# Patient Record
Sex: FEMALE | Race: WHITE | Employment: OTHER
[De-identification: names, ages, dates, MRNs, and addresses within clinical notes are randomized per-mention and may not be internally consistent; named-entity substitution may affect disease eponyms.]

---

## 2017-01-10 ENCOUNTER — OFFICE VISIT (OUTPATIENT)
Dept: FAMILY MEDICINE CLINIC | Facility: CLINIC | Age: 62
End: 2017-01-10

## 2017-01-10 VITALS
SYSTOLIC BLOOD PRESSURE: 120 MMHG | DIASTOLIC BLOOD PRESSURE: 75 MMHG | TEMPERATURE: 97.5 F | BODY MASS INDEX: 28.07 KG/M2 | HEART RATE: 83 BPM | WEIGHT: 158.4 LBS | HEIGHT: 63 IN

## 2017-01-10 DIAGNOSIS — I10 ESSENTIAL HYPERTENSION: Primary | ICD-10-CM

## 2017-01-10 DIAGNOSIS — Z76.0 MEDICATION REFILL: ICD-10-CM

## 2017-01-10 DIAGNOSIS — J45.40 MODERATE PERSISTENT ASTHMA WITHOUT COMPLICATION: ICD-10-CM

## 2017-01-10 DIAGNOSIS — E78.1 HYPERTRIGLYCERIDEMIA: ICD-10-CM

## 2017-01-10 PROCEDURE — 99213 OFFICE O/P EST LOW 20 MIN: CPT | Performed by: FAMILY MEDICINE

## 2017-01-10 RX ORDER — ASPIRIN 81 MG/1
81 TABLET ORAL DAILY
Qty: 30 TABLET | Refills: 5 | Status: SHIPPED | OUTPATIENT
Start: 2017-01-10 | End: 2018-12-11 | Stop reason: SDUPTHER

## 2017-01-10 RX ORDER — SIMVASTATIN 20 MG
20 TABLET ORAL NIGHTLY
Qty: 30 TABLET | Refills: 1 | Status: CANCELLED | OUTPATIENT
Start: 2017-01-10

## 2017-01-10 RX ORDER — CALCIUM CARBONATE 200(500)MG
1 TABLET,CHEWABLE ORAL 3 TIMES DAILY PRN
Qty: 180 TABLET | Refills: 5 | Status: SHIPPED | OUTPATIENT
Start: 2017-01-10

## 2017-01-10 RX ORDER — LOVASTATIN 40 MG/1
60 TABLET ORAL NIGHTLY
Qty: 30 TABLET | Refills: 3 | Status: SHIPPED | OUTPATIENT
Start: 2017-01-10 | End: 2017-07-20 | Stop reason: SDUPTHER

## 2017-01-10 RX ORDER — GEMFIBROZIL 600 MG/1
TABLET, FILM COATED ORAL
Qty: 60 TABLET | Refills: 1 | Status: SHIPPED | OUTPATIENT
Start: 2017-01-10 | End: 2017-06-01 | Stop reason: SDUPTHER

## 2017-01-10 RX ORDER — RALOXIFENE HYDROCHLORIDE 60 MG/1
TABLET, FILM COATED ORAL
Qty: 30 TABLET | Refills: 1 | Status: SHIPPED | OUTPATIENT
Start: 2017-01-10 | End: 2017-07-21 | Stop reason: SDUPTHER

## 2017-01-10 RX ORDER — SERTRALINE HYDROCHLORIDE 100 MG/1
TABLET, FILM COATED ORAL
Qty: 30 TABLET | Refills: 1 | Status: SHIPPED | OUTPATIENT
Start: 2017-01-10 | End: 2017-07-21 | Stop reason: SDUPTHER

## 2017-01-10 ASSESSMENT — ENCOUNTER SYMPTOMS
COUGH: 0
CHEST TIGHTNESS: 0
CHOKING: 0
BACK PAIN: 1
APNEA: 0

## 2017-02-01 ENCOUNTER — TELEPHONE (OUTPATIENT)
Dept: FAMILY MEDICINE CLINIC | Facility: CLINIC | Age: 62
End: 2017-02-01

## 2017-02-06 ENCOUNTER — TELEPHONE (OUTPATIENT)
Dept: FAMILY MEDICINE CLINIC | Facility: CLINIC | Age: 62
End: 2017-02-06

## 2017-02-09 ENCOUNTER — OFFICE VISIT (OUTPATIENT)
Dept: FAMILY MEDICINE CLINIC | Facility: CLINIC | Age: 62
End: 2017-02-09

## 2017-02-09 VITALS
WEIGHT: 153.2 LBS | TEMPERATURE: 99 F | DIASTOLIC BLOOD PRESSURE: 74 MMHG | BODY MASS INDEX: 27.14 KG/M2 | HEART RATE: 101 BPM | SYSTOLIC BLOOD PRESSURE: 122 MMHG

## 2017-02-09 DIAGNOSIS — Z01.818 PREOPERATIVE CLEARANCE: ICD-10-CM

## 2017-02-09 DIAGNOSIS — E11.42 TYPE 2 DIABETES MELLITUS WITH DIABETIC POLYNEUROPATHY, WITH LONG-TERM CURRENT USE OF INSULIN (HCC): Primary | ICD-10-CM

## 2017-02-09 DIAGNOSIS — I10 ESSENTIAL HYPERTENSION: ICD-10-CM

## 2017-02-09 DIAGNOSIS — Z79.4 TYPE 2 DIABETES MELLITUS WITH DIABETIC POLYNEUROPATHY, WITH LONG-TERM CURRENT USE OF INSULIN (HCC): Primary | ICD-10-CM

## 2017-02-09 DIAGNOSIS — Z23 NEED FOR TDAP VACCINATION: ICD-10-CM

## 2017-02-09 DIAGNOSIS — M51.26 LUMBAR DISC HERNIATION: ICD-10-CM

## 2017-02-09 LAB — HBA1C MFR BLD: 14 %

## 2017-02-09 PROCEDURE — 83036 HEMOGLOBIN GLYCOSYLATED A1C: CPT | Performed by: FAMILY MEDICINE

## 2017-02-09 PROCEDURE — 90471 IMMUNIZATION ADMIN: CPT | Performed by: FAMILY MEDICINE

## 2017-02-09 PROCEDURE — 90715 TDAP VACCINE 7 YRS/> IM: CPT | Performed by: FAMILY MEDICINE

## 2017-02-09 PROCEDURE — 99214 OFFICE O/P EST MOD 30 MIN: CPT | Performed by: FAMILY MEDICINE

## 2017-02-09 ASSESSMENT — ENCOUNTER SYMPTOMS
BACK PAIN: 1
CHOKING: 0
APNEA: 0
CHEST TIGHTNESS: 0
COUGH: 0

## 2017-03-06 ENCOUNTER — HOSPITAL ENCOUNTER (OUTPATIENT)
Age: 62
Discharge: HOME | End: 2017-03-06
Payer: MEDICAID

## 2017-03-06 DIAGNOSIS — N18.30 BENIGN HYPERTENSION WITH CKD (CHRONIC KIDNEY DISEASE) STAGE III (HCC): ICD-10-CM

## 2017-03-06 DIAGNOSIS — E13.22 SECONDARY DIABETES MELLITUS WITH STAGE 3 CHRONIC KIDNEY DISEASE (HCC): ICD-10-CM

## 2017-03-06 DIAGNOSIS — Z90.5 S/P NEPHRECTOMY: ICD-10-CM

## 2017-03-06 DIAGNOSIS — Z85.528 HISTORY OF RENAL CELL CANCER: ICD-10-CM

## 2017-03-06 DIAGNOSIS — I12.9 BENIGN HYPERTENSION WITH CKD (CHRONIC KIDNEY DISEASE) STAGE III (HCC): ICD-10-CM

## 2017-03-06 DIAGNOSIS — N18.30 SECONDARY DIABETES MELLITUS WITH STAGE 3 CHRONIC KIDNEY DISEASE (HCC): ICD-10-CM

## 2017-03-06 DIAGNOSIS — N18.30 CKD (CHRONIC KIDNEY DISEASE) STAGE 3, GFR 30-59 ML/MIN (HCC): ICD-10-CM

## 2017-03-06 LAB
ABSOLUTE EOS #: 0.3 K/UL (ref 0–0.4)
ABSOLUTE LYMPH #: 3.6 K/UL (ref 1–4.8)
ABSOLUTE MONO #: 0.7 K/UL (ref 0.1–1.3)
ANION GAP SERPL CALCULATED.3IONS-SCNC: 17 MMOL/L (ref 9–17)
BASOPHILS # BLD: 1 % (ref 0–2)
BASOPHILS ABSOLUTE: 0.1 K/UL (ref 0–0.2)
BUN BLDV-MCNC: 25 MG/DL (ref 6–20)
BUN/CREAT BLD: ABNORMAL (ref 9–20)
CALCIUM SERPL-MCNC: 9.2 MG/DL (ref 8.6–10.4)
CHLORIDE BLD-SCNC: 97 MMOL/L (ref 98–107)
CO2: 20 MMOL/L (ref 20–31)
CREAT SERPL-MCNC: 1.1 MG/DL (ref 0.5–0.9)
DIFFERENTIAL TYPE: NORMAL
EOSINOPHILS RELATIVE PERCENT: 3 % (ref 0–4)
GFR AFRICAN AMERICAN: >60 ML/MIN
GFR NON-AFRICAN AMERICAN: 51 ML/MIN
GFR SERPL CREATININE-BSD FRML MDRD: ABNORMAL ML/MIN/{1.73_M2}
GFR SERPL CREATININE-BSD FRML MDRD: ABNORMAL ML/MIN/{1.73_M2}
GLUCOSE BLD-MCNC: 459 MG/DL (ref 70–99)
HCT VFR BLD CALC: 40.2 % (ref 36–46)
HEMOGLOBIN: 13.5 G/DL (ref 12–16)
LYMPHOCYTES # BLD: 36 % (ref 24–44)
MAGNESIUM: 2 MG/DL (ref 1.6–2.6)
MCH RBC QN AUTO: 29.5 PG (ref 26–34)
MCHC RBC AUTO-ENTMCNC: 33.7 G/DL (ref 31–37)
MCV RBC AUTO: 87.3 FL (ref 80–100)
MONOCYTES # BLD: 7 % (ref 1–7)
PDW BLD-RTO: 13.7 % (ref 11.5–14.9)
PHOSPHORUS: 3.2 MG/DL (ref 2.6–4.5)
PLATELET # BLD: 183 K/UL (ref 150–450)
PLATELET ESTIMATE: NORMAL
PMV BLD AUTO: 9.7 FL (ref 6–12)
POTASSIUM SERPL-SCNC: 4.2 MMOL/L (ref 3.7–5.3)
RBC # BLD: 4.6 M/UL (ref 4–5.2)
RBC # BLD: NORMAL 10*6/UL
SEG NEUTROPHILS: 53 % (ref 36–66)
SEGMENTED NEUTROPHILS ABSOLUTE COUNT: 5.3 K/UL (ref 1.3–9.1)
SODIUM BLD-SCNC: 134 MMOL/L (ref 135–144)
WBC # BLD: 10.1 K/UL (ref 3.5–11)
WBC # BLD: NORMAL 10*3/UL

## 2017-03-06 PROCEDURE — 80048 BASIC METABOLIC PNL TOTAL CA: CPT

## 2017-03-06 PROCEDURE — 84100 ASSAY OF PHOSPHORUS: CPT

## 2017-03-06 PROCEDURE — 85025 COMPLETE CBC W/AUTO DIFF WBC: CPT

## 2017-03-06 PROCEDURE — 83735 ASSAY OF MAGNESIUM: CPT

## 2017-03-06 PROCEDURE — 36415 COLL VENOUS BLD VENIPUNCTURE: CPT

## 2017-03-13 ENCOUNTER — OFFICE VISIT (OUTPATIENT)
Dept: FAMILY MEDICINE CLINIC | Age: 62
End: 2017-03-13
Payer: MEDICAID

## 2017-03-13 VITALS
HEIGHT: 62 IN | SYSTOLIC BLOOD PRESSURE: 134 MMHG | HEART RATE: 78 BPM | WEIGHT: 162.4 LBS | BODY MASS INDEX: 29.88 KG/M2 | DIASTOLIC BLOOD PRESSURE: 66 MMHG | TEMPERATURE: 97.4 F

## 2017-03-13 DIAGNOSIS — Z13.9 SCREENING: ICD-10-CM

## 2017-03-13 DIAGNOSIS — Z12.11 SCREEN FOR COLON CANCER: ICD-10-CM

## 2017-03-13 LAB — HBA1C MFR BLD: 14 %

## 2017-03-13 PROCEDURE — 83036 HEMOGLOBIN GLYCOSYLATED A1C: CPT | Performed by: FAMILY MEDICINE

## 2017-03-13 PROCEDURE — 99213 OFFICE O/P EST LOW 20 MIN: CPT | Performed by: FAMILY MEDICINE

## 2017-03-13 ASSESSMENT — ENCOUNTER SYMPTOMS
CHOKING: 0
APNEA: 0
CHEST TIGHTNESS: 0
COUGH: 0

## 2017-03-17 ENCOUNTER — HOSPITAL ENCOUNTER (OUTPATIENT)
Age: 62
Setting detail: SPECIMEN
Discharge: HOME | End: 2017-03-17
Payer: MEDICAID

## 2017-03-17 DIAGNOSIS — Z13.9 SCREENING: ICD-10-CM

## 2017-03-17 LAB — HIV AG/AB: NONREACTIVE

## 2017-03-27 DIAGNOSIS — Z13.9 SCREENING: ICD-10-CM

## 2017-03-27 DIAGNOSIS — Z12.11 SCREEN FOR COLON CANCER: ICD-10-CM

## 2017-03-27 LAB
CONTROL: PRESENT
HEMOCCULT STL QL: NEGATIVE

## 2017-03-27 PROCEDURE — 82274 ASSAY TEST FOR BLOOD FECAL: CPT | Performed by: FAMILY MEDICINE

## 2017-04-03 ENCOUNTER — HOSPITAL ENCOUNTER (OUTPATIENT)
Age: 62
Discharge: HOME | End: 2017-04-03
Payer: MEDICAID

## 2017-04-03 LAB
CORTISOL COLLECTION INFO: NORMAL
CORTISOL: 0.9 UG/DL

## 2017-04-03 PROCEDURE — 82533 TOTAL CORTISOL: CPT

## 2017-04-03 PROCEDURE — 36415 COLL VENOUS BLD VENIPUNCTURE: CPT

## 2017-05-16 ENCOUNTER — OFFICE VISIT (OUTPATIENT)
Dept: FAMILY MEDICINE CLINIC | Age: 62
End: 2017-05-16
Payer: MEDICAID

## 2017-05-16 VITALS
HEART RATE: 88 BPM | HEIGHT: 62 IN | TEMPERATURE: 97.3 F | BODY MASS INDEX: 29.08 KG/M2 | SYSTOLIC BLOOD PRESSURE: 129 MMHG | WEIGHT: 158 LBS | DIASTOLIC BLOOD PRESSURE: 79 MMHG

## 2017-05-16 DIAGNOSIS — G47.33 OSA ON CPAP: ICD-10-CM

## 2017-05-16 DIAGNOSIS — Z99.89 OSA ON CPAP: ICD-10-CM

## 2017-05-16 DIAGNOSIS — I10 ESSENTIAL HYPERTENSION: Primary | ICD-10-CM

## 2017-05-16 DIAGNOSIS — M51.36 DDD (DEGENERATIVE DISC DISEASE), LUMBAR: ICD-10-CM

## 2017-05-16 PROCEDURE — 99213 OFFICE O/P EST LOW 20 MIN: CPT | Performed by: FAMILY MEDICINE

## 2017-05-16 ASSESSMENT — ENCOUNTER SYMPTOMS
COUGH: 0
CHEST TIGHTNESS: 0
APNEA: 0
CHOKING: 0
BACK PAIN: 1

## 2017-06-01 DIAGNOSIS — Z76.0 MEDICATION REFILL: ICD-10-CM

## 2017-06-01 RX ORDER — GEMFIBROZIL 600 MG/1
TABLET, FILM COATED ORAL
Qty: 60 TABLET | Refills: 0 | Status: SHIPPED | OUTPATIENT
Start: 2017-06-01 | End: 2018-12-11 | Stop reason: SDUPTHER

## 2017-06-01 RX ORDER — SERTRALINE HYDROCHLORIDE 100 MG/1
TABLET, FILM COATED ORAL
Qty: 30 TABLET | Refills: 0 | Status: SHIPPED | OUTPATIENT
Start: 2017-06-01 | End: 2017-07-20 | Stop reason: SDUPTHER

## 2017-06-01 RX ORDER — RALOXIFENE HYDROCHLORIDE 60 MG/1
TABLET, FILM COATED ORAL
Qty: 30 TABLET | Refills: 0 | Status: SHIPPED | OUTPATIENT
Start: 2017-06-01 | End: 2017-07-20 | Stop reason: SDUPTHER

## 2017-06-27 ENCOUNTER — HOSPITAL ENCOUNTER (OUTPATIENT)
Age: 62
Discharge: HOME | End: 2017-06-27
Payer: MEDICAID

## 2017-06-27 DIAGNOSIS — N18.30 SECONDARY DIABETES MELLITUS WITH STAGE 3 CHRONIC KIDNEY DISEASE (GFR 30-59) (HCC): ICD-10-CM

## 2017-06-27 DIAGNOSIS — I12.9 BENIGN HYPERTENSION WITH CKD (CHRONIC KIDNEY DISEASE) STAGE III (HCC): ICD-10-CM

## 2017-06-27 DIAGNOSIS — Z85.528 HISTORY OF RENAL CELL CANCER: ICD-10-CM

## 2017-06-27 DIAGNOSIS — N18.30 CKD (CHRONIC KIDNEY DISEASE) STAGE 3, GFR 30-59 ML/MIN (HCC): ICD-10-CM

## 2017-06-27 DIAGNOSIS — Z90.5 S/P NEPHRECTOMY: ICD-10-CM

## 2017-06-27 DIAGNOSIS — N18.30 BENIGN HYPERTENSION WITH CKD (CHRONIC KIDNEY DISEASE) STAGE III (HCC): ICD-10-CM

## 2017-06-27 DIAGNOSIS — E13.22 SECONDARY DIABETES MELLITUS WITH STAGE 3 CHRONIC KIDNEY DISEASE (GFR 30-59) (HCC): ICD-10-CM

## 2017-06-27 LAB
ANION GAP SERPL CALCULATED.3IONS-SCNC: 17 MMOL/L (ref 9–17)
C-PEPTIDE: 7.3 NG/ML (ref 1.1–4.4)
CHLORIDE BLD-SCNC: 97 MMOL/L (ref 98–107)
CO2: 21 MMOL/L (ref 20–31)
GLUCOSE BLD-MCNC: 227 MG/DL (ref 70–99)
POTASSIUM SERPL-SCNC: 4.1 MMOL/L (ref 3.7–5.3)
SODIUM BLD-SCNC: 135 MMOL/L (ref 135–144)

## 2017-06-27 PROCEDURE — 80051 ELECTROLYTE PANEL: CPT

## 2017-06-27 PROCEDURE — 84681 ASSAY OF C-PEPTIDE: CPT

## 2017-06-27 PROCEDURE — 86341 ISLET CELL ANTIBODY: CPT

## 2017-06-27 PROCEDURE — 82947 ASSAY GLUCOSE BLOOD QUANT: CPT

## 2017-06-27 PROCEDURE — 36415 COLL VENOUS BLD VENIPUNCTURE: CPT

## 2017-06-29 LAB — ISLET CELL ANTIBODY: NORMAL

## 2017-07-20 DIAGNOSIS — Z76.0 MEDICATION REFILL: Primary | ICD-10-CM

## 2017-07-20 DIAGNOSIS — E78.2 MIXED HYPERLIPIDEMIA: ICD-10-CM

## 2017-07-21 RX ORDER — LOVASTATIN 40 MG/1
TABLET ORAL
Qty: 30 TABLET | Refills: 2 | Status: SHIPPED | OUTPATIENT
Start: 2017-07-21 | End: 2018-12-11 | Stop reason: SDUPTHER

## 2017-07-21 RX ORDER — GEMFIBROZIL 600 MG/1
TABLET, FILM COATED ORAL
Qty: 60 TABLET | Refills: 0 | OUTPATIENT
Start: 2017-07-21

## 2017-07-21 RX ORDER — RALOXIFENE HYDROCHLORIDE 60 MG/1
TABLET, FILM COATED ORAL
Qty: 30 TABLET | Refills: 0 | Status: SHIPPED | OUTPATIENT
Start: 2017-07-21 | End: 2017-10-09 | Stop reason: SDUPTHER

## 2017-07-21 RX ORDER — SERTRALINE HYDROCHLORIDE 100 MG/1
TABLET, FILM COATED ORAL
Qty: 30 TABLET | Refills: 0 | Status: SHIPPED | OUTPATIENT
Start: 2017-07-21 | End: 2017-10-09 | Stop reason: SDUPTHER

## 2017-09-15 ENCOUNTER — OFFICE VISIT (OUTPATIENT)
Dept: FAMILY MEDICINE CLINIC | Age: 62
End: 2017-09-15
Payer: MEDICAID

## 2017-09-15 VITALS
WEIGHT: 160.4 LBS | HEIGHT: 62 IN | HEART RATE: 83 BPM | DIASTOLIC BLOOD PRESSURE: 74 MMHG | SYSTOLIC BLOOD PRESSURE: 133 MMHG | BODY MASS INDEX: 29.52 KG/M2 | TEMPERATURE: 98 F

## 2017-09-15 DIAGNOSIS — M48.062 SPINAL STENOSIS, LUMBAR REGION, WITH NEUROGENIC CLAUDICATION: ICD-10-CM

## 2017-09-15 DIAGNOSIS — R07.2 PRECORDIAL PAIN: ICD-10-CM

## 2017-09-15 DIAGNOSIS — Z23 NEED FOR INFLUENZA VACCINATION: ICD-10-CM

## 2017-09-15 DIAGNOSIS — E11.43 DIABETIC AUTONOMIC NEUROPATHY ASSOCIATED WITH TYPE 2 DIABETES MELLITUS (HCC): ICD-10-CM

## 2017-09-15 DIAGNOSIS — I10 ESSENTIAL HYPERTENSION: Primary | ICD-10-CM

## 2017-09-15 PROCEDURE — 99213 OFFICE O/P EST LOW 20 MIN: CPT | Performed by: FAMILY MEDICINE

## 2017-09-15 ASSESSMENT — ENCOUNTER SYMPTOMS
CHEST TIGHTNESS: 0
COUGH: 0
CHOKING: 0
APNEA: 0

## 2017-09-18 PROCEDURE — 90471 IMMUNIZATION ADMIN: CPT | Performed by: FAMILY MEDICINE

## 2017-09-18 PROCEDURE — 90688 IIV4 VACCINE SPLT 0.5 ML IM: CPT | Performed by: FAMILY MEDICINE

## 2017-09-20 ENCOUNTER — TELEPHONE (OUTPATIENT)
Dept: ORTHOPEDIC SURGERY | Age: 62
End: 2017-09-20

## 2017-09-20 ENCOUNTER — HOSPITAL ENCOUNTER (OUTPATIENT)
Dept: PREADMISSION TESTING | Age: 62
Discharge: HOME | End: 2017-09-20
Payer: MEDICAID

## 2017-09-20 VITALS
BODY MASS INDEX: 29.11 KG/M2 | TEMPERATURE: 96.8 F | SYSTOLIC BLOOD PRESSURE: 118 MMHG | WEIGHT: 158.2 LBS | DIASTOLIC BLOOD PRESSURE: 71 MMHG | HEART RATE: 71 BPM | RESPIRATION RATE: 20 BRPM | OXYGEN SATURATION: 95 % | HEIGHT: 62 IN

## 2017-09-20 LAB
ABSOLUTE EOS #: 0.3 K/UL (ref 0–0.4)
ABSOLUTE LYMPH #: 3.2 K/UL (ref 1–4.8)
ABSOLUTE MONO #: 0.8 K/UL (ref 0.1–1.3)
ANION GAP SERPL CALCULATED.3IONS-SCNC: 16 MMOL/L (ref 9–17)
BASOPHILS # BLD: 1 %
BASOPHILS ABSOLUTE: 0.1 K/UL (ref 0–0.2)
BUN BLDV-MCNC: 22 MG/DL (ref 8–23)
BUN/CREAT BLD: ABNORMAL (ref 9–20)
CALCIUM SERPL-MCNC: 9.6 MG/DL (ref 8.6–10.4)
CHLORIDE BLD-SCNC: 96 MMOL/L (ref 98–107)
CO2: 20 MMOL/L (ref 20–31)
CREAT SERPL-MCNC: 0.93 MG/DL (ref 0.5–0.9)
DIFFERENTIAL TYPE: NORMAL
EOSINOPHILS RELATIVE PERCENT: 3 %
GFR AFRICAN AMERICAN: >60 ML/MIN
GFR NON-AFRICAN AMERICAN: >60 ML/MIN
GFR SERPL CREATININE-BSD FRML MDRD: ABNORMAL ML/MIN/{1.73_M2}
GFR SERPL CREATININE-BSD FRML MDRD: ABNORMAL ML/MIN/{1.73_M2}
GLUCOSE BLD-MCNC: 481 MG/DL (ref 70–99)
HCT VFR BLD CALC: 42.9 % (ref 36–46)
HEMOGLOBIN: 14.1 G/DL (ref 12–16)
LYMPHOCYTES # BLD: 31 %
MCH RBC QN AUTO: 29 PG (ref 26–34)
MCHC RBC AUTO-ENTMCNC: 32.9 G/DL (ref 31–37)
MCV RBC AUTO: 88.3 FL (ref 80–100)
MONOCYTES # BLD: 8 %
PDW BLD-RTO: 13.4 % (ref 11.5–14.9)
PLATELET # BLD: 195 K/UL (ref 150–450)
PLATELET ESTIMATE: NORMAL
PMV BLD AUTO: 10.3 FL (ref 6–12)
POTASSIUM SERPL-SCNC: 4.2 MMOL/L (ref 3.7–5.3)
RBC # BLD: 4.87 M/UL (ref 4–5.2)
RBC # BLD: NORMAL 10*6/UL
SEG NEUTROPHILS: 57 %
SEGMENTED NEUTROPHILS ABSOLUTE COUNT: 6.1 K/UL (ref 1.3–9.1)
SODIUM BLD-SCNC: 132 MMOL/L (ref 135–144)
WBC # BLD: 10.4 K/UL (ref 3.5–11)
WBC # BLD: NORMAL 10*3/UL

## 2017-09-20 PROCEDURE — 36415 COLL VENOUS BLD VENIPUNCTURE: CPT

## 2017-09-20 PROCEDURE — 85025 COMPLETE CBC W/AUTO DIFF WBC: CPT

## 2017-09-20 PROCEDURE — 80048 BASIC METABOLIC PNL TOTAL CA: CPT

## 2017-09-21 ENCOUNTER — TELEPHONE (OUTPATIENT)
Dept: FAMILY MEDICINE CLINIC | Age: 62
End: 2017-09-21

## 2017-09-21 RX ORDER — LIDOCAINE HYDROCHLORIDE 10 MG/ML
1 INJECTION, SOLUTION EPIDURAL; INFILTRATION; INTRACAUDAL; PERINEURAL
Status: CANCELLED | OUTPATIENT
Start: 2017-09-21 | End: 2017-09-21

## 2017-09-21 RX ORDER — SODIUM CHLORIDE 9 MG/ML
INJECTION, SOLUTION INTRAVENOUS CONTINUOUS
Status: CANCELLED | OUTPATIENT
Start: 2017-09-21

## 2017-09-21 RX ORDER — SODIUM CHLORIDE 0.9 % (FLUSH) 0.9 %
10 SYRINGE (ML) INJECTION PRN
Status: CANCELLED | OUTPATIENT
Start: 2017-09-21

## 2017-09-21 RX ORDER — SODIUM CHLORIDE 0.9 % (FLUSH) 0.9 %
10 SYRINGE (ML) INJECTION EVERY 12 HOURS SCHEDULED
Status: CANCELLED | OUTPATIENT
Start: 2017-09-21

## 2017-10-04 ENCOUNTER — HOSPITAL ENCOUNTER (OUTPATIENT)
Age: 62
Setting detail: SURGERY ADMIT
Discharge: HOME | End: 2017-10-04
Attending: ORTHOPAEDIC SURGERY | Admitting: ORTHOPAEDIC SURGERY
Payer: MEDICAID

## 2017-10-04 VITALS
HEART RATE: 79 BPM | BODY MASS INDEX: 29.08 KG/M2 | DIASTOLIC BLOOD PRESSURE: 91 MMHG | OXYGEN SATURATION: 99 % | TEMPERATURE: 97.9 F | RESPIRATION RATE: 18 BRPM | HEIGHT: 62 IN | SYSTOLIC BLOOD PRESSURE: 180 MMHG | WEIGHT: 158 LBS

## 2017-10-04 LAB — GLUCOSE BLD-MCNC: 373 MG/DL (ref 65–105)

## 2017-10-04 PROCEDURE — 82947 ASSAY GLUCOSE BLOOD QUANT: CPT

## 2017-10-04 PROCEDURE — 2580000003 HC RX 258: Performed by: ANESTHESIOLOGY

## 2017-10-04 RX ORDER — LIDOCAINE HYDROCHLORIDE 10 MG/ML
1 INJECTION, SOLUTION EPIDURAL; INFILTRATION; INTRACAUDAL; PERINEURAL
Status: DISCONTINUED | OUTPATIENT
Start: 2017-10-04 | End: 2017-10-04 | Stop reason: HOSPADM

## 2017-10-04 RX ORDER — SODIUM CHLORIDE 9 MG/ML
INJECTION, SOLUTION INTRAVENOUS CONTINUOUS
Status: DISCONTINUED | OUTPATIENT
Start: 2017-10-04 | End: 2017-10-04 | Stop reason: HOSPADM

## 2017-10-04 RX ORDER — SODIUM CHLORIDE 0.9 % (FLUSH) 0.9 %
10 SYRINGE (ML) INJECTION PRN
Status: DISCONTINUED | OUTPATIENT
Start: 2017-10-04 | End: 2017-10-04 | Stop reason: HOSPADM

## 2017-10-04 RX ORDER — SODIUM CHLORIDE 0.9 % (FLUSH) 0.9 %
10 SYRINGE (ML) INJECTION EVERY 12 HOURS SCHEDULED
Status: DISCONTINUED | OUTPATIENT
Start: 2017-10-04 | End: 2017-10-04 | Stop reason: HOSPADM

## 2017-10-04 RX ADMIN — SODIUM CHLORIDE: 9 INJECTION, SOLUTION INTRAVENOUS at 07:46

## 2017-10-04 ASSESSMENT — PAIN - FUNCTIONAL ASSESSMENT: PAIN_FUNCTIONAL_ASSESSMENT: 0-10

## 2017-10-04 NOTE — H&P (VIEW-ONLY)
CHOLECYSTECTOMY  10/10/2003    ENDOMETRIAL ABLATION      KNEE SURGERY Bilateral     x 2 each side    LUMBAR LAMINECTOMY  10/15/14    WITH FUSION POSTERIOR L4 L5 WITH SPINAL CORD MONITORING    NEPHROSTOMY Right     TOTAL NEPHRECTOMY Right 06/20/2013    right due to CA       FAMILY HISTORY       Family History   Problem Relation Age of Onset    Diabetes Mother     High Blood Pressure Mother     Pacemaker Mother     High Blood Pressure Father     Prostate Cancer Father     Breast Cancer Sister     Asthma Brother     Prostate Cancer Maternal Grandfather     High Blood Pressure Paternal Grandmother        SOCIAL HISTORY       Social History     Social History    Marital status:      Spouse name: N/A    Number of children: N/A    Years of education: 15     Occupational History    disability N/A     Social History Main Topics    Smoking status: Never Smoker    Smokeless tobacco: Never Used    Alcohol use No    Drug use: No    Sexual activity: Not Asked     Other Topics Concern    None     Social History Narrative           REVIEW OF SYSTEMS      Allergies   Allergen Reactions    Penicillins Hives and Rash    Tape [Adhesive Tape] Rash     OK to use paper tape per patient       Current Outpatient Prescriptions on File Prior to Encounter   Medication Sig Dispense Refill    raloxifene (EVISTA) 60 MG tablet TAKE ONE TABLET BY MOUTH DAILY 30 tablet 0    lovastatin (MEVACOR) 40 MG tablet TAKE ONE AND ONE-HALF (1  1/2) TABLET BY MOUTH NIGHTLY (Patient taking differently: TAKE ONE AND ONE-HALF (1  1/2) TABLET BY MOUTH NIGHTLY, takes 60 mg daily) 30 tablet 2    sertraline (ZOLOFT) 100 MG tablet TAKE ONE TABLET BY MOUTH DAILY 30 tablet 0    gemfibrozil (LOPID) 600 MG tablet TAKE ONE TABLET BY MOUTH TWICE A DAY 60 tablet 0    sodium bicarbonate 650 MG tablet Take 1 tablet by mouth 3 times daily 90 tablet 11    aspirin EC 81 MG EC tablet Take 1 tablet by mouth daily 30 tablet 5    calcium carbonate (TUMS) 500 MG chewable tablet Take 1 tablet by mouth 3 times daily as needed for Heartburn 180 tablet 5    Handicap Placard MISC by Does not apply route Duration: 1 year 1 each 0    glucagon 1 MG injection Infuse 1 kit intravenously once      traMADol (ULTRAM) 50 MG tablet Take 50 mg by mouth every 6 hours as needed for Pain      OnabotulinumtoxinA (BOTOX IJ) Inject as directed Indications: one injection every three months      zonisamide (ZONEGRAN) 25 MG capsule Take 25 mg by mouth daily      zolpidem (AMBIEN) 10 MG tablet Take 10 mg by mouth nightly as needed for Sleep.  Ergocalciferol (VITAMIN D2 PO) Take 1.25 mg by mouth 2 times daily       lisinopril (PRINIVIL;ZESTRIL) 20 MG tablet Take 20 mg by mouth daily.  acetaminophen (TYLENOL) 500 MG tablet Take 500 mg by mouth every 6 hours as needed.  clonazePAM (KLONOPIN) 0.5 MG tablet Take 0.5 mg by mouth nightly as needed.  butalbital-acetaminophen-caffeine (FIORICET, ESGIC) per tablet Take 1 tablet by mouth every 8 hours as needed.  ammonium lactate (AMLACTIN) 12 % cream Apply topically 2 times daily Apply topically as needed. Uses to feet.  montelukast (SINGULAIR) 10 MG tablet Take 10 mg by mouth nightly.  albuterol (PROVENTIL HFA;VENTOLIN HFA) 108 (90 BASE) MCG/ACT inhaler Inhale 2 puffs into the lungs every 6 hours as needed. No current facility-administered medications on file prior to encounter. General health:  Fairly good. No fever or chills. Skin:  No itching, redness or rash. HEENT:  Headache. No  epistaxis or sore throat. Neck:  No pain, stiffness or masses. Cardiovascular/Respiratory system:  Chest pain, shortness of breath, hx asthma. No  palpitation or                          Gastrointestinal tract: Nausea. No abdominal pain, vomiting, diarrhea or constipation. Genitourinary:  No burning on micturition.   No Decompression and instrumented Fusion L3-4 w/ revision and removal hardware L4-5.     Patient Active Problem List    Diagnosis Date Noted    Hypokalemia, gastrointestinal losses 11/21/2014     Priority: High    Clostridium difficile diarrhea 11/19/2014     Priority: High    Dehydration 11/19/2014     Priority: High    CEFERINO (acute kidney injury) ([address] 11/19/2014     Priority: High    EJ on CPAP 05/16/2017    Preoperative clearance 02/09/2017    Type 2 diabetes mellitus with diabetic polyneuropathy, with long-term current use of insulin ([address] 02/09/2017    Lumbar disc herniation 02/09/2017    Need for Tdap vaccination 02/09/2017    Osteoporosis 04/12/2016    CKD (chronic kidney disease) stage 3, GFR 30-59 ml/min 01/18/2016    Annual physical exam 09/17/2015    Depression 09/17/2015    Well adult exam 09/17/2015    Dizziness 12/30/2014    Hypotension 12/30/2014    Colitis     DM (diabetes mellitus), type 2, uncontrolled with complications ([address] 13/32/1353    Orthostatic hypotension 10/22/2013    Cervical spondylosis with myelopathy 09/19/2013    Displacement of cervical intervertebral disc without myelopathy 09/19/2013    Cervicalgia 09/19/2013    Spinal stenosis, lumbar region, with neurogenic claudication 09/19/2013    Acquired spondylolisthesis 09/19/2013    Diabetic autonomic neuropathy associated with type 2 diabetes mellitus ([address] 08/21/2013    Risk for falls 08/21/2013    History of renal cell cancer 08/07/2013    S/p nephrectomy 08/07/2013    Episode of dizziness 08/07/2013    Orthostasis 08/07/2013    Obesity (BMI 30-39.9) 05/28/2013    Moderate persistent asthma 04/02/2013    Carotid artery plaque 04/02/2013    HLD (hyperlipidemia) 04/02/2013    HTN (hypertension) 04/02/2013    Dermatomyositis ([address] 02/18/2013    GERD (gastroesophageal reflux disease) 02/18/2013    Hip arthritis 10/30/2012    Diabetic neuropathy ([address] 10/30/2012    Prolapsed intervertebral disk 09/04/2012    Headache 09/04/2012    Carpal tunnel syndrome 07/31/2012    DDD (degenerative disc disease), lumbar 07/31/2012           Nida Turpin PA-C on 9/20/2017 at 10:56 AM

## 2017-10-04 NOTE — INTERVAL H&P NOTE
HISTORY and [id number]       NAME:  Stvien Rodarte  MRN: [id number]   YOB: 1955   Date: 10/4/2017   Age: 63 y.o. Gender: female     H&P Update Note    H&P from 09/22/2017  reviewed and updated, Patient examined. Vitals: BP (!) 180/91  Pulse 79  Temp 97.9 °F (36.6 °C) (Oral)   Resp 18  Ht 5' 1.75\" (1.568 m)  Wt 158 lb (71.7 kg)  SpO2 99%  BMI 29.13 kg/m2 Body mass index is 29.13 kg/(m^2). Pt is here today for posterior decompression & fusion L3-4 w/ revision & removal hardware L4-5. Pt C/O nausea this AM. Pt denies headache, fever, chills, chest pain, SOB, vomiting. HRRR, lungs are clear, AAO X 3. No interval changes. I concur with the findings.        Jason Cotton PA-C on 10/4/2017 at 7:27 AM

## 2017-10-04 NOTE — IP AVS SNAPSHOT
Patient Information     Patient Name LEV Ignacio 1955         This is your updated medication list to keep with you all times      ASK your doctor about these medications     acetaminophen 500 MG tablet   Commonly known as:  TYLENOL       albuterol sulfate  (90 Base) MCG/ACT inhaler       AMBIEN 10 MG tablet   Generic drug:  zolpidem       ammonium lactate 12 % cream   Commonly known as:  AMLACTIN       ARNUITY ELLIPTA 200 MCG/ACT Aepb   Generic drug:  fluticasone       aspirin EC 81 MG EC tablet   Take 1 tablet by mouth daily       BiPAP Machine Misc       BOTOX IJ       butalbital-acetaminophen-caffeine -40 MG per tablet   Commonly known as:  FIORICET, ESGIC       calcium carbonate 500 MG chewable tablet   Commonly known as:  TUMS   Take 1 tablet by mouth 3 times daily as needed for Heartburn       clonazePAM 0.5 MG tablet   Commonly known as:  KLONOPIN       gemfibrozil 600 MG tablet   Commonly known as:  LOPID   TAKE ONE TABLET BY MOUTH TWICE A DAY       glucagon 1 MG injection       Handicap Placard Misc   by Does not apply route Duration: 1 year       HUMULIN R 500 UNIT/ML concentrated injection vial   Generic drug:  insulin regular human       lisinopril 20 MG tablet   Commonly known as:  PRINIVIL;ZESTRIL       lovastatin 40 MG tablet   Commonly known as:  MEVACOR   TAKE ONE AND ONE-HALF (1  1/2) TABLET BY MOUTH NIGHTLY       montelukast 10 MG tablet   Commonly known as:  SINGULAIR       raloxifene 60 MG tablet   Commonly known as:  EVISTA   TAKE ONE TABLET BY MOUTH DAILY       sertraline 100 MG tablet   Commonly known as:  ZOLOFT   TAKE ONE TABLET BY MOUTH DAILY       sodium bicarbonate 650 MG tablet   Take 1 tablet by mouth 3 times daily       traMADol 50 MG tablet   Commonly known as:  ULTRAM       VITAMIN D2 PO       zonisamide 25 MG capsule   Commonly known as:  Kalli Pleasant

## 2017-10-04 NOTE — IP AVS SNAPSHOT
Allergies as of 10/4/2017        Reactions    Penicillins Hives, Rash    Tape Lorella Richard Tape] Rash    OK to use paper tape per patient      Immunizations as of 10/4/2017     Name Date Dose VIS Date Route    Influenza Virus Vaccine 10/26/2015 0.5 mL 2015 Intramuscular    Influenza Virus Vaccine 10/17/2014 0.5 mL 2014 Intramuscular    Influenza Virus Vaccine 10/22/2013 0.5 mL 2012 Intramuscular    Influenza Virus Vaccine 2012 0.5 mL 2011 Intramuscular    Influenza, Quadv, 3 Years and older, IM 2017 0.5 mL 2015 Intramuscular    Pneumococcal 13-valent Conjugate (Dvzwvkx10) 2015 0.5 mL 2013 Intramuscular    Pneumococcal Polysaccharide ([id number]) 2016 0.5 mL 2015 Intramuscular    Tdap (Boostrix, Adacel) 2017 0.5 mL 2015 Intramuscular      Last Vitals          Most Recent Value    Temp  97.9 °F (36.6 °C)    Pulse  79    Resp  18    BP  (!)  180/91         After Visit Summary    This summary was created for you. Thank you for entrusting your care to us. The following information includes details about your hospital/visit stay along with steps you should take to help with your recovery once you leave the hospital.  In this packet, you will find information about the topics listed below:    · Instructions about your medications including a list of your home medications  · A summary of your hospital visit  · Follow-up appointments once you have left the hospital  · Your care plan at home      You may receive a survey regarding the care you received during your stay. Your input is valuable to us. We encourage you to complete and return your survey in the envelope provided. We hope you will choose us in the future for your healthcare needs.           Patient Information     Patient Name LEV Hackett 1955      Care Provided at:     Name Address Phone       [address] [facility] [place] [address] [facility] [address] [phone]            Your Visit    Here you will find information about your visit, including the reason for your visit. Please take this sheet with you when you visit your doctor or other health care provider in the future. It will help determine the best possible medical care for you at that time. If you have any questions once you leave the hospital, please call the department phone number listed below. Why you were here     Your primary diagnosis was:  Not on File      Visit Information     Date & Time Provider Department Dept. Phone    10/4/2017 Jose Rafael Martinez  [facility] [phone]       Follow-up Appointments    Below is a list of your follow-up and future appointments. This may not be a complete list as you may have made appointments directly with providers that we are not aware of or your providers may have made some for you. Please call your providers to confirm appointments. It is important to keep your appointments. Please bring your current insurance card, photo ID, co-pay, and all medication bottles to your appointment. If self-pay, payment is expected at the time of service.         Follow-up Information     Call [place] MD.    Specialty:  Family Medicine    Why:  management of blood sugar    Contact information:    [place] [address]  [phone]        Future Appointments     10/17/2017 10:30 AM     Appointment with Jose Rafael Martinez MD at [address] ([phone])   [address] [facility] [address] [facility]  [address] [facility] [address]       11/28/2017 2:00 PM     Appointment with En Cano MD at [facility] ([phone])   Please arrive 15 minutes prior to appointment time, bring insurance card and photo ID.    [address]  [address] [facility]         Preventive Care        Date Due    Eye Exam By An Eye Doctor 4/28/2017    Mammograms are recommended every 2 years for low/average risk patients aged 48 - 69, and every year for high risk patients per updated national guidelines. However these guidelines can be individualized by your provider. 5/17/2017    Hemoglobin A1C (Test For Long-Term Glucose Control) 6/13/2017    Zoster Vaccine 7/22/2017    Cholesterol Screening 9/2/2017    Diabetic Foot Exam 5/18/2018 (Originally 4/28/2017)    Urine Check For Kidney Problems 5/24/2018 (Originally 12/18/2016)    Colon Cancer Stool Test 3/13/2018    Pap Smear 9/17/2018    Tetanus Combination Vaccine (2 - Td) 2/9/2027                 Care Plan Once You Return Home    This section includes instructions you will need to follow once you leave the hospital.  Your care team will discuss these with you, so you and those caring for you know how to best care for your health needs at home. This section may also include educational information about certain health topics that may be of help to you. [employer] Signup     [facility] allows you to send messages to your doctor, view your test results, renew your prescriptions, schedule appointments, view visit notes, and more. How Do I Sign Up? 1. In your Internet browser, go to [email or web address]  2. Click on the Sign Up Now link in the Sign In box. You will see the New Member Sign Up page. 3. Enter your [facility] Access Code exactly as it appears below. You will not need to use this code after youve completed the sign-up process. If you do not sign up before the expiration date, you must request a new code. [facility] Access Code: [email or web address]  Expires: 11/14/2017 10:46 AM    4. Enter your Social Security Number ([id number]) and Date of Birth (mm/dd/yyyy) as indicated and click Submit. You will be taken to the next sign-up page. 5. Create a [facility] ID. This will be your [facility] login ID and cannot be changed, so think of one that is secure and easy to remember. 6. Create a [email or web address] password. You can change your password at any time.

## 2017-10-09 DIAGNOSIS — Z76.0 MEDICATION REFILL: ICD-10-CM

## 2017-10-10 ENCOUNTER — TELEPHONE (OUTPATIENT)
Dept: ORTHOPEDIC SURGERY | Age: 62
End: 2017-10-10

## 2017-10-10 NOTE — TELEPHONE ENCOUNTER
pts surgery was canceled on 10-4-2017 due to sugar being so high. Is there any thing you can give her for pain until the surgery can be rescheduled.

## 2017-10-17 ENCOUNTER — OFFICE VISIT (OUTPATIENT)
Dept: ORTHOPEDIC SURGERY | Age: 62
End: 2017-10-17
Payer: MEDICAID

## 2017-10-17 DIAGNOSIS — E87.6 HYPOKALEMIA, GASTROINTESTINAL LOSSES: ICD-10-CM

## 2017-10-17 DIAGNOSIS — M43.10 ACQUIRED SPONDYLOLISTHESIS: ICD-10-CM

## 2017-10-17 DIAGNOSIS — M48.062 SPINAL STENOSIS, LUMBAR REGION, WITH NEUROGENIC CLAUDICATION: ICD-10-CM

## 2017-10-17 DIAGNOSIS — M51.26 LUMBAR DISC HERNIATION: ICD-10-CM

## 2017-10-17 DIAGNOSIS — M51.36 DDD (DEGENERATIVE DISC DISEASE), LUMBAR: Primary | ICD-10-CM

## 2017-10-17 PROCEDURE — 99213 OFFICE O/P EST LOW 20 MIN: CPT | Performed by: ORTHOPAEDIC SURGERY

## 2017-10-17 NOTE — PROGRESS NOTES
Subjective:      Patient ID: Patel Prieto is a 63 y.o. female. HPI    Patient returns clinic today follow-up L3 4 spondylolisthesis. Patient's surgery was canceled for elevated blood sugar. Patient's list of blood sugars from home indicate fairly well controlled blood sugar. Blood sugar is been checked in the hospital and an A1c checked last December indicate much worse control of her blood sugar        Review of Systems    Objective:   Physical Exam   Constitutional: She is oriented to person, place, and time. She appears well-developed and well-nourished. HENT:   Head: Normocephalic and atraumatic. Eyes: Conjunctivae and EOM are normal.   Neck: Normal range of motion. Pulmonary/Chest: Effort normal. No respiratory distress. Neurological: She is alert and oriented to person, place, and time. She has normal strength. No sensory deficit. Normal gait   Skin: Skin is warm and dry. Psychiatric: Her behavior is normal. Thought content normal.   Nursing note and vitals reviewed. Assessment:      Encounter Diagnoses   Name Primary?     DDD (degenerative disc disease), lumbar Yes    Acquired spondylolisthesis     Lumbar disc herniation     Spinal stenosis, lumbar region, with neurogenic claudication     Hypokalemia, gastrointestinal losses      Patient's list of blood sugar control at home although not great looks fairly reasonable    Patient's hospital drawn blood sugars and her last A1c that is available to me shows very poorly controlled blood sugars            Plan:      Follow up with endocrinologist    Follow-up with me in 4 weeks    Patient wouldn't require much greater control of her blood sugars and she is shown on the hospital testing if she is to consider surgical intervention    Unfortunately I have not much else offer to offer the patient    Given the patient's poor blood sugar control and single kidney certainly I do not feel comfortable prescribing anti-inflammatories

## 2017-10-18 RX ORDER — SERTRALINE HYDROCHLORIDE 100 MG/1
TABLET, FILM COATED ORAL
Qty: 30 TABLET | Refills: 0 | Status: SHIPPED | OUTPATIENT
Start: 2017-10-18 | End: 2018-03-14 | Stop reason: SDUPTHER

## 2017-10-18 RX ORDER — RALOXIFENE HYDROCHLORIDE 60 MG/1
TABLET, FILM COATED ORAL
Qty: 30 TABLET | Refills: 0 | Status: SHIPPED | OUTPATIENT
Start: 2017-10-18 | End: 2018-12-11 | Stop reason: SDUPTHER

## 2017-10-30 ENCOUNTER — HOSPITAL ENCOUNTER (OUTPATIENT)
Age: 62
Discharge: HOME | End: 2017-10-30
Payer: MEDICAID

## 2017-10-30 LAB
ALBUMIN SERPL-MCNC: 3.8 G/DL (ref 3.5–5.2)
ALBUMIN/GLOBULIN RATIO: ABNORMAL (ref 1–2.5)
ALP BLD-CCNC: 153 U/L (ref 35–104)
ALT SERPL-CCNC: 14 U/L (ref 5–33)
ANION GAP SERPL CALCULATED.3IONS-SCNC: 18 MMOL/L (ref 9–17)
AST SERPL-CCNC: 14 U/L
BILIRUB SERPL-MCNC: 0.62 MG/DL (ref 0.3–1.2)
BUN BLDV-MCNC: 19 MG/DL (ref 8–23)
BUN/CREAT BLD: ABNORMAL (ref 9–20)
CALCIUM SERPL-MCNC: 9.5 MG/DL (ref 8.6–10.4)
CHLORIDE BLD-SCNC: 94 MMOL/L (ref 98–107)
CHOLESTEROL/HDL RATIO: 4.9
CHOLESTEROL: 167 MG/DL
CO2: 19 MMOL/L (ref 20–31)
CREAT SERPL-MCNC: 0.96 MG/DL (ref 0.5–0.9)
CREATININE URINE: 133.9 MG/DL (ref 28–217)
GFR AFRICAN AMERICAN: >60 ML/MIN
GFR NON-AFRICAN AMERICAN: 59 ML/MIN
GFR SERPL CREATININE-BSD FRML MDRD: ABNORMAL ML/MIN/{1.73_M2}
GFR SERPL CREATININE-BSD FRML MDRD: ABNORMAL ML/MIN/{1.73_M2}
GLUCOSE BLD-MCNC: 460 MG/DL (ref 70–99)
HDLC SERPL-MCNC: 34 MG/DL
LDL CHOLESTEROL: 81 MG/DL (ref 0–130)
MICROALBUMIN/CREAT 24H UR: 246 MG/L
MICROALBUMIN/CREAT UR-RTO: 184 MCG/MG CREAT
POTASSIUM SERPL-SCNC: 3.8 MMOL/L (ref 3.7–5.3)
SODIUM BLD-SCNC: 131 MMOL/L (ref 135–144)
T3 FREE: 3.1 PG/ML (ref 2.02–4.43)
THYROXINE, FREE: 1.54 NG/DL (ref 0.93–1.7)
TOTAL PROTEIN: 8 G/DL (ref 6.4–8.3)
TRIGL SERPL-MCNC: 261 MG/DL
TSH SERPL DL<=0.05 MIU/L-ACNC: 1.24 MIU/L (ref 0.3–5)
VLDLC SERPL CALC-MCNC: ABNORMAL MG/DL (ref 1–30)

## 2017-10-30 PROCEDURE — 86376 MICROSOMAL ANTIBODY EACH: CPT

## 2017-10-30 PROCEDURE — 80061 LIPID PANEL: CPT

## 2017-10-30 PROCEDURE — 86800 THYROGLOBULIN ANTIBODY: CPT

## 2017-10-30 PROCEDURE — 84443 ASSAY THYROID STIM HORMONE: CPT

## 2017-10-30 PROCEDURE — 36415 COLL VENOUS BLD VENIPUNCTURE: CPT

## 2017-10-30 PROCEDURE — 80053 COMPREHEN METABOLIC PANEL: CPT

## 2017-10-30 PROCEDURE — 82043 UR ALBUMIN QUANTITATIVE: CPT

## 2017-10-30 PROCEDURE — 84439 ASSAY OF FREE THYROXINE: CPT

## 2017-10-30 PROCEDURE — 82570 ASSAY OF URINE CREATININE: CPT

## 2017-10-30 PROCEDURE — 84481 FREE ASSAY (FT-3): CPT

## 2017-11-02 LAB
THYROGLOBULIN AB: <20 IU/ML (ref 0–40)
THYROID PEROXIDASE (TPO) AB: <10 IU/ML (ref 0–35)

## 2017-11-16 ENCOUNTER — OFFICE VISIT (OUTPATIENT)
Dept: ORTHOPEDIC SURGERY | Age: 62
End: 2017-11-16
Payer: MEDICAID

## 2017-11-16 DIAGNOSIS — M81.0 AGE-RELATED OSTEOPOROSIS WITHOUT CURRENT PATHOLOGICAL FRACTURE: ICD-10-CM

## 2017-11-16 DIAGNOSIS — I73.9 PERIPHERAL ARTERIAL DISEASE (HCC): ICD-10-CM

## 2017-11-16 DIAGNOSIS — M43.10 ACQUIRED SPONDYLOLISTHESIS: ICD-10-CM

## 2017-11-16 DIAGNOSIS — M51.26 LUMBAR DISC HERNIATION: ICD-10-CM

## 2017-11-16 DIAGNOSIS — M51.36 DDD (DEGENERATIVE DISC DISEASE), LUMBAR: ICD-10-CM

## 2017-11-16 DIAGNOSIS — M48.062 SPINAL STENOSIS, LUMBAR REGION, WITH NEUROGENIC CLAUDICATION: Primary | ICD-10-CM

## 2017-11-16 PROCEDURE — G8427 DOCREV CUR MEDS BY ELIG CLIN: HCPCS | Performed by: ORTHOPAEDIC SURGERY

## 2017-11-16 PROCEDURE — 1036F TOBACCO NON-USER: CPT | Performed by: ORTHOPAEDIC SURGERY

## 2017-11-16 PROCEDURE — G8598 ASA/ANTIPLAT THER USED: HCPCS | Performed by: ORTHOPAEDIC SURGERY

## 2017-11-16 PROCEDURE — 3017F COLORECTAL CA SCREEN DOC REV: CPT | Performed by: ORTHOPAEDIC SURGERY

## 2017-11-16 PROCEDURE — 3014F SCREEN MAMMO DOC REV: CPT | Performed by: ORTHOPAEDIC SURGERY

## 2017-11-16 PROCEDURE — 99213 OFFICE O/P EST LOW 20 MIN: CPT | Performed by: ORTHOPAEDIC SURGERY

## 2017-11-16 PROCEDURE — G8417 CALC BMI ABV UP PARAM F/U: HCPCS | Performed by: ORTHOPAEDIC SURGERY

## 2017-11-16 PROCEDURE — G8484 FLU IMMUNIZE NO ADMIN: HCPCS | Performed by: ORTHOPAEDIC SURGERY

## 2017-11-16 PROCEDURE — 4005F PHARM THX FOR OP RXD: CPT | Performed by: ORTHOPAEDIC SURGERY

## 2017-11-16 ASSESSMENT — ENCOUNTER SYMPTOMS: BACK PAIN: 1

## 2017-11-16 NOTE — PROGRESS NOTES
Subjective:      Patient ID: Miguelito Iverson is a 63 y.o. female. Back Pain   This is a new problem. The current episode started in the past 7 days. The problem occurs constantly. The problem is unchanged. The pain is present in the lumbar spine. The quality of the pain is described as aching. The pain is severe. The pain is the same all the time. The symptoms are aggravated by standing. Stiffness is present all day. Risk factors include sedentary lifestyle and history of osteoporosis. She has tried nothing for the symptoms. The treatment provided mild relief. Patient presents with right rib low back and left great toe pain some pain on the dorsal aspect of her right foot as well. Patient with fall between bed and wall. Patient reports her great toes turning black and blue. Review of Systems   Musculoskeletal: Positive for back pain. Objective:   Physical Exam   Constitutional: She is oriented to person, place, and time. She appears well-developed and well-nourished. HENT:   Head: Normocephalic and atraumatic. Eyes: Conjunctivae and EOM are normal.   Neck: Normal range of motion. Pulmonary/Chest: Effort normal. No respiratory distress. Neurological: She is alert and oriented to person, place, and time. She has normal strength. No sensory deficit. Normal gait   Skin: Skin is warm and dry. Psychiatric: Her behavior is normal. Thought content normal.   Nursing note and vitals reviewed. Diagnostic x-rays AP lateral lumbar spine reveal the patient be status post 4-5 PLIF no new fractures patient with ongoing spondylolisthesis her CT shown this to be solidly fused patient with bladder stimulator placement. Patient no new fractures    Examination right foot reveals severe dryness of skin very poorly- non  palpable peripheral pulses severe onychomycoses   Assessment:      Encounter Diagnoses   Name Primary?     Spinal stenosis, lumbar region, with neurogenic claudication Yes  Lumbar disc herniation     Age-related osteoporosis without current pathological fracture     Acquired spondylolisthesis     DDD (degenerative disc disease), lumbar     Peripheral arterial disease (HCC)      Acute lumbar strain post falling at home       Plan:      Recommend patient follow up with her podiatrist    Referral to vascular surgery to evaluate for peripheral arterial disease    Follow-up 3 weeks

## 2017-11-21 ENCOUNTER — HOSPITAL ENCOUNTER (OUTPATIENT)
Age: 62
Discharge: HOME | End: 2017-11-21
Payer: MEDICAID

## 2017-11-21 DIAGNOSIS — N18.30 BENIGN HYPERTENSION WITH CKD (CHRONIC KIDNEY DISEASE) STAGE III (HCC): ICD-10-CM

## 2017-11-21 DIAGNOSIS — I12.9 BENIGN HYPERTENSION WITH CKD (CHRONIC KIDNEY DISEASE) STAGE III (HCC): ICD-10-CM

## 2017-11-21 DIAGNOSIS — Z85.528 HISTORY OF RENAL CELL CANCER: ICD-10-CM

## 2017-11-21 DIAGNOSIS — Z90.5 S/P NEPHRECTOMY: ICD-10-CM

## 2017-11-21 DIAGNOSIS — E13.22 SECONDARY DIABETES MELLITUS WITH STAGE 3 CHRONIC KIDNEY DISEASE (GFR 30-59) (HCC): ICD-10-CM

## 2017-11-21 DIAGNOSIS — N18.30 CKD (CHRONIC KIDNEY DISEASE) STAGE 3, GFR 30-59 ML/MIN (HCC): ICD-10-CM

## 2017-11-21 DIAGNOSIS — N18.30 SECONDARY DIABETES MELLITUS WITH STAGE 3 CHRONIC KIDNEY DISEASE (GFR 30-59) (HCC): ICD-10-CM

## 2017-11-21 LAB
ABSOLUTE EOS #: 0.08 K/UL (ref 0–0.4)
ABSOLUTE IMMATURE GRANULOCYTE: NORMAL K/UL (ref 0–0.3)
ABSOLUTE LYMPH #: 3.03 K/UL (ref 1–4.8)
ABSOLUTE MONO #: 0.57 K/UL (ref 0.1–1.3)
ALBUMIN SERPL-MCNC: 3.7 G/DL (ref 3.5–5.2)
ALBUMIN/GLOBULIN RATIO: ABNORMAL (ref 1–2.5)
ALP BLD-CCNC: 152 U/L (ref 35–104)
ALT SERPL-CCNC: 15 U/L (ref 5–33)
ANION GAP SERPL CALCULATED.3IONS-SCNC: 20 MMOL/L (ref 9–17)
AST SERPL-CCNC: 15 U/L
BASOPHILS # BLD: 0 %
BASOPHILS ABSOLUTE: 0 K/UL (ref 0–0.2)
BILIRUB SERPL-MCNC: 0.57 MG/DL (ref 0.3–1.2)
BUN BLDV-MCNC: 13 MG/DL (ref 8–23)
BUN/CREAT BLD: ABNORMAL (ref 9–20)
CALCIUM SERPL-MCNC: 9.9 MG/DL (ref 8.6–10.4)
CHLORIDE BLD-SCNC: 91 MMOL/L (ref 98–107)
CO2: 21 MMOL/L (ref 20–31)
CREAT SERPL-MCNC: 1 MG/DL (ref 0.5–0.9)
DIFFERENTIAL TYPE: NORMAL
EOSINOPHILS RELATIVE PERCENT: 1 %
FREE KAPPA/LAMBDA RATIO: 1.56 (ref 0.26–1.65)
GFR AFRICAN AMERICAN: >60 ML/MIN
GFR NON-AFRICAN AMERICAN: 57 ML/MIN
GFR SERPL CREATININE-BSD FRML MDRD: ABNORMAL ML/MIN/{1.73_M2}
GFR SERPL CREATININE-BSD FRML MDRD: ABNORMAL ML/MIN/{1.73_M2}
GLUCOSE BLD-MCNC: 394 MG/DL (ref 70–99)
HCT VFR BLD CALC: 42.8 % (ref 36–46)
HEMOGLOBIN: 14.4 G/DL (ref 12–16)
IMMATURE GRANULOCYTES: NORMAL %
KAPPA FREE LIGHT CHAINS QNT: 3.97 MG/DL (ref 0.37–1.94)
LAMBDA FREE LIGHT CHAINS QNT: 2.54 MG/DL (ref 0.57–2.63)
LYMPHOCYTES # BLD: 37 %
MAGNESIUM: 2.2 MG/DL (ref 1.6–2.6)
MCH RBC QN AUTO: 29.5 PG (ref 26–34)
MCHC RBC AUTO-ENTMCNC: 33.8 G/DL (ref 31–37)
MCV RBC AUTO: 87.3 FL (ref 80–100)
MONOCYTES # BLD: 7 %
MORPHOLOGY: NORMAL
PDW BLD-RTO: 13.3 % (ref 11.5–14.9)
PHOSPHORUS: 2.9 MG/DL (ref 2.6–4.5)
PLATELET # BLD: 215 K/UL (ref 150–450)
PLATELET ESTIMATE: NORMAL
PMV BLD AUTO: 9.4 FL (ref 6–12)
POTASSIUM SERPL-SCNC: 4.1 MMOL/L (ref 3.7–5.3)
RBC # BLD: 4.9 M/UL (ref 4–5.2)
RBC # BLD: NORMAL 10*6/UL
SEG NEUTROPHILS: 55 %
SEGMENTED NEUTROPHILS ABSOLUTE COUNT: 4.52 K/UL (ref 1.3–9.1)
SODIUM BLD-SCNC: 132 MMOL/L (ref 135–144)
TOTAL PROTEIN: 8.2 G/DL (ref 6.4–8.3)
WBC # BLD: 8.2 K/UL (ref 3.5–11)
WBC # BLD: NORMAL 10*3/UL

## 2017-11-21 PROCEDURE — 83883 ASSAY NEPHELOMETRY NOT SPEC: CPT

## 2017-11-21 PROCEDURE — 84165 PROTEIN E-PHORESIS SERUM: CPT

## 2017-11-21 PROCEDURE — 85025 COMPLETE CBC W/AUTO DIFF WBC: CPT

## 2017-11-21 PROCEDURE — 84100 ASSAY OF PHOSPHORUS: CPT

## 2017-11-21 PROCEDURE — 80053 COMPREHEN METABOLIC PANEL: CPT

## 2017-11-21 PROCEDURE — 83735 ASSAY OF MAGNESIUM: CPT

## 2017-11-21 PROCEDURE — 36415 COLL VENOUS BLD VENIPUNCTURE: CPT

## 2017-11-21 PROCEDURE — 84155 ASSAY OF PROTEIN SERUM: CPT

## 2017-11-22 ENCOUNTER — TELEPHONE (OUTPATIENT)
Dept: VASCULAR SURGERY | Age: 62
End: 2017-11-22

## 2017-11-22 NOTE — TELEPHONE ENCOUNTER
Patient was referred to [facility]- Left message on patient's voicemail to call office and schedule an appointment

## 2017-11-24 LAB
ALBUMIN (CALCULATED): 4.1 G/DL (ref 3.2–5.2)
ALBUMIN PERCENT: 54 % (ref 45–65)
ALPHA 1 PERCENT: 3 % (ref 3–6)
ALPHA 2 PERCENT: 14 % (ref 6–13)
ALPHA-1-GLOBULIN: 0.2 G/DL (ref 0.1–0.4)
ALPHA-2-GLOBULIN: 1.1 G/DL (ref 0.5–0.9)
BETA GLOBULIN: 1.1 G/DL (ref 0.5–1.1)
BETA PERCENT: 15 % (ref 11–19)
GAMMA GLOBULIN %: 14 % (ref 9–20)
GAMMA GLOBULIN: 1.1 G/DL (ref 0.5–1.5)
PATHOLOGIST: ABNORMAL
PROTEIN ELECTROPHORESIS, SERUM: ABNORMAL
TOTAL PROT. SUM,%: 100 % (ref 98–102)
TOTAL PROT. SUM: 7.6 G/DL (ref 6.3–8.2)
TOTAL PROTEIN: 7.5 G/DL (ref 6.4–8.3)

## 2018-05-26 ENCOUNTER — HOSPITAL ENCOUNTER (INPATIENT)
Age: 63
LOS: 3 days | Discharge: NURSING, REHAB OR LONG-TERM CARE | DRG: 115 | End: 2018-05-29
Attending: EMERGENCY MEDICINE | Admitting: INTERNAL MEDICINE
Payer: MEDICAID

## 2018-05-26 ENCOUNTER — APPOINTMENT (OUTPATIENT)
Dept: CT IMAGING | Age: 63
DRG: 115 | End: 2018-05-26
Payer: MEDICAID

## 2018-05-26 ENCOUNTER — APPOINTMENT (OUTPATIENT)
Dept: GENERAL RADIOLOGY | Age: 63
DRG: 115 | End: 2018-05-26
Payer: MEDICAID

## 2018-05-26 DIAGNOSIS — S02.40EA ZYGOMATIC FRACTURE, RIGHT SIDE, INITIAL ENCOUNTER FOR CLOSED FRACTURE (HCC): ICD-10-CM

## 2018-05-26 DIAGNOSIS — M51.36 DDD (DEGENERATIVE DISC DISEASE), LUMBAR: ICD-10-CM

## 2018-05-26 DIAGNOSIS — M47.12 CERVICAL SPONDYLOSIS WITH MYELOPATHY: ICD-10-CM

## 2018-05-26 DIAGNOSIS — E87.6 HYPOKALEMIA: Primary | ICD-10-CM

## 2018-05-26 DIAGNOSIS — S52.591A OTHER CLOSED FRACTURE OF DISTAL END OF RIGHT RADIUS, INITIAL ENCOUNTER: ICD-10-CM

## 2018-05-26 DIAGNOSIS — M48.062 SPINAL STENOSIS, LUMBAR REGION, WITH NEUROGENIC CLAUDICATION: ICD-10-CM

## 2018-05-26 PROBLEM — S01.81XA FACIAL LACERATION: Status: ACTIVE | Noted: 2018-05-26

## 2018-05-26 PROBLEM — S52.91XA RIGHT RADIAL FRACTURE: Status: ACTIVE | Noted: 2018-05-26

## 2018-05-26 PROBLEM — R26.2 AMBULATORY DYSFUNCTION: Status: ACTIVE | Noted: 2018-05-26

## 2018-05-26 PROBLEM — S02.401D CLOSED TRIPOD FRACTURE OF ZYGOMATICOMAXILLARY COMPLEX WITH ROUTINE HEALING: Status: ACTIVE | Noted: 2018-05-26

## 2018-05-26 PROBLEM — D69.6 THROMBOCYTOPENIA (HCC): Status: ACTIVE | Noted: 2018-05-26

## 2018-05-26 PROBLEM — E83.42 HYPOMAGNESEMIA: Status: ACTIVE | Noted: 2018-05-26

## 2018-05-26 PROBLEM — S02.80XD CLOSED TRIPOD FRACTURE OF ZYGOMATICOMAXILLARY COMPLEX WITH ROUTINE HEALING: Status: ACTIVE | Noted: 2018-05-26

## 2018-05-26 PROBLEM — S02.30XD CLOSED TRIPOD FRACTURE OF ZYGOMATICOMAXILLARY COMPLEX WITH ROUTINE HEALING: Status: ACTIVE | Noted: 2018-05-26
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
## 2018-05-26 PROBLEM — S02.402D CLOSED TRIPOD FRACTURE OF ZYGOMATICOMAXILLARY COMPLEX WITH ROUTINE HEALING: Status: ACTIVE | Noted: 2018-05-26

## 2018-05-26 LAB
ALLEN TEST: ABNORMAL
ANION GAP SERPL CALCULATED.3IONS-SCNC: 14 MMOL/L (ref 9–17)
BUN BLDV-MCNC: 19 MG/DL (ref 8–23)
CARBOXYHEMOGLOBIN: 1.3 % (ref 0–5)
CHLORIDE BLD-SCNC: 110 MMOL/L (ref 98–107)
CO2: 14 MMOL/L (ref 20–31)
CREAT SERPL-MCNC: 1.13 MG/DL (ref 0.5–0.9)
EKG ATRIAL RATE: 87 BPM
EKG Q-T INTERVAL: 442 MS
EKG QRS DURATION: 92 MS
EKG QTC CALCULATION (BAZETT): 546 MS
EKG R AXIS: 28 DEGREES
EKG T AXIS: 51 DEGREES
EKG VENTRICULAR RATE: 92 BPM
ETHANOL PERCENT: <0.01 %
ETHANOL: <10 MG/DL
FIO2: ABNORMAL
GFR AFRICAN AMERICAN: 60 ML/MIN
GFR NON-AFRICAN AMERICAN: 49 ML/MIN
GFR SERPL CREATININE-BSD FRML MDRD: ABNORMAL ML/MIN/{1.73_M2}
GFR SERPL CREATININE-BSD FRML MDRD: ABNORMAL ML/MIN/{1.73_M2}
GLUCOSE BLD-MCNC: 135 MG/DL (ref 65–105)
GLUCOSE BLD-MCNC: 284 MG/DL (ref 65–105)
GLUCOSE BLD-MCNC: 442 MG/DL (ref 70–99)
GLUCOSE BLD-MCNC: 444 MG/DL (ref 65–105)
GLUCOSE BLD-MCNC: 449 MG/DL (ref 65–105)
GLUCOSE BLD-MCNC: 479 MG/DL (ref 65–105)
GLUCOSE BLD-MCNC: 62 MG/DL (ref 65–105)
HCG QUALITATIVE: NEGATIVE
HCO3 VENOUS: 19.2 MMOL/L (ref 24–30)
HCT VFR BLD CALC: 33.4 % (ref 36.3–47.1)
HEMOGLOBIN: 10.3 G/DL (ref 11.9–15.1)
INR BLD: 0.9
MAGNESIUM: 1.4 MG/DL (ref 1.6–2.6)
MCH RBC QN AUTO: 29 PG (ref 25.2–33.5)
MCHC RBC AUTO-ENTMCNC: 30.8 G/DL (ref 28.4–34.8)
MCV RBC AUTO: 94.1 FL (ref 82.6–102.9)
METHEMOGLOBIN: ABNORMAL % (ref 0–1.5)
MODE: ABNORMAL
MYOGLOBIN: 61 NG/ML (ref 25–58)
NEGATIVE BASE EXCESS, VEN: 5.4 MMOL/L (ref 0–2)
NOTIFICATION TIME: ABNORMAL
NOTIFICATION: ABNORMAL
NRBC AUTOMATED: 0 PER 100 WBC
O2 DEVICE/FLOW/%: ABNORMAL
O2 SAT, VEN: 99 % (ref 60–85)
OXYHEMOGLOBIN: ABNORMAL % (ref 95–98)
PARTIAL THROMBOPLASTIN TIME: 23.9 SEC (ref 20.5–30.5)
PATIENT TEMP: 37
PCO2, VEN, TEMP ADJ: ABNORMAL MMHG (ref 39–55)
PCO2, VEN: 36 (ref 39–55)
PDW BLD-RTO: 12.3 % (ref 11.8–14.4)
PEEP/CPAP: ABNORMAL
PH VENOUS: 7.35 (ref 7.32–7.42)
PH, VEN, TEMP ADJ: ABNORMAL (ref 7.32–7.42)
PLATELET # BLD: 122 K/UL (ref 138–453)
PMV BLD AUTO: 12.4 FL (ref 8.1–13.5)
PO2, VEN, TEMP ADJ: ABNORMAL MMHG (ref 30–50)
PO2, VEN: 123 (ref 30–50)
POC TROPONIN I: 0 NG/ML (ref 0–0.1)
POC TROPONIN INTERP: NORMAL
POSITIVE BASE EXCESS, VEN: ABNORMAL MMOL/L (ref 0–2)
POTASSIUM SERPL-SCNC: 2.9 MMOL/L (ref 3.7–5.3)
PROTHROMBIN TIME: 9.6 SEC (ref 9–12)
PSV: ABNORMAL
PT. POSITION: ABNORMAL
RBC # BLD: 3.55 M/UL (ref 3.95–5.11)
RESPIRATORY RATE: ABNORMAL
SAMPLE SITE: ABNORMAL
SET RATE: ABNORMAL
SODIUM BLD-SCNC: 138 MMOL/L (ref 135–144)
TEXT FOR RESPIRATORY: ABNORMAL
TOTAL CK: 64 U/L (ref 26–192)
TOTAL HB: ABNORMAL G/DL (ref 12–16)
TOTAL RATE: ABNORMAL
VITAMIN D 25-HYDROXY: 9.6 NG/ML (ref 30–100)
VT: ABNORMAL
WBC # BLD: 10.4 K/UL (ref 3.5–11.3)

## 2018-05-26 PROCEDURE — 82565 ASSAY OF CREATININE: CPT

## 2018-05-26 PROCEDURE — 84520 ASSAY OF UREA NITROGEN: CPT

## 2018-05-26 PROCEDURE — 6360000002 HC RX W HCPCS: Performed by: EMERGENCY MEDICINE

## 2018-05-26 PROCEDURE — G0480 DRUG TEST DEF 1-7 CLASSES: HCPCS

## 2018-05-26 PROCEDURE — 82306 VITAMIN D 25 HYDROXY: CPT

## 2018-05-26 PROCEDURE — 2W3CX1Z IMMOBILIZATION OF RIGHT LOWER ARM USING SPLINT: ICD-10-PCS | Performed by: ORTHOPAEDIC SURGERY

## 2018-05-26 PROCEDURE — 72125 CT NECK SPINE W/O DYE: CPT

## 2018-05-26 PROCEDURE — 73110 X-RAY EXAM OF WRIST: CPT

## 2018-05-26 PROCEDURE — 82550 ASSAY OF CK (CPK): CPT

## 2018-05-26 PROCEDURE — 94762 N-INVAS EAR/PLS OXIMTRY CONT: CPT

## 2018-05-26 PROCEDURE — 82805 BLOOD GASES W/O2 SATURATION: CPT

## 2018-05-26 PROCEDURE — 6360000002 HC RX W HCPCS: Performed by: NURSE PRACTITIONER

## 2018-05-26 PROCEDURE — 83735 ASSAY OF MAGNESIUM: CPT

## 2018-05-26 PROCEDURE — 85730 THROMBOPLASTIN TIME PARTIAL: CPT

## 2018-05-26 PROCEDURE — 73130 X-RAY EXAM OF HAND: CPT

## 2018-05-26 PROCEDURE — 36415 COLL VENOUS BLD VENIPUNCTURE: CPT

## 2018-05-26 PROCEDURE — 85027 COMPLETE CBC AUTOMATED: CPT

## 2018-05-26 PROCEDURE — 94640 AIRWAY INHALATION TREATMENT: CPT

## 2018-05-26 PROCEDURE — 73562 X-RAY EXAM OF KNEE 3: CPT

## 2018-05-26 PROCEDURE — 1200000000 HC SEMI PRIVATE

## 2018-05-26 PROCEDURE — 82947 ASSAY GLUCOSE BLOOD QUANT: CPT

## 2018-05-26 PROCEDURE — 71045 X-RAY EXAM CHEST 1 VIEW: CPT

## 2018-05-26 PROCEDURE — 99285 EMERGENCY DEPT VISIT HI MDM: CPT

## 2018-05-26 PROCEDURE — 6370000000 HC RX 637 (ALT 250 FOR IP): Performed by: NURSE PRACTITIONER

## 2018-05-26 PROCEDURE — 72170 X-RAY EXAM OF PELVIS: CPT

## 2018-05-26 PROCEDURE — 96365 THER/PROPH/DIAG IV INF INIT: CPT

## 2018-05-26 PROCEDURE — 85610 PROTHROMBIN TIME: CPT

## 2018-05-26 PROCEDURE — 83874 ASSAY OF MYOGLOBIN: CPT

## 2018-05-26 PROCEDURE — 72192 CT PELVIS W/O DYE: CPT

## 2018-05-26 PROCEDURE — 70450 CT HEAD/BRAIN W/O DYE: CPT

## 2018-05-26 PROCEDURE — 80051 ELECTROLYTE PANEL: CPT

## 2018-05-26 PROCEDURE — 84484 ASSAY OF TROPONIN QUANT: CPT

## 2018-05-26 PROCEDURE — 96375 TX/PRO/DX INJ NEW DRUG ADDON: CPT

## 2018-05-26 PROCEDURE — 99223 1ST HOSP IP/OBS HIGH 75: CPT | Performed by: INTERNAL MEDICINE

## 2018-05-26 PROCEDURE — 6370000000 HC RX 637 (ALT 250 FOR IP): Performed by: INTERNAL MEDICINE

## 2018-05-26 PROCEDURE — 84703 CHORIONIC GONADOTROPIN ASSAY: CPT

## 2018-05-26 PROCEDURE — 93005 ELECTROCARDIOGRAM TRACING: CPT

## 2018-05-26 PROCEDURE — 96368 THER/DIAG CONCURRENT INF: CPT

## 2018-05-26 RX ORDER — MAGNESIUM SULFATE 1 G/100ML
1 INJECTION INTRAVENOUS ONCE
Status: COMPLETED | OUTPATIENT
Start: 2018-05-26 | End: 2018-05-26

## 2018-05-26 RX ORDER — GEMFIBROZIL 600 MG/1
600 TABLET, FILM COATED ORAL 2 TIMES DAILY
Status: DISCONTINUED | OUTPATIENT
Start: 2018-05-26 | End: 2018-05-29 | Stop reason: HOSPADM

## 2018-05-26 RX ORDER — ZOLPIDEM TARTRATE 5 MG/1
10 TABLET ORAL NIGHTLY PRN
Status: DISCONTINUED | OUTPATIENT
Start: 2018-05-26 | End: 2018-05-29 | Stop reason: HOSPADM

## 2018-05-26 RX ORDER — MONTELUKAST SODIUM 10 MG/1
10 TABLET ORAL NIGHTLY
Status: DISCONTINUED | OUTPATIENT
Start: 2018-05-26 | End: 2018-05-29 | Stop reason: HOSPADM

## 2018-05-26 RX ORDER — MAGNESIUM SULFATE 1 G/100ML
1 INJECTION INTRAVENOUS PRN
Status: DISCONTINUED | OUTPATIENT
Start: 2018-05-26 | End: 2018-05-29 | Stop reason: HOSPADM

## 2018-05-26 RX ORDER — CALCIUM CARBONATE 200(500)MG
1 TABLET,CHEWABLE ORAL 3 TIMES DAILY PRN
Status: DISCONTINUED | OUTPATIENT
Start: 2018-05-26 | End: 2018-05-29 | Stop reason: HOSPADM

## 2018-05-26 RX ORDER — DEXTROSE MONOHYDRATE 25 G/50ML
12.5 INJECTION, SOLUTION INTRAVENOUS PRN
Status: DISCONTINUED | OUTPATIENT
Start: 2018-05-26 | End: 2018-05-29 | Stop reason: HOSPADM

## 2018-05-26 RX ORDER — LOVASTATIN 20 MG/1
60 TABLET ORAL NIGHTLY
Status: DISCONTINUED | OUTPATIENT
Start: 2018-05-26 | End: 2018-05-29 | Stop reason: HOSPADM

## 2018-05-26 RX ORDER — POTASSIUM CHLORIDE 20 MEQ/1
40 TABLET, EXTENDED RELEASE ORAL PRN
Status: DISCONTINUED | OUTPATIENT
Start: 2018-05-26 | End: 2018-05-29 | Stop reason: HOSPADM

## 2018-05-26 RX ORDER — ONDANSETRON 2 MG/ML
4 INJECTION INTRAMUSCULAR; INTRAVENOUS EVERY 6 HOURS PRN
Status: DISCONTINUED | OUTPATIENT
Start: 2018-05-26 | End: 2018-05-29 | Stop reason: HOSPADM

## 2018-05-26 RX ORDER — CLONAZEPAM 0.5 MG/1
0.5 TABLET ORAL NIGHTLY PRN
Status: DISCONTINUED | OUTPATIENT
Start: 2018-05-26 | End: 2018-05-29 | Stop reason: HOSPADM

## 2018-05-26 RX ORDER — POTASSIUM CHLORIDE 7.45 MG/ML
10 INJECTION INTRAVENOUS PRN
Status: DISCONTINUED | OUTPATIENT
Start: 2018-05-26 | End: 2018-05-29 | Stop reason: HOSPADM

## 2018-05-26 RX ORDER — TRAMADOL HYDROCHLORIDE 50 MG/1
50 TABLET ORAL EVERY 6 HOURS PRN
Status: DISCONTINUED | OUTPATIENT
Start: 2018-05-26 | End: 2018-05-29 | Stop reason: HOSPADM

## 2018-05-26 RX ORDER — MORPHINE SULFATE 4 MG/ML
4 INJECTION, SOLUTION INTRAMUSCULAR; INTRAVENOUS ONCE
Status: COMPLETED | OUTPATIENT
Start: 2018-05-26 | End: 2018-05-26

## 2018-05-26 RX ORDER — BISACODYL 10 MG
10 SUPPOSITORY, RECTAL RECTAL DAILY PRN
Status: DISCONTINUED | OUTPATIENT
Start: 2018-05-26 | End: 2018-05-29 | Stop reason: HOSPADM

## 2018-05-26 RX ORDER — NICOTINE 21 MG/24HR
1 PATCH, TRANSDERMAL 24 HOURS TRANSDERMAL DAILY PRN
Status: DISCONTINUED | OUTPATIENT
Start: 2018-05-26 | End: 2018-05-29 | Stop reason: HOSPADM

## 2018-05-26 RX ORDER — POTASSIUM CHLORIDE 20 MEQ/1
20 TABLET, EXTENDED RELEASE ORAL 2 TIMES DAILY WITH MEALS
Status: DISCONTINUED | OUTPATIENT
Start: 2018-05-26 | End: 2018-05-29 | Stop reason: HOSPADM

## 2018-05-26 RX ORDER — DEXTROSE MONOHYDRATE 50 MG/ML
100 INJECTION, SOLUTION INTRAVENOUS PRN
Status: DISCONTINUED | OUTPATIENT
Start: 2018-05-26 | End: 2018-05-29 | Stop reason: HOSPADM

## 2018-05-26 RX ORDER — POTASSIUM CHLORIDE 20MEQ/15ML
40 LIQUID (ML) ORAL PRN
Status: DISCONTINUED | OUTPATIENT
Start: 2018-05-26 | End: 2018-05-29 | Stop reason: HOSPADM

## 2018-05-26 RX ORDER — ALBUTEROL SULFATE 90 UG/1
2 AEROSOL, METERED RESPIRATORY (INHALATION) EVERY 6 HOURS PRN
Status: DISCONTINUED | OUTPATIENT
Start: 2018-05-26 | End: 2018-05-29 | Stop reason: HOSPADM

## 2018-05-26 RX ORDER — RALOXIFENE HYDROCHLORIDE 60 MG/1
1 TABLET, FILM COATED ORAL DAILY
Status: DISCONTINUED | OUTPATIENT
Start: 2018-05-26 | End: 2018-05-26

## 2018-05-26 RX ORDER — ASPIRIN 81 MG/1
81 TABLET ORAL DAILY
Status: DISCONTINUED | OUTPATIENT
Start: 2018-05-26 | End: 2018-05-29 | Stop reason: HOSPADM

## 2018-05-26 RX ORDER — NICOTINE POLACRILEX 4 MG
15 LOZENGE BUCCAL PRN
Status: DISCONTINUED | OUTPATIENT
Start: 2018-05-26 | End: 2018-05-29 | Stop reason: HOSPADM

## 2018-05-26 RX ORDER — BUTALBITAL, ACETAMINOPHEN AND CAFFEINE 50; 325; 40 MG/1; MG/1; MG/1
1 TABLET ORAL EVERY 8 HOURS PRN
Status: DISCONTINUED | OUTPATIENT
Start: 2018-05-26 | End: 2018-05-29 | Stop reason: HOSPADM

## 2018-05-26 RX ORDER — LISINOPRIL 20 MG/1
20 TABLET ORAL DAILY
Status: DISCONTINUED | OUTPATIENT
Start: 2018-05-26 | End: 2018-05-29 | Stop reason: HOSPADM

## 2018-05-26 RX ORDER — ZONISAMIDE 25 MG/1
25 CAPSULE ORAL DAILY
Status: DISCONTINUED | OUTPATIENT
Start: 2018-05-26 | End: 2018-05-29 | Stop reason: HOSPADM

## 2018-05-26 RX ORDER — SODIUM CHLORIDE 0.9 % (FLUSH) 0.9 %
10 SYRINGE (ML) INJECTION PRN
Status: DISCONTINUED | OUTPATIENT
Start: 2018-05-26 | End: 2018-05-29 | Stop reason: HOSPADM

## 2018-05-26 RX ORDER — ACETAMINOPHEN 500 MG
500 TABLET ORAL EVERY 6 HOURS PRN
Status: DISCONTINUED | OUTPATIENT
Start: 2018-05-26 | End: 2018-05-29 | Stop reason: HOSPADM

## 2018-05-26 RX ORDER — SODIUM CHLORIDE 0.9 % (FLUSH) 0.9 %
10 SYRINGE (ML) INJECTION EVERY 12 HOURS SCHEDULED
Status: DISCONTINUED | OUTPATIENT
Start: 2018-05-26 | End: 2018-05-29 | Stop reason: HOSPADM

## 2018-05-26 RX ADMIN — BECLOMETHASONE DIPROPIONATE 2 PUFF: 80 AEROSOL, METERED RESPIRATORY (INHALATION) at 21:19

## 2018-05-26 RX ADMIN — GEMFIBROZIL 600 MG: 600 TABLET ORAL at 21:52

## 2018-05-26 RX ADMIN — POTASSIUM CHLORIDE 20 MEQ: 1500 TABLET, EXTENDED RELEASE ORAL at 16:58

## 2018-05-26 RX ADMIN — ASPIRIN 81 MG: 81 TABLET, COATED ORAL at 10:07

## 2018-05-26 RX ADMIN — POTASSIUM CHLORIDE 10 MEQ: 7.46 INJECTION, SOLUTION INTRAVENOUS at 03:01

## 2018-05-26 RX ADMIN — ONDANSETRON 4 MG: 2 INJECTION INTRAMUSCULAR; INTRAVENOUS at 15:00

## 2018-05-26 RX ADMIN — LOVASTATIN 60 MG: 20 TABLET ORAL at 21:52

## 2018-05-26 RX ADMIN — MORPHINE SULFATE 4 MG: 4 INJECTION INTRAVENOUS at 03:29

## 2018-05-26 RX ADMIN — SERTRALINE 100 MG: 50 TABLET, FILM COATED ORAL at 10:07

## 2018-05-26 RX ADMIN — LISINOPRIL 20 MG: 20 TABLET ORAL at 10:07

## 2018-05-26 RX ADMIN — INSULIN LISPRO 9 UNITS: 100 INJECTION, SOLUTION INTRAVENOUS; SUBCUTANEOUS at 16:58

## 2018-05-26 RX ADMIN — MONTELUKAST SODIUM 10 MG: 10 TABLET, FILM COATED ORAL at 21:51

## 2018-05-26 RX ADMIN — MAGNESIUM SULFATE HEPTAHYDRATE 1 G: 1 INJECTION, SOLUTION INTRAVENOUS at 03:33

## 2018-05-26 RX ADMIN — BECLOMETHASONE DIPROPIONATE 2 PUFF: 80 AEROSOL, METERED RESPIRATORY (INHALATION) at 09:38

## 2018-05-26 RX ADMIN — ZONISAMIDE 25 MG: 25 CAPSULE ORAL at 10:07

## 2018-05-26 RX ADMIN — Medication 15 G: at 21:51

## 2018-05-26 RX ADMIN — ENOXAPARIN SODIUM 40 MG: 100 INJECTION SUBCUTANEOUS at 10:07

## 2018-05-26 RX ADMIN — INSULIN HUMAN 95 UNITS: 500 INJECTION, SOLUTION SUBCUTANEOUS at 12:58

## 2018-05-26 ASSESSMENT — ENCOUNTER SYMPTOMS
ABDOMINAL PAIN: 0
COUGH: 0
BLOOD IN STOOL: 0
FACIAL SWELLING: 0
WHEEZING: 0
BACK PAIN: 1
SPUTUM PRODUCTION: 0
TROUBLE SWALLOWING: 0
VOMITING: 0
NAUSEA: 0
BLURRED VISION: 0
STRIDOR: 0
PHOTOPHOBIA: 0
HEMOPTYSIS: 0
COLOR CHANGE: 0
CHOKING: 0
CHEST TIGHTNESS: 0
DIARRHEA: 0
EYE PAIN: 0
DOUBLE VISION: 0
SHORTNESS OF BREATH: 0

## 2018-05-26 ASSESSMENT — PAIN DESCRIPTION - PAIN TYPE: TYPE: ACUTE PAIN

## 2018-05-26 ASSESSMENT — PAIN DESCRIPTION - LOCATION: LOCATION: KNEE;HAND

## 2018-05-26 ASSESSMENT — PAIN SCALES - GENERAL
PAINLEVEL_OUTOF10: 9
PAINLEVEL_OUTOF10: 8

## 2018-05-27 PROBLEM — E55.9 HYPOVITAMINOSIS D: Status: ACTIVE | Noted: 2018-05-27

## 2018-05-27 PROBLEM — S52.501A CLOSED FRACTURE OF DISTAL END OF RIGHT RADIUS: Status: ACTIVE | Noted: 2018-05-27

## 2018-05-27 LAB
ANION GAP SERPL CALCULATED.3IONS-SCNC: 15 MMOL/L (ref 9–17)
BUN BLDV-MCNC: 16 MG/DL (ref 8–23)
BUN/CREAT BLD: ABNORMAL (ref 9–20)
CALCIUM SERPL-MCNC: 8.5 MG/DL (ref 8.6–10.4)
CHLORIDE BLD-SCNC: 101 MMOL/L (ref 98–107)
CO2: 21 MMOL/L (ref 20–31)
CREAT SERPL-MCNC: 0.77 MG/DL (ref 0.5–0.9)
GFR AFRICAN AMERICAN: >60 ML/MIN
GFR NON-AFRICAN AMERICAN: >60 ML/MIN
GFR SERPL CREATININE-BSD FRML MDRD: ABNORMAL ML/MIN/{1.73_M2}
GFR SERPL CREATININE-BSD FRML MDRD: ABNORMAL ML/MIN/{1.73_M2}
GLUCOSE BLD-MCNC: 138 MG/DL (ref 65–105)
GLUCOSE BLD-MCNC: 138 MG/DL (ref 65–105)
GLUCOSE BLD-MCNC: 152 MG/DL (ref 65–105)
GLUCOSE BLD-MCNC: 184 MG/DL (ref 70–99)
GLUCOSE BLD-MCNC: 230 MG/DL (ref 65–105)
GLUCOSE BLD-MCNC: 48 MG/DL (ref 65–105)
HCT VFR BLD CALC: 37 % (ref 36.3–47.1)
HEMOGLOBIN: 11.7 G/DL (ref 11.9–15.1)
INR BLD: 0.9
MAGNESIUM: 2.1 MG/DL (ref 1.6–2.6)
MCH RBC QN AUTO: 28.2 PG (ref 25.2–33.5)
MCHC RBC AUTO-ENTMCNC: 31.6 G/DL (ref 28.4–34.8)
MCV RBC AUTO: 89.2 FL (ref 82.6–102.9)
NRBC AUTOMATED: 0 PER 100 WBC
PDW BLD-RTO: 12.5 % (ref 11.8–14.4)
PLATELET # BLD: 179 K/UL (ref 138–453)
PMV BLD AUTO: 12 FL (ref 8.1–13.5)
POTASSIUM SERPL-SCNC: 3.6 MMOL/L (ref 3.7–5.3)
PROTHROMBIN TIME: 9.4 SEC (ref 9–12)
RBC # BLD: 4.15 M/UL (ref 3.95–5.11)
SODIUM BLD-SCNC: 137 MMOL/L (ref 135–144)
WBC # BLD: 14.1 K/UL (ref 3.5–11.3)

## 2018-05-27 PROCEDURE — 97166 OT EVAL MOD COMPLEX 45 MIN: CPT

## 2018-05-27 PROCEDURE — 97535 SELF CARE MNGMENT TRAINING: CPT

## 2018-05-27 PROCEDURE — 36415 COLL VENOUS BLD VENIPUNCTURE: CPT

## 2018-05-27 PROCEDURE — G8987 SELF CARE CURRENT STATUS: HCPCS

## 2018-05-27 PROCEDURE — 6370000000 HC RX 637 (ALT 250 FOR IP): Performed by: INTERNAL MEDICINE

## 2018-05-27 PROCEDURE — 6360000002 HC RX W HCPCS: Performed by: NURSE PRACTITIONER

## 2018-05-27 PROCEDURE — 82947 ASSAY GLUCOSE BLOOD QUANT: CPT

## 2018-05-27 PROCEDURE — G8979 MOBILITY GOAL STATUS: HCPCS

## 2018-05-27 PROCEDURE — 83735 ASSAY OF MAGNESIUM: CPT

## 2018-05-27 PROCEDURE — 85027 COMPLETE CBC AUTOMATED: CPT

## 2018-05-27 PROCEDURE — G8988 SELF CARE GOAL STATUS: HCPCS

## 2018-05-27 PROCEDURE — 80048 BASIC METABOLIC PNL TOTAL CA: CPT

## 2018-05-27 PROCEDURE — 99233 SBSQ HOSP IP/OBS HIGH 50: CPT | Performed by: INTERNAL MEDICINE

## 2018-05-27 PROCEDURE — 97162 PT EVAL MOD COMPLEX 30 MIN: CPT

## 2018-05-27 PROCEDURE — 85610 PROTHROMBIN TIME: CPT

## 2018-05-27 PROCEDURE — 1200000000 HC SEMI PRIVATE

## 2018-05-27 PROCEDURE — 94640 AIRWAY INHALATION TREATMENT: CPT

## 2018-05-27 PROCEDURE — G8978 MOBILITY CURRENT STATUS: HCPCS

## 2018-05-27 PROCEDURE — 6370000000 HC RX 637 (ALT 250 FOR IP): Performed by: NURSE PRACTITIONER

## 2018-05-27 PROCEDURE — 97530 THERAPEUTIC ACTIVITIES: CPT

## 2018-05-27 PROCEDURE — 94762 N-INVAS EAR/PLS OXIMTRY CONT: CPT

## 2018-05-27 PROCEDURE — 2580000003 HC RX 258: Performed by: NURSE PRACTITIONER

## 2018-05-27 RX ORDER — OMEGA-3S/DHA/EPA/FISH OIL/D3 300MG-1000
400 CAPSULE ORAL DAILY
Status: DISCONTINUED | OUTPATIENT
Start: 2018-05-27 | End: 2018-05-28

## 2018-05-27 RX ORDER — TRAMADOL HYDROCHLORIDE 50 MG/1
50 TABLET ORAL EVERY 6 HOURS PRN
Qty: 20 TABLET | Refills: 0 | Status: SHIPPED | OUTPATIENT
Start: 2018-05-27 | End: 2018-06-03

## 2018-05-27 RX ORDER — OMEGA-3S/DHA/EPA/FISH OIL/D3 300MG-1000
400 CAPSULE ORAL DAILY
Qty: 30 TABLET | Refills: 0 | Status: SHIPPED | OUTPATIENT
Start: 2018-05-27 | End: 2018-09-04

## 2018-05-27 RX ADMIN — INSULIN LISPRO 3 UNITS: 100 INJECTION, SOLUTION INTRAVENOUS; SUBCUTANEOUS at 12:13

## 2018-05-27 RX ADMIN — POTASSIUM CHLORIDE 20 MEQ: 1500 TABLET, EXTENDED RELEASE ORAL at 17:41

## 2018-05-27 RX ADMIN — MONTELUKAST SODIUM 10 MG: 10 TABLET, FILM COATED ORAL at 21:38

## 2018-05-27 RX ADMIN — Medication 10 ML: at 08:47

## 2018-05-27 RX ADMIN — CHOLECALCIFEROL TAB 10 MCG (400 UNIT) 400 UNITS: 10 TAB at 13:59

## 2018-05-27 RX ADMIN — ASPIRIN 81 MG: 81 TABLET, COATED ORAL at 08:47

## 2018-05-27 RX ADMIN — Medication 15 G: at 16:39

## 2018-05-27 RX ADMIN — POTASSIUM CHLORIDE 20 MEQ: 1500 TABLET, EXTENDED RELEASE ORAL at 08:47

## 2018-05-27 RX ADMIN — BECLOMETHASONE DIPROPIONATE 2 PUFF: 80 AEROSOL, METERED RESPIRATORY (INHALATION) at 20:34

## 2018-05-27 RX ADMIN — LOVASTATIN 60 MG: 20 TABLET ORAL at 21:38

## 2018-05-27 RX ADMIN — ACETAMINOPHEN 500 MG: 500 TABLET ORAL at 05:21

## 2018-05-27 RX ADMIN — LISINOPRIL 20 MG: 20 TABLET ORAL at 08:47

## 2018-05-27 RX ADMIN — GEMFIBROZIL 600 MG: 600 TABLET ORAL at 21:38

## 2018-05-27 RX ADMIN — ZONISAMIDE 25 MG: 25 CAPSULE ORAL at 08:46

## 2018-05-27 RX ADMIN — INSULIN HUMAN 95 UNITS: 500 INJECTION, SOLUTION SUBCUTANEOUS at 08:15

## 2018-05-27 RX ADMIN — BECLOMETHASONE DIPROPIONATE 2 PUFF: 80 AEROSOL, METERED RESPIRATORY (INHALATION) at 08:18

## 2018-05-27 RX ADMIN — INSULIN LISPRO 3 UNITS: 100 INJECTION, SOLUTION INTRAVENOUS; SUBCUTANEOUS at 08:47

## 2018-05-27 RX ADMIN — GEMFIBROZIL 600 MG: 600 TABLET ORAL at 08:47

## 2018-05-27 RX ADMIN — ENOXAPARIN SODIUM 40 MG: 100 INJECTION SUBCUTANEOUS at 08:47

## 2018-05-27 RX ADMIN — SERTRALINE 100 MG: 50 TABLET, FILM COATED ORAL at 08:46

## 2018-05-27 ASSESSMENT — ENCOUNTER SYMPTOMS
COUGH: 0
SPUTUM PRODUCTION: 0
VOMITING: 0
NAUSEA: 0
ORTHOPNEA: 0
HEMOPTYSIS: 0
SHORTNESS OF BREATH: 0
ABDOMINAL PAIN: 0

## 2018-05-27 ASSESSMENT — PAIN SCALES - GENERAL
PAINLEVEL_OUTOF10: 8

## 2018-05-27 ASSESSMENT — PAIN DESCRIPTION - ORIENTATION
ORIENTATION: LEFT
ORIENTATION: LEFT

## 2018-05-27 ASSESSMENT — PAIN DESCRIPTION - PAIN TYPE: TYPE: ACUTE PAIN

## 2018-05-27 ASSESSMENT — PAIN DESCRIPTION - LOCATION
LOCATION: KNEE;LEG
LOCATION: KNEE;LEG

## 2018-05-27 ASSESSMENT — PAIN DESCRIPTION - DESCRIPTORS: DESCRIPTORS: ACHING;DISCOMFORT

## 2018-05-28 LAB
GLUCOSE BLD-MCNC: 119 MG/DL (ref 65–105)
GLUCOSE BLD-MCNC: 127 MG/DL (ref 65–105)
GLUCOSE BLD-MCNC: 223 MG/DL (ref 65–105)
GLUCOSE BLD-MCNC: 234 MG/DL (ref 65–105)
GLUCOSE BLD-MCNC: 290 MG/DL (ref 65–105)
GLUCOSE BLD-MCNC: 45 MG/DL (ref 65–105)

## 2018-05-28 PROCEDURE — 97110 THERAPEUTIC EXERCISES: CPT

## 2018-05-28 PROCEDURE — 1200000000 HC SEMI PRIVATE

## 2018-05-28 PROCEDURE — 6370000000 HC RX 637 (ALT 250 FOR IP): Performed by: NURSE PRACTITIONER

## 2018-05-28 PROCEDURE — 94640 AIRWAY INHALATION TREATMENT: CPT

## 2018-05-28 PROCEDURE — 82947 ASSAY GLUCOSE BLOOD QUANT: CPT

## 2018-05-28 PROCEDURE — 6360000002 HC RX W HCPCS: Performed by: NURSE PRACTITIONER

## 2018-05-28 PROCEDURE — 97530 THERAPEUTIC ACTIVITIES: CPT

## 2018-05-28 PROCEDURE — 2580000003 HC RX 258: Performed by: NURSE PRACTITIONER

## 2018-05-28 PROCEDURE — 6370000000 HC RX 637 (ALT 250 FOR IP): Performed by: INTERNAL MEDICINE

## 2018-05-28 PROCEDURE — 99232 SBSQ HOSP IP/OBS MODERATE 35: CPT | Performed by: FAMILY MEDICINE

## 2018-05-28 RX ORDER — ERGOCALCIFEROL 1.25 MG/1
50000 CAPSULE ORAL WEEKLY
Status: DISCONTINUED | OUTPATIENT
Start: 2018-05-29 | End: 2018-05-29 | Stop reason: HOSPADM

## 2018-05-28 RX ADMIN — ENOXAPARIN SODIUM 40 MG: 100 INJECTION SUBCUTANEOUS at 09:00

## 2018-05-28 RX ADMIN — BECLOMETHASONE DIPROPIONATE 2 PUFF: 80 AEROSOL, METERED RESPIRATORY (INHALATION) at 08:57

## 2018-05-28 RX ADMIN — Medication 10 ML: at 23:57

## 2018-05-28 RX ADMIN — ANTACID TABLETS 500 MG: 500 TABLET, CHEWABLE ORAL at 10:58

## 2018-05-28 RX ADMIN — Medication 15 G: at 23:52

## 2018-05-28 RX ADMIN — ZONISAMIDE 25 MG: 25 CAPSULE ORAL at 10:57

## 2018-05-28 RX ADMIN — MONTELUKAST SODIUM 10 MG: 10 TABLET, FILM COATED ORAL at 20:43

## 2018-05-28 RX ADMIN — LISINOPRIL 20 MG: 20 TABLET ORAL at 09:00

## 2018-05-28 RX ADMIN — POTASSIUM CHLORIDE 20 MEQ: 1500 TABLET, EXTENDED RELEASE ORAL at 20:44

## 2018-05-28 RX ADMIN — CHOLECALCIFEROL TAB 10 MCG (400 UNIT) 400 UNITS: 10 TAB at 09:00

## 2018-05-28 RX ADMIN — INSULIN HUMAN 95 UNITS: 500 INJECTION, SOLUTION SUBCUTANEOUS at 11:08

## 2018-05-28 RX ADMIN — GEMFIBROZIL 600 MG: 600 TABLET ORAL at 09:00

## 2018-05-28 RX ADMIN — BECLOMETHASONE DIPROPIONATE 2 PUFF: 80 AEROSOL, METERED RESPIRATORY (INHALATION) at 20:52

## 2018-05-28 RX ADMIN — POTASSIUM CHLORIDE 20 MEQ: 1500 TABLET, EXTENDED RELEASE ORAL at 08:00

## 2018-05-28 RX ADMIN — ASPIRIN 81 MG: 81 TABLET, COATED ORAL at 10:57

## 2018-05-28 RX ADMIN — Medication 10 ML: at 09:00

## 2018-05-28 RX ADMIN — GEMFIBROZIL 600 MG: 600 TABLET ORAL at 20:43

## 2018-05-28 RX ADMIN — LOVASTATIN 60 MG: 20 TABLET ORAL at 20:43

## 2018-05-28 RX ADMIN — SERTRALINE 100 MG: 50 TABLET, FILM COATED ORAL at 10:58

## 2018-05-28 ASSESSMENT — ENCOUNTER SYMPTOMS
HEMOPTYSIS: 0
ABDOMINAL PAIN: 0
VOMITING: 0
COUGH: 0
NAUSEA: 0
ORTHOPNEA: 0
SPUTUM PRODUCTION: 0
SHORTNESS OF BREATH: 0

## 2018-05-29 VITALS
TEMPERATURE: 98.2 F | SYSTOLIC BLOOD PRESSURE: 109 MMHG | DIASTOLIC BLOOD PRESSURE: 59 MMHG | HEART RATE: 83 BPM | BODY MASS INDEX: 32.2 KG/M2 | WEIGHT: 175 LBS | OXYGEN SATURATION: 97 % | HEIGHT: 62 IN | RESPIRATION RATE: 16 BRPM

## 2018-05-29 LAB
ABSOLUTE EOS #: 0.14 K/UL (ref 0–0.44)
ABSOLUTE IMMATURE GRANULOCYTE: 0.1 K/UL (ref 0–0.3)
ABSOLUTE LYMPH #: 3.4 K/UL (ref 1.1–3.7)
ABSOLUTE MONO #: 0.91 K/UL (ref 0.1–1.2)
ANION GAP SERPL CALCULATED.3IONS-SCNC: 15 MMOL/L (ref 9–17)
BASOPHILS # BLD: 0 % (ref 0–2)
BASOPHILS ABSOLUTE: <0.03 K/UL (ref 0–0.2)
BUN BLDV-MCNC: 17 MG/DL (ref 8–23)
BUN/CREAT BLD: ABNORMAL (ref 9–20)
CALCIUM SERPL-MCNC: 8.8 MG/DL (ref 8.6–10.4)
CHLORIDE BLD-SCNC: 98 MMOL/L (ref 98–107)
CO2: 21 MMOL/L (ref 20–31)
CREAT SERPL-MCNC: 0.97 MG/DL (ref 0.5–0.9)
DIFFERENTIAL TYPE: ABNORMAL
EOSINOPHILS RELATIVE PERCENT: 1 % (ref 1–4)
FOLATE: 10.4 NG/ML
GFR AFRICAN AMERICAN: >60 ML/MIN
GFR NON-AFRICAN AMERICAN: 59 ML/MIN
GFR SERPL CREATININE-BSD FRML MDRD: ABNORMAL ML/MIN/{1.73_M2}
GFR SERPL CREATININE-BSD FRML MDRD: ABNORMAL ML/MIN/{1.73_M2}
GLUCOSE BLD-MCNC: 134 MG/DL (ref 65–105)
GLUCOSE BLD-MCNC: 214 MG/DL (ref 65–105)
GLUCOSE BLD-MCNC: 220 MG/DL (ref 65–105)
GLUCOSE BLD-MCNC: 226 MG/DL (ref 65–105)
GLUCOSE BLD-MCNC: 315 MG/DL (ref 70–99)
GLUCOSE BLD-MCNC: 321 MG/DL (ref 65–105)
GLUCOSE BLD-MCNC: 84 MG/DL (ref 65–105)
HCT VFR BLD CALC: 40 % (ref 36.3–47.1)
HEMOGLOBIN: 12.6 G/DL (ref 11.9–15.1)
IMMATURE GRANULOCYTES: 1 %
LYMPHOCYTES # BLD: 25 % (ref 24–43)
MCH RBC QN AUTO: 28.6 PG (ref 25.2–33.5)
MCHC RBC AUTO-ENTMCNC: 31.5 G/DL (ref 28.4–34.8)
MCV RBC AUTO: 90.9 FL (ref 82.6–102.9)
MONOCYTES # BLD: 7 % (ref 3–12)
NRBC AUTOMATED: 0 PER 100 WBC
PDW BLD-RTO: 12.5 % (ref 11.8–14.4)
PLATELET # BLD: 228 K/UL (ref 138–453)
PLATELET ESTIMATE: ABNORMAL
PMV BLD AUTO: 11.4 FL (ref 8.1–13.5)
POTASSIUM SERPL-SCNC: 4.4 MMOL/L (ref 3.7–5.3)
RBC # BLD: 4.4 M/UL (ref 3.95–5.11)
RBC # BLD: ABNORMAL 10*6/UL
SEG NEUTROPHILS: 66 % (ref 36–65)
SEGMENTED NEUTROPHILS ABSOLUTE COUNT: 9.29 K/UL (ref 1.5–8.1)
SODIUM BLD-SCNC: 134 MMOL/L (ref 135–144)
TSH SERPL DL<=0.05 MIU/L-ACNC: 1.01 MIU/L (ref 0.3–5)
VITAMIN B-12: 589 PG/ML (ref 232–1245)
WBC # BLD: 13.9 K/UL (ref 3.5–11.3)
WBC # BLD: ABNORMAL 10*3/UL

## 2018-05-29 PROCEDURE — 6370000000 HC RX 637 (ALT 250 FOR IP): Performed by: FAMILY MEDICINE

## 2018-05-29 PROCEDURE — 82607 VITAMIN B-12: CPT

## 2018-05-29 PROCEDURE — 97530 THERAPEUTIC ACTIVITIES: CPT

## 2018-05-29 PROCEDURE — 6360000002 HC RX W HCPCS: Performed by: NURSE PRACTITIONER

## 2018-05-29 PROCEDURE — 2580000003 HC RX 258: Performed by: NURSE PRACTITIONER

## 2018-05-29 PROCEDURE — 36415 COLL VENOUS BLD VENIPUNCTURE: CPT

## 2018-05-29 PROCEDURE — 85025 COMPLETE CBC W/AUTO DIFF WBC: CPT

## 2018-05-29 PROCEDURE — 82746 ASSAY OF FOLIC ACID SERUM: CPT

## 2018-05-29 PROCEDURE — 99232 SBSQ HOSP IP/OBS MODERATE 35: CPT | Performed by: FAMILY MEDICINE

## 2018-05-29 PROCEDURE — 82947 ASSAY GLUCOSE BLOOD QUANT: CPT

## 2018-05-29 PROCEDURE — 84443 ASSAY THYROID STIM HORMONE: CPT

## 2018-05-29 PROCEDURE — 6370000000 HC RX 637 (ALT 250 FOR IP): Performed by: NURSE PRACTITIONER

## 2018-05-29 PROCEDURE — 97110 THERAPEUTIC EXERCISES: CPT

## 2018-05-29 PROCEDURE — 94640 AIRWAY INHALATION TREATMENT: CPT

## 2018-05-29 PROCEDURE — 80048 BASIC METABOLIC PNL TOTAL CA: CPT

## 2018-05-29 PROCEDURE — 6370000000 HC RX 637 (ALT 250 FOR IP): Performed by: INTERNAL MEDICINE

## 2018-05-29 RX ORDER — ERGOCALCIFEROL 1.25 MG/1
50000 CAPSULE ORAL WEEKLY
Qty: 8 CAPSULE | Refills: 0 | Status: SHIPPED | OUTPATIENT
Start: 2018-05-29 | End: 2018-07-06 | Stop reason: SDUPTHER

## 2018-05-29 RX ADMIN — INSULIN LISPRO 12 UNITS: 100 INJECTION, SOLUTION INTRAVENOUS; SUBCUTANEOUS at 11:58

## 2018-05-29 RX ADMIN — ERGOCALCIFEROL 50000 UNITS: 1.25 CAPSULE ORAL at 08:38

## 2018-05-29 RX ADMIN — GEMFIBROZIL 600 MG: 600 TABLET ORAL at 08:39

## 2018-05-29 RX ADMIN — INSULIN LISPRO 3 UNITS: 100 INJECTION, SOLUTION INTRAVENOUS; SUBCUTANEOUS at 08:38

## 2018-05-29 RX ADMIN — INSULIN LISPRO 6 UNITS: 100 INJECTION, SOLUTION INTRAVENOUS; SUBCUTANEOUS at 17:13

## 2018-05-29 RX ADMIN — ZONISAMIDE 25 MG: 25 CAPSULE ORAL at 08:38

## 2018-05-29 RX ADMIN — BECLOMETHASONE DIPROPIONATE 2 PUFF: 80 AEROSOL, METERED RESPIRATORY (INHALATION) at 07:50

## 2018-05-29 RX ADMIN — ASPIRIN 81 MG: 81 TABLET, COATED ORAL at 08:39

## 2018-05-29 RX ADMIN — POTASSIUM CHLORIDE 20 MEQ: 1500 TABLET, EXTENDED RELEASE ORAL at 17:13

## 2018-05-29 RX ADMIN — Medication 10 ML: at 08:39

## 2018-05-29 RX ADMIN — POTASSIUM CHLORIDE 20 MEQ: 1500 TABLET, EXTENDED RELEASE ORAL at 08:38

## 2018-05-29 RX ADMIN — SERTRALINE 100 MG: 50 TABLET, FILM COATED ORAL at 08:38

## 2018-05-29 RX ADMIN — ENOXAPARIN SODIUM 40 MG: 100 INJECTION SUBCUTANEOUS at 08:38

## 2018-05-29 RX ADMIN — LISINOPRIL 20 MG: 20 TABLET ORAL at 08:39

## 2018-05-29 ASSESSMENT — ENCOUNTER SYMPTOMS
NAUSEA: 0
HEMOPTYSIS: 0
SPUTUM PRODUCTION: 0
COUGH: 0
SHORTNESS OF BREATH: 0
ABDOMINAL PAIN: 0
ORTHOPNEA: 0
VOMITING: 0

## 2018-07-06 ENCOUNTER — OFFICE VISIT (OUTPATIENT)
Dept: FAMILY MEDICINE CLINIC | Age: 63
End: 2018-07-06
Payer: MEDICAID

## 2018-07-06 VITALS
TEMPERATURE: 96.9 F | HEART RATE: 70 BPM | DIASTOLIC BLOOD PRESSURE: 67 MMHG | BODY MASS INDEX: 26.68 KG/M2 | WEIGHT: 145 LBS | HEIGHT: 62 IN | SYSTOLIC BLOOD PRESSURE: 113 MMHG

## 2018-07-06 DIAGNOSIS — M25.462 EFFUSION OF LEFT KNEE: Primary | ICD-10-CM

## 2018-07-06 DIAGNOSIS — E55.9 VITAMIN D DEFICIENCY: ICD-10-CM

## 2018-07-06 DIAGNOSIS — E87.8 ELECTROLYTE ABNORMALITY: ICD-10-CM

## 2018-07-06 DIAGNOSIS — E11.9 TYPE 2 DIABETES MELLITUS WITHOUT COMPLICATION, UNSPECIFIED LONG TERM INSULIN USE STATUS: ICD-10-CM

## 2018-07-06 LAB — HBA1C MFR BLD: 9.6 %

## 2018-07-06 PROCEDURE — G8598 ASA/ANTIPLAT THER USED: HCPCS | Performed by: STUDENT IN AN ORGANIZED HEALTH CARE EDUCATION/TRAINING PROGRAM

## 2018-07-06 PROCEDURE — 83036 HEMOGLOBIN GLYCOSYLATED A1C: CPT | Performed by: STUDENT IN AN ORGANIZED HEALTH CARE EDUCATION/TRAINING PROGRAM

## 2018-07-06 PROCEDURE — 3046F HEMOGLOBIN A1C LEVEL >9.0%: CPT | Performed by: STUDENT IN AN ORGANIZED HEALTH CARE EDUCATION/TRAINING PROGRAM

## 2018-07-06 PROCEDURE — 99213 OFFICE O/P EST LOW 20 MIN: CPT | Performed by: STUDENT IN AN ORGANIZED HEALTH CARE EDUCATION/TRAINING PROGRAM

## 2018-07-06 PROCEDURE — 2022F DILAT RTA XM EVC RTNOPTHY: CPT | Performed by: STUDENT IN AN ORGANIZED HEALTH CARE EDUCATION/TRAINING PROGRAM

## 2018-07-06 PROCEDURE — G8417 CALC BMI ABV UP PARAM F/U: HCPCS | Performed by: STUDENT IN AN ORGANIZED HEALTH CARE EDUCATION/TRAINING PROGRAM

## 2018-07-06 PROCEDURE — G8427 DOCREV CUR MEDS BY ELIG CLIN: HCPCS | Performed by: STUDENT IN AN ORGANIZED HEALTH CARE EDUCATION/TRAINING PROGRAM

## 2018-07-06 PROCEDURE — 3017F COLORECTAL CA SCREEN DOC REV: CPT | Performed by: STUDENT IN AN ORGANIZED HEALTH CARE EDUCATION/TRAINING PROGRAM

## 2018-07-06 PROCEDURE — 1036F TOBACCO NON-USER: CPT | Performed by: STUDENT IN AN ORGANIZED HEALTH CARE EDUCATION/TRAINING PROGRAM

## 2018-07-06 RX ORDER — ERGOCALCIFEROL 1.25 MG/1
50000 CAPSULE ORAL WEEKLY
Qty: 8 CAPSULE | Refills: 0 | Status: SHIPPED | OUTPATIENT
Start: 2018-07-06 | End: 2019-07-10 | Stop reason: SDUPTHER

## 2018-07-06 ASSESSMENT — PATIENT HEALTH QUESTIONNAIRE - PHQ9
SUM OF ALL RESPONSES TO PHQ9 QUESTIONS 1 & 2: 0
2. FEELING DOWN, DEPRESSED OR HOPELESS: 0
1. LITTLE INTEREST OR PLEASURE IN DOING THINGS: 0
SUM OF ALL RESPONSES TO PHQ QUESTIONS 1-9: 0

## 2018-07-06 ASSESSMENT — ENCOUNTER SYMPTOMS
ABDOMINAL DISTENTION: 0
ABDOMINAL PAIN: 0
BACK PAIN: 0

## 2018-07-06 NOTE — PATIENT INSTRUCTIONS
Thank you for letting us take care of you today. We hope all your questions were addressed. If a question was overlooked or something else comes to mind after you return home, please contact a member of your Care Team listed below. Please make sure you have a routine office visit set up to follow-up on [address]. Your Care Team at [place] [address] is Team #1  Britney Conrad MD (Faculty)  Caden Zaragoza MD (Faculty)  Raina Seth MD (Resident)  Consuelo Smallwood MD (Resident)  Maddie Cordero MD (Resident)  Saul Kelley MD (Resident)  [facility], [facility], ([address] [facility])  Cherly Aase, LPN [place], (Clinical Practice Manager)  Jeremy Harris, Ph.D., (Behavioral Services)  Lee Zamarripa [facility] (Clinical Pharmacist)     Office phone number: [phone]    If you need to get in right away due to illness, please be advised we have \"Same Day\" appointments available Monday-Friday. Please call us at [phone] option #3 to schedule your \"Same Day\" appointment.

## 2018-07-06 NOTE — PROGRESS NOTES
Attending Physician Statement  I  have discussed the care of Brenna Merlos including pertinent history and exam findings with the resident. I agree with the assessment, plan and orders as documented by the resident. /67 (Site: Left Arm, Position: Sitting, Cuff Size: Medium Adult) Comment: machine  Pulse 70   Temp 96.9 °F (36.1 °C) (Oral)   Ht 5' 2\" (1.575 m)   Wt 145 lb (65.8 kg)   BMI 26.52 kg/m²    BP Readings from Last 3 Encounters:   07/06/18 113/67   05/29/18 (!) 109/59   11/28/17 122/82     Wt Readings from Last 3 Encounters:   07/06/18 145 lb (65.8 kg)   05/26/18 175 lb (79.4 kg)   11/28/17 144 lb 12.8 oz (65.7 kg)          Diagnosis Orders   1. Effusion of left knee  Daniela Lozano MD, [facility]   2. Electrolyte abnormality  Basic Metabolic Panel   3. Vitamin D deficiency  POCT glycosylated hemoglobin (Hb A1C)    Vitamin D 25 Hydroxy   4.  Type 2 diabetes mellitus without complication, unspecified long term insulin use status ([facility]         Donny Villagomez DO 7/7/2018 8:37 PM

## 2018-07-07 NOTE — PROGRESS NOTES
Subjective:    Catia Moore is a 63 y.o. female with  has a past medical history of CEFERINO (acute kidney injury) ([facility] Asthma; Carotid artery plaque; Clostridium difficile diarrhea; Dehydration; Depression; Fibromyalgia; Hiatal hernia; HTN (hypertension); Hyperlipidemia; Hypokalemia, gastrointestinal losses; Kidney stone; Obesity (BMI 30-39.9); Osteoarthritis; Osteoporosis; Renal cancer ([facility] Short of breath on exertion; Type II or unspecified type diabetes mellitus without mention of complication, not stated as uncontrolled; Unspecified sleep apnea; Urge urinary incontinence; Wears glasses; and Zygomatic fracture, right side, initial encounter for closed fracture ([facility] Family History   Problem Relation Age of Onset    Diabetes Mother     High Blood Pressure Mother     Pacemaker Mother     High Blood Pressure Father     Prostate Cancer Father     Breast Cancer Sister     Asthma Brother     Prostate Cancer Maternal Grandfather     High Blood Pressure Paternal Grandmother        Presented to the office today for:  Chief Complaint   Patient presents with    Follow-up     fall        HPI  Patient is here following up from a hospital admission. She was admitted for three days on may 29 2018. She fell down the stairs going to the bathroom at night and fractured distal end of right radius and zygomatic bone on right side of face. She was discharged to a nursing home where she recently left due to insurance reasons. She has a history of frequent falls. She denies feeling dizziness, SOB or any lightheadness prior to falls. She mentions feeling week and losing balance. During that admission patient was found to be hypokalemic. She says she usually has electrolyte abnormalities espeicially with potassium. During her fall patient was also found to have a swollen left knee. Effusion was noted on imaging and was told to get aspriation however refused during her hospital stay.  Today she is experiencing

## 2018-07-13 ENCOUNTER — HOSPITAL ENCOUNTER (OUTPATIENT)
Dept: PHYSICAL THERAPY | Age: 63
Setting detail: THERAPIES SERIES
Discharge: HOME | End: 2018-07-13
Payer: MEDICAID

## 2018-07-13 PROCEDURE — 97161 PT EVAL LOW COMPLEX 20 MIN: CPT

## 2018-07-13 PROCEDURE — G0283 ELEC STIM OTHER THAN WOUND: HCPCS

## 2018-07-13 PROCEDURE — 97110 THERAPEUTIC EXERCISES: CPT

## 2018-07-13 ASSESSMENT — PAIN SCALES - GENERAL: PAINLEVEL_OUTOF10: 8

## 2018-07-13 ASSESSMENT — PAIN DESCRIPTION - ORIENTATION: ORIENTATION: LEFT

## 2018-07-13 ASSESSMENT — PAIN DESCRIPTION - DESCRIPTORS: DESCRIPTORS: SHARP

## 2018-07-13 ASSESSMENT — PAIN DESCRIPTION - FREQUENCY: FREQUENCY: INTERMITTENT

## 2018-07-13 ASSESSMENT — PAIN DESCRIPTION - LOCATION: LOCATION: KNEE

## 2018-07-13 NOTE — PROGRESS NOTES
Physical Therapy  Initial Assessment  Date: 2018  Patient Name: Yolande Bennett  MRN: [id number]  : 1955     Treatment Diagnosis: stiffness/weakness L knee difficulty walking    Restrictions  Restrictions/Precautions  Restrictions/Precautions: Fall Risk    Subjective   General  Chart Reviewed: Yes  Patient assessed for rehabilitation services?: Yes  Additional Pertinent Hx: fell at home 18,FX R wrist  Family / Caregiver Present: No  Referring Practitioner: Dr Moses Munoz  Referral Date : 18  Diagnosis: effusion L knee M25.462  Follows Commands: Within Functional Limits  PT Visit Information  Onset Date: 18  PT Insurance Information: [facility]  Total # of Visits Approved: 12  Total # of Visits to Date: 1  Subjective  Subjective: pain on left knee  Pain Screening  Patient Currently in Pain: Yes  Pain Assessment  Pain Assessment: 0-10  Pain Level: 8  Pain Location: Knee  Pain Orientation: Left  Pain Descriptors: Sharp  Pain Frequency: Intermittent  Vital Signs  Patient Currently in Pain: Yes    Vision/Hearing  Vision  Vision: Impaired (wear glasses)  Hearing  Hearing: Within functional limits    Orientation  Orientation  Overall Orientation Status: Within Normal Limits    Social/Functional History  Social/Functional History  Lives With: Significant other  Type of Home: House  Home Layout: Two level;Bed/Bath upstairs  Home Access: Stairs to enter with rails  Entrance Stairs - Number of Steps: 4  Entrance Stairs - Rails: Both  Receives Help From: Family  ADL Assistance: Independent  Homemaking Assistance: Needs assistance  Meal Prep: Total  Laundry: Total  Vacuuming: Total  Cleaning: Total  Driving: Total  Shopping:  Total  Ambulation Assistance: Independent  Transfer Assistance: Independent  Active : No  Mode of Transportation: Family  Occupation: On disability  Additional Comments: using a hemiwalker  Objective  Observation/Palpation  Observation: cast on R forearm  AROM RLE (degrees)  RLE AROM: WFL  AROM LLE (degrees)  LLE AROM : Exceptions  L Knee Flexion 0-145:   L Knee Extension 0: 15  Strength RLE  Strength RLE: WNL  Strength LLE  Comment: hip 3-/5 knee 4-/5 ankle 5/5  Bed mobility  Rolling to Left: Independent  Rolling to Right: Independent  Supine to Sit: Independent  Sit to Supine: Independent  Transfers  Sit to Stand: Independent  Stand to sit: Independent  Bed to Chair: Independent  Stand Pivot Transfers: Independent  Ambulation  Ambulation?: Yes  Ambulation 1  Surface: level tile  Device: Blend Labswalker  Assistance: Contact guard assistance  Comments: TUG 80 secs  Stairs/Curb  Stairs?: No     Exercises  Exercise 1: L heel slide 20x  Exercise 2: L quads sets 10x10\"  Exercise 3: L hamstring sets 10x10\"  Exercise 4: L SLR 3x10  Exercise 5: SAQ's  L 3x10  Exercise 6: hot pack and EStim to L knee after ex today    Assessment   Conditions Requiring Skilled Therapeutic Intervention  Body structures, Functions, Activity limitations: Decreased functional mobility ; Decreased ADL status; Decreased ROM; Decreased strength;Decreased balance  Assessment: L knee pain limiting function  Treatment Diagnosis: stiffness/weakness L knee difficulty walking  Prognosis: Good  Decision Making: Low Complexity  History: fell at home 5/28/18 injured L knee  Exam: limited ROM and strength L knee  Clinical Presentation: TUG 80 secs  Patient Education: AROM and strengthening ex L knee as in ex sheet  Barriers to Learning: none  REQUIRES PT FOLLOW UP: Yes  Treatment Initiated : therapeutic ex, hot pack and EStim to L knee  Discharge Recommendations: Home independently  Activity Tolerance  Activity Tolerance: Patient limited by pain         Plan   Plan  Times per week: 2x/week  Plan weeks: 6 weeks  Specific instructions for Next Treatment: progress with ex as tolerated  Current Treatment Recommendations: Strengthening, ROM, Balance Training, Home Exercise Program, Modalities, Gait Training, Stair 40-80mAmin (Allergies Reviewed)     Frequency:        2   X/wk x       6   wk's      [x] Plans/Goals, Risk/Benefits discussed with pt/family  Comprehension of Education [x] yes  [] Needs Review  Pt/Family Education: [x] Verbal  [x] Demo  [] Written    More objective information is available upon request.  Thank you for this referral.        Medicare/Regulatory Requirements:  I have reviewed this plan of care and certify a need for   Medically necessary rehabilitation services.   [] Physician Signature    Date:     Electronically signed by: Evette Yu, [address] [facility] [address] [facility]  [address] [place] [address], [address] [facility]  Phone [phone]  Fax [phone]

## 2018-07-17 ENCOUNTER — OFFICE VISIT (OUTPATIENT)
Dept: ORTHOPEDIC SURGERY | Age: 63
End: 2018-07-17
Payer: MEDICAID

## 2018-07-17 VITALS — HEIGHT: 62 IN | BODY MASS INDEX: 25.76 KG/M2 | WEIGHT: 140 LBS

## 2018-07-17 DIAGNOSIS — S52.501D CLOSED FRACTURE OF DISTAL END OF RIGHT RADIUS WITH ROUTINE HEALING, UNSPECIFIED FRACTURE MORPHOLOGY, SUBSEQUENT ENCOUNTER: ICD-10-CM

## 2018-07-17 DIAGNOSIS — M25.531 RIGHT WRIST PAIN: Primary | ICD-10-CM

## 2018-07-17 DIAGNOSIS — M25.562 ACUTE PAIN OF LEFT KNEE: ICD-10-CM

## 2018-07-17 PROCEDURE — G8417 CALC BMI ABV UP PARAM F/U: HCPCS | Performed by: ORTHOPAEDIC SURGERY

## 2018-07-17 PROCEDURE — 1036F TOBACCO NON-USER: CPT | Performed by: ORTHOPAEDIC SURGERY

## 2018-07-17 PROCEDURE — 99213 OFFICE O/P EST LOW 20 MIN: CPT | Performed by: ORTHOPAEDIC SURGERY

## 2018-07-17 PROCEDURE — 3017F COLORECTAL CA SCREEN DOC REV: CPT | Performed by: ORTHOPAEDIC SURGERY

## 2018-07-17 PROCEDURE — G8598 ASA/ANTIPLAT THER USED: HCPCS | Performed by: ORTHOPAEDIC SURGERY

## 2018-07-17 PROCEDURE — G8427 DOCREV CUR MEDS BY ELIG CLIN: HCPCS | Performed by: ORTHOPAEDIC SURGERY

## 2018-07-17 NOTE — PROGRESS NOTES
(ZONEGRAN) 25 MG capsule Take 25 mg by mouth daily      lisinopril (PRINIVIL;ZESTRIL) 20 MG tablet Take 20 mg by mouth daily.  acetaminophen (TYLENOL) 500 MG tablet Take 500 mg by mouth every 6 hours as needed.  clonazePAM (KLONOPIN) 0.5 MG tablet Take 0.5 mg by mouth nightly as needed.  butalbital-acetaminophen-caffeine (FIORICET, ESGIC) per tablet Take 1 tablet by mouth every 8 hours as needed.  ammonium lactate (AMLACTIN) 12 % cream Apply topically 2 times daily Apply topically as needed. Uses to feet.  montelukast (SINGULAIR) 10 MG tablet Take 10 mg by mouth nightly.  albuterol (PROVENTIL HFA;VENTOLIN HFA) 108 (90 BASE) MCG/ACT inhaler Inhale 2 puffs into the lungs every 6 hours as needed. No current facility-administered medications for this visit. Allergies  Allergies have been reviewed. Kvng Luu is allergic to penicillins and tape [adhesive tape]. Social History  Kvng Luu  reports that she has never smoked. She has never used smokeless tobacco. She reports that she does not drink alcohol or use drugs. Family History  Jazmin's family history includes Asthma in her brother; Breast Cancer in her sister; Diabetes in her mother; High Blood Pressure in her father, mother, and paternal grandmother; Pacemaker in her mother; Prostate Cancer in her father and maternal grandfather. Review of Systems   History obtained from the patient. REVIEW OF SYSTEMS:   Constitution: negative for fever, chills, weight loss or malaise   Musculoskeletal: As noted in the HPI   Neurologic: As noted in the HPI    Physical Exam  Ht 5' 2\" (1.575 m)   Wt 140 lb (63.5 kg)   BMI 25.61 kg/m²    General Appearance: alert, well appearing, and in no distress  Mental Status: alert, oriented to person, place, and time  Evaluation of patient's left knee and lower extremity demonstrates intact skin without any warmth, erythema, or notable swelling.   She lacks approximately 10° of full extension and flexion to approximately 110°. She is diffusely tender to palpation about this knee. She has no gross instability with varus and valgus stress applied across joint at 0 and 30° of knee flexion and she has a negative Lachman and posterior drawer test.  Sensation is grossly intact light touch in all dermatomes and she has 2+ pedal pulses distally. Evaluation of her right wrist demonstrates intact skin without any warmth, erythema, or notable swelling. She is mildly tender to palpation on the dorsum of her wrist.  Range of motion is limited with both extension and flexion. Sensation is grossly intact light touch in all dermatomes and she has a 2+ radial pulse with brisk capillary refill in her fingers. Imaging Studies  Xrays of the left knee obtained on 5/26/18 and 7/17/18 were independently reviewed demonstrating significantly osteopenic bone. No obvious fracture, dislocation, or subluxation. Medial and lateral Joint spaces appear to be well preserved. Some narrowing of the patellofemoral joint space bilaterally noted. X-rays of the right wrist obtained on 5/26/18 were also made available for reviewed demonstrating a distal radius fracture with mild dorsal angulation. Three-view X-rays of the right wrist from 7/17/18 were also independently reviewed demonstrating interval consolidation and callus formation across the fracture site. Assessment and Plan  Iván Guillen is a 63 y.o. old female with left knee and right wrist pain. Starting with the left knee she has some patellofemoral joint osteoarthritis. It's likely that her pain is attributable to this. No obvious fracture is present although she does have osteopenic bone. At this time I recommend symptomatic management with use of anti-inflammatories as tolerated in addition to physical therapy which she started yesterday.   Wrist regards to her right wrist she is from the injury and she has been in a splint the entire time.  Consequently she was taken out of the splint today and placed in the removable cockup splint. She may take this off for hygiene and to work on moving her wrist.  She is to avoid any heavy lifting, pushing, or pulling. I'll get her set up for some occupational therapy 6 weeks for this issue. She was encouraged to return or call earlier with questions and/or concerns.

## 2018-07-18 ENCOUNTER — HOSPITAL ENCOUNTER (OUTPATIENT)
Dept: PHYSICAL THERAPY | Age: 63
Setting detail: THERAPIES SERIES
Discharge: HOME | End: 2018-07-18
Payer: MEDICAID

## 2018-07-18 PROCEDURE — 97110 THERAPEUTIC EXERCISES: CPT

## 2018-07-18 PROCEDURE — G0283 ELEC STIM OTHER THAN WOUND: HCPCS

## 2018-07-18 ASSESSMENT — PAIN DESCRIPTION - FREQUENCY: FREQUENCY: INTERMITTENT

## 2018-07-18 ASSESSMENT — PAIN DESCRIPTION - DESCRIPTORS
DESCRIPTORS_2: SHARP
DESCRIPTORS: SHARP

## 2018-07-18 ASSESSMENT — PAIN DESCRIPTION - LOCATION
LOCATION: KNEE
LOCATION_2: WRIST

## 2018-07-18 ASSESSMENT — PAIN DESCRIPTION - ORIENTATION
ORIENTATION_2: RIGHT
ORIENTATION: LEFT

## 2018-07-18 ASSESSMENT — PAIN DESCRIPTION - INTENSITY: RATING_2: 5

## 2018-07-18 ASSESSMENT — PAIN DESCRIPTION - DURATION: DURATION_2: INTERMITTENT

## 2018-07-18 ASSESSMENT — PAIN SCALES - GENERAL: PAINLEVEL_OUTOF10: 8

## 2018-07-18 NOTE — PROGRESS NOTES
Physical Therapy Re-evaluation Note    Date: 2018  Patient Name: Smiley Zamarripa  MRN: [id number]  : 1955     Treatment Diagnosis: stiffness/weakness L knee and R wrist difficulty walking    Subjective   General  Referring Practitioner: Dr Nadiya Michel [facility]  Diagnosis: L wrist FX  General Comment  Comments: new script received to do PT to R wrist  PT Visit Information  Onset Date: 18  PT Insurance Information: [facility]  Total # of Visits Approved: 12  Total # of Visits to Date: 2  Subjective  Subjective: pain on left knee and R wrist  Pain Screening  Patient Currently in Pain: Yes  Pain Assessment  Pain Assessment: 0-10  Pain Level: 8  Pain Location: Knee  Pain Orientation: Left  Pain Descriptors: Sharp  Pain Frequency: Intermittent  Multiple Pain Sites: Yes  Pain 2  Pain Rating 2: 5  Pain Location 2: Wrist  Pain Orientation 2: Right  Pain Descriptors 2: Sharp  Pain Duration 2: Intermittent  Vital Signs  Patient Currently in Pain: Yes  Objective  AROM RUE (degrees)  RUE AROM : Exceptions  R Forearm Pron 0-90: 0-90  R Forearm Supination  0-90: 0-70 with pain  R Wrist Flexion 0-80: 0-30 with pain  R Wrist Extension 0-70: 0-20 with pain  R Wrist Radial Deviation 0-20: 0-20  R Wrist Ulnar Deviation 0-45: 0-20 with pain  Strength RUE  Comment: R wrist 3-/5  15#  Strength LUE  Strength LUE: WNL  Comment:  30#     Exercises  Exercise 1: slant board stretch 3x30\"  Exercise 2: step up 41/2\" F/L 10x  Exercise 3: 4 way hip BLE Red Tband 10x  Exercise 4: TKE L 30x Red Tband  Exercise 5: tandem stance B 3x30\"  Exercise 6: hot pack and EStim to L knee after ex today  Exercise 7: P-AAROM R wrist 10x    Assessment   Conditions Requiring Skilled Therapeutic Intervention  Body structures, Functions, Activity limitations: Decreased functional mobility ; Decreased ADL status; Decreased ROM; Decreased strength  Assessment: L knee and R wrist pain limiting function  Treatment Diagnosis: stiffness/weakness L knee and R wrist difficulty walking  Prognosis: Good  Decision Making: Low Complexity  History: FX R wrist after fall at home 5/28/18  Exam: limited ROM and strength R wrist  REQUIRES PT FOLLOW UP: Yes  Treatment Initiated : therapeutic ex R wrist  Activity Tolerance  Activity Tolerance: Patient limited by pain         Plan   Plan  Times per week: 2x/week  Current Treatment Recommendations: Strengthening, ROM, Balance Training, Home Exercise Program, Modalities, Gait Training, Stair training  Plan Comment: instructed in HEP    Goals  Short term goals  Short term goal 1: decrease pain L knee 0-3/10 so patient can walk with a SC  Short term goal 2: increase AROM L knee 0-135,R wrist to WNL  Short term goal 3: increase strength L hip/knee by 1/2 grade,R wrist/hand 4/5  Short term goal 4: improve TUG to 40 secs  Short term goal 5: indep with HEP  Long term goals  Long term goal 1: improve Gait so patient can ambulate using a SC 150ft SBA  Long term goal 2: improve TUG 10 secs to prevent falls     Treatment Charges: Minutes Units   []  Ultrasound     [x]  Electrical-Stim 20 1   []  Iontophoresis     []  Traction     []  Massage       []  Eval     []  Gait     [x]  Ther Exercise 45  3    []  Manual Therapy       []  Ther Activities       []  Aquatics     []  Vasopneumatic Device     []  Neuro Re-Ed       []  Other       Total Treatment Time: 65 4        Therapy Time   Individual Concurrent Group Co-treatment   Time In 8069         Time Out 1350         Minutes 65         Timed Code Treatment Minutes: 45 Minutes    Electronically signed by: Clint Ribeiro, PT

## 2018-07-19 DIAGNOSIS — Z00.00 HEALTH CARE MAINTENANCE: Primary | ICD-10-CM

## 2018-07-20 ENCOUNTER — HOSPITAL ENCOUNTER (OUTPATIENT)
Dept: PHYSICAL THERAPY | Age: 63
Setting detail: THERAPIES SERIES
Discharge: HOME | End: 2018-07-20
Payer: MEDICAID

## 2018-07-20 PROCEDURE — 97110 THERAPEUTIC EXERCISES: CPT

## 2018-07-20 PROCEDURE — G0283 ELEC STIM OTHER THAN WOUND: HCPCS

## 2018-07-20 ASSESSMENT — PAIN DESCRIPTION - ORIENTATION
ORIENTATION: LEFT
ORIENTATION_2: RIGHT

## 2018-07-20 ASSESSMENT — PAIN SCALES - GENERAL: PAINLEVEL_OUTOF10: 8

## 2018-07-20 ASSESSMENT — PAIN DESCRIPTION - DESCRIPTORS
DESCRIPTORS: SHARP
DESCRIPTORS_2: SORE

## 2018-07-20 ASSESSMENT — PAIN DESCRIPTION - INTENSITY: RATING_2: 7

## 2018-07-20 ASSESSMENT — PAIN DESCRIPTION - FREQUENCY: FREQUENCY: INTERMITTENT

## 2018-07-20 ASSESSMENT — PAIN DESCRIPTION - DURATION: DURATION_2: INTERMITTENT

## 2018-07-20 ASSESSMENT — PAIN DESCRIPTION - LOCATION
LOCATION: KNEE
LOCATION_2: WRIST

## 2018-07-20 NOTE — PROGRESS NOTES
Physical Therapy  Daily Treatment Note  Date: 2018  Patient Name: Jonah Mooney  MRN: [id number]     :   1955    Subjective:      PT Visit Information  Onset Date: 18  PT Insurance Information: [facility]  Total # of Visits Approved: 12  Total # of Visits to Date: 3  Subjective  Subjective: pain on left knee and R wrist  General Comment  Comments: sore from last PT  Pain Screening  Patient Currently in Pain: Yes  Pain Assessment  Pain Assessment: 0-10  Pain Level: 8  Pain Location: Knee  Pain Orientation: Left  Pain Descriptors: Sharp  Pain Frequency: Intermittent  Pain 2  Pain Rating 2: 7  Pain Location 2: Wrist  Pain Orientation 2: Right  Pain Descriptors 2: Sore  Pain Duration 2: Intermittent  Vital Signs  Patient Currently in Pain: Yes       Treatment Activities:   Exercises  Exercise 1: L heel slide supine 20x  Exercise 2: supine L SLR 3x10  Exercise 3: SAQ's 3# L 3x10  Exercise 6: hot pack and EStim to L knee after ex today  Exercise 7: P-AAROM R wrist 10x    Assessment:   Conditions Requiring Skilled Therapeutic Intervention  REQUIRES PT FOLLOW UP: Yes     Plan:    Plan  Times per week: 2x/week  Current Treatment Recommendations: Strengthening, ROM, Balance Training, Home Exercise Program, Modalities, Gait Training, Stair training  Plan Comment: to continue PT per POC  Timed Code Treatment Minutes: 20 Minutes   Treatment Charges: Minutes Units   []  Ultrasound     [x]  Electrical-Stim 20 1   []  Iontophoresis     []  Traction     []  Massage       []  Eval     []  Gait     [x]  Ther Exercise 20  1    []  Manual Therapy       []  Ther Activities       []  Aquatics     []  Vasopneumatic Device     []  Neuro Re-Ed       []  Other       Total Treatment Time: 40 2        Therapy Time   Individual Concurrent Group Co-treatment   Time In 1523         Time Out 1603         Minutes 40         Timed Code Treatment Minutes: 20 Minutes     Electronically signed by: Maurilio Schneider, PT

## 2018-07-24 ENCOUNTER — HOSPITAL ENCOUNTER (OUTPATIENT)
Dept: CT IMAGING | Age: 63
Discharge: HOME | End: 2018-07-26
Payer: MEDICAID

## 2018-07-24 DIAGNOSIS — C64.9 MALIGNANT NEOPLASM OF KIDNEY, UNSPECIFIED LATERALITY (HCC): ICD-10-CM

## 2018-07-24 PROCEDURE — 74176 CT ABD & PELVIS W/O CONTRAST: CPT

## 2018-07-25 ENCOUNTER — HOSPITAL ENCOUNTER (OUTPATIENT)
Dept: PHYSICAL THERAPY | Age: 63
Setting detail: THERAPIES SERIES
Discharge: HOME | End: 2018-07-25
Payer: MEDICAID

## 2018-07-25 NOTE — PROGRESS NOTES
Physical Therapy Cancel/NS Note    Date: 2018  Patient Name: John Pelaez  MRN: [id number]  : 1955    Subjective   General Comment  Comments: cancel PT today and friday,has the flu  PT Visit Information  Onset Date: 18  PT Insurance Information: [facility]  Total # of Visits Approved: 12  Total # of Visits to Date: 3  No Show: 0  Canceled Appointment: 1    Electronically signed by: Claudio Scott PT

## 2018-07-27 ENCOUNTER — APPOINTMENT (OUTPATIENT)
Dept: PHYSICAL THERAPY | Age: 63
End: 2018-07-27
Payer: MEDICAID

## 2018-07-30 ENCOUNTER — HOSPITAL ENCOUNTER (OUTPATIENT)
Age: 63
Discharge: HOME | End: 2018-07-30
Payer: MEDICAID

## 2018-07-30 DIAGNOSIS — N18.30 BENIGN HYPERTENSION WITH CKD (CHRONIC KIDNEY DISEASE) STAGE III (HCC): ICD-10-CM

## 2018-07-30 DIAGNOSIS — N18.30 SECONDARY DIABETES MELLITUS WITH STAGE 3 CHRONIC KIDNEY DISEASE (HCC): ICD-10-CM

## 2018-07-30 DIAGNOSIS — I12.9 BENIGN HYPERTENSION WITH CKD (CHRONIC KIDNEY DISEASE) STAGE III (HCC): ICD-10-CM

## 2018-07-30 DIAGNOSIS — N18.30 CKD (CHRONIC KIDNEY DISEASE) STAGE 3, GFR 30-59 ML/MIN (HCC): ICD-10-CM

## 2018-07-30 DIAGNOSIS — E55.9 VITAMIN D DEFICIENCY: ICD-10-CM

## 2018-07-30 DIAGNOSIS — E13.22 SECONDARY DIABETES MELLITUS WITH STAGE 3 CHRONIC KIDNEY DISEASE (HCC): ICD-10-CM

## 2018-07-30 LAB
ABSOLUTE EOS #: 0.2 K/UL (ref 0–0.4)
ABSOLUTE IMMATURE GRANULOCYTE: NORMAL K/UL (ref 0–0.3)
ABSOLUTE LYMPH #: 2.6 K/UL (ref 1–4.8)
ABSOLUTE MONO #: 0.5 K/UL (ref 0.1–1.3)
ALBUMIN SERPL-MCNC: 3.6 G/DL (ref 3.5–5.2)
ALBUMIN/GLOBULIN RATIO: ABNORMAL (ref 1–2.5)
ALP BLD-CCNC: 116 U/L (ref 35–104)
ALT SERPL-CCNC: 7 U/L (ref 5–33)
ANION GAP SERPL CALCULATED.3IONS-SCNC: 16 MMOL/L (ref 9–17)
ANION GAP SERPL CALCULATED.3IONS-SCNC: 16 MMOL/L (ref 9–17)
AST SERPL-CCNC: 11 U/L
BASOPHILS # BLD: 1 % (ref 0–2)
BASOPHILS ABSOLUTE: 0.1 K/UL (ref 0–0.2)
BILIRUB SERPL-MCNC: 0.51 MG/DL (ref 0.3–1.2)
BUN BLDV-MCNC: 9 MG/DL (ref 8–23)
BUN BLDV-MCNC: 9 MG/DL (ref 8–23)
BUN/CREAT BLD: ABNORMAL (ref 9–20)
BUN/CREAT BLD: ABNORMAL (ref 9–20)
CALCIUM SERPL-MCNC: 9.3 MG/DL (ref 8.6–10.4)
CALCIUM SERPL-MCNC: 9.4 MG/DL (ref 8.6–10.4)
CHLORIDE BLD-SCNC: 101 MMOL/L (ref 98–107)
CHLORIDE BLD-SCNC: 99 MMOL/L (ref 98–107)
CO2: 20 MMOL/L (ref 20–31)
CO2: 20 MMOL/L (ref 20–31)
CREAT SERPL-MCNC: 0.93 MG/DL (ref 0.5–0.9)
CREAT SERPL-MCNC: 0.96 MG/DL (ref 0.5–0.9)
DIFFERENTIAL TYPE: NORMAL
EOSINOPHILS RELATIVE PERCENT: 2 % (ref 0–4)
GFR AFRICAN AMERICAN: >60 ML/MIN
GFR AFRICAN AMERICAN: >60 ML/MIN
GFR NON-AFRICAN AMERICAN: 59 ML/MIN
GFR NON-AFRICAN AMERICAN: >60 ML/MIN
GFR SERPL CREATININE-BSD FRML MDRD: ABNORMAL ML/MIN/{1.73_M2}
GLUCOSE BLD-MCNC: 221 MG/DL (ref 70–99)
GLUCOSE BLD-MCNC: 223 MG/DL (ref 70–99)
HCT VFR BLD CALC: 38 % (ref 36–46)
HEMOGLOBIN: 12.7 G/DL (ref 12–16)
IMMATURE GRANULOCYTES: NORMAL %
LYMPHOCYTES # BLD: 30 % (ref 24–44)
MAGNESIUM: 2.1 MG/DL (ref 1.6–2.6)
MCH RBC QN AUTO: 28.7 PG (ref 26–34)
MCHC RBC AUTO-ENTMCNC: 33.4 G/DL (ref 31–37)
MCV RBC AUTO: 85.8 FL (ref 80–100)
MONOCYTES # BLD: 5 % (ref 1–7)
NRBC AUTOMATED: NORMAL PER 100 WBC
PDW BLD-RTO: 13.3 % (ref 11.5–14.9)
PHOSPHORUS: 3.4 MG/DL (ref 2.6–4.5)
PLATELET # BLD: 205 K/UL (ref 150–450)
PLATELET ESTIMATE: NORMAL
PMV BLD AUTO: 10.1 FL (ref 6–12)
POTASSIUM SERPL-SCNC: 3.8 MMOL/L (ref 3.7–5.3)
POTASSIUM SERPL-SCNC: 3.8 MMOL/L (ref 3.7–5.3)
RBC # BLD: 4.43 M/UL (ref 4–5.2)
RBC # BLD: NORMAL 10*6/UL
SEG NEUTROPHILS: 62 % (ref 36–66)
SEGMENTED NEUTROPHILS ABSOLUTE COUNT: 5.5 K/UL (ref 1.3–9.1)
SODIUM BLD-SCNC: 135 MMOL/L (ref 135–144)
SODIUM BLD-SCNC: 137 MMOL/L (ref 135–144)
TOTAL PROTEIN: 7.4 G/DL (ref 6.4–8.3)
VITAMIN D 25-HYDROXY: 22.8 NG/ML (ref 30–100)
WBC # BLD: 8.9 K/UL (ref 3.5–11)
WBC # BLD: NORMAL 10*3/UL

## 2018-07-30 PROCEDURE — 84100 ASSAY OF PHOSPHORUS: CPT

## 2018-07-30 PROCEDURE — 80053 COMPREHEN METABOLIC PANEL: CPT

## 2018-07-30 PROCEDURE — 82306 VITAMIN D 25 HYDROXY: CPT

## 2018-07-30 PROCEDURE — 83735 ASSAY OF MAGNESIUM: CPT

## 2018-07-30 PROCEDURE — 80048 BASIC METABOLIC PNL TOTAL CA: CPT

## 2018-07-30 PROCEDURE — 36415 COLL VENOUS BLD VENIPUNCTURE: CPT

## 2018-07-30 PROCEDURE — 85025 COMPLETE CBC W/AUTO DIFF WBC: CPT

## 2018-08-02 ENCOUNTER — HOSPITAL ENCOUNTER (OUTPATIENT)
Age: 63
Setting detail: SPECIMEN
Discharge: HOME | End: 2018-08-02
Payer: MEDICAID

## 2018-08-02 PROBLEM — N18.2 CKD (CHRONIC KIDNEY DISEASE) STAGE 2, GFR 60-89 ML/MIN: Status: ACTIVE | Noted: 2018-08-02

## 2018-08-02 LAB
CREATININE URINE: 46.7 MG/DL (ref 28–217)
TOTAL PROTEIN, URINE: 17 MG/DL

## 2018-08-02 PROCEDURE — 82570 ASSAY OF URINE CREATININE: CPT

## 2018-08-02 PROCEDURE — 84156 ASSAY OF PROTEIN URINE: CPT

## 2018-08-09 ENCOUNTER — OFFICE VISIT (OUTPATIENT)
Dept: FAMILY MEDICINE CLINIC | Age: 63
End: 2018-08-09
Payer: MEDICAID

## 2018-08-09 VITALS
HEIGHT: 62 IN | TEMPERATURE: 98.6 F | SYSTOLIC BLOOD PRESSURE: 111 MMHG | HEART RATE: 65 BPM | WEIGHT: 142.2 LBS | DIASTOLIC BLOOD PRESSURE: 70 MMHG | BODY MASS INDEX: 26.17 KG/M2

## 2018-08-09 DIAGNOSIS — M17.4 OTHER SECONDARY OSTEOARTHRITIS OF BOTH KNEES: Primary | ICD-10-CM

## 2018-08-09 PROCEDURE — 99213 OFFICE O/P EST LOW 20 MIN: CPT | Performed by: STUDENT IN AN ORGANIZED HEALTH CARE EDUCATION/TRAINING PROGRAM

## 2018-08-09 PROCEDURE — 2022F DILAT RTA XM EVC RTNOPTHY: CPT | Performed by: STUDENT IN AN ORGANIZED HEALTH CARE EDUCATION/TRAINING PROGRAM

## 2018-08-09 PROCEDURE — 1036F TOBACCO NON-USER: CPT | Performed by: STUDENT IN AN ORGANIZED HEALTH CARE EDUCATION/TRAINING PROGRAM

## 2018-08-09 PROCEDURE — G8417 CALC BMI ABV UP PARAM F/U: HCPCS | Performed by: STUDENT IN AN ORGANIZED HEALTH CARE EDUCATION/TRAINING PROGRAM

## 2018-08-09 PROCEDURE — 3046F HEMOGLOBIN A1C LEVEL >9.0%: CPT | Performed by: STUDENT IN AN ORGANIZED HEALTH CARE EDUCATION/TRAINING PROGRAM

## 2018-08-09 PROCEDURE — G8427 DOCREV CUR MEDS BY ELIG CLIN: HCPCS | Performed by: STUDENT IN AN ORGANIZED HEALTH CARE EDUCATION/TRAINING PROGRAM

## 2018-08-09 PROCEDURE — 3017F COLORECTAL CA SCREEN DOC REV: CPT | Performed by: STUDENT IN AN ORGANIZED HEALTH CARE EDUCATION/TRAINING PROGRAM

## 2018-08-09 PROCEDURE — G8598 ASA/ANTIPLAT THER USED: HCPCS | Performed by: STUDENT IN AN ORGANIZED HEALTH CARE EDUCATION/TRAINING PROGRAM

## 2018-08-09 ASSESSMENT — ENCOUNTER SYMPTOMS
EYE PAIN: 0
PHOTOPHOBIA: 0
EYE REDNESS: 0
ABDOMINAL PAIN: 0
BLOOD IN STOOL: 0
CONSTIPATION: 0
WHEEZING: 0
EYE DISCHARGE: 0
SHORTNESS OF BREATH: 0
SORE THROAT: 0
NAUSEA: 0
DIARRHEA: 0
BACK PAIN: 0
COUGH: 0
CHEST TIGHTNESS: 0

## 2018-08-09 NOTE — PROGRESS NOTES
and sore throat. Eyes: Negative for photophobia, pain, discharge, redness and visual disturbance. Respiratory: Negative for cough, chest tightness, shortness of breath and wheezing. Cardiovascular: Negative for chest pain, palpitations and leg swelling. Gastrointestinal: Negative for abdominal pain, blood in stool, constipation, diarrhea and nausea. Endocrine: Negative for cold intolerance and heat intolerance. Genitourinary: Negative for difficulty urinating and hematuria. Musculoskeletal: Positive for arthralgias and joint swelling. Negative for back pain. Skin: Negative for rash and wound. Neurological: Negative for light-headedness and headaches. Psychiatric/Behavioral: Negative for agitation and confusion. Objective:    /70 (Site: Left Arm, Position: Sitting, Cuff Size: Medium Adult) Comment: machine  Pulse 65   Temp 98.6 °F (37 °C) (Temporal)   Ht 5' 2.01\" (1.575 m)   Wt 142 lb 3.2 oz (64.5 kg)   BMI 26.00 kg/m²    BP Readings from Last 3 Encounters:   08/09/18 111/70   08/02/18 (!) 150/82   07/06/18 113/67       Physical Exam   Eyes: Conjunctivae are normal. No scleral icterus. Neck: Normal range of motion. Neck supple. No thyromegaly present. Cardiovascular: Normal rate, regular rhythm and normal heart sounds. Pulmonary/Chest: Effort normal and breath sounds normal. She has no wheezes. She has no rales. She exhibits no tenderness. Musculoskeletal: She exhibits no edema or tenderness. Skin: No erythema. No pallor.        Lab Results   Component Value Date    WBC 8.9 07/30/2018    HGB 12.7 07/30/2018    HCT 38.0 07/30/2018     07/30/2018    CHOL 167 10/30/2017    TRIG 261 (H) 10/30/2017    HDL 34 (L) 10/30/2017    LDLDIRECT 155 (H) 10/13/2015    ALT 7 07/30/2018    AST 11 07/30/2018     07/30/2018    K 3.8 07/30/2018    CL 99 07/30/2018    CREATININE 0.93 (H) 07/30/2018    BUN 9 07/30/2018    CO2 20 07/30/2018    TSH 1.01 05/29/2018    INR 0.9 05/27/2018    LABA1C 9.6 07/06/2018    LABMICR 184 (H) 10/30/2017     Lab Results   Component Value Date    CALCIUM 9.3 07/30/2018    PHOS 3.4 07/30/2018     Lab Results   Component Value Date    LDLCHOLESTEROL 81 10/30/2017    LDLDIRECT 155 (H) 10/13/2015       Assessment and Plan:    1. Other secondary osteoarthritis of both knees  - patient refuses to go to OT due to worsening pain   - Jarome Beans works best but insurance wont cover  - encouraged patient to do PT and contact insurance company     2. Diabetes mellitus  - follows with endocrinologist.   - continue current regimen        Requested Prescriptions      No prescriptions requested or ordered in this encounter       There are no discontinued medications. Monae received counseling on the following healthy behaviors: nutrition, exercise and medication adherence    Discussed use, benefit, and side effects of prescribed medications. Barriers to medication compliance addressed. All patient questions answered. Pt voiced understanding. Return in about 3 months (around 11/9/2018).

## 2018-08-28 ENCOUNTER — OFFICE VISIT (OUTPATIENT)
Dept: ORTHOPEDIC SURGERY | Age: 63
End: 2018-08-28
Payer: MEDICAID

## 2018-08-28 VITALS — WEIGHT: 140 LBS | HEIGHT: 62 IN | BODY MASS INDEX: 25.76 KG/M2

## 2018-08-28 DIAGNOSIS — S52.591D OTHER CLOSED FRACTURE OF DISTAL END OF RIGHT RADIUS WITH ROUTINE HEALING, SUBSEQUENT ENCOUNTER: Primary | ICD-10-CM

## 2018-08-28 PROCEDURE — G8417 CALC BMI ABV UP PARAM F/U: HCPCS | Performed by: ORTHOPAEDIC SURGERY

## 2018-08-28 PROCEDURE — G8598 ASA/ANTIPLAT THER USED: HCPCS | Performed by: ORTHOPAEDIC SURGERY

## 2018-08-28 PROCEDURE — 3017F COLORECTAL CA SCREEN DOC REV: CPT | Performed by: ORTHOPAEDIC SURGERY

## 2018-08-28 PROCEDURE — G8427 DOCREV CUR MEDS BY ELIG CLIN: HCPCS | Performed by: ORTHOPAEDIC SURGERY

## 2018-08-28 PROCEDURE — 99212 OFFICE O/P EST SF 10 MIN: CPT | Performed by: ORTHOPAEDIC SURGERY

## 2018-08-28 PROCEDURE — 1036F TOBACCO NON-USER: CPT | Performed by: ORTHOPAEDIC SURGERY

## 2018-08-28 NOTE — PROGRESS NOTES
HPI: Ms. Simona Donahue is here today for follow-up evaluation of her right wrist and an left knee. She was seen back my clinic in 6 weeks ago approximately. At that time she was dealing with the right total radius fracture for which she was splinted 6 weeks prior to that visit. She is also having some anterior left knee pain that appeared to be due to underlying patellofemoral joint osteoarthritis. We initiated treatment for her wrist by placing her in a cockup wrist splint and getting her set up for occupational therapy in addition she was also set up for physical therapy for her knee and I recommended use of over-the-counter NSAIDs. She states that she went to therapy for her knee but had increase in pain and so stopped. With regards to her wrists she reports having some residual pain primarily localized to the dorsum of the wrist.  She still wearing her wrist splint and states that she has completed occupational therapy. Physical examination:  Evaluation of patient's right wrist and upper extremity demonstrates intact skin without any warmth, erythema, or notable swelling. She does have some mild tenderness to palpation on the dorsum of the wrist.  Sensation is grossly intact light touch in all dermatomes and she has 2+ radial pulse. Imaging studies: 3 x-ray views of the patient's right wrist completed on 8/28/18 were independently reviewed demonstrating maintained alignment of the distal radius was interval consolidation across the fracture site. Impression and plan: Ms. Simona Donahue is a 82-year-old here today for follow-up evaluation of the right distal radius fracture and patellofemoral knee pain. She appears to be healing appropriately both clinically and radiographically with regards to her right distal radius fracture.   At this time she was encouraged to wean out of her wrist splint and keep up with her home exercise program.  She may gradually increase activities as tolerated with this wrist.  With regards to her knee at this time I recommend continued symptomatic management follow-up my clinic in 3 months as needed but she was encouraged to return or call at anytime with questions and/or concerns.

## 2018-09-04 ENCOUNTER — APPOINTMENT (OUTPATIENT)
Dept: CT IMAGING | Age: 63
End: 2018-09-04
Payer: MEDICAID

## 2018-09-04 ENCOUNTER — APPOINTMENT (OUTPATIENT)
Dept: GENERAL RADIOLOGY | Age: 63
End: 2018-09-04
Payer: MEDICAID

## 2018-09-04 ENCOUNTER — HOSPITAL ENCOUNTER (EMERGENCY)
Age: 63
Discharge: HOME | End: 2018-09-04
Attending: EMERGENCY MEDICINE
Payer: MEDICAID

## 2018-09-04 VITALS
WEIGHT: 140 LBS | DIASTOLIC BLOOD PRESSURE: 81 MMHG | SYSTOLIC BLOOD PRESSURE: 158 MMHG | HEIGHT: 62 IN | RESPIRATION RATE: 18 BRPM | HEART RATE: 73 BPM | OXYGEN SATURATION: 100 % | TEMPERATURE: 97.5 F | BODY MASS INDEX: 25.76 KG/M2

## 2018-09-04 DIAGNOSIS — S72.402D CLOSED FRACTURE OF DISTAL END OF LEFT FEMUR WITH ROUTINE HEALING, UNSPECIFIED FRACTURE MORPHOLOGY, SUBSEQUENT ENCOUNTER: ICD-10-CM

## 2018-09-04 DIAGNOSIS — S22.020A CLOSED WEDGE COMPRESSION FRACTURE OF SECOND THORACIC VERTEBRA, INITIAL ENCOUNTER: Primary | ICD-10-CM

## 2018-09-04 LAB
ABSOLUTE EOS #: 0.17 K/UL (ref 0–0.4)
ABSOLUTE IMMATURE GRANULOCYTE: ABNORMAL K/UL (ref 0–0.3)
ABSOLUTE LYMPH #: 2.66 K/UL (ref 1–4.8)
ABSOLUTE MONO #: 0.66 K/UL (ref 0.1–1.3)
ANION GAP SERPL CALCULATED.3IONS-SCNC: 15 MMOL/L (ref 9–17)
BASOPHILS # BLD: 0 % (ref 0–2)
BASOPHILS ABSOLUTE: 0 K/UL (ref 0–0.2)
BUN BLDV-MCNC: 18 MG/DL (ref 8–23)
BUN/CREAT BLD: ABNORMAL (ref 9–20)
CALCIUM SERPL-MCNC: 9.9 MG/DL (ref 8.6–10.4)
CHLORIDE BLD-SCNC: 97 MMOL/L (ref 98–107)
CO2: 21 MMOL/L (ref 20–31)
CREAT SERPL-MCNC: 1.05 MG/DL (ref 0.5–0.9)
DIFFERENTIAL TYPE: ABNORMAL
EOSINOPHILS RELATIVE PERCENT: 2 % (ref 0–4)
GFR AFRICAN AMERICAN: >60 ML/MIN
GFR NON-AFRICAN AMERICAN: 53 ML/MIN
GFR SERPL CREATININE-BSD FRML MDRD: ABNORMAL ML/MIN/{1.73_M2}
GFR SERPL CREATININE-BSD FRML MDRD: ABNORMAL ML/MIN/{1.73_M2}
GLUCOSE BLD-MCNC: 281 MG/DL (ref 65–105)
GLUCOSE BLD-MCNC: 297 MG/DL (ref 70–99)
HCT VFR BLD CALC: 40.2 % (ref 36–46)
HEMOGLOBIN: 13.2 G/DL (ref 12–16)
IMMATURE GRANULOCYTES: ABNORMAL %
LYMPHOCYTES # BLD: 32 % (ref 24–44)
MCH RBC QN AUTO: 27.9 PG (ref 26–34)
MCHC RBC AUTO-ENTMCNC: 32.7 G/DL (ref 31–37)
MCV RBC AUTO: 85.3 FL (ref 80–100)
MONOCYTES # BLD: 8 % (ref 1–7)
MORPHOLOGY: NORMAL
NRBC AUTOMATED: ABNORMAL PER 100 WBC
PDW BLD-RTO: 13.6 % (ref 11.5–14.9)
PLATELET # BLD: 203 K/UL (ref 150–450)
PLATELET ESTIMATE: ABNORMAL
PMV BLD AUTO: 10.1 FL (ref 6–12)
POTASSIUM SERPL-SCNC: 4.1 MMOL/L (ref 3.7–5.3)
RBC # BLD: 4.72 M/UL (ref 4–5.2)
RBC # BLD: ABNORMAL 10*6/UL
SEG NEUTROPHILS: 58 % (ref 36–66)
SEGMENTED NEUTROPHILS ABSOLUTE COUNT: 4.81 K/UL (ref 1.3–9.1)
SODIUM BLD-SCNC: 133 MMOL/L (ref 135–144)
WBC # BLD: 8.3 K/UL (ref 3.5–11)
WBC # BLD: ABNORMAL 10*3/UL

## 2018-09-04 PROCEDURE — 80048 BASIC METABOLIC PNL TOTAL CA: CPT

## 2018-09-04 PROCEDURE — 73562 X-RAY EXAM OF KNEE 3: CPT

## 2018-09-04 PROCEDURE — 72131 CT LUMBAR SPINE W/O DYE: CPT

## 2018-09-04 PROCEDURE — 73060 X-RAY EXAM OF HUMERUS: CPT

## 2018-09-04 PROCEDURE — 82947 ASSAY GLUCOSE BLOOD QUANT: CPT

## 2018-09-04 PROCEDURE — 85025 COMPLETE CBC W/AUTO DIFF WBC: CPT

## 2018-09-04 PROCEDURE — 72128 CT CHEST SPINE W/O DYE: CPT

## 2018-09-04 PROCEDURE — 70450 CT HEAD/BRAIN W/O DYE: CPT

## 2018-09-04 PROCEDURE — 72125 CT NECK SPINE W/O DYE: CPT

## 2018-09-04 PROCEDURE — 6360000002 HC RX W HCPCS: Performed by: EMERGENCY MEDICINE

## 2018-09-04 PROCEDURE — 99284 EMERGENCY DEPT VISIT MOD MDM: CPT

## 2018-09-04 PROCEDURE — 36415 COLL VENOUS BLD VENIPUNCTURE: CPT

## 2018-09-04 PROCEDURE — 96374 THER/PROPH/DIAG INJ IV PUSH: CPT

## 2018-09-04 PROCEDURE — 73030 X-RAY EXAM OF SHOULDER: CPT

## 2018-09-04 RX ORDER — KETOROLAC TROMETHAMINE 30 MG/ML
30 INJECTION, SOLUTION INTRAMUSCULAR; INTRAVENOUS ONCE
Status: COMPLETED | OUTPATIENT
Start: 2018-09-04 | End: 2018-09-04

## 2018-09-04 RX ORDER — HYDROCODONE BITARTRATE AND ACETAMINOPHEN 5; 325 MG/1; MG/1
1 TABLET ORAL EVERY 6 HOURS PRN
Qty: 10 TABLET | Refills: 0 | Status: SHIPPED | OUTPATIENT
Start: 2018-09-04 | End: 2018-09-11

## 2018-09-04 RX ORDER — ZOLPIDEM TARTRATE 10 MG/1
10 TABLET ORAL NIGHTLY PRN
Status: ON HOLD | COMMUNITY
End: 2021-05-14 | Stop reason: HOSPADM

## 2018-09-04 RX ORDER — FLUTICASONE FUROATE AND VILANTEROL 200; 25 UG/1; UG/1
POWDER RESPIRATORY (INHALATION)
COMMUNITY
End: 2018-09-04 | Stop reason: CLARIF

## 2018-09-04 RX ORDER — TRAMADOL HYDROCHLORIDE 50 MG/1
50 TABLET ORAL DAILY PRN
COMMUNITY
End: 2018-12-11 | Stop reason: SDUPTHER

## 2018-09-04 RX ADMIN — KETOROLAC TROMETHAMINE 30 MG: 30 INJECTION, SOLUTION INTRAMUSCULAR at 18:19

## 2018-09-04 ASSESSMENT — ENCOUNTER SYMPTOMS
TROUBLE SWALLOWING: 0
VOMITING: 0
DIARRHEA: 0
BLOOD IN STOOL: 0
COUGH: 0
SHORTNESS OF BREATH: 0
NAUSEA: 0
BACK PAIN: 0
COLOR CHANGE: 0
ABDOMINAL PAIN: 0
SORE THROAT: 0
CONSTIPATION: 0

## 2018-09-04 ASSESSMENT — PAIN SCALES - GENERAL
PAINLEVEL_OUTOF10: 9
PAINLEVEL_OUTOF10: 8
PAINLEVEL_OUTOF10: 8

## 2018-09-04 NOTE — ED PROVIDER NOTES
medical history of CEFERINO (acute kidney injury) ([facility] Asthma; Carotid artery plaque; Clostridium difficile diarrhea; Dehydration; Depression; Fibromyalgia; Hiatal hernia; HTN (hypertension); Hyperlipidemia; Hypokalemia, gastrointestinal losses; Kidney stone; Obesity (BMI 30-39.9); Osteoarthritis; Osteoporosis; [facility] ([facility] Short of breath on exertion; Type II or unspecified type diabetes mellitus without mention of complication, not stated as uncontrolled; Unspecified sleep apnea; Urge urinary incontinence; Wears glasses; and Zygomatic fracture, right side, initial encounter for closed fracture ([facility] SURGICAL HISTORY      has a past surgical history that includes knee surgery (Bilateral); bladder suspension (08/09/2002); Nephrostomy (Right); total nephrectomy (Right, 06/20/2013); lumbar laminectomy (10/15/14); Bladder surgery (06/05/2014); Endometrial ablation; Cholecystectomy (10/10/2003); Breast surgery (Left, 03/03/2008); and Breast biopsy (Left, 03/03/2008). CURRENT MEDICATIONS       Previous Medications    ACETAMINOPHEN (TYLENOL) 500 MG TABLET    Take 500 mg by mouth every 6 hours as needed. ALBUTEROL (PROVENTIL HFA;VENTOLIN HFA) 108 (90 BASE) MCG/ACT INHALER    Inhale 2 puffs into the lungs every 6 hours as needed. ASPIRIN EC 81 MG EC TABLET    Take 1 tablet by mouth daily    BIPAP MACHINE MISC    by Does not apply route    BUTALBITAL-ACETAMINOPHEN-CAFFEINE (FIORICET, ESGIC) PER TABLET    Take 1 tablet by mouth every 8 hours as needed. CALCIUM CARBONATE (TUMS) 500 MG CHEWABLE TABLET    Take 1 tablet by mouth 3 times daily as needed for Heartburn    CLONAZEPAM (KLONOPIN) 0.5 MG TABLET    Take 0.5 mg by mouth nightly as needed.     FLUTICASONE (ARNUITY ELLIPTA) 200 MCG/ACT AEPB    Inhale 1 Inhaler into the lungs daily    GEMFIBROZIL (LOPID) 600 MG TABLET    TAKE ONE TABLET BY MOUTH TWICE A DAY    GLUCAGON 1 MG INJECTION    Infuse 1 kit intravenously once    HANDICAP PLACARD MISC    by INITIAL VITALS:  height is 5' 2\" (1.575 m) and weight is 140 lb (63.5 kg). Her oral temperature is 97.5 °F (36.4 °C). Her blood pressure is 158/81 (abnormal) and her pulse is 73. Her respiration is 18 and oxygen saturation is 100%. Physical Exam   Constitutional: She is oriented to person, place, and time and well-developed, well-nourished, and in no distress. No distress. HENT:   Head: Normocephalic and atraumatic. Mouth/Throat: Oropharynx is clear and moist.   Eyes: Pupils are equal, round, and reactive to light. Conjunctivae are normal.   Neck: Neck supple. Cardiovascular: Normal rate, regular rhythm, normal heart sounds and intact distal pulses. No murmur heard. Pulmonary/Chest: Effort normal and breath sounds normal. No respiratory distress. Abdominal: Soft. Bowel sounds are normal. She exhibits no distension. There is no tenderness. Musculoskeletal: She exhibits no edema or tenderness. Right shoulder: She exhibits bony tenderness. She exhibits no deformity. Right knee: She exhibits ecchymosis and bony tenderness. She exhibits no deformity. Left knee: She exhibits ecchymosis and bony tenderness. She exhibits no deformity. Cervical back: She exhibits bony tenderness. Thoracic back: She exhibits no tenderness. Lumbar back: She exhibits no tenderness. Left upper arm: She exhibits bony tenderness. She exhibits no deformity. Lymphadenopathy:     She has no cervical adenopathy. Neurological: She is alert and oriented to person, place, and time. She has normal sensation and normal strength. GCS score is 15. Skin: Skin is warm and dry. No rash noted. Psychiatric: Affect and judgment normal.   Nursing note and vitals reviewed. DIFFERENTIAL DIAGNOSIS/MDM:   Mechanical fall down 4-5 stairs 5 days ago with no LOC. Not on blood thinners. No neuro deficits. Alert and oriented x4.   Ecchymosis and tenderness to right shoulder, left humerus,

## 2018-09-06 ENCOUNTER — OFFICE VISIT (OUTPATIENT)
Dept: ORTHOPEDIC SURGERY | Age: 63
End: 2018-09-06
Payer: MEDICAID

## 2018-09-06 DIAGNOSIS — M80.00XA AGE-RELATED OSTEOPOROSIS WITH CURRENT PATHOLOGICAL FRACTURE, INITIAL ENCOUNTER: ICD-10-CM

## 2018-09-06 DIAGNOSIS — M81.0 AGE-RELATED OSTEOPOROSIS WITHOUT CURRENT PATHOLOGICAL FRACTURE: ICD-10-CM

## 2018-09-06 DIAGNOSIS — S72.452A CLOSED SUPRACONDYLAR FRACTURE OF LEFT FEMUR, INITIAL ENCOUNTER (HCC): ICD-10-CM

## 2018-09-06 DIAGNOSIS — S32.020A CLOSED COMPRESSION FRACTURE OF SECOND LUMBAR VERTEBRA, INITIAL ENCOUNTER: ICD-10-CM

## 2018-09-06 DIAGNOSIS — S22.000A CLOSED COMPRESSION FRACTURE OF THORACIC VERTEBRA, INITIAL ENCOUNTER (HCC): Primary | ICD-10-CM

## 2018-09-06 DIAGNOSIS — M51.36 DDD (DEGENERATIVE DISC DISEASE), LUMBAR: ICD-10-CM

## 2018-09-06 PROCEDURE — 1036F TOBACCO NON-USER: CPT | Performed by: ORTHOPAEDIC SURGERY

## 2018-09-06 PROCEDURE — G8598 ASA/ANTIPLAT THER USED: HCPCS | Performed by: ORTHOPAEDIC SURGERY

## 2018-09-06 PROCEDURE — G8427 DOCREV CUR MEDS BY ELIG CLIN: HCPCS | Performed by: ORTHOPAEDIC SURGERY

## 2018-09-06 PROCEDURE — 99213 OFFICE O/P EST LOW 20 MIN: CPT | Performed by: ORTHOPAEDIC SURGERY

## 2018-09-06 PROCEDURE — 3017F COLORECTAL CA SCREEN DOC REV: CPT | Performed by: ORTHOPAEDIC SURGERY

## 2018-09-06 PROCEDURE — G8417 CALC BMI ABV UP PARAM F/U: HCPCS | Performed by: ORTHOPAEDIC SURGERY

## 2018-09-09 NOTE — PROGRESS NOTES
Subjective:      Patient ID: Salvador Wynn is a 66 y.o. female. HPI  Patient presents with upper thoracic and mid lumbar pain as well as right knee pain    Patient with recent fall down the stairs  Review of Systems    Objective:   Physical Exam   Constitutional: She is oriented to person, place, and time. She appears well-developed and well-nourished. HENT:   Head: Normocephalic and atraumatic. Eyes: Conjunctivae and EOM are normal.   Neck: Normal range of motion. Pulmonary/Chest: Effort normal. No respiratory distress. Neurological: She is alert and oriented to person, place, and time. She has normal strength. No sensory deficit. Normal gait   Skin: Skin is warm and dry. Psychiatric: Her behavior is normal. Thought content normal.   Nursing note and vitals reviewed. Patient with multiple areas of ecchymosis and contusions    CT scan thoracic spine is reviewed patient with T2 compression fracture    CT scan lumbar spine is reviewed patient with L2 compression fracture; Patient with history of L4 5 PLIF and a bladder stimulator    Diagnostic x-rays right knee reveal a lucency consistent with a nondisplaced supracondylar fracture    All imaging demonstrates severe osteopenia  Assessment:      Encounter Diagnoses   Name Primary?     Closed compression fracture of thoracic vertebra, initial encounter ([address] Yes    DDD (degenerative disc disease), lumbar     Age-related osteoporosis without current pathological fracture     Closed compression fracture of second lumbar vertebra, initial encounter ([address]     Closed supracondylar fracture of left femur, initial encounter ([address]     Age-related osteoporosis with current pathological fracture, initial encounter            Plan:      Okay to schedule kyphoplasty and potential stabilization of supracondylar fracture        Meng Ford MD

## 2018-09-10 ENCOUNTER — ANESTHESIA EVENT (OUTPATIENT)
Dept: OPERATING ROOM | Age: 63
End: 2018-09-10
Payer: MEDICAID

## 2018-09-10 ENCOUNTER — ANESTHESIA (OUTPATIENT)
Dept: OPERATING ROOM | Age: 63
End: 2018-09-10
Payer: MEDICAID

## 2018-09-12 ENCOUNTER — APPOINTMENT (OUTPATIENT)
Dept: GENERAL RADIOLOGY | Age: 63
End: 2018-09-12
Attending: ORTHOPAEDIC SURGERY
Payer: MEDICAID

## 2018-09-12 ENCOUNTER — HOSPITAL ENCOUNTER (OUTPATIENT)
Age: 63
Setting detail: OUTPATIENT SURGERY
Discharge: HOME | End: 2018-09-12
Attending: ORTHOPAEDIC SURGERY | Admitting: ORTHOPAEDIC SURGERY
Payer: MEDICAID

## 2018-09-12 VITALS
HEART RATE: 91 BPM | RESPIRATION RATE: 13 BRPM | WEIGHT: 140 LBS | TEMPERATURE: 97.3 F | HEIGHT: 62 IN | OXYGEN SATURATION: 98 % | DIASTOLIC BLOOD PRESSURE: 64 MMHG | BODY MASS INDEX: 25.76 KG/M2 | SYSTOLIC BLOOD PRESSURE: 154 MMHG

## 2018-09-12 VITALS — SYSTOLIC BLOOD PRESSURE: 175 MMHG | OXYGEN SATURATION: 100 % | DIASTOLIC BLOOD PRESSURE: 93 MMHG

## 2018-09-12 DIAGNOSIS — Z91.81 RISK FOR FALLS: Primary | ICD-10-CM

## 2018-09-12 DIAGNOSIS — S22.020D: ICD-10-CM

## 2018-09-12 DIAGNOSIS — S32.020D CLOSED COMPRESSION FRACTURE OF L2 LUMBAR VERTEBRA WITH ROUTINE HEALING, SUBSEQUENT ENCOUNTER: ICD-10-CM

## 2018-09-12 DIAGNOSIS — M80.00XD AGE-RELATED OSTEOPOROSIS WITH CURRENT PATHOLOGICAL FRACTURE WITH ROUTINE HEALING, SUBSEQUENT ENCOUNTER: ICD-10-CM

## 2018-09-12 LAB
GLUCOSE BLD-MCNC: 196 MG/DL (ref 65–105)
GLUCOSE BLD-MCNC: 197 MG/DL (ref 65–105)

## 2018-09-12 PROCEDURE — 88341 IMHCHEM/IMCYTCHM EA ADD ANTB: CPT

## 2018-09-12 PROCEDURE — 3700000000 HC ANESTHESIA ATTENDED CARE: Performed by: ORTHOPAEDIC SURGERY

## 2018-09-12 PROCEDURE — 22513 PERQ VERTEBRAL AUGMENTATION: CPT | Performed by: ORTHOPAEDIC SURGERY

## 2018-09-12 PROCEDURE — 82947 ASSAY GLUCOSE BLOOD QUANT: CPT

## 2018-09-12 PROCEDURE — 3700000001 HC ADD 15 MINUTES (ANESTHESIA): Performed by: ORTHOPAEDIC SURGERY

## 2018-09-12 PROCEDURE — 2500000003 HC RX 250 WO HCPCS

## 2018-09-12 PROCEDURE — C1894 INTRO/SHEATH, NON-LASER: HCPCS | Performed by: ORTHOPAEDIC SURGERY

## 2018-09-12 PROCEDURE — C1713 ANCHOR/SCREW BN/BN,TIS/BN: HCPCS | Performed by: ORTHOPAEDIC SURGERY

## 2018-09-12 PROCEDURE — 88311 DECALCIFY TISSUE: CPT

## 2018-09-12 PROCEDURE — 6360000002 HC RX W HCPCS: Performed by: ANESTHESIOLOGY

## 2018-09-12 PROCEDURE — 72100 X-RAY EXAM L-S SPINE 2/3 VWS: CPT

## 2018-09-12 PROCEDURE — 2720000010 HC SURG SUPPLY STERILE: Performed by: ORTHOPAEDIC SURGERY

## 2018-09-12 PROCEDURE — 2580000003 HC RX 258: Performed by: ANESTHESIOLOGY

## 2018-09-12 PROCEDURE — 72040 X-RAY EXAM NECK SPINE 2-3 VW: CPT

## 2018-09-12 PROCEDURE — 2709999900 HC NON-CHARGEABLE SUPPLY: Performed by: ORTHOPAEDIC SURGERY

## 2018-09-12 PROCEDURE — 88360 TUMOR IMMUNOHISTOCHEM/MANUAL: CPT

## 2018-09-12 PROCEDURE — 22515 PERQ VERTEBRAL AUGMENTATION: CPT | Performed by: ORTHOPAEDIC SURGERY

## 2018-09-12 PROCEDURE — 6370000000 HC RX 637 (ALT 250 FOR IP): Performed by: ANESTHESIOLOGY

## 2018-09-12 PROCEDURE — 88342 IMHCHEM/IMCYTCHM 1ST ANTB: CPT

## 2018-09-12 PROCEDURE — 2500000003 HC RX 250 WO HCPCS: Performed by: ORTHOPAEDIC SURGERY

## 2018-09-12 PROCEDURE — 3600000002 HC SURGERY LEVEL 2 BASE: Performed by: ORTHOPAEDIC SURGERY

## 2018-09-12 PROCEDURE — 7100000031 HC ASPR PHASE II RECOVERY - ADDTL 15 MIN: Performed by: ORTHOPAEDIC SURGERY

## 2018-09-12 PROCEDURE — 6360000002 HC RX W HCPCS: Performed by: ORTHOPAEDIC SURGERY

## 2018-09-12 PROCEDURE — 7100000000 HC PACU RECOVERY - FIRST 15 MIN: Performed by: ORTHOPAEDIC SURGERY

## 2018-09-12 PROCEDURE — 7100000001 HC PACU RECOVERY - ADDTL 15 MIN: Performed by: ORTHOPAEDIC SURGERY

## 2018-09-12 PROCEDURE — 88307 TISSUE EXAM BY PATHOLOGIST: CPT

## 2018-09-12 PROCEDURE — 3209999900 FLUORO FOR SURGICAL PROCEDURES

## 2018-09-12 PROCEDURE — 6360000004 HC RX CONTRAST MEDICATION: Performed by: ORTHOPAEDIC SURGERY

## 2018-09-12 PROCEDURE — 6360000002 HC RX W HCPCS

## 2018-09-12 PROCEDURE — 3600000012 HC SURGERY LEVEL 2 ADDTL 15MIN: Performed by: ORTHOPAEDIC SURGERY

## 2018-09-12 PROCEDURE — 7100000030 HC ASPR PHASE II RECOVERY - FIRST 15 MIN: Performed by: ORTHOPAEDIC SURGERY

## 2018-09-12 DEVICE — CEMENT C01A KYPHX HV-R BONE CEMENT EN
Type: IMPLANTABLE DEVICE | Site: SPINE LUMBAR | Status: FUNCTIONAL
Brand: KYPHON® HV-R® BONE CEMENT

## 2018-09-12 RX ORDER — SODIUM CHLORIDE 0.9 % (FLUSH) 0.9 %
10 SYRINGE (ML) INJECTION PRN
Status: DISCONTINUED | OUTPATIENT
Start: 2018-09-12 | End: 2018-09-12 | Stop reason: HOSPADM

## 2018-09-12 RX ORDER — DIPHENHYDRAMINE HYDROCHLORIDE 50 MG/ML
12.5 INJECTION INTRAMUSCULAR; INTRAVENOUS
Status: DISCONTINUED | OUTPATIENT
Start: 2018-09-12 | End: 2018-09-12 | Stop reason: HOSPADM

## 2018-09-12 RX ORDER — LIDOCAINE HYDROCHLORIDE 10 MG/ML
1 INJECTION, SOLUTION EPIDURAL; INFILTRATION; INTRACAUDAL; PERINEURAL
Status: DISCONTINUED | OUTPATIENT
Start: 2018-09-12 | End: 2018-09-12 | Stop reason: HOSPADM

## 2018-09-12 RX ORDER — TRAMADOL HYDROCHLORIDE 50 MG/1
50 TABLET ORAL EVERY 6 HOURS PRN
Qty: 28 TABLET | Refills: 0 | Status: ON HOLD | OUTPATIENT
Start: 2018-09-12 | End: 2019-05-08 | Stop reason: HOSPADM

## 2018-09-12 RX ORDER — CEFAZOLIN SODIUM 1 G/3ML
INJECTION, POWDER, FOR SOLUTION INTRAMUSCULAR; INTRAVENOUS
Status: DISCONTINUED
Start: 2018-09-12 | End: 2018-09-12 | Stop reason: HOSPADM

## 2018-09-12 RX ORDER — DEXAMETHASONE SODIUM PHOSPHATE 4 MG/ML
INJECTION, SOLUTION INTRA-ARTICULAR; INTRALESIONAL; INTRAMUSCULAR; INTRAVENOUS; SOFT TISSUE PRN
Status: DISCONTINUED | OUTPATIENT
Start: 2018-09-12 | End: 2018-09-12 | Stop reason: SDUPTHER

## 2018-09-12 RX ORDER — FENTANYL CITRATE 50 UG/ML
INJECTION, SOLUTION INTRAMUSCULAR; INTRAVENOUS PRN
Status: DISCONTINUED | OUTPATIENT
Start: 2018-09-12 | End: 2018-09-12 | Stop reason: SDUPTHER

## 2018-09-12 RX ORDER — MEPERIDINE HYDROCHLORIDE 50 MG/ML
12.5 INJECTION INTRAMUSCULAR; INTRAVENOUS; SUBCUTANEOUS EVERY 5 MIN PRN
Status: DISCONTINUED | OUTPATIENT
Start: 2018-09-12 | End: 2018-09-12 | Stop reason: HOSPADM

## 2018-09-12 RX ORDER — MORPHINE SULFATE 2 MG/ML
2 INJECTION, SOLUTION INTRAMUSCULAR; INTRAVENOUS EVERY 5 MIN PRN
Status: DISCONTINUED | OUTPATIENT
Start: 2018-09-12 | End: 2018-09-12 | Stop reason: HOSPADM

## 2018-09-12 RX ORDER — ROCURONIUM BROMIDE 10 MG/ML
INJECTION, SOLUTION INTRAVENOUS PRN
Status: DISCONTINUED | OUTPATIENT
Start: 2018-09-12 | End: 2018-09-12 | Stop reason: SDUPTHER

## 2018-09-12 RX ORDER — LABETALOL HYDROCHLORIDE 5 MG/ML
5 INJECTION, SOLUTION INTRAVENOUS EVERY 10 MIN PRN
Status: DISCONTINUED | OUTPATIENT
Start: 2018-09-12 | End: 2018-09-12 | Stop reason: HOSPADM

## 2018-09-12 RX ORDER — PROPOFOL 10 MG/ML
INJECTION, EMULSION INTRAVENOUS PRN
Status: DISCONTINUED | OUTPATIENT
Start: 2018-09-12 | End: 2018-09-12 | Stop reason: SDUPTHER

## 2018-09-12 RX ORDER — TRAMADOL HYDROCHLORIDE 50 MG/1
50 TABLET ORAL ONCE
Status: COMPLETED | OUTPATIENT
Start: 2018-09-12 | End: 2018-09-12

## 2018-09-12 RX ORDER — MIDAZOLAM HYDROCHLORIDE 1 MG/ML
INJECTION INTRAMUSCULAR; INTRAVENOUS PRN
Status: DISCONTINUED | OUTPATIENT
Start: 2018-09-12 | End: 2018-09-12 | Stop reason: SDUPTHER

## 2018-09-12 RX ORDER — LIDOCAINE HYDROCHLORIDE 10 MG/ML
INJECTION, SOLUTION EPIDURAL; INFILTRATION; INTRACAUDAL; PERINEURAL PRN
Status: DISCONTINUED | OUTPATIENT
Start: 2018-09-12 | End: 2018-09-12 | Stop reason: SDUPTHER

## 2018-09-12 RX ORDER — BUPIVACAINE HYDROCHLORIDE AND EPINEPHRINE 5; 5 MG/ML; UG/ML
INJECTION, SOLUTION EPIDURAL; INTRACAUDAL; PERINEURAL PRN
Status: DISCONTINUED | OUTPATIENT
Start: 2018-09-12 | End: 2018-09-12 | Stop reason: HOSPADM

## 2018-09-12 RX ORDER — ONDANSETRON 2 MG/ML
4 INJECTION INTRAMUSCULAR; INTRAVENOUS
Status: COMPLETED | OUTPATIENT
Start: 2018-09-12 | End: 2018-09-12

## 2018-09-12 RX ORDER — SODIUM CHLORIDE, SODIUM LACTATE, POTASSIUM CHLORIDE, CALCIUM CHLORIDE 600; 310; 30; 20 MG/100ML; MG/100ML; MG/100ML; MG/100ML
INJECTION, SOLUTION INTRAVENOUS CONTINUOUS
Status: DISCONTINUED | OUTPATIENT
Start: 2018-09-12 | End: 2018-09-12 | Stop reason: HOSPADM

## 2018-09-12 RX ORDER — ONDANSETRON 2 MG/ML
INJECTION INTRAMUSCULAR; INTRAVENOUS PRN
Status: DISCONTINUED | OUTPATIENT
Start: 2018-09-12 | End: 2018-09-12 | Stop reason: SDUPTHER

## 2018-09-12 RX ORDER — SODIUM CHLORIDE 0.9 % (FLUSH) 0.9 %
10 SYRINGE (ML) INJECTION EVERY 12 HOURS SCHEDULED
Status: DISCONTINUED | OUTPATIENT
Start: 2018-09-12 | End: 2018-09-12 | Stop reason: HOSPADM

## 2018-09-12 RX ADMIN — ROCURONIUM BROMIDE 20 MG: 10 INJECTION INTRAVENOUS at 10:45

## 2018-09-12 RX ADMIN — DEXAMETHASONE SODIUM PHOSPHATE 4 MG: 4 INJECTION, SOLUTION INTRAMUSCULAR; INTRAVENOUS at 11:28

## 2018-09-12 RX ADMIN — TRAMADOL HYDROCHLORIDE 50 MG: 50 TABLET, FILM COATED ORAL at 14:13

## 2018-09-12 RX ADMIN — ONDANSETRON 4 MG: 2 INJECTION INTRAMUSCULAR; INTRAVENOUS at 12:58

## 2018-09-12 RX ADMIN — MIDAZOLAM 2 MG: 1 INJECTION INTRAMUSCULAR; INTRAVENOUS at 10:40

## 2018-09-12 RX ADMIN — Medication 2 G: at 10:45

## 2018-09-12 RX ADMIN — LIDOCAINE HYDROCHLORIDE 50 MG: 10 INJECTION, SOLUTION EPIDURAL; INFILTRATION; INTRACAUDAL; PERINEURAL at 10:45

## 2018-09-12 RX ADMIN — PROPOFOL 130 MG: 10 INJECTION, EMULSION INTRAVENOUS at 10:45

## 2018-09-12 RX ADMIN — FENTANYL CITRATE 100 MCG: 50 INJECTION, SOLUTION INTRAMUSCULAR; INTRAVENOUS at 11:00

## 2018-09-12 RX ADMIN — PHENYLEPHRINE HYDROCHLORIDE 100 MCG: 10 INJECTION INTRAVENOUS at 11:13

## 2018-09-12 RX ADMIN — PHENYLEPHRINE HYDROCHLORIDE 100 MCG: 10 INJECTION INTRAVENOUS at 11:25

## 2018-09-12 RX ADMIN — PHENYLEPHRINE HYDROCHLORIDE 100 MCG: 10 INJECTION INTRAVENOUS at 11:42

## 2018-09-12 RX ADMIN — ONDANSETRON 4 MG: 2 INJECTION INTRAMUSCULAR; INTRAVENOUS at 11:28

## 2018-09-12 RX ADMIN — IOPAMIDOL 40 ML: 408 INJECTION, SOLUTION INTRATHECAL at 13:07

## 2018-09-12 RX ADMIN — IOPAMIDOL 40 ML: 408 INJECTION, SOLUTION INTRATHECAL at 13:11

## 2018-09-12 RX ADMIN — SODIUM CHLORIDE, POTASSIUM CHLORIDE, SODIUM LACTATE AND CALCIUM CHLORIDE: 600; 310; 30; 20 INJECTION, SOLUTION INTRAVENOUS at 09:45

## 2018-09-12 RX ADMIN — PHENYLEPHRINE HYDROCHLORIDE 100 MCG: 10 INJECTION INTRAVENOUS at 11:44

## 2018-09-12 ASSESSMENT — PAIN SCALES - GENERAL
PAINLEVEL_OUTOF10: 5
PAINLEVEL_OUTOF10: 7
PAINLEVEL_OUTOF10: 8
PAINLEVEL_OUTOF10: 8
PAINLEVEL_OUTOF10: 0

## 2018-09-12 ASSESSMENT — PAIN - FUNCTIONAL ASSESSMENT: PAIN_FUNCTIONAL_ASSESSMENT: 0-10

## 2018-09-12 ASSESSMENT — PULMONARY FUNCTION TESTS
PIF_VALUE: 16
PIF_VALUE: 15
PIF_VALUE: 22
PIF_VALUE: 16
PIF_VALUE: 9
PIF_VALUE: 19
PIF_VALUE: 16
PIF_VALUE: 16
PIF_VALUE: 2
PIF_VALUE: 16
PIF_VALUE: 0
PIF_VALUE: 2
PIF_VALUE: 15
PIF_VALUE: 3
PIF_VALUE: 15
PIF_VALUE: 16
PIF_VALUE: 16
PIF_VALUE: 15
PIF_VALUE: 1
PIF_VALUE: 3
PIF_VALUE: 2
PIF_VALUE: 0
PIF_VALUE: 16
PIF_VALUE: 15
PIF_VALUE: 21
PIF_VALUE: 16
PIF_VALUE: 14
PIF_VALUE: 20
PIF_VALUE: 2
PIF_VALUE: 14
PIF_VALUE: 16
PIF_VALUE: 14
PIF_VALUE: 22
PIF_VALUE: 16
PIF_VALUE: 20
PIF_VALUE: 16
PIF_VALUE: 16
PIF_VALUE: 14
PIF_VALUE: 16
PIF_VALUE: 2
PIF_VALUE: 16
PIF_VALUE: 2
PIF_VALUE: 24
PIF_VALUE: 16
PIF_VALUE: 14
PIF_VALUE: 25
PIF_VALUE: 15
PIF_VALUE: 3
PIF_VALUE: 16
PIF_VALUE: 14
PIF_VALUE: 16
PIF_VALUE: 2
PIF_VALUE: 16
PIF_VALUE: 15
PIF_VALUE: 15
PIF_VALUE: 14
PIF_VALUE: 1
PIF_VALUE: 15
PIF_VALUE: 16
PIF_VALUE: 1
PIF_VALUE: 16
PIF_VALUE: 14
PIF_VALUE: 16
PIF_VALUE: 14

## 2018-09-12 ASSESSMENT — PAIN DESCRIPTION - DESCRIPTORS: DESCRIPTORS: DISCOMFORT

## 2018-09-12 ASSESSMENT — ENCOUNTER SYMPTOMS
DYSPNEA ACTIVITY LEVEL: NO INTERVAL CHANGE
STRIDOR: 0
SHORTNESS OF BREATH: 1

## 2018-09-12 ASSESSMENT — PAIN DESCRIPTION - PAIN TYPE: TYPE: SURGICAL PAIN

## 2018-09-12 ASSESSMENT — LIFESTYLE VARIABLES: SMOKING_STATUS: 0

## 2018-09-12 ASSESSMENT — PAIN DESCRIPTION - LOCATION: LOCATION: BACK

## 2018-09-12 NOTE — ANESTHESIA POSTPROCEDURE EVALUATION
POST- ANESTHESIA EVALUATION       Pt Name: Pernell South  MRN: [id number]  YOB: 1955  Date of evaluation: 9/12/2018  Time:  2:43 PM      BP (!) 171/76   Pulse 91   Temp 97.3 °F (36.3 °C) (Temporal)   Resp 14   Ht 5' 2\" (1.575 m)   Wt 140 lb (63.5 kg)   SpO2 98%   BMI 25.61 kg/m²      Consciousness Level  Awake  Cardiopulmonary Status  Stable  Pain Adequately Treated YES  Nausea / Vomiting  NO  Adequate Hydration  YES  Anesthesia Related Complications NONE      Electronically signed by Itzel Daniels MD on 9/12/2018 at 2:43 PM       Department of Anesthesiology  Postprocedure Note    Patient: Pernell South  MRN: [id number]  YOB: 1955  Date of evaluation: 9/12/2018  Time:  2:43 PM     Procedure Summary     Date:  09/12/18 Room / Location:  [address] [place] [address] [address] / [address]    Anesthesia Start:  1040 Anesthesia Stop:  1204    Procedures:       KYPHOPLASTY T2 & L2 (N/A )      KYPHOPLASTY T2 & L2 (canceled) Diagnosis:  (COMPRESSION FRACTURE T2 L2 & LEFT FEMUR FRACTURE [facility] 9/10/18))    Surgeon:  Lauryn Billings MD Responsible Provider:  Itzel Daniels MD    Anesthesia Type:  general ASA Status:  3          Anesthesia Type: general    Raffaele Phase I: Raffaele Score: 10    Raffaele Phase II: Raffaele Score: 10    Last vitals: Reviewed and per EMR flowsheets.        Anesthesia Post Evaluation

## 2018-09-12 NOTE — H&P
ABLATION      KNEE SURGERY Bilateral     x 2 each side    LUMBAR LAMINECTOMY  10/15/14    WITH FUSION POSTERIOR L4 L5 WITH SPINAL CORD MONITORING    NEPHROSTOMY Right     TOTAL NEPHRECTOMY Right 06/20/2013    right due to CA         SOCIAL HISTORY       Social History     Social History    Marital status:      Spouse name: N/A    Number of children: N/A    Years of education: 15     Occupational History    disability N/A     Social History Main Topics    Smoking status: Never Smoker    Smokeless tobacco: Never Used    Alcohol use No    Drug use: No    Sexual activity: Not on file     Other Topics Concern    Not on file     Social History Narrative    No narrative on file           REVIEW OF SYSTEMS      Allergies   Allergen Reactions    Penicillins Hives and Rash    Tape [Adhesive Tape] Rash     OK to use paper tape per patient       No current facility-administered medications on file prior to encounter. Current Outpatient Prescriptions on File Prior to Encounter   Medication Sig Dispense Refill    zolpidem (AMBIEN) 10 MG tablet Take 10 mg by mouth nightly as needed for Sleep. Bryant  traMADol (ULTRAM) 50 MG tablet Take 50 mg by mouth daily as needed for Pain. Bryant  insulin glargine (BASAGLAR KWIKPEN) 100 UNIT/ML injection pen Inject 25 Units into the skin nightly      Potassium Citrate ER 15 MEQ (1620 MG) TBCR Take 15 mEq by mouth 2 times daily 60 tablet 6    vitamin D (ERGOCALCIFEROL) 06963 units CAPS capsule Take 1 capsule by mouth once a week for 8 doses 8 capsule 0    sertraline (ZOLOFT) 100 MG tablet TAKE ONE TABLET BY MOUTH DAILY 30 tablet 0    raloxifene (EVISTA) 60 MG tablet TAKE ONE TABLET BY MOUTH DAILY 30 tablet 0    insulin regular human (HUMULIN R) 500 UNIT/ML concentrated injection vial Inject into the skin Inject 0.35 mL (175 units) in the morning and 0.1 mL (50 units) with lunch      fluticasone (ARNUITY ELLIPTA) 200 MCG/ACT AEPB Inhale 1 Inhaler into the lungs dysfunction 05/26/2018    EJ on CPAP 05/16/2017    Preoperative clearance 02/09/2017    Type 2 diabetes mellitus with diabetic polyneuropathy, with long-term current use of insulin ([address] 02/09/2017    Lumbar disc herniation 02/09/2017    Need for Tdap vaccination 02/09/2017    Osteoporosis 04/12/2016    Annual physical exam 09/17/2015    Depression 09/17/2015    Well adult exam 09/17/2015    Dizziness 12/30/2014    Hypotension 12/30/2014    Colitis     DM (diabetes mellitus), type 2, uncontrolled with complications ([address] 25/55/3414    Orthostatic hypotension 10/22/2013    Cervical spondylosis with myelopathy 09/19/2013    Displacement of cervical intervertebral disc without myelopathy 09/19/2013    Cervicalgia 09/19/2013    Spinal stenosis, lumbar region, with neurogenic claudication 09/19/2013    Acquired spondylolisthesis 09/19/2013    Diabetic autonomic neuropathy associated with type 2 diabetes mellitus ([address] 08/21/2013    Risk for falls 08/21/2013    History of renal cell cancer 08/07/2013    S/p nephrectomy 08/07/2013    Episode of dizziness 08/07/2013    Orthostasis 08/07/2013    Obesity (BMI 30-39.9) 05/28/2013    Moderate persistent asthma 04/02/2013    Carotid artery plaque 04/02/2013    HLD (hyperlipidemia) 04/02/2013    Benign hypertension with CKD (chronic kidney disease), stage II 04/02/2013    Dermatomyositis ([address] 02/18/2013    GERD (gastroesophageal reflux disease) 02/18/2013    Hip arthritis 10/30/2012    Diabetic neuropathy ([address] 10/30/2012    Prolapsed intervertebral disk 09/04/2012    Headache 09/04/2012    Carpal tunnel syndrome 07/31/2012    DDD (degenerative disc disease), lumbar 07/31/2012           KAMINI HARRIS, APRN - CNP on 9/12/2018 at 8:54 AM

## 2018-09-12 NOTE — ANESTHESIA PRE PROCEDURE
Department of Anesthesiology  Preprocedure Note       Name:  Candace Seth   Age:  66 y.o.  :  1955                                          MRN:  [id number]         Date:  2018      Surgeon: Raul Ingram):  MD Bobby Bailey MD    Procedure: Procedure(s):  KYPHOPLASTY T2 & L2  FEMUR OPEN REDUCTION INTERNAL FIXATION    Medications prior to admission:   Prior to Admission medications    Medication Sig Start Date End Date Taking? Authorizing Provider   zolpidem (AMBIEN) 10 MG tablet Take 10 mg by mouth nightly as needed for Sleep. John Mcfadden Historical Provider, MD   traMADol (ULTRAM) 50 MG tablet Take 50 mg by mouth daily as needed for Pain. John Mcfadden     Historical Provider, MD   insulin glargine (BASAGLAR KWIKPEN) 100 UNIT/ML injection pen Inject 25 Units into the skin nightly    Historical Provider, MD   Potassium Citrate ER 15 MEQ (1620 MG) TBCR Take 15 mEq by mouth 2 times daily 18   Kong Chinchilla MD   vitamin D (ERGOCALCIFEROL) 15839 units CAPS capsule Take 1 capsule by mouth once a week for 8 doses 18  Mau Oquendo MD   sertraline (ZOLOFT) 100 MG tablet TAKE ONE TABLET BY MOUTH DAILY 3/20/18   Ted Hernandez MD   raloxifene (EVISTA) 60 MG tablet TAKE ONE TABLET BY MOUTH DAILY 10/18/17   Ted Hernandez MD   insulin regular human (HUMULIN R) 500 UNIT/ML concentrated injection vial Inject into the skin Inject 0.35 mL (175 units) in the morning and 0.1 mL (50 units) with lunch    Historical Provider, MD   fluticasone (ARNUITY ELLIPTA) 200 MCG/ACT AEPB Inhale 1 Inhaler into the lungs daily    Historical Provider, MD   BiPAP Machine MISC by Does not apply route    Historical Provider, MD   lovastatin (MEVACOR) 40 MG tablet TAKE ONE AND ONE-HALF (1  1/2) TABLET BY MOUTH NIGHTLY  Patient taking differently: TAKE ONE AND ONE-HALF (1  1/2) TABLET BY MOUTH NIGHTLY, takes 60 mg daily 17   Ted Hernandez MD   gemfibrozil (LOPID) 600 MG tablet TAKE ONE TABLET BY MOUTH TWICE A DAY 6/1/17   Dilcia Doan MD   aspirin EC 81 MG EC tablet Take 1 tablet by mouth daily 1/10/17   Dilcia Doan MD   calcium carbonate (TUMS) 500 MG chewable tablet Take 1 tablet by mouth 3 times daily as needed for Heartburn 1/10/17   Dilcia Doan MD   Handicap Placard MISC by Does not apply route Duration: 1 year 4/28/16   Mariola MD Lisseth   glucagon 1 MG injection Infuse 1 kit intravenously once    Historical Provider, MD   zonisamide (ZONEGRAN) 25 MG capsule Take 25 mg by mouth daily    Historical Provider, MD   acetaminophen (TYLENOL) 500 MG tablet Take 500 mg by mouth every 6 hours as needed. Historical Provider, MD   clonazePAM (KLONOPIN) 0.5 MG tablet Take 0.5 mg by mouth nightly as needed. Historical Provider, MD   butalbital-acetaminophen-caffeine (FIORICET, ESGIC) per tablet Take 1 tablet by mouth every 8 hours as needed. Historical Provider, MD   montelukast (SINGULAIR) 10 MG tablet Take 10 mg by mouth nightly. Historical Provider, MD   albuterol (PROVENTIL HFA;VENTOLIN HFA) 108 (90 BASE) MCG/ACT inhaler Inhale 2 puffs into the lungs every 6 hours as needed. Historical Provider, MD       Current medications:    Current Facility-Administered Medications   Medication Dose Route Frequency Provider Last Rate Last Dose    lactated ringers infusion   Intravenous Continuous Graciela Sanderson MD        sodium chloride flush 0.9 % injection 10 mL  10 mL Intravenous 2 times per day Graciela Sanderson MD        sodium chloride flush 0.9 % injection 10 mL  10 mL Intravenous PRN Graciela Sanderson MD        lidocaine PF 1 % injection 1 mL  1 mL Intradermal Once PRN Graciela Sanderson MD           Allergies:     Allergies   Allergen Reactions    Penicillins Hives and Rash    Tape Merlin Qiu Tape] Rash     OK to use paper tape per patient       Problem List:    Patient Active Problem List   Diagnosis Code    Carpal tunnel syndrome G56.00    DDD (degenerative disc disease), lumbar M51.36    (1.575 m)                                              BP Readings from Last 3 Encounters:   09/12/18 (!) 142/79   09/04/18 (!) 158/81   08/09/18 111/70       NPO Status:                                                                                 BMI:   Wt Readings from Last 3 Encounters:   09/12/18 140 lb (63.5 kg)   09/04/18 140 lb (63.5 kg)   08/28/18 140 lb (63.5 kg)     Body mass index is 25.61 kg/m². CBC:   Lab Results   Component Value Date    WBC 8.3 09/04/2018    RBC 4.72 09/04/2018    HGB 13.2 09/04/2018    HCT 40.2 09/04/2018    MCV 85.3 09/04/2018    RDW 13.6 09/04/2018     09/04/2018     LR    CMP:   Lab Results   Component Value Date     09/04/2018    K 4.1 09/04/2018    CL 97 09/04/2018    CO2 21 09/04/2018    BUN 18 09/04/2018    CREATININE 1.05 09/04/2018    GFRAA >60 09/04/2018    LABGLOM 53 09/04/2018    GLUCOSE 297 09/04/2018    GLUCOSE 366 04/19/2012    PROT 7.4 07/30/2018    CALCIUM 9.9 09/04/2018    BILITOT 0.51 07/30/2018    ALKPHOS 116 07/30/2018    AST 11 07/30/2018    ALT 7 07/30/2018       POC Tests: No results for input(s): POCGLU, POCNA, POCK, POCCL, POCBUN, POCHEMO, POCHCT in the last 72 hours.     Coags:   Lab Results   Component Value Date    PROTIME 9.4 05/27/2018    INR 0.9 05/27/2018    APTT 23.9 05/26/2018       HCG (If Applicable): No results found for: PREGTESTUR, PREGSERUM, HCG, HCGQUANT     ABGs: No results found for: PHART, PO2ART, ONN8SXL, TWZ9KOH, BEART, T3OARXMF     Type & Screen (If Applicable):  No results found for: LABABO, [address]    Anesthesia Evaluation  Patient summary reviewed no history of anesthetic complications:   Airway: Mallampati: III  TM distance: >3 FB   Neck ROM: full  Mouth opening: > = 3 FB Dental: normal exam         Pulmonary:normal exam  breath sounds clear to auscultation  (+) shortness of breath: no interval change,  sleep apnea: on CPAP,  asthma: allergic asthma,     (-) pneumonia, COPD, recent URI, rhonchi, wheezes, rales,

## 2018-09-13 NOTE — OP NOTE
[address] [facility]                   [address] [facility], [address] [place] [address] [place]                                 OPERATIVE REPORT    PATIENT NAME: Frank Shah          :        1955  MED REC NO:   [id number]                              ROOM:  ACCOUNT NO:   [de-identified]                           ADMIT DATE: 2018  PROVIDER:     Phoenix López    DATE OF PROCEDURE:  2018    PREOPERATIVE DIAGNOSIS:  Osteoporotic compression fractures T2 and L2. POSTOPERATIVE DIAGNOSIS:  Osteoporotic compression fractures T2 and L2. OPERATION PERFORMED:  T2 and L2 kyphoplasty with fluoroscopic assistance  and biopsy. OPERATING SURGEON:  Phoenix López MD    INDICATIONS FOR PROCEDURE:  This 45-year-old female roughly had a fall 10  days ago, presented to my office roughly a week ago with complaints of left  knee, thoracolumbar and cervicothoracic back pain. CT scans revealed compression fractures T2 and L2 with a vascular line in  the supracondylar femoral region. The patient had a contusion to that area. She is exquisitely tender to palpation. On presentation to the hospital day, the patient denies any pain in the  supracondylar region with ambulation. After re-reviewing her previous  x-rays as well as x-rays from two months ago the patient with critical  osteoporosis throughout and she does have a vascular line that could be a  potential nondisplaced fracture in the supracondylar region however is  present two months ago and given the fact the patient is not having pain in  the supracondylar region with ambulation, today I elected that this was not  impending pathologic or continuation of her osteoporotic supracondylar  fracture. FINDINGS:  Excellent cement fill and interdigitation T2 and L2. Biopsy was obtained from T2 and L2 sent concomitantly.     PROCEDURE IN DETAIL:  The patient was taken to the operating room, placed  under general anesthesia,

## 2018-09-17 LAB — SURGICAL PATHOLOGY REPORT: NORMAL

## 2018-09-24 ENCOUNTER — OFFICE VISIT (OUTPATIENT)
Dept: ORTHOPEDIC SURGERY | Age: 63
End: 2018-09-24

## 2018-09-24 DIAGNOSIS — M80.00XD AGE-RELATED OSTEOPOROSIS WITH CURRENT PATHOLOGICAL FRACTURE WITH ROUTINE HEALING, SUBSEQUENT ENCOUNTER: Primary | ICD-10-CM

## 2018-09-24 DIAGNOSIS — S32.020D CLOSED COMPRESSION FRACTURE OF L2 LUMBAR VERTEBRA WITH ROUTINE HEALING, SUBSEQUENT ENCOUNTER: ICD-10-CM

## 2018-09-24 DIAGNOSIS — S22.000D CLOSED COMPRESSION FRACTURE OF THORACIC VERTEBRA WITH ROUTINE HEALING, SUBSEQUENT ENCOUNTER: ICD-10-CM

## 2018-09-24 PROCEDURE — 99024 POSTOP FOLLOW-UP VISIT: CPT | Performed by: ORTHOPAEDIC SURGERY

## 2018-09-24 NOTE — PROGRESS NOTES
Subjective:      Patient ID: Iván Guillen is a 66 y.o. female. HPI  Follow-up T2 and L2 kyphoplasty for compression fracture    Patient having some cervical thoracic region pain otherwise doing much better  Review of Systems    Objective:   Physical Exam   Constitutional: She is oriented to person, place, and time. She appears well-developed and well-nourished. HENT:   Head: Normocephalic and atraumatic. Eyes: Conjunctivae and EOM are normal.   Neck: Normal range of motion. Pulmonary/Chest: Effort normal. No respiratory distress. Neurological: She is alert and oriented to person, place, and time. She has normal strength. No sensory deficit. Normal gait   Skin: Skin is warm and dry. Psychiatric: Her behavior is normal. Thought content normal.   Nursing note and vitals reviewed. Surgical pathology appears to be largely normal there is a very slight increase in plasma cells although this can be seen in the face of trauma  Assessment:      Encounter Diagnoses   Name Primary?     Age-related osteoporosis with current pathological fracture with routine healing, subsequent encounter Yes    Closed compression fracture of L2 lumbar vertebra with routine healing, subsequent encounter     Closed compression fracture of thoracic vertebra with routine healing, subsequent encounter            Plan:      Sed rate, C-reactive protein, urine protein electrophoresis, serum protein electrophoresis    Fu 2 weeks        Marianela Moeller MD

## 2018-09-26 PROBLEM — Z01.818 PREOPERATIVE CLEARANCE: Status: RESOLVED | Noted: 2017-02-09 | Resolved: 2018-09-26

## 2018-10-05 ENCOUNTER — HOSPITAL ENCOUNTER (OUTPATIENT)
Age: 63
Discharge: HOME | End: 2018-10-05
Payer: MEDICAID

## 2018-10-05 DIAGNOSIS — S32.020D CLOSED COMPRESSION FRACTURE OF L2 LUMBAR VERTEBRA WITH ROUTINE HEALING, SUBSEQUENT ENCOUNTER: ICD-10-CM

## 2018-10-05 DIAGNOSIS — S22.000D CLOSED COMPRESSION FRACTURE OF THORACIC VERTEBRA WITH ROUTINE HEALING, SUBSEQUENT ENCOUNTER: ICD-10-CM

## 2018-10-05 LAB
-: NORMAL
C-REACTIVE PROTEIN: 7.5 MG/L (ref 0–5)
REASON FOR REJECTION: NORMAL
SEDIMENTATION RATE, ERYTHROCYTE: 38 MM (ref 0–20)
ZZ NTE CLEAN UP: ORDERED TEST: NORMAL
ZZ NTE WITH NAME CLEAN UP: SPECIMEN SOURCE: NORMAL

## 2018-10-05 PROCEDURE — 86140 C-REACTIVE PROTEIN: CPT

## 2018-10-05 PROCEDURE — 85651 RBC SED RATE NONAUTOMATED: CPT

## 2018-10-05 PROCEDURE — 84165 PROTEIN E-PHORESIS SERUM: CPT

## 2018-10-05 PROCEDURE — 84155 ASSAY OF PROTEIN SERUM: CPT

## 2018-10-05 PROCEDURE — 36415 COLL VENOUS BLD VENIPUNCTURE: CPT

## 2018-10-08 ENCOUNTER — OFFICE VISIT (OUTPATIENT)
Dept: ORTHOPEDIC SURGERY | Age: 63
End: 2018-10-08

## 2018-10-08 DIAGNOSIS — M80.00XD AGE-RELATED OSTEOPOROSIS WITH CURRENT PATHOLOGICAL FRACTURE WITH ROUTINE HEALING, SUBSEQUENT ENCOUNTER: Primary | ICD-10-CM

## 2018-10-08 DIAGNOSIS — S22.000D CLOSED COMPRESSION FRACTURE OF THORACIC VERTEBRA WITH ROUTINE HEALING, SUBSEQUENT ENCOUNTER: ICD-10-CM

## 2018-10-08 DIAGNOSIS — S32.020D CLOSED COMPRESSION FRACTURE OF L2 LUMBAR VERTEBRA WITH ROUTINE HEALING, SUBSEQUENT ENCOUNTER: ICD-10-CM

## 2018-10-08 PROBLEM — S22.000A CLOSED COMPRESSION FRACTURE OF THORACIC VERTEBRA (HCC): Status: ACTIVE | Noted: 2018-10-08

## 2018-10-08 PROBLEM — S32.020A CLOSED COMPRESSION FRACTURE OF L2 LUMBAR VERTEBRA: Status: ACTIVE | Noted: 2018-10-08

## 2018-10-08 PROCEDURE — 99024 POSTOP FOLLOW-UP VISIT: CPT | Performed by: ORTHOPAEDIC SURGERY

## 2018-10-08 NOTE — PROGRESS NOTES
Subjective:      Patient ID: Roxanne Mcfadden is a 66 y.o. female. HPI  Please refer to all my previous clinic notes    Patient is here for follow-up kyphoplasty L2. Urine for protein electrophoresis was insufficient sample    Serum protein electrophoresis is still pending  Review of Systems    Objective:   Physical Exam   Constitutional: She is oriented to person, place, and time. She appears well-developed and well-nourished. HENT:   Head: Normocephalic and atraumatic. Eyes: Conjunctivae and EOM are normal.   Neck: Normal range of motion. Pulmonary/Chest: Effort normal. No respiratory distress. Neurological: She is alert and oriented to person, place, and time. She has normal strength. No sensory deficit. Normal gait   Skin: Skin is warm and dry. Psychiatric: Her behavior is normal. Thought content normal.   Nursing note and vitals reviewed. Assessment:      Encounter Diagnoses   Name Primary?     Age-related osteoporosis with current pathological fracture with routine healing, subsequent encounter Yes    Closed compression fracture of L2 lumbar vertebra with routine healing, subsequent encounter     Closed compression fracture of thoracic vertebra with routine healing, subsequent encounter            Plan:      Repeat urine protein electrophoresis    Follow-up 3 weeks which time we will review of serum and urine protein electrophoresis    Sed rate was minimally elevated as well as C-reactive protein which is really in line with routine fracture healing        Yinka Jones MD

## 2018-10-09 LAB
ALBUMIN (CALCULATED): 4.2 G/DL (ref 3.2–5.2)
ALBUMIN PERCENT: 56 % (ref 45–65)
ALPHA 1 PERCENT: 3 % (ref 3–6)
ALPHA 2 PERCENT: 13 % (ref 6–13)
ALPHA-1-GLOBULIN: 0.2 G/DL (ref 0.1–0.4)
ALPHA-2-GLOBULIN: 1 G/DL (ref 0.5–0.9)
BETA GLOBULIN: 1.1 G/DL (ref 0.5–1.1)
BETA PERCENT: 14 % (ref 11–19)
GAMMA GLOBULIN %: 15 % (ref 9–20)
GAMMA GLOBULIN: 1.1 G/DL (ref 0.5–1.5)
PATHOLOGIST: ABNORMAL
PROTEIN ELECTROPHORESIS, SERUM: ABNORMAL
TOTAL PROT. SUM,%: 101 % (ref 98–102)
TOTAL PROT. SUM: 7.6 G/DL (ref 6.3–8.2)
TOTAL PROTEIN: 7.6 G/DL (ref 6.4–8.3)

## 2018-10-17 ENCOUNTER — TELEPHONE (OUTPATIENT)
Dept: OTHER | Facility: CLINIC | Age: 63
End: 2018-10-17

## 2018-10-18 ENCOUNTER — HOSPITAL ENCOUNTER (OUTPATIENT)
Age: 63
Discharge: HOME | End: 2018-10-18
Payer: MEDICAID

## 2018-10-18 DIAGNOSIS — S32.020D CLOSED COMPRESSION FRACTURE OF L2 LUMBAR VERTEBRA WITH ROUTINE HEALING, SUBSEQUENT ENCOUNTER: Primary | ICD-10-CM

## 2018-10-18 DIAGNOSIS — S22.000D CLOSED COMPRESSION FRACTURE OF THORACIC VERTEBRA WITH ROUTINE HEALING, SUBSEQUENT ENCOUNTER: ICD-10-CM

## 2018-10-18 PROCEDURE — 84156 ASSAY OF PROTEIN URINE: CPT

## 2018-10-18 PROCEDURE — 84166 PROTEIN E-PHORESIS/URINE/CSF: CPT

## 2018-10-24 LAB
P E INTERPRETATION, U: NORMAL
PATHOLOGIST: NORMAL
SPECIMEN TYPE: NORMAL
URINE TOTAL PROTEIN: 71 MG/DL

## 2018-10-29 ENCOUNTER — OFFICE VISIT (OUTPATIENT)
Dept: ORTHOPEDIC SURGERY | Age: 63
End: 2018-10-29
Payer: MEDICAID

## 2018-10-29 DIAGNOSIS — S22.000D CLOSED COMPRESSION FRACTURE OF THORACIC VERTEBRA WITH ROUTINE HEALING, SUBSEQUENT ENCOUNTER: ICD-10-CM

## 2018-10-29 DIAGNOSIS — M80.00XD AGE-RELATED OSTEOPOROSIS WITH CURRENT PATHOLOGICAL FRACTURE WITH ROUTINE HEALING, SUBSEQUENT ENCOUNTER: Primary | ICD-10-CM

## 2018-10-29 DIAGNOSIS — S32.020D CLOSED COMPRESSION FRACTURE OF L2 LUMBAR VERTEBRA WITH ROUTINE HEALING, SUBSEQUENT ENCOUNTER: ICD-10-CM

## 2018-10-29 PROCEDURE — G8427 DOCREV CUR MEDS BY ELIG CLIN: HCPCS | Performed by: ORTHOPAEDIC SURGERY

## 2018-10-29 PROCEDURE — 3017F COLORECTAL CA SCREEN DOC REV: CPT | Performed by: ORTHOPAEDIC SURGERY

## 2018-10-29 PROCEDURE — G8598 ASA/ANTIPLAT THER USED: HCPCS | Performed by: ORTHOPAEDIC SURGERY

## 2018-10-29 PROCEDURE — 1036F TOBACCO NON-USER: CPT | Performed by: ORTHOPAEDIC SURGERY

## 2018-10-29 PROCEDURE — 99213 OFFICE O/P EST LOW 20 MIN: CPT | Performed by: ORTHOPAEDIC SURGERY

## 2018-10-29 PROCEDURE — G8484 FLU IMMUNIZE NO ADMIN: HCPCS | Performed by: ORTHOPAEDIC SURGERY

## 2018-10-29 PROCEDURE — G8417 CALC BMI ABV UP PARAM F/U: HCPCS | Performed by: ORTHOPAEDIC SURGERY

## 2018-10-29 NOTE — PROGRESS NOTES
Subjective:      Patient ID: Clint Marshall is a 66 y.o. female. HPI  Please refer to all my previous clinic notes    Patient is here today follow-up osteoporotic fractures. Since patient was seen she has had a serum and urine protein electrophoresis they appeared to be largely normal or sufficiently negative    Patient reports her back pain continues to improve  Review of Systems    Objective:   Physical Exam   Constitutional: She is oriented to person, place, and time. She appears well-developed and well-nourished. disshelved   HENT:   Head: Normocephalic and atraumatic. Eyes: Conjunctivae and EOM are normal.   Neck: Normal range of motion. Pulmonary/Chest: Effort normal. No respiratory distress. Neurological: She is alert and oriented to person, place, and time. She has normal strength. No sensory deficit. Normal gait   Skin: Skin is warm and dry. Psychiatric: Her behavior is normal. Thought content normal.   Nursing note and vitals reviewed. Assessment:      Encounter Diagnoses   Name Primary?  Age-related osteoporosis with current pathological fracture with routine healing, subsequent encounter Yes    Closed compression fracture of L2 lumbar vertebra with routine healing, subsequent encounter     Closed compression fracture of thoracic vertebra with routine healing, subsequent encounter            Plan:       Fu 6 months    Pt to work with pcp for osteoporosis        Robert Caicedo MD

## 2018-12-11 ENCOUNTER — OFFICE VISIT (OUTPATIENT)
Dept: FAMILY MEDICINE CLINIC | Age: 63
End: 2018-12-11
Payer: MEDICAID

## 2018-12-11 ENCOUNTER — HOSPITAL ENCOUNTER (OUTPATIENT)
Age: 63
Setting detail: SPECIMEN
Discharge: HOME | End: 2018-12-11
Payer: MEDICAID

## 2018-12-11 VITALS
HEART RATE: 78 BPM | SYSTOLIC BLOOD PRESSURE: 110 MMHG | BODY MASS INDEX: 26.16 KG/M2 | DIASTOLIC BLOOD PRESSURE: 80 MMHG | WEIGHT: 143 LBS | TEMPERATURE: 98 F

## 2018-12-11 DIAGNOSIS — R42 DIZZINESSES: Primary | ICD-10-CM

## 2018-12-11 DIAGNOSIS — E55.9 VITAMIN D DEFICIENCY: ICD-10-CM

## 2018-12-11 DIAGNOSIS — Z87.81 HISTORY OF FRACTURE DUE TO FALL: ICD-10-CM

## 2018-12-11 DIAGNOSIS — Z76.0 MEDICATION REFILL: ICD-10-CM

## 2018-12-11 DIAGNOSIS — R42 DIZZINESSES: ICD-10-CM

## 2018-12-11 DIAGNOSIS — Z23 NEED FOR INFLUENZA VACCINATION: ICD-10-CM

## 2018-12-11 LAB
ANION GAP SERPL CALCULATED.3IONS-SCNC: 17 MMOL/L (ref 9–17)
BUN BLDV-MCNC: 17 MG/DL (ref 8–23)
BUN/CREAT BLD: ABNORMAL (ref 9–20)
CALCIUM SERPL-MCNC: 10.2 MG/DL (ref 8.6–10.4)
CHLORIDE BLD-SCNC: 96 MMOL/L (ref 98–107)
CO2: 21 MMOL/L (ref 20–31)
CREAT SERPL-MCNC: 1.1 MG/DL (ref 0.5–0.9)
GFR AFRICAN AMERICAN: >60 ML/MIN
GFR NON-AFRICAN AMERICAN: 50 ML/MIN
GFR SERPL CREATININE-BSD FRML MDRD: ABNORMAL ML/MIN/{1.73_M2}
GFR SERPL CREATININE-BSD FRML MDRD: ABNORMAL ML/MIN/{1.73_M2}
GLUCOSE BLD-MCNC: 457 MG/DL (ref 70–99)
POTASSIUM SERPL-SCNC: 5.1 MMOL/L (ref 3.7–5.3)
SODIUM BLD-SCNC: 134 MMOL/L (ref 135–144)

## 2018-12-11 PROCEDURE — 3017F COLORECTAL CA SCREEN DOC REV: CPT | Performed by: STUDENT IN AN ORGANIZED HEALTH CARE EDUCATION/TRAINING PROGRAM

## 2018-12-11 PROCEDURE — 1036F TOBACCO NON-USER: CPT | Performed by: STUDENT IN AN ORGANIZED HEALTH CARE EDUCATION/TRAINING PROGRAM

## 2018-12-11 PROCEDURE — 90686 IIV4 VACC NO PRSV 0.5 ML IM: CPT | Performed by: FAMILY MEDICINE

## 2018-12-11 PROCEDURE — G8598 ASA/ANTIPLAT THER USED: HCPCS | Performed by: STUDENT IN AN ORGANIZED HEALTH CARE EDUCATION/TRAINING PROGRAM

## 2018-12-11 PROCEDURE — G8482 FLU IMMUNIZE ORDER/ADMIN: HCPCS | Performed by: STUDENT IN AN ORGANIZED HEALTH CARE EDUCATION/TRAINING PROGRAM

## 2018-12-11 PROCEDURE — G8427 DOCREV CUR MEDS BY ELIG CLIN: HCPCS | Performed by: STUDENT IN AN ORGANIZED HEALTH CARE EDUCATION/TRAINING PROGRAM

## 2018-12-11 PROCEDURE — 99213 OFFICE O/P EST LOW 20 MIN: CPT | Performed by: STUDENT IN AN ORGANIZED HEALTH CARE EDUCATION/TRAINING PROGRAM

## 2018-12-11 PROCEDURE — G8417 CALC BMI ABV UP PARAM F/U: HCPCS | Performed by: STUDENT IN AN ORGANIZED HEALTH CARE EDUCATION/TRAINING PROGRAM

## 2018-12-11 RX ORDER — ASPIRIN 81 MG/1
81 TABLET ORAL DAILY
Qty: 30 TABLET | Refills: 5 | Status: SHIPPED | OUTPATIENT
Start: 2018-12-11

## 2018-12-11 RX ORDER — SERTRALINE HYDROCHLORIDE 100 MG/1
TABLET, FILM COATED ORAL
Qty: 30 TABLET | Refills: 0 | Status: SHIPPED | OUTPATIENT
Start: 2018-12-11 | End: 2019-07-10 | Stop reason: SDUPTHER

## 2018-12-11 RX ORDER — RALOXIFENE HYDROCHLORIDE 60 MG/1
TABLET, FILM COATED ORAL
Qty: 30 TABLET | Refills: 0 | Status: SHIPPED | OUTPATIENT
Start: 2018-12-11 | End: 2019-07-10 | Stop reason: SDUPTHER

## 2018-12-11 RX ORDER — LOVASTATIN 40 MG/1
TABLET ORAL
Qty: 30 TABLET | Refills: 2 | Status: SHIPPED | OUTPATIENT
Start: 2018-12-11

## 2018-12-11 RX ORDER — GEMFIBROZIL 600 MG/1
TABLET, FILM COATED ORAL
Qty: 60 TABLET | Refills: 0 | Status: SHIPPED | OUTPATIENT
Start: 2018-12-11

## 2018-12-11 RX ORDER — FLUDROCORTISONE ACETATE 0.1 MG/1
0.1 TABLET ORAL DAILY
Qty: 30 TABLET | Refills: 0 | Status: CANCELLED | OUTPATIENT
Start: 2018-12-11 | End: 2019-01-10

## 2018-12-11 ASSESSMENT — ENCOUNTER SYMPTOMS
EYE PAIN: 0
EYE DISCHARGE: 0
ABDOMINAL PAIN: 0
SORE THROAT: 0
EYE REDNESS: 0
WHEEZING: 0
DIARRHEA: 0
NAUSEA: 0
PHOTOPHOBIA: 0
BLOOD IN STOOL: 0
COUGH: 0
BACK PAIN: 0
CHEST TIGHTNESS: 0
SHORTNESS OF BREATH: 0
CONSTIPATION: 0

## 2018-12-11 NOTE — PROGRESS NOTES
Subjective:    Ruben Yu is a 66 y.o. female with  has a past medical history of CEFERINO (acute kidney injury) ([facility] Asthma; Carotid artery plaque; Clostridium difficile diarrhea; Dehydration; Depression; Fibromyalgia; Hiatal hernia; HTN (hypertension); Hyperlipidemia; Hypokalemia, gastrointestinal losses; Kidney stone; Obesity (BMI 30-39.9); Osteoarthritis; Osteoporosis; Renal cancer ([facility] Short of breath on exertion; Type II or unspecified type diabetes mellitus without mention of complication, not stated as uncontrolled; Unspecified sleep apnea; Urge urinary incontinence; Wears glasses; and Zygomatic fracture, right side, initial encounter for closed fracture ([facility] Family History   Problem Relation Age of Onset    Diabetes Mother     High Blood Pressure Mother     Pacemaker Mother     High Blood Pressure Father     Prostate Cancer Father     Breast Cancer Sister     Asthma Brother     Prostate Cancer Maternal Grandfather     High Blood Pressure Paternal Grandmother        Presented tothe office today for:  Chief Complaint   Patient presents with    Medication Refill    Dizziness     intermittent times 6 months       HPI  Patient is a 67 yo female here c/o lightheadedness. She has been experiencing falls. Was admitted this past year for fractures from falling. She feels like Blurry vision. She describes it as feeling lightheaded when she gets up. She has to hold onto rails to kep her self from falling. Review of Systems   Constitutional: Negative for chills, diaphoresis, fatigue and fever. HENT: Negative for ear pain and sore throat. Eyes: Positive for visual disturbance (blurry vision). Negative for photophobia, pain, discharge and redness. Respiratory: Negative for cough, chest tightness, shortness of breath and wheezing. Cardiovascular: Negative for chest pain, palpitations and leg swelling.    Gastrointestinal: Negative for abdominal pain, blood in stool, constipation,

## 2018-12-13 LAB — VITAMIN D 25-HYDROXY: 21.8 NG/ML (ref 30–100)

## 2018-12-26 DIAGNOSIS — E55.9 HYPOVITAMINOSIS D: Primary | ICD-10-CM

## 2018-12-26 RX ORDER — MELATONIN
1000 DAILY
Qty: 90 TABLET | Refills: 1 | Status: SHIPPED | OUTPATIENT
Start: 2018-12-26

## 2019-01-02 ENCOUNTER — HOSPITAL ENCOUNTER (OUTPATIENT)
Dept: WOMENS IMAGING | Age: 64
Discharge: HOME | End: 2019-01-04
Payer: MEDICAID

## 2019-01-02 DIAGNOSIS — Z87.81 HISTORY OF FRACTURE DUE TO FALL: ICD-10-CM

## 2019-01-02 PROCEDURE — 77080 DXA BONE DENSITY AXIAL: CPT

## 2019-04-18 NOTE — PATIENT INSTRUCTIONS
Thank you for letting us take care of you today. We hope all your questions were addressed. If a question was overlooked or something else comes to mind after you return home, please contact a member of your Care Team listed below. Please make sure you have a routine office visit set up to follow-up on [address]. Your Care Team at [place] [address] is Team #1  Harrison Calabrese MD (Faculty)  Kike Newberry MD (Faculty)  Shellie Esparza MD (Resident)  Fallon Olvera MD (Resident)  Mukul Ludwig MD (Resident)  Shannon Burns MD (Resident)  Iris Ernandez., [facility], ([address])  TEJA aSmaniego, (Clinical Practice Manager)  Jakob Gonzalez, Ph.D., (Behavioral Services)  Faustina Salgado, [facility] (Clinical Pharmacist)     Office phone number: [phone]    If you need to get in right away due to illness, please be advised we have \"Same Day\" appointments available Monday-Friday. Please call us at [phone] option #3 to schedule your \"Same Day\" appointment.
none

## 2019-05-07 ENCOUNTER — OFFICE VISIT (OUTPATIENT)
Dept: ORTHOPEDIC SURGERY | Age: 64
End: 2019-05-07
Payer: MEDICAID

## 2019-05-07 DIAGNOSIS — S32.020A CLOSED COMPRESSION FRACTURE OF SECOND LUMBAR VERTEBRA, INITIAL ENCOUNTER: ICD-10-CM

## 2019-05-07 DIAGNOSIS — S32.020D CLOSED COMPRESSION FRACTURE OF L2 LUMBAR VERTEBRA WITH ROUTINE HEALING, SUBSEQUENT ENCOUNTER: ICD-10-CM

## 2019-05-07 DIAGNOSIS — S22.000A CLOSED COMPRESSION FRACTURE OF THORACIC VERTEBRA, INITIAL ENCOUNTER (HCC): ICD-10-CM

## 2019-05-07 DIAGNOSIS — M80.00XD AGE-RELATED OSTEOPOROSIS WITH CURRENT PATHOLOGICAL FRACTURE WITH ROUTINE HEALING, SUBSEQUENT ENCOUNTER: Primary | ICD-10-CM

## 2019-05-07 DIAGNOSIS — M81.0 AGE-RELATED OSTEOPOROSIS WITHOUT CURRENT PATHOLOGICAL FRACTURE: ICD-10-CM

## 2019-05-07 DIAGNOSIS — M51.36 DDD (DEGENERATIVE DISC DISEASE), LUMBAR: ICD-10-CM

## 2019-05-07 DIAGNOSIS — S22.000D CLOSED COMPRESSION FRACTURE OF THORACIC VERTEBRA WITH ROUTINE HEALING, SUBSEQUENT ENCOUNTER: ICD-10-CM

## 2019-05-07 DIAGNOSIS — S72.452A CLOSED SUPRACONDYLAR FRACTURE OF LEFT FEMUR, INITIAL ENCOUNTER (HCC): ICD-10-CM

## 2019-05-07 PROCEDURE — G8598 ASA/ANTIPLAT THER USED: HCPCS | Performed by: ORTHOPAEDIC SURGERY

## 2019-05-07 PROCEDURE — 3017F COLORECTAL CA SCREEN DOC REV: CPT | Performed by: ORTHOPAEDIC SURGERY

## 2019-05-07 PROCEDURE — 99213 OFFICE O/P EST LOW 20 MIN: CPT | Performed by: ORTHOPAEDIC SURGERY

## 2019-05-07 PROCEDURE — G8417 CALC BMI ABV UP PARAM F/U: HCPCS | Performed by: ORTHOPAEDIC SURGERY

## 2019-05-07 PROCEDURE — G8427 DOCREV CUR MEDS BY ELIG CLIN: HCPCS | Performed by: ORTHOPAEDIC SURGERY

## 2019-05-07 PROCEDURE — 1036F TOBACCO NON-USER: CPT | Performed by: ORTHOPAEDIC SURGERY

## 2019-05-08 ENCOUNTER — ANESTHESIA (OUTPATIENT)
Dept: OPERATING ROOM | Age: 64
End: 2019-05-08
Payer: MEDICAID

## 2019-05-08 ENCOUNTER — HOSPITAL ENCOUNTER (OUTPATIENT)
Age: 64
Setting detail: OBSERVATION
Discharge: NURSING, REHAB OR LONG-TERM CARE | End: 2019-05-10
Attending: ORTHOPAEDIC SURGERY | Admitting: ORTHOPAEDIC SURGERY
Payer: MEDICAID

## 2019-05-08 ENCOUNTER — APPOINTMENT (OUTPATIENT)
Dept: GENERAL RADIOLOGY | Age: 64
End: 2019-05-08
Attending: ORTHOPAEDIC SURGERY
Payer: MEDICAID

## 2019-05-08 ENCOUNTER — ANESTHESIA EVENT (OUTPATIENT)
Dept: OPERATING ROOM | Age: 64
End: 2019-05-08
Payer: MEDICAID

## 2019-05-08 VITALS
SYSTOLIC BLOOD PRESSURE: 132 MMHG | RESPIRATION RATE: 15 BRPM | DIASTOLIC BLOOD PRESSURE: 61 MMHG | TEMPERATURE: 96.8 F | OXYGEN SATURATION: 95 %

## 2019-05-08 DIAGNOSIS — S32.040D CLOSED COMPRESSION FRACTURE OF L4 LUMBAR VERTEBRA WITH ROUTINE HEALING, SUBSEQUENT ENCOUNTER: Primary | ICD-10-CM

## 2019-05-08 PROBLEM — R73.9 HYPERGLYCEMIA: Status: ACTIVE | Noted: 2019-05-08

## 2019-05-08 PROBLEM — M80.00XD AGE-RELATED OSTEOPOROSIS WITH CURRENT PATHOLOGICAL FRACTURE WITH ROUTINE HEALING: Status: ACTIVE | Noted: 2019-05-08

## 2019-05-08 PROBLEM — S32.040A CLOSED COMPRESSION FRACTURE OF L4 LUMBAR VERTEBRA: Status: ACTIVE | Noted: 2019-05-08

## 2019-05-08 LAB
GLUCOSE BLD-MCNC: 270 MG/DL (ref 65–105)
GLUCOSE BLD-MCNC: 364 MG/DL (ref 65–105)
GLUCOSE BLD-MCNC: 371 MG/DL (ref 65–105)
GLUCOSE BLD-MCNC: 397 MG/DL (ref 65–105)

## 2019-05-08 PROCEDURE — 2580000003 HC RX 258: Performed by: ANESTHESIOLOGY

## 2019-05-08 PROCEDURE — 22514 PERQ VERTEBRAL AUGMENTATION: CPT | Performed by: ORTHOPAEDIC SURGERY

## 2019-05-08 PROCEDURE — C1713 ANCHOR/SCREW BN/BN,TIS/BN: HCPCS | Performed by: ORTHOPAEDIC SURGERY

## 2019-05-08 PROCEDURE — 6370000000 HC RX 637 (ALT 250 FOR IP): Performed by: ORTHOPAEDIC SURGERY

## 2019-05-08 PROCEDURE — 6360000002 HC RX W HCPCS: Performed by: ORTHOPAEDIC SURGERY

## 2019-05-08 PROCEDURE — 7100000000 HC PACU RECOVERY - FIRST 15 MIN: Performed by: ORTHOPAEDIC SURGERY

## 2019-05-08 PROCEDURE — 72100 X-RAY EXAM L-S SPINE 2/3 VWS: CPT

## 2019-05-08 PROCEDURE — 3600000002 HC SURGERY LEVEL 2 BASE: Performed by: ORTHOPAEDIC SURGERY

## 2019-05-08 PROCEDURE — 3700000000 HC ANESTHESIA ATTENDED CARE: Performed by: ORTHOPAEDIC SURGERY

## 2019-05-08 PROCEDURE — 2500000003 HC RX 250 WO HCPCS: Performed by: NURSE ANESTHETIST, CERTIFIED REGISTERED

## 2019-05-08 PROCEDURE — 6370000000 HC RX 637 (ALT 250 FOR IP): Performed by: INTERNAL MEDICINE

## 2019-05-08 PROCEDURE — 2580000003 HC RX 258: Performed by: ORTHOPAEDIC SURGERY

## 2019-05-08 PROCEDURE — 82947 ASSAY GLUCOSE BLOOD QUANT: CPT

## 2019-05-08 PROCEDURE — C1894 INTRO/SHEATH, NON-LASER: HCPCS | Performed by: ORTHOPAEDIC SURGERY

## 2019-05-08 PROCEDURE — 2709999900 HC NON-CHARGEABLE SUPPLY: Performed by: ORTHOPAEDIC SURGERY

## 2019-05-08 PROCEDURE — 2500000003 HC RX 250 WO HCPCS: Performed by: ORTHOPAEDIC SURGERY

## 2019-05-08 PROCEDURE — G0378 HOSPITAL OBSERVATION PER HR: HCPCS

## 2019-05-08 PROCEDURE — 7100000001 HC PACU RECOVERY - ADDTL 15 MIN: Performed by: ORTHOPAEDIC SURGERY

## 2019-05-08 PROCEDURE — 6360000002 HC RX W HCPCS: Performed by: NURSE ANESTHETIST, CERTIFIED REGISTERED

## 2019-05-08 PROCEDURE — 3600000012 HC SURGERY LEVEL 2 ADDTL 15MIN: Performed by: ORTHOPAEDIC SURGERY

## 2019-05-08 PROCEDURE — 6370000000 HC RX 637 (ALT 250 FOR IP): Performed by: ANESTHESIOLOGY

## 2019-05-08 PROCEDURE — 22515 PERQ VERTEBRAL AUGMENTATION: CPT | Performed by: ORTHOPAEDIC SURGERY

## 2019-05-08 PROCEDURE — 1200000000 HC SEMI PRIVATE

## 2019-05-08 PROCEDURE — 3700000001 HC ADD 15 MINUTES (ANESTHESIA): Performed by: ORTHOPAEDIC SURGERY

## 2019-05-08 DEVICE — BONE CEMENT C10A KYPHON ACTIVOS 10
Type: IMPLANTABLE DEVICE | Site: SPINE LUMBAR | Status: FUNCTIONAL
Brand: ACTIVOS™ 10 BONE CEMENT

## 2019-05-08 RX ORDER — HYDROCODONE BITARTRATE AND ACETAMINOPHEN 5; 325 MG/1; MG/1
1 TABLET ORAL PRN
Status: DISCONTINUED | OUTPATIENT
Start: 2019-05-08 | End: 2019-05-08 | Stop reason: HOSPADM

## 2019-05-08 RX ORDER — CALCIUM CARBONATE 200(500)MG
500 TABLET,CHEWABLE ORAL 3 TIMES DAILY PRN
Status: DISCONTINUED | OUTPATIENT
Start: 2019-05-08 | End: 2019-05-10 | Stop reason: HOSPADM

## 2019-05-08 RX ORDER — HYDROCODONE BITARTRATE AND ACETAMINOPHEN 5; 325 MG/1; MG/1
1 TABLET ORAL EVERY 4 HOURS PRN
Status: DISCONTINUED | OUTPATIENT
Start: 2019-05-08 | End: 2019-05-10 | Stop reason: HOSPADM

## 2019-05-08 RX ORDER — ASPIRIN 81 MG/1
81 TABLET ORAL DAILY
Status: DISCONTINUED | OUTPATIENT
Start: 2019-05-09 | End: 2019-05-10 | Stop reason: HOSPADM

## 2019-05-08 RX ORDER — INSULIN GLARGINE 100 [IU]/ML
25 INJECTION, SOLUTION SUBCUTANEOUS NIGHTLY
Status: DISCONTINUED | OUTPATIENT
Start: 2019-05-08 | End: 2019-05-10 | Stop reason: HOSPADM

## 2019-05-08 RX ORDER — PROPOFOL 10 MG/ML
INJECTION, EMULSION INTRAVENOUS PRN
Status: DISCONTINUED | OUTPATIENT
Start: 2019-05-08 | End: 2019-05-08 | Stop reason: SDUPTHER

## 2019-05-08 RX ORDER — SERTRALINE HYDROCHLORIDE 100 MG/1
100 TABLET, FILM COATED ORAL DAILY
Status: DISCONTINUED | OUTPATIENT
Start: 2019-05-08 | End: 2019-05-10 | Stop reason: HOSPADM

## 2019-05-08 RX ORDER — MORPHINE SULFATE 4 MG/ML
2 INJECTION, SOLUTION INTRAMUSCULAR; INTRAVENOUS
Status: DISCONTINUED | OUTPATIENT
Start: 2019-05-08 | End: 2019-05-10 | Stop reason: HOSPADM

## 2019-05-08 RX ORDER — ONDANSETRON 2 MG/ML
INJECTION INTRAMUSCULAR; INTRAVENOUS PRN
Status: DISCONTINUED | OUTPATIENT
Start: 2019-05-08 | End: 2019-05-08 | Stop reason: SDUPTHER

## 2019-05-08 RX ORDER — DEXTROSE MONOHYDRATE 25 G/50ML
12.5 INJECTION, SOLUTION INTRAVENOUS PRN
Status: DISCONTINUED | OUTPATIENT
Start: 2019-05-08 | End: 2019-05-10 | Stop reason: HOSPADM

## 2019-05-08 RX ORDER — ROCURONIUM BROMIDE 10 MG/ML
INJECTION, SOLUTION INTRAVENOUS PRN
Status: DISCONTINUED | OUTPATIENT
Start: 2019-05-08 | End: 2019-05-08 | Stop reason: SDUPTHER

## 2019-05-08 RX ORDER — FENTANYL CITRATE 50 UG/ML
25 INJECTION, SOLUTION INTRAMUSCULAR; INTRAVENOUS EVERY 5 MIN PRN
Status: DISCONTINUED | OUTPATIENT
Start: 2019-05-08 | End: 2019-05-08 | Stop reason: HOSPADM

## 2019-05-08 RX ORDER — DEXAMETHASONE SODIUM PHOSPHATE 4 MG/ML
INJECTION, SOLUTION INTRA-ARTICULAR; INTRALESIONAL; INTRAMUSCULAR; INTRAVENOUS; SOFT TISSUE PRN
Status: DISCONTINUED | OUTPATIENT
Start: 2019-05-08 | End: 2019-05-08 | Stop reason: SDUPTHER

## 2019-05-08 RX ORDER — LIDOCAINE HYDROCHLORIDE 10 MG/ML
INJECTION, SOLUTION EPIDURAL; INFILTRATION; INTRACAUDAL; PERINEURAL PRN
Status: DISCONTINUED | OUTPATIENT
Start: 2019-05-08 | End: 2019-05-08 | Stop reason: SDUPTHER

## 2019-05-08 RX ORDER — MORPHINE SULFATE 2 MG/ML
2 INJECTION, SOLUTION INTRAMUSCULAR; INTRAVENOUS EVERY 5 MIN PRN
Status: DISCONTINUED | OUTPATIENT
Start: 2019-05-08 | End: 2019-05-08 | Stop reason: HOSPADM

## 2019-05-08 RX ORDER — HYDROCODONE BITARTRATE AND ACETAMINOPHEN 5; 325 MG/1; MG/1
2 TABLET ORAL EVERY 4 HOURS PRN
Status: DISCONTINUED | OUTPATIENT
Start: 2019-05-08 | End: 2019-05-10 | Stop reason: HOSPADM

## 2019-05-08 RX ORDER — SODIUM CHLORIDE 9 MG/ML
INJECTION, SOLUTION INTRAVENOUS CONTINUOUS
Status: DISCONTINUED | OUTPATIENT
Start: 2019-05-08 | End: 2019-05-08

## 2019-05-08 RX ORDER — GEMFIBROZIL 600 MG/1
600 TABLET, FILM COATED ORAL
Status: DISCONTINUED | OUTPATIENT
Start: 2019-05-08 | End: 2019-05-10 | Stop reason: HOSPADM

## 2019-05-08 RX ORDER — SODIUM CHLORIDE 0.9 % (FLUSH) 0.9 %
10 SYRINGE (ML) INJECTION EVERY 12 HOURS SCHEDULED
Status: DISCONTINUED | OUTPATIENT
Start: 2019-05-08 | End: 2019-05-10 | Stop reason: HOSPADM

## 2019-05-08 RX ORDER — LABETALOL 20 MG/4 ML (5 MG/ML) INTRAVENOUS SYRINGE
5 EVERY 10 MIN PRN
Status: DISCONTINUED | OUTPATIENT
Start: 2019-05-08 | End: 2019-05-08 | Stop reason: HOSPADM

## 2019-05-08 RX ORDER — MORPHINE SULFATE 4 MG/ML
4 INJECTION, SOLUTION INTRAMUSCULAR; INTRAVENOUS
Status: DISCONTINUED | OUTPATIENT
Start: 2019-05-08 | End: 2019-05-10 | Stop reason: HOSPADM

## 2019-05-08 RX ORDER — HYDRALAZINE HYDROCHLORIDE 20 MG/ML
5 INJECTION INTRAMUSCULAR; INTRAVENOUS EVERY 10 MIN PRN
Status: DISCONTINUED | OUTPATIENT
Start: 2019-05-08 | End: 2019-05-08 | Stop reason: HOSPADM

## 2019-05-08 RX ORDER — ONDANSETRON 2 MG/ML
4 INJECTION INTRAMUSCULAR; INTRAVENOUS EVERY 6 HOURS PRN
Status: DISCONTINUED | OUTPATIENT
Start: 2019-05-08 | End: 2019-05-10 | Stop reason: HOSPADM

## 2019-05-08 RX ORDER — SODIUM CHLORIDE 0.9 % (FLUSH) 0.9 %
10 SYRINGE (ML) INJECTION PRN
Status: DISCONTINUED | OUTPATIENT
Start: 2019-05-08 | End: 2019-05-10 | Stop reason: HOSPADM

## 2019-05-08 RX ORDER — RALOXIFENE HYDROCHLORIDE 60 MG/1
1 TABLET, FILM COATED ORAL DAILY
Status: DISCONTINUED | OUTPATIENT
Start: 2019-05-08 | End: 2019-05-08 | Stop reason: RX

## 2019-05-08 RX ORDER — MEPERIDINE HYDROCHLORIDE 50 MG/ML
12.5 INJECTION INTRAMUSCULAR; INTRAVENOUS; SUBCUTANEOUS EVERY 5 MIN PRN
Status: DISCONTINUED | OUTPATIENT
Start: 2019-05-08 | End: 2019-05-08 | Stop reason: HOSPADM

## 2019-05-08 RX ORDER — ALBUTEROL SULFATE 90 UG/1
2 AEROSOL, METERED RESPIRATORY (INHALATION) EVERY 6 HOURS PRN
Status: DISCONTINUED | OUTPATIENT
Start: 2019-05-08 | End: 2019-05-10 | Stop reason: HOSPADM

## 2019-05-08 RX ORDER — METOCLOPRAMIDE HYDROCHLORIDE 5 MG/ML
10 INJECTION INTRAMUSCULAR; INTRAVENOUS
Status: DISCONTINUED | OUTPATIENT
Start: 2019-05-08 | End: 2019-05-08 | Stop reason: HOSPADM

## 2019-05-08 RX ORDER — LIDOCAINE HYDROCHLORIDE AND EPINEPHRINE 10; 10 MG/ML; UG/ML
INJECTION, SOLUTION INFILTRATION; PERINEURAL PRN
Status: DISCONTINUED | OUTPATIENT
Start: 2019-05-08 | End: 2019-05-08 | Stop reason: ALTCHOICE

## 2019-05-08 RX ORDER — NICOTINE POLACRILEX 4 MG
15 LOZENGE BUCCAL PRN
Status: DISCONTINUED | OUTPATIENT
Start: 2019-05-08 | End: 2019-05-10 | Stop reason: HOSPADM

## 2019-05-08 RX ORDER — FENTANYL CITRATE 50 UG/ML
50 INJECTION, SOLUTION INTRAMUSCULAR; INTRAVENOUS EVERY 5 MIN PRN
Status: DISCONTINUED | OUTPATIENT
Start: 2019-05-08 | End: 2019-05-08 | Stop reason: HOSPADM

## 2019-05-08 RX ORDER — ERGOCALCIFEROL 1.25 MG/1
50000 CAPSULE ORAL WEEKLY
Status: DISCONTINUED | OUTPATIENT
Start: 2019-05-08 | End: 2019-05-10 | Stop reason: HOSPADM

## 2019-05-08 RX ORDER — DOCUSATE SODIUM 100 MG/1
100 CAPSULE, LIQUID FILLED ORAL 2 TIMES DAILY
Status: DISCONTINUED | OUTPATIENT
Start: 2019-05-08 | End: 2019-05-10 | Stop reason: HOSPADM

## 2019-05-08 RX ORDER — CLONAZEPAM 0.5 MG/1
0.5 TABLET ORAL NIGHTLY PRN
Status: DISCONTINUED | OUTPATIENT
Start: 2019-05-08 | End: 2019-05-10 | Stop reason: HOSPADM

## 2019-05-08 RX ORDER — DEXTROSE MONOHYDRATE 50 MG/ML
100 INJECTION, SOLUTION INTRAVENOUS PRN
Status: DISCONTINUED | OUTPATIENT
Start: 2019-05-08 | End: 2019-05-10 | Stop reason: HOSPADM

## 2019-05-08 RX ORDER — HYDROCODONE BITARTRATE AND ACETAMINOPHEN 5; 325 MG/1; MG/1
1-2 TABLET ORAL EVERY 6 HOURS PRN
Qty: 28 TABLET | Refills: 0 | Status: SHIPPED | OUTPATIENT
Start: 2019-05-08 | End: 2019-05-15

## 2019-05-08 RX ORDER — ZOLPIDEM TARTRATE 10 MG/1
10 TABLET ORAL NIGHTLY PRN
Status: DISCONTINUED | OUTPATIENT
Start: 2019-05-08 | End: 2019-05-10 | Stop reason: HOSPADM

## 2019-05-08 RX ORDER — ONDANSETRON 2 MG/ML
4 INJECTION INTRAMUSCULAR; INTRAVENOUS
Status: DISCONTINUED | OUTPATIENT
Start: 2019-05-08 | End: 2019-05-08 | Stop reason: HOSPADM

## 2019-05-08 RX ORDER — DIPHENHYDRAMINE HYDROCHLORIDE 50 MG/ML
12.5 INJECTION INTRAMUSCULAR; INTRAVENOUS
Status: DISCONTINUED | OUTPATIENT
Start: 2019-05-08 | End: 2019-05-08 | Stop reason: HOSPADM

## 2019-05-08 RX ORDER — HYDROCODONE BITARTRATE AND ACETAMINOPHEN 5; 325 MG/1; MG/1
2 TABLET ORAL PRN
Status: DISCONTINUED | OUTPATIENT
Start: 2019-05-08 | End: 2019-05-08 | Stop reason: HOSPADM

## 2019-05-08 RX ORDER — FENTANYL CITRATE 50 UG/ML
INJECTION, SOLUTION INTRAMUSCULAR; INTRAVENOUS PRN
Status: DISCONTINUED | OUTPATIENT
Start: 2019-05-08 | End: 2019-05-08 | Stop reason: SDUPTHER

## 2019-05-08 RX ORDER — MONTELUKAST SODIUM 10 MG/1
10 TABLET ORAL NIGHTLY
Status: DISCONTINUED | OUTPATIENT
Start: 2019-05-08 | End: 2019-05-10 | Stop reason: HOSPADM

## 2019-05-08 RX ORDER — SUCCINYLCHOLINE/SOD CL,ISO/PF 200MG/10ML
SYRINGE (ML) INTRAVENOUS PRN
Status: DISCONTINUED | OUTPATIENT
Start: 2019-05-08 | End: 2019-05-08 | Stop reason: SDUPTHER

## 2019-05-08 RX ADMIN — ROCURONIUM BROMIDE 10 MG: 10 INJECTION INTRAVENOUS at 13:50

## 2019-05-08 RX ADMIN — Medication 10 ML: at 20:59

## 2019-05-08 RX ADMIN — INSULIN HUMAN 15 UNITS: 100 INJECTION, SOLUTION PARENTERAL at 15:34

## 2019-05-08 RX ADMIN — GEMFIBROZIL 600 MG: 600 TABLET ORAL at 18:06

## 2019-05-08 RX ADMIN — FENTANYL CITRATE 50 MCG: 50 INJECTION, SOLUTION INTRAMUSCULAR; INTRAVENOUS at 14:33

## 2019-05-08 RX ADMIN — INSULIN GLARGINE 25 UNITS: 100 INJECTION, SOLUTION SUBCUTANEOUS at 21:23

## 2019-05-08 RX ADMIN — ONDANSETRON 4 MG: 2 INJECTION INTRAMUSCULAR; INTRAVENOUS at 13:50

## 2019-05-08 RX ADMIN — INSULIN LISPRO 6 UNITS: 100 INJECTION, SOLUTION INTRAVENOUS; SUBCUTANEOUS at 18:07

## 2019-05-08 RX ADMIN — LIDOCAINE HYDROCHLORIDE 50 MG: 10 INJECTION, SOLUTION EPIDURAL; INFILTRATION; INTRACAUDAL; PERINEURAL at 13:50

## 2019-05-08 RX ADMIN — SODIUM CHLORIDE: 9 INJECTION, SOLUTION INTRAVENOUS at 12:40

## 2019-05-08 RX ADMIN — MONTELUKAST SODIUM 10 MG: 10 TABLET, FILM COATED ORAL at 20:59

## 2019-05-08 RX ADMIN — Medication 2 G: at 13:53

## 2019-05-08 RX ADMIN — INSULIN LISPRO 5 UNITS: 100 INJECTION, SOLUTION INTRAVENOUS; SUBCUTANEOUS at 21:23

## 2019-05-08 RX ADMIN — Medication 100 MG: at 13:50

## 2019-05-08 RX ADMIN — DEXAMETHASONE SODIUM PHOSPHATE 4 MG: 4 INJECTION, SOLUTION INTRAMUSCULAR; INTRAVENOUS at 13:50

## 2019-05-08 RX ADMIN — Medication 2 G: at 21:50

## 2019-05-08 RX ADMIN — PHENYLEPHRINE HYDROCHLORIDE 200 MCG: 10 INJECTION INTRAVENOUS at 14:24

## 2019-05-08 RX ADMIN — DOCUSATE SODIUM 100 MG: 100 CAPSULE, LIQUID FILLED ORAL at 20:59

## 2019-05-08 RX ADMIN — PROPOFOL 150 MG: 10 INJECTION, EMULSION INTRAVENOUS at 13:50

## 2019-05-08 RX ADMIN — FENTANYL CITRATE 50 MCG: 50 INJECTION, SOLUTION INTRAMUSCULAR; INTRAVENOUS at 13:51

## 2019-05-08 RX ADMIN — INSULIN HUMAN 15 UNITS: 100 INJECTION, SOLUTION PARENTERAL at 12:51

## 2019-05-08 ASSESSMENT — PULMONARY FUNCTION TESTS
PIF_VALUE: 16
PIF_VALUE: 15
PIF_VALUE: 25
PIF_VALUE: 23
PIF_VALUE: 15
PIF_VALUE: 15
PIF_VALUE: 17
PIF_VALUE: 15
PIF_VALUE: 16
PIF_VALUE: 14
PIF_VALUE: 18
PIF_VALUE: 1
PIF_VALUE: 15
PIF_VALUE: 15
PIF_VALUE: 14
PIF_VALUE: 22
PIF_VALUE: 19
PIF_VALUE: 15
PIF_VALUE: 15
PIF_VALUE: 0
PIF_VALUE: 16
PIF_VALUE: 4
PIF_VALUE: 15
PIF_VALUE: 17
PIF_VALUE: 16
PIF_VALUE: 15
PIF_VALUE: 15
PIF_VALUE: 17
PIF_VALUE: 15
PIF_VALUE: 16
PIF_VALUE: 15
PIF_VALUE: 18
PIF_VALUE: 17
PIF_VALUE: 15
PIF_VALUE: 1
PIF_VALUE: 16
PIF_VALUE: 15
PIF_VALUE: 0
PIF_VALUE: 15
PIF_VALUE: 19
PIF_VALUE: 15
PIF_VALUE: 18
PIF_VALUE: 19
PIF_VALUE: 2
PIF_VALUE: 0
PIF_VALUE: 15
PIF_VALUE: 2
PIF_VALUE: 15
PIF_VALUE: 15

## 2019-05-08 ASSESSMENT — PAIN SCALES - GENERAL
PAINLEVEL_OUTOF10: 0
PAINLEVEL_OUTOF10: 0

## 2019-05-08 ASSESSMENT — ENCOUNTER SYMPTOMS: SHORTNESS OF BREATH: 1

## 2019-05-08 ASSESSMENT — PAIN DESCRIPTION - DESCRIPTORS: DESCRIPTORS: ACHING;DISCOMFORT

## 2019-05-08 ASSESSMENT — PAIN - FUNCTIONAL ASSESSMENT: PAIN_FUNCTIONAL_ASSESSMENT: 0-10

## 2019-05-08 NOTE — FLOWSHEET NOTE
[facility]  DVT Prophylaxis and Vaccine Status  Work List  Mandatory for all patients      Patient must be on both Chemical prophylaxis and Mechanical prophylaxis.  If chemical/mechanical prophylaxis is not ordered, the physician must document a reason for not using prophylaxis     Chemical Prophylaxis  Is patient on chemical prophylaxis: No  If no chemical prophylaxis Is a order in for No Chemical VTE prophylaxisNo  If no was the physician notified yes      Mechanical Prophylaxis  Is patient on mechanical prophylaxis, intermittent pneumatic compression device: Yes  If no was the physician notified not applicable        Pneumonia Vaccine  Vaccine indicated:  Not indicated  If indicated was the vaccine given: not applicable    Influenza Vaccine (applicable from October through March):  Vaccine indicated: Not indicated  If indicated was the vaccine given: not applicable    Patient Education  Education completed on DVT prophylaxis: yes

## 2019-05-08 NOTE — H&P
HISTORY and [address] L[id number]       NAME:  Gwyndolyn Fleischer  MRN: [id number]   YOB: 1955   Date: 5/8/2019   Age: 66 y.o. Gender: female       COMPLAINT AND PRESENT HISTORY:      Gwyndolyn Fleischer is 66 y.o.,  female, undergoing for  Closed compression fracture of thoracic vertebra, and osteoporosis, osteoporoic fractures. Pt will be having KYPHOPLASTY L3.  Pt has DDD, C/O of pain in the lumbar area without any radiation  to the lower limbs   The symptoms started 1 week ago after a fall on her right side. Pt rates her pain at 8/10. Patient states that she has been on tylenol to help treat the pain. Patient has limitation in the range of motion to the back. No recent falls or trauma. No redness, swelling or rashes. No recent falls or trauma. PAST MEDICAL HISTORY     Past Medical History:   Diagnosis Date    CEFERINO (acute kidney injury) ([address] 11/19/2014    Asthma     Carotid artery plaque 4/2/2013    Clostridium difficile diarrhea 11/19/2014    Dehydration 11/19/2014    Depression     Fibromyalgia     Hiatal hernia     HTN (hypertension) 4/2/2013    Hyperlipidemia     Hypokalemia, gastrointestinal losses 11/21/2014    Kidney stone     b/l    Obesity (BMI 30-39. 9) 5/28/2013    Osteoarthritis     Osteoporosis     Renal cancer ([address] 8/7/2013    right    Short of breath on exertion     Type II or unspecified type diabetes mellitus without mention of complication, not stated as uncontrolled     Unspecified sleep apnea     does not use machine    Urge urinary incontinence     mid urethral sling in 2002    Wears glasses     Zygomatic fracture, right side, initial encounter for closed fracture ([address] 5/26/2018       SURGICAL HISTORY       Past Surgical History:   Procedure Laterality Date    BLADDER SURGERY  06/05/2014    Bladder Stimulator Implant    BLADDER SUSPENSION  08/09/2002    BREAST BIOPSY Left 03/03/2008    BREAST SURGERY current or ex partner: Not on file     Emotionally abused: Not on file     Physically abused: Not on file     Forced sexual activity: Not on file   Other Topics Concern    Not on file   Social History Narrative    Not on file           REVIEW OF SYSTEMS      Allergies   Allergen Reactions    Lac Bovis     Nedocromil     Tramadol     Penicillins Hives and Rash    Tape [Adhesive Tape] Rash     OK to use paper tape per patient       No current facility-administered medications on file prior to encounter. Current Outpatient Medications on File Prior to Encounter   Medication Sig Dispense Refill    Cholecalciferol (VITAMIN D3) 1000 units TABS Take 1 tablet by mouth daily 90 tablet 1    aspirin EC 81 MG EC tablet Take 1 tablet by mouth daily 30 tablet 5    sertraline (ZOLOFT) 100 MG tablet TAKE ONE TABLET BY MOUTH DAILY 30 tablet 0    lovastatin (MEVACOR) 40 MG tablet TAKE ONE AND ONE-HALF (1  1/2) TABLET BY MOUTH NIGHTLY 30 tablet 2    gemfibrozil (LOPID) 600 MG tablet TAKE ONE TABLET BY MOUTH TWICE A DAY 60 tablet 0    raloxifene (EVISTA) 60 MG tablet TAKE ONE TABLET BY MOUTH DAILY 30 tablet 0    traMADol (ULTRAM) 50 MG tablet Take 1 tablet by mouth every 6 hours as needed for Pain. . 28 tablet 0    zolpidem (AMBIEN) 10 MG tablet Take 10 mg by mouth nightly as needed for Sleep. Chales Haste insulin glargine (BASAGLAR KWIKPEN) 100 UNIT/ML injection pen Inject 25 Units into the skin nightly      Potassium Citrate ER 15 MEQ (1620 MG) TBCR Take 15 mEq by mouth 2 times daily 60 tablet 6    vitamin D (ERGOCALCIFEROL) 45695 units CAPS capsule Take 1 capsule by mouth once a week for 8 doses 8 capsule 0    insulin regular human (HUMULIN R) 500 UNIT/ML concentrated injection vial Inject into the skin Inject 0.35 mL (175 units) in the morning and 0.1 mL (50 units) with lunch      fluticasone (ARNUITY ELLIPTA) 200 MCG/ACT AEPB Inhale 1 Inhaler into the lungs daily      BiPAP Machine MISC by Does not apply route  calcium carbonate (TUMS) 500 MG chewable tablet Take 1 tablet by mouth 3 times daily as needed for Heartburn 180 tablet 5    Handicap Placard MISC by Does not apply route Duration: 1 year 1 each 0    glucagon 1 MG injection Infuse 1 kit intravenously once      zonisamide (ZONEGRAN) 25 MG capsule Take 25 mg by mouth daily      acetaminophen (TYLENOL) 500 MG tablet Take 500 mg by mouth every 6 hours as needed.  clonazePAM (KLONOPIN) 0.5 MG tablet Take 0.5 mg by mouth nightly as needed.  butalbital-acetaminophen-caffeine (FIORICET, ESGIC) per tablet Take 1 tablet by mouth every 8 hours as needed.  montelukast (SINGULAIR) 10 MG tablet Take 10 mg by mouth nightly.  albuterol (PROVENTIL HFA;VENTOLIN HFA) 108 (90 BASE) MCG/ACT inhaler Inhale 2 puffs into the lungs every 6 hours as needed. Negative except for what is mentioned in the HPI. GENERAL PHYSICAL EXAM     Vitals: /71   Pulse 71   Temp 97.2 °F (36.2 °C) (Oral)   Resp 14   Ht 5' 2\" (1.575 m)   Wt 143 lb (64.9 kg)   LMP 01/01/2002   SpO2 99%   BMI 26.16 kg/m²  Body mass index is 26.16 kg/m². GENERAL APPEARANCE:   Mason Hampton is 66 y.o.,  female, mildly obese, nourished, conscious, alert. Does not appear to be distress or pain at this time. Hair is matted in the back. SKIN:  Warm, dry, no cyanosis or jaundice. Silvery plaque formation to forehead and arcenio ankles. Skin very dry and rough. HEAD:  Normocephalic, atraumatic, no swelling or tenderness. EYES:  Pupils equal, reactive to light. EARS:  No discharge, no marked hearing loss. NOSE:  No rhinorrhea, epistaxis or septal deformity. THROAT:  Not congested. No ulceration bleeding or discharge. NECK:  No stiffness, trachea central.  No palpable masses or L.N.                 CHEST:  Symmetrical and equal on expansion. HEART:  RRR S1 > S2. No audible murmurs or gallops. LUNGS:  Equal on expansion, normal breath sounds. No adventitious sounds. ABDOMEN:  Mildly obese. Soft on palpation. No localized tenderness. No guarding or rigidity. No palpable hepatosplenomegaly. LYMPHATICS:  No palpable cervical lymphadenopathy. LOCOMOTOR, BACK AND SPINE:  No tenderness or deformities. EXTREMITIES:  Symmetrical, no pretibial edema. Chets sign negative. No discoloration or ulcerations. NEUROLOGIC:  The patient is conscious, alert, oriented,Cranial nerve II-XII intact, taste was not examined. No apparent focal sensory or motor deficits.              PROVISIONAL DIAGNOSES / SURGERY:      COMPRESSION FRACTURE L3  KYPHOPLASTY L3    Patient Active Problem List    Diagnosis Date Noted    Hypokalemia, gastrointestinal losses 11/21/2014     Priority: High    Clostridium difficile diarrhea 11/19/2014     Priority: High    CEFERINO (acute kidney injury) ([address] 11/19/2014     Priority: High    Closed compression fracture of L2 lumbar vertebra ([address] 10/08/2018    Closed compression fracture of thoracic vertebra ([address] 10/08/2018    Closed supracondylar fracture of left femur ([address] 09/06/2018    CKD (chronic kidney disease) stage 2, GFR 60-89 ml/min 08/02/2018    Hypovitaminosis D 05/27/2018    Closed fracture of distal end of right radius 05/27/2018    Hypokalemia 05/26/2018    Falls 05/26/2018    Right radial fracture 05/26/2018    Zygomatic fracture, right side, initial encounter for closed fracture ([address] 05/26/2018    Thrombocytopenia ([address] 05/26/2018    Hypomagnesemia 05/26/2018    Facial laceration 05/26/2018    Ambulatory dysfunction 05/26/2018    EJ on CPAP 05/16/2017    Type 2 diabetes mellitus with diabetic polyneuropathy, with long-term current use of insulin ([address] 02/09/2017    Lumbar disc herniation 02/09/2017    Need for Tdap vaccination 02/09/2017    Osteoporosis 04/12/2016    Depression 09/17/2015    Dizziness 12/30/2014    Hypotension 12/30/2014    Colitis     DM (diabetes mellitus), type 2, uncontrolled with complications ([address] 18/90/9422    Orthostatic hypotension 10/22/2013    Cervical spondylosis with myelopathy 09/19/2013    Displacement of cervical intervertebral disc without myelopathy 09/19/2013    Cervicalgia 09/19/2013    Spinal stenosis, lumbar region, with neurogenic claudication 09/19/2013    Acquired spondylolisthesis 09/19/2013    Diabetic autonomic neuropathy associated with type 2 diabetes mellitus ([address] 08/21/2013    Risk for falls 08/21/2013    History of renal cell cancer 08/07/2013    S/p nephrectomy 08/07/2013    Episode of dizziness 08/07/2013    Orthostasis 08/07/2013    Obesity (BMI 30-39.9) 05/28/2013    Moderate persistent asthma 04/02/2013    Carotid artery plaque 04/02/2013    HLD (hyperlipidemia) 04/02/2013    Benign hypertension with CKD (chronic kidney disease), stage II 04/02/2013    Dermatomyositis ([address] 02/18/2013    GERD (gastroesophageal reflux disease) 02/18/2013    Hip arthritis 10/30/2012    Diabetic neuropathy ([address] 10/30/2012    Prolapsed intervertebral disk 09/04/2012    Headache 09/04/2012    Carpal tunnel syndrome 07/31/2012    DDD (degenerative disc disease), lumbar 07/31/2012           KAMINI HARRIS, APRN - CNP on 5/8/2019 at 12:15 PM

## 2019-05-08 NOTE — BRIEF OP NOTE
Brief Postoperative Note  ______________________________________________________________    Patient: Jean Morris  YOB: 1955  MRN: [id number]  Date of Procedure: 5/8/2019    Pre-Op Diagnosis: COMPRESSION FRACTURE  L4    Post-Op Diagnosis: Same       Procedure(s):  KYPHOPLASTY L4    Anesthesia: General    Surgeon(s):  Lena Anton MD    Assistant: laury    Estimated Blood Loss (mL): less than 50     Complications: None    Specimens:   * No specimens in log *    Implants:  Implant Name Type Inv.  Item Serial No.  Lot No. LRB No. Used   CEMENT KYPHON ACTIVOS 10BONE HYDROXYAPATITE Spine CEMENT [address] [facility] [id number] N/A 1         Drains: * No LDAs found *    Findings: laury Anton MD  Date: 5/8/2019  Time: 2:38 PM

## 2019-05-08 NOTE — ANESTHESIA PRE PROCEDURE
Department of Anesthesiology  Preprocedure Note       Name:  Lauren Meza   Age:  66 y.o.  :  1955                                          MRN:  [id number]         Date:  2019      Surgeon: Ashleigh Heard):  Joan Russell MD    Procedure: KYPHOPLASTY L3 (N/A Back)    Medications prior to admission:   Prior to Admission medications    Medication Sig Start Date End Date Taking? Authorizing Provider   aspirin EC 81 MG EC tablet Take 1 tablet by mouth daily 18  Yes Radha Benz MD   sertraline (ZOLOFT) 100 MG tablet TAKE ONE TABLET BY MOUTH DAILY 18  Yes Radha Benz MD   lovastatin (MEVACOR) 40 MG tablet TAKE ONE AND ONE-HALF (1  1/2) TABLET BY MOUTH NIGHTLY 18  Yes Radha Bezn MD   gemfibrozil (LOPID) 600 MG tablet TAKE ONE TABLET BY MOUTH TWICE A DAY 18  Yes Radha Benz MD   raloxifene (EVISTA) 60 MG tablet TAKE ONE TABLET BY MOUTH DAILY 18  Yes Radha Benz MD   zolpidem (AMBIEN) 10 MG tablet Take 10 mg by mouth nightly as needed for Sleep. .   Yes Historical Provider, MD   vitamin D (ERGOCALCIFEROL) 83915 units CAPS capsule Take 1 capsule by mouth once a week for 8 doses 18 Yes Radha Benz MD   calcium carbonate (TUMS) 500 MG chewable tablet Take 1 tablet by mouth 3 times daily as needed for Heartburn 1/10/17  Yes Rodrigo Perales MD   acetaminophen (TYLENOL) 500 MG tablet Take 500 mg by mouth every 6 hours as needed. Yes Historical Provider, MD   albuterol (PROVENTIL HFA;VENTOLIN HFA) 108 (90 BASE) MCG/ACT inhaler Inhale 2 puffs into the lungs every 6 hours as needed. Yes Historical Provider, MD   Cholecalciferol (VITAMIN D3) 1000 units TABS Take 1 tablet by mouth daily 18   Radha Benz MD   traMADol (ULTRAM) 50 MG tablet Take 1 tablet by mouth every 6 hours as needed for Pain. . 18  Joan Russell MD   insulin glargine [facility]) 100 UNIT/ML injection pen Inject 25 Units into the skin nightly    Historical Provider, MD   insulin regular human (HUMULIN R) 500 UNIT/ML concentrated injection vial Inject into the skin Inject 0.35 mL (175 units) in the morning and 0.1 mL (50 units) with lunch    Historical Provider, MD   fluticasone (ARNUITY ELLIPTA) 200 MCG/ACT AEPB Inhale 1 Inhaler into the lungs daily    Historical Provider, MD   BiPAP Machine MISC by Does not apply route    Historical Provider, MD   Handicap Placard MISC by Does not apply route Duration: 1 year 4/28/16   Geovanna Hull MD   glucagon 1 MG injection Infuse 1 kit intravenously once    Historical Provider, MD   clonazePAM (KLONOPIN) 0.5 MG tablet Take 0.5 mg by mouth nightly as needed. Historical Provider, MD   butalbital-acetaminophen-caffeine (FIORICET, ESGIC) per tablet Take 1 tablet by mouth every 8 hours as needed. Historical Provider, MD   montelukast (SINGULAIR) 10 MG tablet Take 10 mg by mouth nightly. Historical Provider, MD       Current medications:    Current Facility-Administered Medications   Medication Dose Route Frequency Provider Last Rate Last Dose    0.9 % sodium chloride infusion   Intravenous Continuous Jc Bell MD           Allergies: Allergies   Allergen Reactions    Lac Bovis     Nedocromil     Tramadol     Penicillins Hives and Rash    Tape Ama Hughson Tape] Rash     OK to use paper tape per patient       Problem List:    Patient Active Problem List   Diagnosis Code    Carpal tunnel syndrome G56.00    DDD (degenerative disc disease), lumbar M51.36    Prolapsed intervertebral disk [id number]    Headache R51    Hip arthritis M16.10    Diabetic neuropathy ([address] E11.40    Dermatomyositis ([address] M33.90    GERD (gastroesophageal reflux disease) K21.9    Moderate persistent asthma J45.40    Carotid artery plaque I65.29    HLD (hyperlipidemia) E78.5    Benign hypertension with CKD (chronic kidney disease), stage II I12.9, N18.2    Obesity (BMI 30-39. 9) E66.9    History of renal cell cancer Z85.528    S/p nephrectomy Z90.5    Episode of dizziness R42    Orthostasis I95.1    Diabetic autonomic neuropathy associated with type 2 diabetes mellitus ([facility]) E11.43    Risk for falls Z91.81    Cervical spondylosis with myelopathy M47.12    Displacement of cervical intervertebral disc without myelopathy M50.20    Cervicalgia M54.2    Spinal stenosis, lumbar region, with neurogenic claudication M48.062    Acquired spondylolisthesis M43.10    Orthostatic hypotension I95.1    DM (diabetes mellitus), type 2, uncontrolled with complications ([facility]) A28.7, E11.65    Clostridium difficile diarrhea A04.72    CEFERINO (acute kidney injury) ([facility]) N17.9    Colitis K52.9    Hypokalemia, gastrointestinal losses E87.6    Dizziness R42    Hypotension I95.9    Depression F32.9    Osteoporosis M81.0    Type 2 diabetes mellitus with diabetic polyneuropathy, with long-term current use of insulin ([facility]) E11.42, Z79.4    Lumbar disc herniation M51.26    Need for Tdap vaccination Z23    EJ on CPAP G47.33, Z99.89    Hypokalemia E87.6    Falls W19. XXXA    Right radial fracture S52. 91XA    Zygomatic fracture, right side, initial encounter for closed fracture ([facility] S02.40EA    Thrombocytopenia ([facility]) D69.6    Hypomagnesemia E83.42    Facial laceration S01.81XA    Ambulatory dysfunction R26.2    Hypovitaminosis D E55.9    Closed fracture of distal end of right radius S52.501A    CKD (chronic kidney disease) stage 2, GFR 60-89 ml/min N18.2    Closed supracondylar fracture of left femur ([facility]) S72.452A    Closed compression fracture of L2 lumbar vertebra ([facility]) S32.020A    Closed compression fracture of thoracic vertebra ([facility]) S22.000A       Past Medical History:        Diagnosis Date    CEFERINO (acute kidney injury) ([facility] 11/19/2014    Asthma     Carotid artery plaque 4/2/2013    Clostridium difficile diarrhea 11/19/2014    Dehydration 11/19/2014    Depression     Fibromyalgia     Hiatal hernia     HTN (hypertension) Readings from Last 3 Encounters:   05/08/19 143 lb (64.9 kg)   12/11/18 143 lb (64.9 kg)   09/12/18 140 lb (63.5 kg)     Body mass index is 26.16 kg/m².     CBC:   Lab Results   Component Value Date    WBC 8.3 09/04/2018    RBC 4.72 09/04/2018    HGB 13.2 09/04/2018    HCT 40.2 09/04/2018    MCV 85.3 09/04/2018    RDW 13.6 09/04/2018     09/04/2018       CMP:   Lab Results   Component Value Date     12/11/2018    K 5.1 12/11/2018    CL 96 12/11/2018    CO2 21 12/11/2018    BUN 17 12/11/2018    CREATININE 1.10 12/11/2018    GFRAA >60 12/11/2018    LABGLOM 50 12/11/2018    GLUCOSE 457 12/11/2018    GLUCOSE 366 04/19/2012    PROT 7.6 10/05/2018    CALCIUM 10.2 12/11/2018    BILITOT 0.51 07/30/2018    ALKPHOS 116 07/30/2018    AST 11 07/30/2018    ALT 7 07/30/2018       POC Tests:   Recent Labs     05/08/19  1211   POCGLU 397*       Coags:   Lab Results   Component Value Date    PROTIME 9.4 05/27/2018    INR 0.9 05/27/2018    APTT 23.9 05/26/2018       HCG (If Applicable): No results found for: PREGTESTUR, PREGSERUM, HCG, HCGQUANT     ABGs: No results found for: PHART, PO2ART, RAK4HKI, USN2KFV, BEART, T8DSGUHP     Type & Screen (If Applicable):  No results found for: [facility]    Anesthesia Evaluation  Patient summary reviewed and Nursing notes reviewed no history of anesthetic complications:   Airway: Mallampati: II  TM distance: >3 FB   Neck ROM: full  Mouth opening: > = 3 FB Dental: normal exam         Pulmonary:normal exam    (+) shortness of breath: chronic,  sleep apnea:  asthma: allergic asthma,                            Cardiovascular:    (+) hypertension:, MCPHERSON:,         Rhythm: regular  Rate: normal                    Neuro/Psych:   (+) neuromuscular disease:, headaches:, psychiatric history:            GI/Hepatic/Renal:   (+) hiatal hernia, GERD: no interval change,           Endo/Other:    (+) DiabetesType II DM, poorly controlled, , : arthritis:., .                 Abdominal:

## 2019-05-08 NOTE — ANESTHESIA POSTPROCEDURE EVALUATION
Department of Anesthesiology  Postprocedure Note    Patient: Adenike Mcdowell  MRN: [id number]  YOB: 1955  Date of evaluation: 5/8/2019  Time:  3:25 PM     Procedure Summary     Date:  05/08/19 Room / Location:  [facility] [address] / [address]    Anesthesia Start:  9362 Anesthesia Stop:  2471    Procedure:  KYPHOPLASTY L4 (N/A Back) Diagnosis:  (COMPRESSION FRACTURE  L3    PAT ON ADMIT PER ANES)    Surgeon:  Trudy Blanchard MD Responsible Provider:  Davin Julian MD    Anesthesia Type:  general ASA Status:  3          Anesthesia Type: general    Raffaele Phase I: Raffaele Score: 8    Raffaele Phase II:      Last vitals: Reviewed and per EMR flowsheets.        Anesthesia Post Evaluation    Comments: POST- ANESTHESIA EVALUATION       Pt Name: Adenike Mcdowell  MRN: [id number]  YOB: 1955  Date of evaluation: 5/8/2019  Time:  3:25 PM      BP (!) 150/76   Pulse 96   Temp 97.9 °F (36.6 °C) (Infrared)   Resp 15   Ht 5' 2\" (1.575 m)   Wt 143 lb (64.9 kg)   LMP 01/01/2002   SpO2 92%   BMI 26.16 kg/m²      Consciousness Level  Awake  Cardiopulmonary Status  Stable  Pain Adequately Treated YES  Nausea / Vomiting  NO  Adequate Hydration  YES  Anesthesia Related Complications NONE      Electronically signed by Davin Julian MD on 5/8/2019 at 3:25 PM

## 2019-05-08 NOTE — PLAN OF CARE
Problem: SAFETY  Goal: Free from accidental physical injury  Outcome: Ongoing  Goal: Free from intentional harm  Outcome: Ongoing     Problem: DAILY CARE  Goal: Daily care needs are met  Outcome: Ongoing     Problem: PAIN  Goal: Patient's pain/discomfort is manageable  Outcome: Ongoing     Problem: SKIN INTEGRITY  Goal: Skin integrity is maintained or improved  Outcome: Ongoing     Problem: KNOWLEDGE DEFICIT  Goal: Patient/S.O. demonstrates understanding of disease process, treatment plan, medications, and discharge instructions.   Outcome: Ongoing     Problem: DISCHARGE BARRIERS  Goal: Patient's continuum of care needs are met  Outcome: Ongoing

## 2019-05-09 LAB
GLUCOSE BLD-MCNC: 196 MG/DL (ref 65–105)
GLUCOSE BLD-MCNC: 313 MG/DL (ref 65–105)
GLUCOSE BLD-MCNC: 353 MG/DL (ref 65–105)
GLUCOSE BLD-MCNC: 416 MG/DL (ref 65–105)
HCT VFR BLD CALC: 40.3 % (ref 36–46)
HEMOGLOBIN: 13.2 G/DL (ref 12–16)
MCH RBC QN AUTO: 28.2 PG (ref 26–34)
MCHC RBC AUTO-ENTMCNC: 32.6 G/DL (ref 31–37)
MCV RBC AUTO: 86.5 FL (ref 80–100)
NRBC AUTOMATED: ABNORMAL PER 100 WBC
PDW BLD-RTO: 13.1 % (ref 11.5–14.9)
PLATELET # BLD: 186 K/UL (ref 150–450)
PMV BLD AUTO: 9.7 FL (ref 6–12)
RBC # BLD: 4.66 M/UL (ref 4–5.2)
WBC # BLD: 11.4 K/UL (ref 3.5–11)

## 2019-05-09 PROCEDURE — 97162 PT EVAL MOD COMPLEX 30 MIN: CPT

## 2019-05-09 PROCEDURE — 36415 COLL VENOUS BLD VENIPUNCTURE: CPT

## 2019-05-09 PROCEDURE — 99254 IP/OBS CNSLTJ NEW/EST MOD 60: CPT | Performed by: INTERNAL MEDICINE

## 2019-05-09 PROCEDURE — 82947 ASSAY GLUCOSE BLOOD QUANT: CPT

## 2019-05-09 PROCEDURE — 96374 THER/PROPH/DIAG INJ IV PUSH: CPT

## 2019-05-09 PROCEDURE — 6370000000 HC RX 637 (ALT 250 FOR IP): Performed by: INTERNAL MEDICINE

## 2019-05-09 PROCEDURE — G0378 HOSPITAL OBSERVATION PER HR: HCPCS

## 2019-05-09 PROCEDURE — 97166 OT EVAL MOD COMPLEX 45 MIN: CPT

## 2019-05-09 PROCEDURE — 2580000003 HC RX 258: Performed by: ORTHOPAEDIC SURGERY

## 2019-05-09 PROCEDURE — 6370000000 HC RX 637 (ALT 250 FOR IP): Performed by: ORTHOPAEDIC SURGERY

## 2019-05-09 PROCEDURE — 97530 THERAPEUTIC ACTIVITIES: CPT

## 2019-05-09 PROCEDURE — 6360000002 HC RX W HCPCS: Performed by: ORTHOPAEDIC SURGERY

## 2019-05-09 PROCEDURE — 85027 COMPLETE CBC AUTOMATED: CPT

## 2019-05-09 RX ORDER — FLUTICASONE PROPIONATE 110 UG/1
1 AEROSOL, METERED RESPIRATORY (INHALATION) 2 TIMES DAILY
Status: DISCONTINUED | OUTPATIENT
Start: 2019-05-09 | End: 2019-05-10 | Stop reason: HOSPADM

## 2019-05-09 RX ADMIN — SERTRALINE HYDROCHLORIDE 100 MG: 100 TABLET ORAL at 08:20

## 2019-05-09 RX ADMIN — Medication 2 G: at 06:01

## 2019-05-09 RX ADMIN — INSULIN LISPRO 18 UNITS: 100 INJECTION, SOLUTION INTRAVENOUS; SUBCUTANEOUS at 11:36

## 2019-05-09 RX ADMIN — HYDROCODONE BITARTRATE AND ACETAMINOPHEN 2 TABLET: 5; 325 TABLET ORAL at 15:33

## 2019-05-09 RX ADMIN — MONTELUKAST SODIUM 10 MG: 10 TABLET, FILM COATED ORAL at 21:51

## 2019-05-09 RX ADMIN — GEMFIBROZIL 600 MG: 600 TABLET ORAL at 16:39

## 2019-05-09 RX ADMIN — INSULIN LISPRO 12 UNITS: 100 INJECTION, SOLUTION INTRAVENOUS; SUBCUTANEOUS at 16:36

## 2019-05-09 RX ADMIN — DOCUSATE SODIUM 100 MG: 100 CAPSULE, LIQUID FILLED ORAL at 21:51

## 2019-05-09 RX ADMIN — ASPIRIN 81 MG: 81 TABLET, COATED ORAL at 08:22

## 2019-05-09 RX ADMIN — ONDANSETRON 4 MG: 2 INJECTION INTRAMUSCULAR; INTRAVENOUS at 17:23

## 2019-05-09 RX ADMIN — FLUTICASONE PROPIONATE 1 PUFF: 110 AEROSOL, METERED RESPIRATORY (INHALATION) at 21:52

## 2019-05-09 RX ADMIN — Medication 10 ML: at 21:51

## 2019-05-09 RX ADMIN — INSULIN LISPRO 10 UNITS: 100 INJECTION, SOLUTION INTRAVENOUS; SUBCUTANEOUS at 08:19

## 2019-05-09 RX ADMIN — INSULIN GLARGINE 25 UNITS: 100 INJECTION, SOLUTION SUBCUTANEOUS at 21:53

## 2019-05-09 RX ADMIN — GEMFIBROZIL 600 MG: 600 TABLET ORAL at 06:01

## 2019-05-09 RX ADMIN — VITAMIN D, TAB 1000IU (100/BT) 1000 UNITS: 25 TAB at 08:20

## 2019-05-09 ASSESSMENT — PAIN DESCRIPTION - DESCRIPTORS
DESCRIPTORS: ACHING
DESCRIPTORS: ACHING

## 2019-05-09 ASSESSMENT — PAIN DESCRIPTION - PROGRESSION
CLINICAL_PROGRESSION: NOT CHANGED
CLINICAL_PROGRESSION: NOT CHANGED

## 2019-05-09 ASSESSMENT — PAIN DESCRIPTION - FREQUENCY
FREQUENCY: CONTINUOUS
FREQUENCY: CONTINUOUS

## 2019-05-09 ASSESSMENT — PAIN DESCRIPTION - LOCATION
LOCATION: BACK
LOCATION: BACK

## 2019-05-09 ASSESSMENT — PAIN - FUNCTIONAL ASSESSMENT: PAIN_FUNCTIONAL_ASSESSMENT: ACTIVITIES ARE NOT PREVENTED

## 2019-05-09 ASSESSMENT — PAIN DESCRIPTION - PAIN TYPE
TYPE: ACUTE PAIN;SURGICAL PAIN
TYPE: ACUTE PAIN

## 2019-05-09 ASSESSMENT — PAIN SCALES - GENERAL
PAINLEVEL_OUTOF10: 8

## 2019-05-09 ASSESSMENT — PAIN DESCRIPTION - ORIENTATION
ORIENTATION: LOWER
ORIENTATION: LOWER

## 2019-05-09 ASSESSMENT — PAIN DESCRIPTION - ONSET: ONSET: ON-GOING

## 2019-05-09 NOTE — CONSULTS
[facility] InternalMedicine            Date:   5/9/2019  Patient name:  Sujit Benjamin  Date of admission:  5/8/2019 11:09 AM  MRN:   [id number]  Account:  [de-identified]  Dateof Birth:  1955  PCP:    Keo Wiggins MD  Room:   2055/2055-01  Code Status:    Full Code    PhysicianRequesting Consult: Irvin Cohen MD          History ofPresent Illness:     No chief complaint on file. and reason for consult; Medical comorbidity and medication management ;  S/pkyphoplasty     Patient: Sujit Benjamin  YOB: 1955  MRN: [id number]  Date of Procedure: 5/8/2019     Pre-Op Diagnosis: COMPRESSION FRACTURE  L4     Post-Op Diagnosis: Same       Procedure(s):  KYPHOPLASTY L4     Anesthesia: General     Surgeon(s):  Irvin Cohen MD            diabetic poor control   Recent Labs     05/08/19  1723 05/08/19  2111 05/09/19  0648 05/09/19  1129 05/09/19  1608   POCGLU 270* 371* 353* 416* 313*       Active Problems:    Diabetic neuropathy (HCC)    GERD (gastroesophageal reflux disease)    Obesity (BMI 30-39.9)    Spinal stenosis, lumbar region, with neurogenic claudication    DM (diabetes mellitus), type 2, uncontrolled with complications (HCC)    Closed compression fracture of L4 lumbar vertebra (HCC)    Age-related osteoporosis with current pathological fracture with routine healing    Hyperglycemia  Resolved Problems:    * No resolved hospital problems. *                HPI  ;      ON admission HX; Sujit Benjamin is 66 y.o.,  female, undergoing for  Closed compression fracture of thoracic vertebra, and osteoporosis, osteoporoic fractures. Pt will be having KYPHOPLASTY L3.  Pt has DDD, C/O of pain in the lumbar area without any radiation  to the lower limbs   The symptoms started 1 week ago after a fall on her right side. Pt rates her pain at 8/10.   Patient states that she has been on tylenol to help treat the pain. Patient has limitation in the range of motion to the back. No recent falls or trauma. No redness, swelling or rashes. No recent falls or trauma.                   Past Medical History:   Diagnosis Date    CEFERINO (acute kidney injury) ([address] 11/19/2014    Asthma     Carotid artery plaque 4/2/2013    Clostridium difficile diarrhea 11/19/2014    Dehydration 11/19/2014    Depression     Fibromyalgia     Hiatal hernia     HTN (hypertension) 4/2/2013    Hyperlipidemia     Hypokalemia, gastrointestinal losses 11/21/2014    Kidney stone     b/l    Obesity (BMI 30-39. 9) 5/28/2013    Osteoarthritis     Osteoporosis     Renal cancer ([address] 8/7/2013    right    Short of breath on exertion     Type II or unspecified type diabetes mellitus without mention of complication, not stated as uncontrolled     Unspecified sleep apnea     does not use machine    Urge urinary incontinence     mid urethral sling in 2002    Wears glasses     Zygomatic fracture, right side, initial encounter for closed fracture ([address] 5/26/2018       Family History   Problem Relation Age of Onset    Diabetes Mother     High Blood Pressure Mother     Pacemaker Mother     High Blood Pressure Father     Prostate Cancer Father     Breast Cancer Sister     Asthma Brother     Prostate Cancer Maternal Grandfather     High Blood Pressure Paternal Grandmother        Social History:     Tobacco:    reports that she has never smoked. She has never used smokeless tobacco.  Alcohol:     reports that she does not drink alcohol. Drug Use:  reports that she does not use drugs.     Review of Systems:     POSITIVE AND NEGATIVES AS DESCRIBEDIN HISTORY OF PRESENT ILLNESS ;  IN ADDITION ;   Review of Systems          All other systems negative                Physical Exam:     Physical Exam   Vitals:  BP (!) 145/81   Pulse 76   Temp 96.5 °F (35.8 °C) (Oral)   Resp 16   Ht 5' 2\" (1.575 m)   Wt 139 lb 1.8 oz (63.1 kg)   [facility] 01/01/2002   SpO2 100%   BMI 25.44 kg/m²                 Body mass index is 25.44 kg/m². General Appearance:   Alert , CO-OPERATIVE ,                 Skin:                             No rash or erythema  HEENT ;                           Head                        Symmetrical , within normal limits                                    No tenderness over frontal  sinuses. No tenderness over maxillary sinuses. Eye                           Conjunctiva normal ,, sclera non-icteric . Ear                           External ear ok . Hearing okay , no discharge from ears . [] Otoscopy findings wnl     Nose                         w.n.l. Throat                       Oropharynx  findings wnl               Neck:                            No mass , no thyroid enlargement                                           Pulmonary/Chest:        Clear to auscultation bilaterally . No wheezes, rales or rhonchi . No abnormality on percussion                                                        Cardiovascular:            Normal rate, regular rhythm,                                          No murmur or  Gallop . Abdomen:                       Soft, non-tender                                           Normal bowels sounds,                                             Extremities:                    No  Edema .                                            Neurological ;                 No focal motor deficit ,                 No focal sensory deficit ,    Musculo-skeletal ;                  No  gait abnormality                  No significant joint abnormality X 4 extremities                   Psych:   Mood normal ,                 Memory intact , neurogenic claudication [M48.062] 09/19/2013    Obesity (BMI 30-39. 9) [E66.9] 05/28/2013    GERD (gastroesophageal reflux disease) [K21.9] 02/18/2013    Diabetic neuropathy ([address] [E11.40] 10/30/2012                 Plan:     1. Will treat with insulin sliding scale high dose     Medications: Allergies: Allergies   Allergen Reactions    Lac Bovis     Nedocromil     Tramadol     Penicillins Hives and Rash    Tape Rosetta Ng Tape] Rash     OK to use paper tape per patient       Current Meds:   Scheduled Meds:    fluticasone  1 puff Inhalation BID    insulin lispro  0-9 Units Subcutaneous Nightly    insulin lispro  0-18 Units Subcutaneous TID WC    aspirin EC  81 mg Oral Daily    vitamin D  1,000 Units Oral Daily    gemfibrozil  600 mg Oral BID AC    insulin glargine  25 Units Subcutaneous Nightly    montelukast  10 mg Oral Nightly    sertraline  100 mg Oral Daily    vitamin D  50,000 Units Oral Weekly    sodium chloride flush  10 mL Intravenous 2 times per day    docusate sodium  100 mg Oral BID     Continuous Infusions:    dextrose       PRN Meds: albuterol sulfate HFA, calcium carbonate, clonazePAM, zolpidem, sodium chloride flush, morphine **OR** morphine, ondansetron, HYDROcodone 5 mg - acetaminophen **OR** HYDROcodone 5 mg - acetaminophen, glucose, dextrose, glucagon (rDNA), dextrose    Thanks for consulting us . Will monitor vitals and clinical course , and  Optimize therapy  as needed . Lawyer Zulay MD  5/9/2019  11:53 AM    Copy sent to Dr. Tiffany Combs MD    Please note that this chart was generated using voice recognition Dragon dictation software. Although every effort was made to ensure theaccuracy of this automated transcription, some errors in transcription may have occurred.

## 2019-05-09 NOTE — PROGRESS NOTES
Physical Therapy    Facility/Department: [facility] MED SURG  Initial Assessment    NAME: Mason Hampton  : 1955  MRN: [id number]    Date of Service: 2019    Discharge Recommendations:  Subacute/Skilled Nursing Facility        Assessment   Body structures, Functions, Activity limitations: Decreased functional mobility ; Decreased strength;Decreased endurance;Decreased safe awareness;Decreased balance  Assessment: Pt presents with L4 compression fx d/t fall. Pt has repeated falls at home and is not safe to d/c home at this time. Pt demo's weakness, pain, balance deficits, limited endurance and impaired mobility. Cont per POC, recommend d/c to SNF  Treatment Diagnosis: L4 compression fx  Prognosis: Good  Decision Making: Medium Complexity  History: see hx  Exam: ROM, MMT, balance, orthostatic BP, endurance, mobiltiy  Patient Education: PT POC, safety, d/c recs  Barriers to Learning: possible cognitive deficits  REQUIRES PT FOLLOW UP: Yes  Activity Tolerance  Activity Tolerance: Patient limited by endurance  Activity Tolerance: min dizziness per pt, not worsening once sitting in chair, RN notified       Patient Diagnosis(es): The encounter diagnosis was Closed compression fracture of L4 lumbar vertebra with routine healing, subsequent encounter. has a past medical history of CEFERINO (acute kidney injury) ([address] Asthma, Carotid artery plaque, Clostridium difficile diarrhea, Dehydration, Depression, Fibromyalgia, Hiatal hernia, HTN (hypertension), Hyperlipidemia, Hypokalemia, gastrointestinal losses, Kidney stone, Obesity (BMI 30-39.9), Osteoarthritis, Osteoporosis, Renal cancer ([address] Short of breath on exertion, Type II or unspecified type diabetes mellitus without mention of complication, not stated as uncontrolled, Unspecified sleep apnea, Urge urinary incontinence, Wears glasses, and Zygomatic fracture, right side, initial encounter for closed fracture ([address]    has a past surgical history that includes On-going  Clinical Progression: Not changed  Functional Pain Assessment: Activities are not prevented  Non-Pharmaceutical Pain Intervention(s): Ambulation/Increased Activity;Repositioned  Multiple Pain Sites: No  Vital Signs  Patient Currently in Pain: Yes  Patient Observation  Observations: Supine BP: 123/95 and HR 75, Seated BP: 130/69 and HR 82, stand BP: 120/78 and , Standing after ambulation BP: 127/57 and HR 89, seated BP in chair: 120/52 and HR 82  Pre Treatment Pain Screening  Intervention List: Patient able to continue with treatment    Orientation  Orientation  Overall Orientation Status: Within Functional Limits(pt is oriented x4, although demo's questionable safety awareness and appropriate judgement)  Social/Functional History  Social/Functional History  Lives With: Other (comment)(Pt plans to go to mother in [address] [place] [address] at d/c. Staets she lives next door)  Type of Home: House  Home Layout: Two level, Able to Live on Main level with bedroom/bathroom, Performs ADL's on one level  Home Access: Stairs to enter with rails  Entrance Stairs - Number of Steps: 3 + 1  Entrance Stairs - Rails: Both  Bathroom Shower/Tub: Tub/Shower unit, Doors  Bathroom Toilet: Standard  Bathroom Equipment: Shower chair  Home Equipment: 4 wheeled walker, Rolling walker, Cane, Reacher(plans to sleep in couch at mother in [address] [place] [address])  Receives Help From: Family  ADL Assistance: [address] [place] [address] Avenue: Independent  Homemaking Responsibilities: Yes  Ambulation Assistance: Independent(uses RW or holds onto objects and furniture in home)  Transfer Assistance: Independent  Active : No  Patient's  Info: Pt's sister-in-law  Mode of Transportation: Truck  Occupation: On disability  Additional Comments: Pt will be going to [address] [place] [address] to her house. Pt's house is two-story with bedroom/bathroom upstairs with 10 + 5 steps with B HR to 2nd floor.  Pt's spouse typically lives with her, but he is currently in SNF. Pt reports will need to \"sign him out\" but has no plans to sign him out anytime soon.   Cognition        Objective     Observation/Palpation  Posture: Fair  Observation: dry skin with areas of flaking and overgrowth on feet, band aids on back and incisional area not observed    AROM RLE (degrees)  RLE AROM: WFL  AROM LLE (degrees)  LLE AROM : WFL  AROM RUE (degrees)  RUE AROM : WFL  AROM LUE (degrees)  LUE AROM : WFL  Strength RLE  Comment: generally 4/5  Strength LLE  Comment: generally 4-/5  Strength RUE  Comment: at least 3/5, MMT deferred  Strength LUE  Comment: at least 3/5, MMT deferred  Tone RLE  RLE Tone: Normotonic  Tone LLE  LLE Tone: Normotonic  Motor Control  Gross Motor?: WFL  Sensation  Overall Sensation Status: Impaired(intermittent numbness in feet)  Bed mobility  Rolling to Left: Stand by assistance  Supine to Sit: Stand by assistance  Transfers  Sit to Stand: Stand by assistance  Stand to sit: Stand by assistance  Ambulation  Ambulation?: Yes  More Ambulation?: No  Ambulation 1  Surface: level tile  Device: Rolling Walker  Assistance: Contact guard assistance  Quality of Gait: slow pace, narrow AMADEO, min unsteadiness although no LOB  Distance: 10 ft  Stairs/Curb  Stairs?: No     Balance  Posture: Fair  Sitting - Static: Fair;+  Sitting - Dynamic: Fair;+  Standing - Static: Fair;-  Standing - Dynamic: Fair;-  Comments: fair standing with RW, fair minus without support- needs 1 HHA while BP taken        Plan   Plan  Times per week: 6-7x  Times per day: Daily  Current Treatment Recommendations: Strengthening, Transfer Training, Endurance Training, Balance Training, Gait Training, Functional Mobility Training, Safety Education & Training  Safety Devices  Type of devices: Gait belt, Patient at risk for falls, Nurse notified, Call light within reach, Left in chair    G-Code       OutComes Score                                                  AM-PAC Score             Goals  Short term goals  Time Frame for Short term goals: 7 days  Short term goal 1: Increase LE strength to 5/5  Short term goal 2: Improve endurance to good to maximize mobility  Short term goal 3: Improve bed mobility to IND  Short term goal 4: Improve transfers with RW to MOD IND  Short term goal 5: Improve gait with RW 50 ft SBA x1  Short term goal 6:  Increase standing balance to good to increase safety       Therapy Time   Individual Concurrent Group Co-treatment   Time In   0955       Time Out   1032       Minutes   [address] [place], [place]

## 2019-05-09 NOTE — FLOWSHEET NOTE
Patient expressed no needs at this time. Chaplains will remain available to offer spiritual and emotional support as needed. 05/09/19 1318   Encounter Summary   Services provided to: Patient   Referral/Consult From: [facility]   Continue Visiting   (5/9/19)   Complexity of Encounter Low   Length of Encounter 15 minutes   Spiritual Assessment Completed Yes   Routine   Type Initial   Assessment Calm; Approachable   Intervention Sustaining presence/ Ministry of presence; Explored feelings, thoughts, concerns; Active listening   Outcome Expressed gratitude

## 2019-05-09 NOTE — PLAN OF CARE
Problem: SAFETY  Goal: Free from accidental physical injury  5/9/2019 0332 by Adrianne Zaidi RN  Outcome: Ongoing  Note:   Pt. Free of falls and injuries this shift. Problem: DAILY CARE  Goal: Daily care needs are met  5/9/2019 0332 by Adrianne Zaidi RN  Outcome: Ongoing     Problem: PAIN  Goal: Patient's pain/discomfort is manageable  5/9/2019 0332 by Adrianne Zaidi RN  Outcome: Ongoing  Note:   Adequate pain control achieved this shift. See MAR. Problem: SKIN INTEGRITY  Goal: Skin integrity is maintained or improved  5/9/2019 0332 by Adrianne Zaidi RN  Outcome: Ongoing  Note:   Skin integrity improved/maintained this shift. See head to toe assessment. Problem: KNOWLEDGE DEFICIT  Goal: Patient/S.O. demonstrates understanding of disease process, treatment plan, medications, and discharge instructions. 5/9/2019 0332 by Adrianne Zaidi RN  Outcome: Ongoing     Problem: DISCHARGE BARRIERS  Goal: Patient's continuum of care needs are met  5/9/2019 0332 by Adrianne Zaidi RN  Outcome: Ongoing     Problem: Falls - Risk of:  Goal: Will remain free from falls  Description  Will remain free from falls  Outcome: Ongoing  Note:   Pt. Free of falls and injuries this shift.

## 2019-05-09 NOTE — OP NOTE
[address]                 [address] [facility], [address]                                OPERATIVE REPORT    PATIENT NAME: Masoud Oseguera          :        1955  MED REC NO:   [id number]                              ROOM:       2055  ACCOUNT NO:   [de-identified]                           ADMIT DATE: 2019  PROVIDER:     Emma Worthy    DATE OF PROCEDURE:  2019    PREOPERATIVE DIAGNOSES:  Osteoporotic compression fracture L4, history  of L5-S1 fusion, L3 kyphoplasty. POSTOPERATIVE DIAGNOSES:  Osteoporotic compression fracture L4, history  of L5-S1 fusion, L3 kyphoplasty. OPERATING SURGEON:  Emma Worthy MD    ANESTHESIA:  General.    PROCEDURE PERFORMED:  L4 kyphoplasty with fluoroscopic assistance. FINDINGS:  1. The patient with acceptable cement fill interdigitation. 2.  Trocar was advanced with minimal fluoroscopic views biplanar at  pedicle docking, mid pedicle, posterior wall and final resting position  to try and safely ensure traversing the pedicles. 3.  Small cement extravasation into the disc space at L4-5. PROCEDURE IN DETAIL:  The patient was taken to the operative room,  placed under general anesthesia, transferred to the [facility] table,  checked for padding and positioning. AP and lateral fluoroscopy were arranged for the procedure. The level  identified by the kyphoplasty at the level above and the previous fusion  at the level below. Back was prepped and draped in the usual sterile fashion, 18-gauge  needles were advanced with aid of biplanar fluoroscopy from the skin to  the pedicle docking position. Back injected with local anesthetic. Stab incisions were made. Trocars were introduced bipedicularly with  minimal biplanar views as previously noted. Multiple additional  predominantly AP views to try and safely navigate the pedicle.     Post-placement of trocars, drill was utilized to create the channel for  the balloons as the patient had previous biopsy. No biopsy was done. Balloons were placed and inflated. Balloons predominantly inflated away from the concave inferior endplate  fracture. Cement insertion was begun. The patient with excellent cement fill  interdigitation. The patient unfortunately started to leak a bit  through the inferior endplate as we were finishing the cement insertion. We therefore halted cement insertion. Cement was allowed to harden. Cement insertion devices and trocars were removed. Final AP and lateral  fluoroscopic images were obtained. The patient was transferred from the  [facility] to her hospital [facility]. At this juncture, the feet were  noticed to have significantly thickened and long, unkempt toenails and  some measuring up to 1 cm long. They were obviously causing some foot  irritation. We therefore went ahead and trimmed her nails back with a  rongeur to more appropriate length. The patient was taken to recovery room in stable condition. COMPLICATIONS RAISED DURING PROCEDURE:  None noted.         JOANIE Larson    D: 05/08/2019 14:55:37       T: 05/08/2019 15:04:24     [email or web address]  Job#: [id number]     Doc#: [id number]    CC:

## 2019-05-09 NOTE — PROGRESS NOTES
[address]   Occupational Therapy Evaluation  Date: 19  Patient Name: Kiersten Ortiz       Room: 0740/6688-17  MRN: [id number]  Account: [de-identified]   : 1955  (66 y.o.) Gender: female     Discharge Recommendations:  [address]       Referring Practitioner: Dr. John Mg  Diagnosis: Osteoporotic compression fracture L4 s/p L4 kyphoplasty 19       Treatment Diagnosis: Impaired self-care status. Past Medical History:  has a past medical history of CEFERINO (acute kidney injury) ([facility] Asthma, Carotid artery plaque, Clostridium difficile diarrhea, Dehydration, Depression, Fibromyalgia, Hiatal hernia, HTN (hypertension), Hyperlipidemia, Hypokalemia, gastrointestinal losses, Kidney stone, Obesity (BMI 30-39.9), Osteoarthritis, Osteoporosis, Renal cancer ([facility] Short of breath on exertion, Type II or unspecified type diabetes mellitus without mention of complication, not stated as uncontrolled, Unspecified sleep apnea, Urge urinary incontinence, Wears glasses, and Zygomatic fracture, right side, initial encounter for closed fracture ([facility] Past Surgical History:   has a past surgical history that includes knee surgery (Bilateral); bladder suspension (2002); Nephrostomy (Right); total nephrectomy (Right, 2013); lumbar laminectomy (10/15/14); Bladder surgery (2014); Endometrial ablation; Cholecystectomy (10/10/2003); Breast surgery (Left, 2008); Breast biopsy (Left, 2008); pr perq vert agmntj cavity crtj uni/bi cannulj lmbr (N/A, 2018); Fixation Kyphoplasty (10/2018); and Kyphosis surgery (N/A, 2019). Restrictions  Restrictions/Precautions: Fall Risk(Pt is a high fall risk, states she falls every other day at home)  Implants present? : Metal implants(uretheral stents)  Other position/activity restrictions: Pt has repeated falls at home for the last 2 yrs.  Pt has multiple orthopedic injuries as a result of falls. Required Braces or Orthoses?: No     Vitals  Temp: 96.5 °F (35.8 °C)  Pulse: 76  Resp: 16  BP: (!) 145/81  Height: 5' 2\" (157.5 cm)  Weight: 139 lb 1.8 oz (63.1 kg)  BMI (Calculated): 25.5  Oxygen Therapy  SpO2: 100 %  Pulse Oximeter Device Mode: Continuous  Pulse Oximeter Device Location: Finger  O2 Device: Nasal cannula  O2 Flow Rate (L/min): 2 L/min  Level of Consciousness: Alert    Subjective  Subjective: \"They told me that my bones are like an [de-identified] year old woman and I'm [age]. Charli Escudero Oh God, every other day\" Pt reports frequent falls starting ~1 year ago. \"Just light-headed and go\" Pt reports her falls are typically preceeded by light-headedness and she typically \"passes out. \"  Overall Orientation Status: Within Functional Limits(Pt accurately states birthdate, but also says she is 60)  Vision  Vision: Impaired  Vision Exceptions: Wears glasses at all times  Hearing  Hearing: Within functional limits  Social/Functional History  Lives With: Other (comment)(Pt plans to go to mother in [address] [place] [address] at d/c.  Staets she lives next door)  Type of Home: House  Home Layout: Two level, Able to Live on Main level with bedroom/bathroom, Performs ADL's on one level  Home Access: Stairs to enter with rails  Entrance Stairs - Number of Steps: 3 + 1  Entrance Stairs - Rails: Both  Bathroom Shower/Tub: Tub/Shower unit, Doors  Bathroom Toilet: Standard  Bathroom Equipment: Shower chair  Home Equipment: 4 wheeled walker, Rolling walker, Cane, Reacher(plans to sleep in couch at mother in [address] [place] [address])  Receives Help From: Family  ADL Assistance: [address] [place] [address] Avenue: Independent  Homemaking Responsibilities: Yes  Ambulation Assistance: Independent(uses RW or holds onto objects and furniture in home)  Transfer Assistance: Independent  Active : No  Patient's  Info: Pt's sister-in-law  Mode of Transportation: Truck  Occupation: On disability  Additional Comments: Pt will be going to [employer] house next-door to her house. Pt's house is two-story with bedroom/bathroom upstairs with 10 + 5 steps with B HR to 2nd floor. Pt's spouse typically lives with her, but he is currently in SNF. Pt reports will need to \"sign him out\" but has no plans to sign him out anytime soon. Pain Assessment  Pain Assessment: 0-10  Pain Level: 8  Patient's Stated Pain Goal: No pain  Pain Type: Acute pain  Pain Location: Back  Pain Orientation: Lower  Pain Descriptors: Aching  Pain Frequency: Continuous  Clinical Progression: Not changed    Objective  Vision - Basic Assessment  Prior Vision: Wears glasses all the time  Visual History: No significant visual history  Patient Visual Report: Blurring of print when reading, Blurring of vision when changing focal distance  Vision Comments: Pt reports she needs an updated prescription   Cognition  Overall Cognitive Status: Impaired  Memory: Decreased recall of recent events  Following Commands: Follows one step commands with repetition  Safety Judgement: Decreased awareness of need for safety  Awareness of Errors: Decreased awareness of errors  Insights: Decreased awareness of deficits  Sequencing and Organization: Assistance required to generate solutions, Assistance required to identify errors made   Perception  Overall Perceptual Status: WFL  Sensation  Overall Sensation Status: Impaired(C/O intermittent tingling B feet)   ADL  Feeding: Setup  Grooming: Supervision, Setup  UE Bathing: Stand by assistance, Setup  LE Bathing: Contact guard assistance, Setup, Verbal cueing  UE Dressing: Stand by assistance, Setup  LE Dressing: Contact guard assistance, Setup, Verbal cueing  Toileting: Contact guard assistance, Setup  Additional Comments: Pt donned B socks while seated EOB with SBA and VCs for crossed-legged technique to maintain recommended BLT precautions. Pt engaged in functional mobility in room and around bed to chair with RW and CGA.  Pt's BP monitored throughout session as Pt Transfers  Toilet - Technique: Stand pivot  Equipment Used: Standard bedside commode  Toilet Transfer: Contact guard assistance  Toilet Transfers Comments: per Pt report  Functional Activity Tolerance  Functional Activity Tolerance: Tolerates 10 - 20 min exercise with multiple rests   Assessment  Performance deficits / Impairments: Decreased functional mobility , Decreased ADL status, Decreased strength, Decreased safe awareness, Decreased endurance, Decreased balance, Decreased sensation, Decreased high-level IADLs  Treatment Diagnosis: Impaired self-care status. Prognosis: Good  Decision Making: Medium Complexity  History: moderate chart review  Exam: 8 performance deficits  Assistance / Modification: min assist/modification  Patient Education: OT POC, safety, d/c rec of SNF  REQUIRES OT FOLLOW UP: Yes  Discharge Recommendations: Subacute/Skilled Nursing Facility  Activity Tolerance: Patient Tolerated treatment well, Patient limited by pain         Functional Outcome Measures  AM-PAC Daily Activity Inpatient   How much help for putting on and taking off regular lower body clothing?: A Little  How much help for Bathing?: A Little  How much help for Toileting?: A Little  How much help for putting on and taking off regular upper body clothing?: A Little  How much help for taking care of personal grooming?: A Little  How much help for eating meals?: A Little  AM-[facility] Inpatient Daily Activity Raw Score: 18  AM-PAC Inpatient ADL T-Scale Score : 38.66  ADL Inpatient CMS 0-100% Score: 46.65  ADL Inpatient CMS G-Code Modifier : CK       Goals  Patient Goals   Patient goals : To feel better  Short term goals  Time Frame for Short term goals: By discharge  Short term goal 1: Pt will V/D Good understanding of AE/DME/modified techniques for increased IND and safety with self-care and functional mobility. Short term goal 2: Pt will V/D Good understanding of BLT precautions for pain management with self-care.   Short term goal 3: Pt will perform LB ADLs with SBA and Good safety awareness. Short term goal 4: Pt will perform functional mobility and transfers during self-care with SBA, RW, and Good safety awareness. Short term goal 5: Pt will actively participate in 10-15 minutes of therapeutic exercise/functional activities to promote increased IND with self-care and mobility.     Plan  Safety Devices  Safety Devices in place: Yes  Type of devices: Call light within reach, Gait belt, Left in chair, Nurse notified, Patient at risk for falls     Plan  Times per week: 5  Times per day: Daily  Current Treatment Recommendations: Self-Care / ADL, Balance Training, Functional Mobility Training, Endurance Training, Pain Management, Safety Education & Training, Patient/Caregiver Education & Training, Equipment Evaluation, Education, & procurement, Positioning    Equipment Recommendations  Equipment Needed: (TBD)  OT Individual Minutes  Time In: 4238  Time Out: 2982  Minutes: 36    Electronically signed by Isac Chavez OT on 5/9/19 at 11:46 AM

## 2019-05-10 VITALS
BODY MASS INDEX: 25.96 KG/M2 | OXYGEN SATURATION: 99 % | WEIGHT: 141.09 LBS | RESPIRATION RATE: 16 BRPM | TEMPERATURE: 98.1 F | HEART RATE: 81 BPM | HEIGHT: 62 IN | SYSTOLIC BLOOD PRESSURE: 108 MMHG | DIASTOLIC BLOOD PRESSURE: 64 MMHG

## 2019-05-10 LAB
GLUCOSE BLD-MCNC: 189 MG/DL (ref 65–105)
GLUCOSE BLD-MCNC: 221 MG/DL (ref 65–105)
HCT VFR BLD CALC: 38.6 % (ref 36–46)
HEMOGLOBIN: 12.8 G/DL (ref 12–16)
MCH RBC QN AUTO: 28.8 PG (ref 26–34)
MCHC RBC AUTO-ENTMCNC: 33.2 G/DL (ref 31–37)
MCV RBC AUTO: 86.7 FL (ref 80–100)
NRBC AUTOMATED: NORMAL PER 100 WBC
PDW BLD-RTO: 13.3 % (ref 11.5–14.9)
PLATELET # BLD: 176 K/UL (ref 150–450)
PMV BLD AUTO: 9.4 FL (ref 6–12)
RBC # BLD: 4.45 M/UL (ref 4–5.2)
WBC # BLD: 10.3 K/UL (ref 3.5–11)

## 2019-05-10 PROCEDURE — 6370000000 HC RX 637 (ALT 250 FOR IP): Performed by: INTERNAL MEDICINE

## 2019-05-10 PROCEDURE — 97116 GAIT TRAINING THERAPY: CPT

## 2019-05-10 PROCEDURE — G0378 HOSPITAL OBSERVATION PER HR: HCPCS

## 2019-05-10 PROCEDURE — 97530 THERAPEUTIC ACTIVITIES: CPT

## 2019-05-10 PROCEDURE — 99024 POSTOP FOLLOW-UP VISIT: CPT | Performed by: ORTHOPAEDIC SURGERY

## 2019-05-10 PROCEDURE — 82947 ASSAY GLUCOSE BLOOD QUANT: CPT

## 2019-05-10 PROCEDURE — 97110 THERAPEUTIC EXERCISES: CPT

## 2019-05-10 PROCEDURE — 97535 SELF CARE MNGMENT TRAINING: CPT

## 2019-05-10 PROCEDURE — 36415 COLL VENOUS BLD VENIPUNCTURE: CPT

## 2019-05-10 PROCEDURE — 99232 SBSQ HOSP IP/OBS MODERATE 35: CPT | Performed by: INTERNAL MEDICINE

## 2019-05-10 PROCEDURE — 85027 COMPLETE CBC AUTOMATED: CPT

## 2019-05-10 PROCEDURE — 6370000000 HC RX 637 (ALT 250 FOR IP): Performed by: ORTHOPAEDIC SURGERY

## 2019-05-10 RX ADMIN — ASPIRIN 81 MG: 81 TABLET, COATED ORAL at 08:11

## 2019-05-10 RX ADMIN — HYDROCODONE BITARTRATE AND ACETAMINOPHEN 1 TABLET: 5; 325 TABLET ORAL at 17:12

## 2019-05-10 RX ADMIN — GEMFIBROZIL 600 MG: 600 TABLET ORAL at 17:12

## 2019-05-10 RX ADMIN — INSULIN LISPRO 6 UNITS: 100 INJECTION, SOLUTION INTRAVENOUS; SUBCUTANEOUS at 11:55

## 2019-05-10 RX ADMIN — VITAMIN D, TAB 1000IU (100/BT) 1000 UNITS: 25 TAB at 08:12

## 2019-05-10 RX ADMIN — GEMFIBROZIL 600 MG: 600 TABLET ORAL at 05:46

## 2019-05-10 RX ADMIN — SERTRALINE HYDROCHLORIDE 100 MG: 100 TABLET ORAL at 11:55

## 2019-05-10 RX ADMIN — HYDROCODONE BITARTRATE AND ACETAMINOPHEN 1 TABLET: 5; 325 TABLET ORAL at 08:12

## 2019-05-10 RX ADMIN — INSULIN LISPRO 3 UNITS: 100 INJECTION, SOLUTION INTRAVENOUS; SUBCUTANEOUS at 08:12

## 2019-05-10 RX ADMIN — INSULIN LISPRO 2 UNITS: 100 INJECTION, SOLUTION INTRAVENOUS; SUBCUTANEOUS at 17:08

## 2019-05-10 RX ADMIN — FLUTICASONE PROPIONATE 1 PUFF: 110 AEROSOL, METERED RESPIRATORY (INHALATION) at 08:12

## 2019-05-10 ASSESSMENT — PAIN DESCRIPTION - LOCATION
LOCATION: BACK
LOCATION: BACK

## 2019-05-10 ASSESSMENT — PAIN DESCRIPTION - PAIN TYPE
TYPE: SURGICAL PAIN
TYPE: SURGICAL PAIN

## 2019-05-10 ASSESSMENT — PAIN SCALES - GENERAL
PAINLEVEL_OUTOF10: 7
PAINLEVEL_OUTOF10: 6
PAINLEVEL_OUTOF10: 7
PAINLEVEL_OUTOF10: 8

## 2019-05-10 ASSESSMENT — PAIN DESCRIPTION - PROGRESSION
CLINICAL_PROGRESSION: NOT CHANGED
CLINICAL_PROGRESSION: NOT CHANGED

## 2019-05-10 ASSESSMENT — PAIN DESCRIPTION - DESCRIPTORS: DESCRIPTORS: ACHING

## 2019-05-10 ASSESSMENT — PAIN DESCRIPTION - ORIENTATION
ORIENTATION: LOWER
ORIENTATION: LOWER

## 2019-05-10 NOTE — PROGRESS NOTES
[address]   INPATIENT OCCUPATIONAL THERAPY  PROGRESS NOTE  Date: 5/10/2019  Patient Name: Telma Sparks      Room: 2423/4330-34  MRN: [id number]    : 1955  (64 y.o.) Gender: female     Discharge Recommendations:  [address]       Referring Practitioner: Dr. Keren Santos  Diagnosis: Osteoporotic compression fracture L4 s/p L4 kyphoplasty 19  General  Chart Reviewed: Yes  Patient assessed for rehabilitation services?: Yes  Family / Caregiver Present: No  Referring Practitioner: Dr. Keren Santos  Diagnosis: Osteoporotic compression fracture L4 s/p L4 kyphoplasty 19    Restrictions  Restrictions/Precautions: Fall Risk(Pt is a high fall risk, states she falls every other day at home)  Implants present? : Metal implants(uretheral stents)  Other position/activity restrictions: Pt has repeated falls at home for the last 2 yrs. Pt has multiple orthopedic injuries as a result of falls. Required Braces or Orthoses?: No      Subjective  Subjective: pt states dionicio she would like to shower this AM  Comments: pt alert and motivated. Yoan Medina RN ok's shower  Patient Currently in Pain: Yes  Pain Level: 7  Pain Location: Back  Pain Orientation: Lower  Overall Orientation Status: Within Functional Limits  Patient Observation  Observations: pt states that she gets a little dizzy during when transitioning from sit>stand, improves after 2-3 minutes  Pain Assessment  Pain Assessment: 0-10  Pain Level: 7  Pain Type: Surgical pain  Pain Location: Back  Pain Orientation: Lower  Pain Descriptors: Aching  Clinical Progression: Not changed    Objective        Balance  Sitting Balance: Modified independent   Standing Balance: Contact guard assistance(CGA-SBA)  Standing Balance  Time: 1-2 min x 5 c 0-2 UB support on RW  Activity: func mobility, ADLs  Comment: no LOB noted. Pt complained of \"minimal\" dizziness.   Functional Mobility  Functional - Mobility Device: Rolling Walker  Activity: To/from bathroom;Transport items  Assist Level: Contact guard assistance(CGA-SBA)  Functional Mobility Comments: VCs for tech c F return, no LOB noted   ADL  Feeding: Setup  Grooming: Supervision;Setup(pt stood at sink for oral care)  UE Bathing: Setup; Increased time to complete  LE Bathing: Setup;Verbal cueing  UE Dressing: Stand by assistance;Setup(hospital gown)  LE Dressing: Contact guard assistance;Setup;Verbal cueing (CGA in stand when pulling up brief over hips, SBA while pt sits in chair doff/ras footies utilizing cross leg tech)  Additional Comments: pt edu on utilizing reacher to increase ease and EC to doff/ras brief, pt verbalizes understanding but was able to complete task without AE     Transfers  Sit to stand: Contact guard assistance  Stand to sit: Stand by assistance  Transfer Comments: VCs for hand placement          Assessment  Performance deficits / Impairments: Decreased functional mobility ; Decreased ADL status; Decreased strength;Decreased safe awareness;Decreased endurance;Decreased balance;Decreased sensation;Decreased high-level IADLs  Prognosis: Good  Discharge Recommendations: Subacute/Skilled Nursing Facility  Activity Tolerance: Patient Tolerated treatment well  Safety Devices in place: Yes  Type of devices: Nurse notified; Left in chair;Call light within reach             Patient Education:  Patient Education: OT POC, RW tech and safety during func mobility and transfers, home safety, AE, EC during ADLs  Learner:patient  Method: demonstration and explanation       Outcome: acknowledged understanding, demonstrated understanding and asked questions     Plan  Safety Devices  Safety Devices in place: Yes  Type of devices: Nurse notified, Left in chair, Call light within reach  Plan  Times per week: 5  Times per day: Daily  Current Treatment Recommendations: Self-Care / ADL, Balance Training, Functional Mobility Training, Endurance Training, Pain Management, Safety Education & Training,

## 2019-05-10 NOTE — PLAN OF CARE
Problem: SAFETY  Goal: Free from accidental physical injury  Outcome: Ongoing  Note:   Pt. Free of falls and injuries this shift. Problem: DAILY CARE  Goal: Daily care needs are met  Outcome: Ongoing     Problem: PAIN  Goal: Patient's pain/discomfort is manageable  Outcome: Ongoing  Note:   Adequate pain control achieved this shift. See MAR. Problem: SKIN INTEGRITY  Goal: Skin integrity is maintained or improved  Outcome: Ongoing  Note:   Skin integrity improved/maintained this shift. See head to toe assessment. Problem: KNOWLEDGE DEFICIT  Goal: Patient/S.O. demonstrates understanding of disease process, treatment plan, medications, and discharge instructions. Outcome: Ongoing     Problem: DISCHARGE BARRIERS  Goal: Patient's continuum of care needs are met  Outcome: Ongoing     Problem: Falls - Risk of:  Goal: Will remain free from falls  Description  Will remain free from falls  Outcome: Ongoing  Note:   Pt. Free of falls and injuries this shift. Problem: Pain:  Goal: Pain level will decrease  Description  Pain level will decrease  Outcome: Ongoing  Note:   Adequate pain control achieved this shift. See MAR.

## 2019-05-10 NOTE — CONSULTS
[facility]            Date:   5/10/2019  Patient name:  Pepe Robin  Date of admission:  5/8/2019 11:09 AM  MRN:   [id number]  Account:  [de-identified]  Dateof Birth:  1955  PCP:    Christie Gotti MD  Room:   2055/2055-01  Code Status:    Full Code    PhysicianRequesting Consult: Tyler Esparza MD          History ofPresent Illness:     No chief complaint on file. and reason for consult; Medical comorbidity and medication management ;  S/pkyphoplasty     Patient: Pepe Robin  YOB: 1955  MRN: [id number]  Date of Procedure: 5/8/2019     Pre-Op Diagnosis: COMPRESSION FRACTURE  L4     Post-Op Diagnosis: Same       Procedure(s):  KYPHOPLASTY L4     Anesthesia: General     Surgeon(s):  Tyler Esparza MD            diabetic poor control   Recent Labs     05/09/19  1129 05/09/19  1608 05/09/19  2107 05/10/19  0658 05/10/19  1108   POCGLU 416* 313* 196* 189* 221*       Active Problems:    Diabetic neuropathy (HCC)    GERD (gastroesophageal reflux disease)    Obesity (BMI 30-39.9)    Spinal stenosis, lumbar region, with neurogenic claudication    DM (diabetes mellitus), type 2, uncontrolled with complications (HCC)    Closed compression fracture of L4 lumbar vertebra (HCC)    Age-related osteoporosis with current pathological fracture with routine healing    Hyperglycemia  Resolved Problems:    * No resolved hospital problems. *                HPI  ;      ON admission HX; Pepe Robin is 66 y.o.,  female, undergoing for  Closed compression fracture of thoracic vertebra, and osteoporosis, osteoporoic fractures. Pt will be having KYPHOPLASTY L3.  Pt has DDD, C/O of pain in the lumbar area without any radiation  to the lower limbs   The symptoms started 1 week ago after a fall on her right side. Pt rates her pain at 8/10.   Patient states that she has been on tylenol to help treat the pain. Patient has limitation in the range of motion to the back. No recent falls or trauma. No redness, swelling or rashes. No recent falls or trauma.                   Past Medical History:   Diagnosis Date    CEFERINO (acute kidney injury) ([facility] 11/19/2014    Asthma     Carotid artery plaque 4/2/2013    Clostridium difficile diarrhea 11/19/2014    Dehydration 11/19/2014    Depression     Fibromyalgia     Hiatal hernia     HTN (hypertension) 4/2/2013    Hyperlipidemia     Hypokalemia, gastrointestinal losses 11/21/2014    Kidney stone     b/l    Obesity (BMI 30-39. 9) 5/28/2013    Osteoarthritis     Osteoporosis     Renal cancer ([facility] 8/7/2013    right    Short of breath on exertion     Type II or unspecified type diabetes mellitus without mention of complication, not stated as uncontrolled     Unspecified sleep apnea     does not use machine    Urge urinary incontinence     mid urethral sling in 2002    Wears glasses     Zygomatic fracture, right side, initial encounter for closed fracture ([facility] 5/26/2018       Family History   Problem Relation Age of Onset    Diabetes Mother     High Blood Pressure Mother     Pacemaker Mother     High Blood Pressure Father     Prostate Cancer Father     Breast Cancer Sister     Asthma Brother     Prostate Cancer Maternal Grandfather     High Blood Pressure Paternal Grandmother        Social History:     Tobacco:    reports that she has never smoked. She has never used smokeless tobacco.  Alcohol:     reports that she does not drink alcohol. Drug Use:  reports that she does not use drugs.     Review of Systems:     POSITIVE AND NEGATIVES AS DESCRIBEDIN HISTORY OF PRESENT ILLNESS ;  IN ADDITION ;   Review of Systems          All other systems negative                Physical Exam:     Physical Exam   Vitals:  /64   Pulse 81   Temp 98.1 °F (36.7 °C) (Oral)   Resp 16   Ht 5' 2\" (1.575 m)   Wt 141 lb 1.5 oz (64 kg)   [facility] 01/01/2002   SpO2 99%   BMI 25.81 kg/m²                 Body mass index is 25.81 kg/m². General Appearance:   Alert , CO-OPERATIVE ,                 Skin:                             No rash or erythema  HEENT ;                           Head                        Symmetrical , within normal limits                                    No tenderness over frontal  sinuses. No tenderness over maxillary sinuses. Eye                           Conjunctiva normal ,, sclera non-icteric . Ear                           External ear ok . Hearing okay , no discharge from ears . [] Otoscopy findings wnl     Nose                         w.n.l. Throat                       Oropharynx  findings wnl               Neck:                            No mass , no thyroid enlargement                                           Pulmonary/Chest:        Clear to auscultation bilaterally . No wheezes, rales or rhonchi . No abnormality on percussion                                                        Cardiovascular:            Normal rate, regular rhythm,                                          No murmur or  Gallop . Abdomen:                       Soft, non-tender                                           Normal bowels sounds,                                             Extremities:                    No  Edema .                                            Neurological ;                 No focal motor deficit ,                 No focal sensory deficit ,    Musculo-skeletal ;                  No  gait abnormality                  No significant joint abnormality X 4 extremities                   Psych:   Mood normal ,                 Memory intact , Data:     Vitals:  /64   Pulse 81   Temp 98.1 °F (36.7 °C) (Oral)   Resp 16   Ht 5' 2\" (1.575 m)   Wt 141 lb 1.5 oz (64 kg)   LMP 2002   SpO2 99%   BMI 25.81 kg/m²   Temp (24hrs), Av.9 °F (36.6 °C), Min:97.7 °F (36.5 °C), Max:98.1 °F (36.7 °C)       Labs:    URINE ANALYSIS: No results found for: LABURIN     CBC:  Lab Results   Component Value Date    WBC 10.3 05/10/2019    HGB 12.8 05/10/2019     05/10/2019        BMP:    Lab Results   Component Value Date     2018    K 5.1 2018    CL 96 2018    CO2 21 2018    BUN 17 2018    CREATININE 1.10 2018    GLUCOSE 457 2018    GLUCOSE 366 2012      LIVER PROFILE:  Lab Results   Component Value Date    ALT 7 2018    AST 11 2018    PROT 7.6 10/05/2018    BILITOT 0.51 2018    BILIDIR 0.15 2014    LABALBU 3.6 2018                 Radiology:       Vitals:    19 1914 05/10/19 0600 05/10/19 0625 05/10/19 1228   BP: 134/65  (!) 105/54 108/64   Pulse: 78  73 81   Resp: 16  16 16   Temp: 97.9 °F (36.6 °C)  97.7 °F (36.5 °C) 98.1 °F (36.7 °C)   TempSrc: Oral  Oral Oral   SpO2: 92%  98% 99%   Weight:  141 lb 1.5 oz (64 kg)     Height:           Recent Labs     19  1129 19  1608 19  2107 05/10/19  0658 05/10/19  1108   POCGLU 416* 313* 196* 189* 221*              Assessment :      Primary Problem  <principal problem not specified>    Active Hospital Problems    Diagnosis Date Noted    [facility] [S32.040A] 2019    Age-related osteoporosis with current pathological fracture with routine healing [M80.00XD] 2019    Hyperglycemia [R73.9] 2019    DM (diabetes mellitus), type 2, uncontrolled with complications ([facility] [M42.5, E11.65] 2013    Spinal stenosis, lumbar region, with neurogenic claudication [M48.062] 2013    Obesity (BMI 30-39. 9) [E66.9] 05/28/2013    GERD (gastroesophageal reflux disease) [K21.9] 02/18/2013    Diabetic neuropathy ([facility] [E11.40] 10/30/2012                 Plan:     1. Will treat with insulin sliding scale high dose     Medications: Allergies: Allergies   Allergen Reactions    Lac Bovis     Nedocromil     Tramadol     Penicillins Hives and Rash    Tape Kalyn Inch Tape] Rash     OK to use paper tape per patient       Current Meds:   Scheduled Meds:    fluticasone  1 puff Inhalation BID    insulin lispro  0-9 Units Subcutaneous Nightly    insulin lispro  0-18 Units Subcutaneous TID WC    aspirin EC  81 mg Oral Daily    vitamin D  1,000 Units Oral Daily    gemfibrozil  600 mg Oral BID AC    insulin glargine  25 Units Subcutaneous Nightly    montelukast  10 mg Oral Nightly    sertraline  100 mg Oral Daily    vitamin D  50,000 Units Oral Weekly    sodium chloride flush  10 mL Intravenous 2 times per day    docusate sodium  100 mg Oral BID     Continuous Infusions:    dextrose       PRN Meds: albuterol sulfate HFA, calcium carbonate, clonazePAM, zolpidem, sodium chloride flush, morphine **OR** morphine, ondansetron, HYDROcodone 5 mg - acetaminophen **OR** HYDROcodone 5 mg - acetaminophen, glucose, dextrose, glucagon (rDNA), dextrose    5/10/19    · Discharge plans inprogress . Blood sugars improved . On insulin   Recent Labs     05/09/19  1129 05/09/19  1608 05/09/19  2107 05/10/19  0658 05/10/19  1108   POCGLU 416* 313* 196* 189* 221* 1.     2.            Thanks for consulting us . Will monitor vitals and clinical course , and  Optimize therapy  as needed . Hillary De Paz MD  5/10/2019  3:46 PM    Copy sent to Dr. Shani Palafox MD    Please note that this chart was generated using voice recognition Dragon dictation software. Although every effort was made to ensure theaccuracy of this automated transcription, some errors in transcription may have occurred.

## 2019-05-10 NOTE — PROGRESS NOTES
Octavio was able to obtain pre-cert. Pt does not have transportation coverage; SW called pt's sister in law per pt request. Ap Martina [facility] [phone] will take pt to facility. This worker completed PASR in [address]. Orders sent to facility.

## 2019-05-10 NOTE — DISCHARGE INSTR - COC
L2 performed by Trudy Blanchard MD at [address] Right 06/20/2013    right due to [place]       Immunization History:   Immunization History   Administered Date(s) Administered    Influenza Virus Vaccine 09/27/2012, 10/22/2013, 10/17/2014, 10/26/2015    Influenza, Felicia Hernandez, 3 Years and older, IM (Fluzone 3 yrs and older or Afluria 5 yrs and older) 09/18/2017    Influenza, Felicia Hernandez, 3 yrs and older, IM, PF (Fluzone 3 yrs and older or Afluria 5 yrs and older) 12/11/2018    Pneumococcal 13-valent Conjugate ([id number]) 04/13/2015    Pneumococcal Polysaccharide ([id number]) 04/28/2016    Tdap (Boostrix, Adacel) 02/09/2017       Active Problems:  Patient Active Problem List   Diagnosis Code    Carpal tunnel syndrome G56.00    DDD (degenerative disc disease), lumbar M51.36    Prolapsed intervertebral disk [id number]    Headache R51    Hip arthritis M16.10    Diabetic neuropathy ([facility]) E11.40    Dermatomyositis ([facility] [address] M33.90    GERD (gastroesophageal reflux disease) K21.9    Moderate persistent asthma J45.40    Carotid artery plaque I65.29    HLD (hyperlipidemia) E78.5    Benign hypertension with CKD (chronic kidney disease), stage II I12.9, N18.2    Obesity (BMI 30-39. 9) E66.9    History of renal cell cancer Z85.528    S/p nephrectomy Z90.5    Episode of dizziness R42    Orthostasis I95.1    Diabetic autonomic neuropathy associated with type 2 diabetes mellitus (HCC) E11.43    Risk for falls Z91.81    Cervical spondylosis with myelopathy M47.12    Displacement of cervical intervertebral disc without myelopathy M50.20    Cervicalgia M54.2    Spinal stenosis, lumbar region, with neurogenic claudication M48.062    Acquired spondylolisthesis M43.10    Orthostatic hypotension I95.1    DM (diabetes mellitus), type 2, uncontrolled with complications ([facility]) K88.3, E11.65    Clostridium difficile diarrhea A04.72    CEFERINO (acute kidney injury) ([facility]) N17.9    Colitis K52.9    Hypokalemia, gastrointestinal losses E87.6    Dizziness R42    Hypotension I95.9    Depression F32.9    Osteoporosis M81.0    Type 2 diabetes mellitus with diabetic polyneuropathy, with long-term current use of insulin ([facility]) E11.42, Z79.4    Lumbar disc herniation M51.26    Need for Tdap vaccination Z23    EJ on CPAP G47.33, Z99.89    Hypokalemia E87.6    Falls W19. XXXA    Right radial fracture S52. 91XA    Zygomatic fracture, right side, initial encounter for closed fracture ([address] S02.40EA    Thrombocytopenia ([facility]) D69.6    Hypomagnesemia E83.42    Facial laceration S01.81XA    Ambulatory dysfunction R26.2    Hypovitaminosis D E55.9    Closed fracture of distal end of right radius S52.501A    CKD (chronic kidney disease) stage 2, GFR 60-89 ml/min N18.2    Closed supracondylar fracture of left femur ([facility]) S72.452A    Closed compression fracture of L2 lumbar vertebra ([facility]) S32.020A    Closed compression fracture of thoracic vertebra ([facility]) S22.000A    Closed compression fracture of L4 lumbar vertebra ([facility]) S32.040A    Age-related osteoporosis with current pathological fracture with routine healing M80.00XD    Hyperglycemia R73.9       Isolation/Infection:   Isolation          Contact        Patient Infection Status     Infection Encounter Level?  Onset Date Added Added By Resolved Resolved By Review Date    VRE No  12/02/14 Tejas Espinoza RN       Urine 11/2014            Nurse Assessment:  Last Vital Signs: BP (!) 105/54   Pulse 73   Temp 97.7 °F (36.5 °C) (Oral)   Resp 16   Ht 5' 2\" (1.575 m)   Wt 141 lb 1.5 oz (64 kg)   LMP 01/01/2002   SpO2 98%   BMI 25.81 kg/m²     Last documented pain score (0-10 scale): Pain Level: 6  Last Weight:   Wt Readings from Last 1 Encounters:   05/10/19 141 lb 1.5 oz (64 kg)     Mental Status:  oriented, alert, coherent, logical, thought processes intact and able to concentrate and follow conversation    IV Access:  - None    Nursing Mobility/ADLs:  Walking Assisted  Transfer  Independent  Bathing  Assisted  Dressing  Assisted  Toileting  Assisted  Feeding  Independent  Med [address]  Med Delivery   whole    Wound Care Documentation and Therapy:  Incision 09/12/18 Back (Active)   Number of days: 239        Elimination:  Continence:   · Bowel: Yes  · Bladder: Yes  Urinary Catheter: None   Colostomy/Ileostomy/Ileal Conduit: No       Date of Last BM: 5/9/2019    Intake/Output Summary (Last 24 hours) at 5/10/2019 0846  Last data filed at 5/10/2019 0603  Gross per 24 hour   Intake 560 ml   Output 200 ml   Net 360 ml     I/O last 3 completed shifts: In: 2398 [P.O.:1040]  Out: 200 [Emesis/NG output:200]    Safety Concerns: At Risk for Falls    Impairments/Disabilities:      Vision    Nutrition Therapy:  Current Nutrition Therapy:   - Oral Diet:  Carb Control 4 carbs/meal (1800kcals/day)    Routes of Feeding: Oral  Liquids: No Restrictions  Daily Fluid Restriction: no  Last Modified Barium Swallow with Video (Video Swallowing Test): not done    Treatments at the Time of Hospital Discharge:   Respiratory Treatments: N/A  Oxygen Therapy:  is not on home oxygen therapy.   Ventilator:    - No ventilator support    Rehab Therapies: Physical Therapy and Occupational Therapy  Weight Bearing Status/Restrictions: No weight bearing restirctions  Other Medical Equipment (for information only, NOT a DME order):  walker  Other Treatments: NA    Patient's personal belongings (please select all that are sent with patient):  None    RN SIGNATURE:  Electronically signed by Isauro Wheatley RN on 5/10/19 at 8:50 AM    CASE MANAGEMENT/SOCIAL WORK SECTION    Inpatient Status Date: ***    Readmission Risk Assessment Score:  Readmission Risk              Risk of Unplanned Readmission:        11           Discharging to Facility/ Agency   · Name: [facility]  · Phone: [phone]  · Fax: [phone]    Dialysis Facility (if applicable)   · Name:  · Address:  · Dialysis Schedule:  · Phone:  · Fax:    / signature: {Esignature:[id number]}    PHYSICIAN SECTION    Prognosis: Fair    Condition at Discharge: Stable    Rehab Potential (if transferring to Rehab): Fair    Recommended Labs or Other Treatments After Discharge: na    Physician Certification: I certify the above information and transfer of Cecilia Abreu  is necessary for the continuing treatment of the diagnosis listed and that she requires [place] for less 30 days.      Update Admission H&P: No change in H&P    PHYSICIAN SIGNATURE:  Electronically signed by Chelsie Michel MD on 5/10/19 at 4:42 PM

## 2019-05-10 NOTE — PROGRESS NOTES
Surgical Progress Note    POD: 2    Patient doing fairly well  Vitals:    05/10/19 1228   BP: 108/64   Pulse: 81   Resp: 16   Temp: 98.1 °F (36.7 °C)   SpO2: 99%      Temp (24hrs), Av.9 °F (36.6 °C), Min:97.7 °F (36.5 °C), Max:98.1 °F (36.7 °C)       Pain Control Fair  No unusual nausea    Exam:  Improved pain post kyphoplasty    History no compliance with medical mgmt    Poorly able to care for self      Lungs:  No respiratory distress    Labs reviewed:  Hemoglobin   Date/Time Value Ref Range Status   05/10/2019 06:29 AM 12.8 12.0 - 16.0 g/dL Final     Hematocrit   Date/Time Value Ref Range Status   05/10/2019 06:29 AM 38.6 36 - 46 % Final     WBC   Date/Time Value Ref Range Status   05/10/2019 06:29 AM 10.3 3.5 - 11.0 k/uL Final     Sodium   Date/Time Value Ref Range Status   2018 04:38  (L) 135 - 144 mmol/L Final     Potassium   Date/Time Value Ref Range Status   2018 04:38 PM 5.1 3.7 - 5.3 mmol/L Final     Chloride   Date/Time Value Ref Range Status   2018 04:38 PM 96 (L) 98 - 107 mmol/L Final     CO2   Date/Time Value Ref Range Status   2018 04:38 PM 21 20 - 31 mmol/L Final     INR   Date/Time Value Ref Range Status   2018 06:38 AM 0.9  Final     Comment:           Therapeutic Range: Moderate Anticoagulant Intensity:     INR = 2.0-3.0   High Anticoagulant Intensity:     INR = 2.5-3.5        [facility] [address] [facility], [address] [place] [address] [phone]         I/O last 3 completed shifts:   In: 200 [P.O.:200]  Out: 600 [Urine:400; Emesis/NG output:200]    Assessment:  S/p kyphoplasty    Plan:  See my orders    Katalina Samaniego MD  5/10/2019 4:41 PM

## 2019-05-10 NOTE — PROGRESS NOTES
General  Chart Reviewed: (P) Yes  Additional Pertinent Hx: (P) Pt presents to [facility] s/p fall 1 week ago that resulted in LBP and limited ROM. Pt found to have L4 compression fx and underwent L4 kyphoplasty 5/8/19  Response To Previous Treatment: (P) Not applicable  Family / Caregiver Present: (P) No  Subjective  Subjective: (P) Pt is sitting up in bed upon entry, agreeable to therapy. General Comment  Comments: (P) Pt very cooperative. Pain Screening  Patient Currently in Pain: (P) Yes  Pain Assessment  Pain Assessment: (P) 0-10  Pain Level: (P) 8  Patient's Stated Pain Goal: (P) (decreased pain 7/10)  Pain Type: (P) Surgical pain  Pain Location: (P) Back  Pain Orientation: (P) Lower  Clinical Progression: (P) Not changed  Vital Signs  Patient Currently in Pain: (P) Yes       Orientation     Cognition      Objective      Transfers  Sit to Stand: (P) Stand by assistance  Stand to sit: (P) Stand by assistance  Ambulation  Ambulation?: (P) Yes  More Ambulation?: (P) No  Ambulation 1  Surface: (P) level tile  Device: (P) Rolling Walker  Assistance: (P) Contact guard assistance  Quality of Gait: (P) slow pace, narrow AMADEO, min unsteadiness although no LOB  Distance: (P) 20' x 2, 25' x 1  Stairs/Curb  Stairs?: (P) No     Balance  Posture: (P) Fair  Sitting - Static: (P) Good;-  Sitting - Dynamic: (P) Fair;+  Standing - Static: (P) Good;-  Standing - Dynamic: (P) Fair;+  Comments: (P) fair standing with RW, fair minus without support- needs 1 HHA while BP taken  Other exercises  Other exercises?: (P) Yes  Other exercises 1: (P) Bed Mobility, reviewed log roll. Other exercises 2: (P) Ther ex both L/E supine and seated. x 10  Other exercises 3: (P) Sit> Stand x 3(v.c for hand placement.)         Other Activities: (P) Dangling protocol(7 mins)              Assessment   Body structures, Functions, Activity limitations: (P) Decreased functional mobility ; Decreased strength;Decreased endurance;Decreased safe

## 2019-05-13 LAB — GLUCOSE BLD-MCNC: 147 MG/DL (ref 65–105)

## 2019-05-14 ASSESSMENT — ENCOUNTER SYMPTOMS: BACK PAIN: 1

## 2019-05-14 NOTE — PROGRESS NOTES
Subjective:      Patient ID: Haylie Smith is a 66 y.o. female. Back Pain   This is a new problem. The current episode started 1 to 4 weeks ago. The problem occurs constantly. The problem is unchanged. The pain is present in the lumbar spine. The quality of the pain is described as aching. The pain does not radiate. The pain is severe. The pain is the same all the time. The symptoms are aggravated by standing, position, bending and twisting. Risk factors include history of osteoporosis and sedentary lifestyle. The treatment provided no relief. Patient presents with new acute low back pain    Patient reports ongoing pain for approximately 4 weeks    Review of Systems   Musculoskeletal: Positive for back pain. All other systems reviewed and are negative. Objective:   Physical Exam   Constitutional: She is oriented to person, place, and time. She appears well-developed and well-nourished. HENT:   Head: Normocephalic and atraumatic. Eyes: Conjunctivae and EOM are normal.   Neck: Normal range of motion. Pulmonary/Chest: Effort normal. No respiratory distress. Neurological: She is alert and oriented to person, place, and time. She has normal strength. No sensory deficit. Normal gait   Skin: Skin is warm and dry. Psychiatric: Her behavior is normal. Thought content normal.   Nursing note and vitals reviewed. This compression fracture is new since October    Radiographic studies reveal a new inferior endplate compression deformity L4 utilizing prior fusion level numbered L5-S1 with a prior kyphoplasty L3 - patient has a transitional lumbar vertebrae therefore numbering could be off a level i.e. we could call the prior kyphoplasty at L2 prior fusion at L45 and new compression fracture L3  Assessment:      Encounter Diagnoses   Name Primary?     Age-related osteoporosis with current pathological fracture with routine healing, subsequent encounter Yes    Closed compression fracture of

## 2019-05-20 DIAGNOSIS — S32.040D CLOSED COMPRESSION FRACTURE OF L4 LUMBAR VERTEBRA WITH ROUTINE HEALING, SUBSEQUENT ENCOUNTER: Primary | ICD-10-CM

## 2019-05-21 ENCOUNTER — OFFICE VISIT (OUTPATIENT)
Dept: ORTHOPEDIC SURGERY | Age: 64
End: 2019-05-21

## 2019-05-21 DIAGNOSIS — S32.030D CLOSED COMPRESSION FRACTURE OF L3 LUMBAR VERTEBRA WITH ROUTINE HEALING, SUBSEQUENT ENCOUNTER: ICD-10-CM

## 2019-05-21 DIAGNOSIS — S32.020D CLOSED COMPRESSION FRACTURE OF L2 LUMBAR VERTEBRA WITH ROUTINE HEALING, SUBSEQUENT ENCOUNTER: ICD-10-CM

## 2019-05-21 DIAGNOSIS — S32.040D CLOSED COMPRESSION FRACTURE OF L4 LUMBAR VERTEBRA WITH ROUTINE HEALING, SUBSEQUENT ENCOUNTER: Primary | ICD-10-CM

## 2019-05-21 DIAGNOSIS — S22.000D CLOSED COMPRESSION FRACTURE OF THORACIC VERTEBRA WITH ROUTINE HEALING, SUBSEQUENT ENCOUNTER: ICD-10-CM

## 2019-05-21 DIAGNOSIS — M80.00XD AGE-RELATED OSTEOPOROSIS WITH CURRENT PATHOLOGICAL FRACTURE WITH ROUTINE HEALING, SUBSEQUENT ENCOUNTER: ICD-10-CM

## 2019-05-21 PROCEDURE — 99024 POSTOP FOLLOW-UP VISIT: CPT | Performed by: ORTHOPAEDIC SURGERY

## 2019-05-24 ENCOUNTER — HOSPITAL ENCOUNTER (OUTPATIENT)
Age: 64
Setting detail: SPECIMEN
Discharge: HOME | End: 2019-05-24
Payer: MEDICAID

## 2019-05-24 LAB
-: NORMAL
ALBUMIN SERPL-MCNC: 3.6 G/DL (ref 3.5–5.2)
ALBUMIN/GLOBULIN RATIO: 1 (ref 1–2.5)
ALP BLD-CCNC: 86 U/L (ref 35–104)
ALT SERPL-CCNC: 7 U/L (ref 5–33)
ANION GAP SERPL CALCULATED.3IONS-SCNC: 12 MMOL/L (ref 9–17)
AST SERPL-CCNC: 11 U/L
BILIRUB SERPL-MCNC: 0.22 MG/DL (ref 0.3–1.2)
BUN BLDV-MCNC: 33 MG/DL (ref 8–23)
BUN/CREAT BLD: ABNORMAL (ref 9–20)
CALCIUM SERPL-MCNC: 9.2 MG/DL (ref 8.6–10.4)
CHLORIDE BLD-SCNC: 107 MMOL/L (ref 98–107)
CHOLESTEROL, FASTING: 132 MG/DL
CHOLESTEROL/HDL RATIO: 3.6
CO2: 20 MMOL/L (ref 20–31)
CREAT SERPL-MCNC: 1.12 MG/DL (ref 0.5–0.9)
GFR AFRICAN AMERICAN: 60 ML/MIN
GFR NON-AFRICAN AMERICAN: 49 ML/MIN
GFR SERPL CREATININE-BSD FRML MDRD: ABNORMAL ML/MIN/{1.73_M2}
GFR SERPL CREATININE-BSD FRML MDRD: ABNORMAL ML/MIN/{1.73_M2}
GLUCOSE FASTING: 110 MG/DL (ref 70–99)
HDLC SERPL-MCNC: 37 MG/DL
LDL CHOLESTEROL: 70 MG/DL (ref 0–130)
POTASSIUM SERPL-SCNC: 4.5 MMOL/L (ref 3.7–5.3)
REASON FOR REJECTION: NORMAL
SODIUM BLD-SCNC: 139 MMOL/L (ref 135–144)
TOTAL PROTEIN: 7.3 G/DL (ref 6.4–8.3)
TRIGLYCERIDE, FASTING: 123 MG/DL
VLDLC SERPL CALC-MCNC: ABNORMAL MG/DL (ref 1–30)
ZZ NTE CLEAN UP: ORDERED TEST: NORMAL
ZZ NTE WITH NAME CLEAN UP: SPECIMEN SOURCE: NORMAL

## 2019-05-24 PROCEDURE — 36415 COLL VENOUS BLD VENIPUNCTURE: CPT

## 2019-05-24 PROCEDURE — 80053 COMPREHEN METABOLIC PANEL: CPT

## 2019-05-24 PROCEDURE — 80061 LIPID PANEL: CPT

## 2019-05-24 PROCEDURE — P9603 ONE-WAY ALLOW PRORATED MILES: HCPCS

## 2019-05-25 ENCOUNTER — HOSPITAL ENCOUNTER (OUTPATIENT)
Age: 64
Setting detail: SPECIMEN
Discharge: HOME | End: 2019-05-25
Payer: MEDICAID

## 2019-05-25 LAB
-: ABNORMAL
AMORPHOUS: ABNORMAL
BACTERIA: ABNORMAL
BILIRUBIN URINE: NEGATIVE
CASTS UA: ABNORMAL /LPF (ref 0–8)
COLOR: YELLOW
COMMENT UA: ABNORMAL
CREATININE URINE: 72.9 MG/DL (ref 28–217)
CRYSTALS, UA: ABNORMAL /HPF
EPITHELIAL CELLS UA: ABNORMAL /HPF (ref 0–5)
GLUCOSE URINE: NEGATIVE
KETONES, URINE: NEGATIVE
LEUKOCYTE ESTERASE, URINE: ABNORMAL
MICROALBUMIN/CREAT 24H UR: 115 MG/L
MICROALBUMIN/CREAT UR-RTO: 158 MCG/MG CREAT
MUCUS: ABNORMAL
NITRITE, URINE: NEGATIVE
OTHER OBSERVATIONS UA: ABNORMAL
PH UA: 5.5 (ref 5–8)
PROTEIN UA: ABNORMAL
RBC UA: ABNORMAL /HPF (ref 0–4)
RENAL EPITHELIAL, UA: ABNORMAL /HPF
SPECIFIC GRAVITY UA: 1.02 (ref 1–1.03)
TRICHOMONAS: ABNORMAL
TURBIDITY: ABNORMAL
URINE HGB: ABNORMAL
UROBILINOGEN, URINE: NORMAL
WBC UA: ABNORMAL /HPF (ref 0–5)
YEAST: ABNORMAL

## 2019-05-25 PROCEDURE — 81001 URINALYSIS AUTO W/SCOPE: CPT

## 2019-05-25 PROCEDURE — 82043 UR ALBUMIN QUANTITATIVE: CPT

## 2019-05-25 PROCEDURE — 82570 ASSAY OF URINE CREATININE: CPT

## 2019-07-08 ENCOUNTER — OFFICE VISIT (OUTPATIENT)
Dept: ORTHOPEDIC SURGERY | Age: 64
End: 2019-07-08
Payer: MEDICAID

## 2019-07-08 DIAGNOSIS — S32.040D CLOSED COMPRESSION FRACTURE OF L4 LUMBAR VERTEBRA WITH ROUTINE HEALING, SUBSEQUENT ENCOUNTER: Primary | ICD-10-CM

## 2019-07-08 DIAGNOSIS — S22.000D CLOSED COMPRESSION FRACTURE OF THORACIC VERTEBRA WITH ROUTINE HEALING, SUBSEQUENT ENCOUNTER: ICD-10-CM

## 2019-07-08 DIAGNOSIS — S32.030D CLOSED COMPRESSION FRACTURE OF L3 LUMBAR VERTEBRA WITH ROUTINE HEALING, SUBSEQUENT ENCOUNTER: ICD-10-CM

## 2019-07-08 DIAGNOSIS — M80.00XD AGE-RELATED OSTEOPOROSIS WITH CURRENT PATHOLOGICAL FRACTURE WITH ROUTINE HEALING, SUBSEQUENT ENCOUNTER: ICD-10-CM

## 2019-07-08 DIAGNOSIS — S32.020D CLOSED COMPRESSION FRACTURE OF L2 LUMBAR VERTEBRA WITH ROUTINE HEALING, SUBSEQUENT ENCOUNTER: ICD-10-CM

## 2019-07-08 DIAGNOSIS — M51.36 DDD (DEGENERATIVE DISC DISEASE), LUMBAR: ICD-10-CM

## 2019-07-08 PROCEDURE — 1036F TOBACCO NON-USER: CPT | Performed by: ORTHOPAEDIC SURGERY

## 2019-07-08 PROCEDURE — G8417 CALC BMI ABV UP PARAM F/U: HCPCS | Performed by: ORTHOPAEDIC SURGERY

## 2019-07-08 PROCEDURE — 3017F COLORECTAL CA SCREEN DOC REV: CPT | Performed by: ORTHOPAEDIC SURGERY

## 2019-07-08 PROCEDURE — G8427 DOCREV CUR MEDS BY ELIG CLIN: HCPCS | Performed by: ORTHOPAEDIC SURGERY

## 2019-07-08 PROCEDURE — 99213 OFFICE O/P EST LOW 20 MIN: CPT | Performed by: ORTHOPAEDIC SURGERY

## 2019-07-08 PROCEDURE — G8598 ASA/ANTIPLAT THER USED: HCPCS | Performed by: ORTHOPAEDIC SURGERY

## 2019-07-10 ENCOUNTER — OFFICE VISIT (OUTPATIENT)
Dept: FAMILY MEDICINE CLINIC | Age: 64
End: 2019-07-10
Payer: MEDICAID

## 2019-07-10 VITALS
DIASTOLIC BLOOD PRESSURE: 55 MMHG | WEIGHT: 142.6 LBS | SYSTOLIC BLOOD PRESSURE: 102 MMHG | TEMPERATURE: 98.6 F | HEART RATE: 68 BPM | BODY MASS INDEX: 26.08 KG/M2

## 2019-07-10 DIAGNOSIS — E55.9 VITAMIN D DEFICIENCY DISEASE: Primary | ICD-10-CM

## 2019-07-10 DIAGNOSIS — Z76.0 MEDICATION REFILL: ICD-10-CM

## 2019-07-10 PROCEDURE — 99213 OFFICE O/P EST LOW 20 MIN: CPT | Performed by: STUDENT IN AN ORGANIZED HEALTH CARE EDUCATION/TRAINING PROGRAM

## 2019-07-10 PROCEDURE — 99211 OFF/OP EST MAY X REQ PHY/QHP: CPT | Performed by: STUDENT IN AN ORGANIZED HEALTH CARE EDUCATION/TRAINING PROGRAM

## 2019-07-10 RX ORDER — ERGOCALCIFEROL 1.25 MG/1
50000 CAPSULE ORAL WEEKLY
Qty: 8 CAPSULE | Refills: 0 | Status: ON HOLD | OUTPATIENT
Start: 2019-07-10 | End: 2021-05-14 | Stop reason: HOSPADM

## 2019-07-10 RX ORDER — SERTRALINE HYDROCHLORIDE 100 MG/1
TABLET, FILM COATED ORAL
Qty: 30 TABLET | Refills: 0 | Status: SHIPPED | OUTPATIENT
Start: 2019-07-10

## 2019-07-10 RX ORDER — RALOXIFENE HYDROCHLORIDE 60 MG/1
TABLET, FILM COATED ORAL
Qty: 30 TABLET | Refills: 0 | Status: SHIPPED | OUTPATIENT
Start: 2019-07-10

## 2019-07-10 RX ORDER — ZOLPIDEM TARTRATE 10 MG/1
10 TABLET ORAL NIGHTLY PRN
Status: CANCELLED | OUTPATIENT
Start: 2019-07-10

## 2019-07-10 ASSESSMENT — ENCOUNTER SYMPTOMS
EYE DISCHARGE: 0
WHEEZING: 0
BACK PAIN: 1
COUGH: 0
BLOOD IN STOOL: 0
SORE THROAT: 0
SHORTNESS OF BREATH: 0
EYE PAIN: 0
PHOTOPHOBIA: 0
ABDOMINAL PAIN: 0
CHEST TIGHTNESS: 0
CONSTIPATION: 0
NAUSEA: 0
DIARRHEA: 0
EYE REDNESS: 0

## 2019-07-10 NOTE — PROGRESS NOTES
Attending Physician Statement  I have discussed the care of Jae Miller, including pertinent history and exam findings,  with the resident. I have reviewed the key elements of all parts of the encounter with the resident. I agree with the assessment, plan and orders as documented by the resident. (Maryanne Hernandez) Kayla Hamm M.D  Vitals:    07/10/19 1631   BP: (!) 102/55   Pulse: 68   Temp: 98.6 °F (37 °C)     1. Vitamin D deficiency disease    2.  Medication refill

## 2019-07-10 NOTE — PROGRESS NOTES
Neurological: Negative for light-headedness and headaches. Psychiatric/Behavioral: Negative for agitation and confusion. Objective:    BP (!) 102/55 (Site: Left Upper Arm, Position: Sitting, Cuff Size: Medium Adult)   Pulse 68   Temp 98.6 °F (37 °C) (Temporal)   Wt 142 lb 9.6 oz (64.7 kg)   LMP 01/01/2002   BMI 26.08 kg/m²    BP Readings from Last 3 Encounters:   07/10/19 (!) 102/55   05/10/19 108/64   05/08/19 132/61       Physical Exam   Eyes: Conjunctivae are normal. No scleral icterus. Neck: Normal range of motion. Neck supple. No thyromegaly present. Cardiovascular: Normal rate, regular rhythm and normal heart sounds. Pulmonary/Chest: Effort normal and breath sounds normal. She has no wheezes. She has no rales. She exhibits no tenderness. Musculoskeletal: She exhibits no edema or tenderness. Skin: No erythema. No pallor. Lab Results   Component Value Date    WBC 10.3 05/10/2019    HGB 12.8 05/10/2019    HCT 38.6 05/10/2019     05/10/2019    CHOL 167 10/30/2017    TRIG 261 (H) 10/30/2017    HDL 37 (L) 05/24/2019    LDLDIRECT 155 (H) 10/13/2015    ALT 7 05/24/2019    AST 11 05/24/2019     05/24/2019    K 4.5 05/24/2019     05/24/2019    CREATININE 1.12 (H) 05/24/2019    BUN 33 (H) 05/24/2019    CO2 20 05/24/2019    TSH 1.01 05/29/2018    INR 0.9 05/27/2018    GLUF 110 (H) 05/24/2019    LABA1C 9.6 07/06/2018    LABMICR 158 (H) 05/25/2019     Lab Results   Component Value Date    CALCIUM 9.2 05/24/2019    PHOS 3.4 07/30/2018     Lab Results   Component Value Date    LDLCHOLESTEROL 70 05/24/2019    LDLDIRECT 155 (H) 10/13/2015       Assessment and Plan:    1. Medication refill    - sertraline (ZOLOFT) 100 MG tablet; TAKE ONE TABLET BY MOUTH DAILY  Dispense: 30 tablet; Refill: 0  - raloxifene (EVISTA) 60 MG tablet; TAKE ONE TABLET BY MOUTH DAILY  Dispense: 30 tablet; Refill: 0    2. Vitamin D deficiency disease    - Vitamin D 25 Hydroxy;  Future          Requested Prescriptions     Signed Prescriptions Disp Refills    vitamin D (ERGOCALCIFEROL) 24938 units CAPS capsule 8 capsule 0     Sig: Take 1 capsule by mouth once a week for 8 doses    sertraline (ZOLOFT) 100 MG tablet 30 tablet 0     Sig: TAKE ONE TABLET BY MOUTH DAILY    raloxifene (EVISTA) 60 MG tablet 30 tablet 0     Sig: TAKE ONE TABLET BY MOUTH DAILY       Medications Discontinued During This Encounter   Medication Reason    vitamin D (ERGOCALCIFEROL) 26328 units CAPS capsule REORDER    sertraline (ZOLOFT) 100 MG tablet REORDER    raloxifene (EVISTA) 60 MG tablet Sarah Correa received counseling on the following healthy behaviors:nutrition, exercise and medication adherence    Discussed use, benefit, and side effects of prescribed medications. Barriers to medication compliance addressed. All patient questions answered. Pt voicedunderstanding. Return in about 3 months (around 10/10/2019).

## 2019-07-19 RX ORDER — SODIUM CHLORIDE 9 MG/ML
INJECTION, SOLUTION INTRAVENOUS CONTINUOUS
Status: CANCELLED | OUTPATIENT
Start: 2019-07-19

## 2019-07-22 ENCOUNTER — HOSPITAL ENCOUNTER (OUTPATIENT)
Dept: CT IMAGING | Age: 64
Discharge: HOME | End: 2019-07-24
Payer: MEDICAID

## 2019-07-22 ENCOUNTER — HOSPITAL ENCOUNTER (OUTPATIENT)
Dept: INTERVENTIONAL RADIOLOGY/VASCULAR | Age: 64
Discharge: HOME | End: 2019-07-24
Payer: MEDICAID

## 2019-07-22 VITALS
WEIGHT: 139.7 LBS | HEART RATE: 70 BPM | OXYGEN SATURATION: 96 % | TEMPERATURE: 97 F | SYSTOLIC BLOOD PRESSURE: 136 MMHG | BODY MASS INDEX: 25.71 KG/M2 | DIASTOLIC BLOOD PRESSURE: 78 MMHG | HEIGHT: 62 IN | RESPIRATION RATE: 16 BRPM

## 2019-07-22 DIAGNOSIS — S22.000D CLOSED COMPRESSION FRACTURE OF THORACIC VERTEBRA WITH ROUTINE HEALING, SUBSEQUENT ENCOUNTER: ICD-10-CM

## 2019-07-22 DIAGNOSIS — S32.020D CLOSED COMPRESSION FRACTURE OF L2 LUMBAR VERTEBRA WITH ROUTINE HEALING, SUBSEQUENT ENCOUNTER: ICD-10-CM

## 2019-07-22 DIAGNOSIS — S32.030D CLOSED COMPRESSION FRACTURE OF L3 LUMBAR VERTEBRA WITH ROUTINE HEALING, SUBSEQUENT ENCOUNTER: ICD-10-CM

## 2019-07-22 DIAGNOSIS — M51.36 DDD (DEGENERATIVE DISC DISEASE), LUMBAR: ICD-10-CM

## 2019-07-22 DIAGNOSIS — M80.00XD AGE-RELATED OSTEOPOROSIS WITH CURRENT PATHOLOGICAL FRACTURE WITH ROUTINE HEALING, SUBSEQUENT ENCOUNTER: ICD-10-CM

## 2019-07-22 DIAGNOSIS — S32.040D CLOSED COMPRESSION FRACTURE OF L4 LUMBAR VERTEBRA WITH ROUTINE HEALING, SUBSEQUENT ENCOUNTER: ICD-10-CM

## 2019-07-22 LAB
INR BLD: 0.9
PARTIAL THROMBOPLASTIN TIME: 28.9 SEC (ref 24–36)
PLATELET # BLD: 205 K/UL (ref 150–450)
PROTHROMBIN TIME: 12.4 SEC (ref 11.8–14.6)

## 2019-07-22 PROCEDURE — 2709999900 IR MYELOGRAM LUMBOSACRAL

## 2019-07-22 PROCEDURE — 85730 THROMBOPLASTIN TIME PARTIAL: CPT

## 2019-07-22 PROCEDURE — 7100000010 HC PHASE II RECOVERY - FIRST 15 MIN

## 2019-07-22 PROCEDURE — 7100000031 HC ASPR PHASE II RECOVERY - ADDTL 15 MIN

## 2019-07-22 PROCEDURE — 85610 PROTHROMBIN TIME: CPT

## 2019-07-22 PROCEDURE — 7100000011 HC PHASE II RECOVERY - ADDTL 15 MIN

## 2019-07-22 PROCEDURE — 6360000004 HC RX CONTRAST MEDICATION: Performed by: RADIOLOGY

## 2019-07-22 PROCEDURE — 72132 CT LUMBAR SPINE W/DYE: CPT

## 2019-07-22 PROCEDURE — 7100000030 HC ASPR PHASE II RECOVERY - FIRST 15 MIN

## 2019-07-22 PROCEDURE — 36415 COLL VENOUS BLD VENIPUNCTURE: CPT

## 2019-07-22 PROCEDURE — 85049 AUTOMATED PLATELET COUNT: CPT

## 2019-07-22 PROCEDURE — 62304 MYELOGRAPHY LUMBAR INJECTION: CPT | Performed by: RADIOLOGY

## 2019-07-22 RX ORDER — SODIUM CHLORIDE 9 MG/ML
INJECTION, SOLUTION INTRAVENOUS CONTINUOUS
Status: DISCONTINUED | OUTPATIENT
Start: 2019-07-22 | End: 2019-07-25 | Stop reason: HOSPADM

## 2019-07-22 RX ORDER — ACETAMINOPHEN 325 MG/1
650 TABLET ORAL EVERY 4 HOURS PRN
Status: DISCONTINUED | OUTPATIENT
Start: 2019-07-22 | End: 2019-07-25 | Stop reason: HOSPADM

## 2019-07-22 RX ADMIN — IOPAMIDOL 14 ML: 408 INJECTION, SOLUTION INTRATHECAL at 11:42

## 2019-07-22 ASSESSMENT — PAIN DESCRIPTION - DESCRIPTORS: DESCRIPTORS: SHARP

## 2019-07-22 ASSESSMENT — PAIN - FUNCTIONAL ASSESSMENT
PAIN_FUNCTIONAL_ASSESSMENT: 0-10
PAIN_FUNCTIONAL_ASSESSMENT: 0-10

## 2019-07-22 ASSESSMENT — PAIN SCALES - GENERAL: PAINLEVEL_OUTOF10: 0

## 2019-07-22 NOTE — H&P
tablet Take 500 mg by mouth every 6 hours as needed.  clonazePAM (KLONOPIN) 0.5 MG tablet Take 0.5 mg by mouth nightly as needed.  butalbital-acetaminophen-caffeine (FIORICET, ESGIC) per tablet Take 1 tablet by mouth every 8 hours as needed.  montelukast (SINGULAIR) 10 MG tablet Take 10 mg by mouth nightly.  albuterol (PROVENTIL HFA;VENTOLIN HFA) 108 (90 BASE) MCG/ACT inhaler Inhale 2 puffs into the lungs every 6 hours as needed. No current facility-administered medications on file prior to encounter. Negative except for what is mentioned in the HPI. GENERAL PHYSICAL EXAM     Vitals: LMP 01/01/2002  There is no height or weight on file to calculate BMI. GENERAL APPEARANCE:   Mp Sosa is 60 y.o.,  female, mildly obese,  nourished, conscious, alert. Does not appear to be distress or pain at this time. SKIN:  Skin with no lesion                HEAD:  Normocephalic, Face symmetrical.                  EYES:  EVAN EOMI  Glasses . EARS:  No hearing loss. NOSE:  Nares are patent                   THROAT:  No erythema of pharynx Has few missing teeth                  NECK:   Neck is supple, Has no adenopathy. CHEST:  Good chest excursion                  HEART:  HT normal and no murmer                 LUNGS:  Breath sounds are clear            ABDOMEN:   Abdomen is non tender and not distended and is soft on palpation. . No palpable organomegaly. LYMPHATICS:  No palpable cervical lymphadenopathy. LOCOMOTOR, BACK AND SPINE:  No tenderness or deformities. EXTREMITIES: Good range of motion of upper and lower extremities, Steady gait,  Grasp strength is 5/5    NEUROLOGIC:  Alert and speech is clear No apparent focal sensory or motor deficits.                       PROVISIONAL DIAGNOSES / SURGERY:      For myelogram    Patient Active Problem List    Diagnosis

## 2019-07-22 NOTE — BRIEF OP NOTE
Brief Postoperative Note    Rodrigo Larger  YOB: 1955  [id number]    Pre-operative Diagnosis: Chronic lower back pain post op/post kyphoplasty    Post-operative Diagnosis: Same    Procedure: Lumbar myelogram    Anesthesia: Local    Surgeons/Assistants: Noemi Giron MD    Estimated Blood Loss: less than 50     Complications: None    Specimens: Was Not Obtained    Findings: 22 g needle used under fluoro to inject 14 mls Isovue M200 intrathecally at L3-L4 without incident. Pt tolerated well.     Electronically signed by Noemi Giron MD on 7/22/2019 at 11:47 AM

## 2019-07-25 ENCOUNTER — OFFICE VISIT (OUTPATIENT)
Dept: ORTHOPEDIC SURGERY | Age: 64
End: 2019-07-25
Payer: MEDICAID

## 2019-07-25 DIAGNOSIS — M43.10 ACQUIRED SPONDYLOLISTHESIS: ICD-10-CM

## 2019-07-25 DIAGNOSIS — M80.00XD AGE-RELATED OSTEOPOROSIS WITH CURRENT PATHOLOGICAL FRACTURE WITH ROUTINE HEALING, SUBSEQUENT ENCOUNTER: ICD-10-CM

## 2019-07-25 DIAGNOSIS — M48.062 SPINAL STENOSIS OF LUMBAR REGION WITH NEUROGENIC CLAUDICATION: ICD-10-CM

## 2019-07-25 DIAGNOSIS — S32.040D CLOSED COMPRESSION FRACTURE OF L4 LUMBAR VERTEBRA WITH ROUTINE HEALING, SUBSEQUENT ENCOUNTER: Primary | ICD-10-CM

## 2019-07-25 DIAGNOSIS — S32.030D CLOSED COMPRESSION FRACTURE OF L3 LUMBAR VERTEBRA WITH ROUTINE HEALING, SUBSEQUENT ENCOUNTER: ICD-10-CM

## 2019-07-25 DIAGNOSIS — S22.000D CLOSED COMPRESSION FRACTURE OF THORACIC VERTEBRA WITH ROUTINE HEALING, SUBSEQUENT ENCOUNTER: ICD-10-CM

## 2019-07-25 DIAGNOSIS — M81.6 LOCALIZED OSTEOPOROSIS WITHOUT CURRENT PATHOLOGICAL FRACTURE: ICD-10-CM

## 2019-07-25 DIAGNOSIS — M51.36 DDD (DEGENERATIVE DISC DISEASE), LUMBAR: ICD-10-CM

## 2019-07-25 DIAGNOSIS — S32.020D CLOSED COMPRESSION FRACTURE OF L2 LUMBAR VERTEBRA WITH ROUTINE HEALING, SUBSEQUENT ENCOUNTER: ICD-10-CM

## 2019-07-25 PROBLEM — S32.030A CLOSED COMPRESSION FRACTURE OF THIRD LUMBAR VERTEBRA (HCC): Status: ACTIVE | Noted: 2019-05-08

## 2019-07-25 PROCEDURE — 99213 OFFICE O/P EST LOW 20 MIN: CPT | Performed by: ORTHOPAEDIC SURGERY

## 2019-07-25 PROCEDURE — G8427 DOCREV CUR MEDS BY ELIG CLIN: HCPCS | Performed by: ORTHOPAEDIC SURGERY

## 2019-07-25 PROCEDURE — 3017F COLORECTAL CA SCREEN DOC REV: CPT | Performed by: ORTHOPAEDIC SURGERY

## 2019-07-25 PROCEDURE — G8417 CALC BMI ABV UP PARAM F/U: HCPCS | Performed by: ORTHOPAEDIC SURGERY

## 2019-07-25 PROCEDURE — G8598 ASA/ANTIPLAT THER USED: HCPCS | Performed by: ORTHOPAEDIC SURGERY

## 2019-07-25 PROCEDURE — 1036F TOBACCO NON-USER: CPT | Performed by: ORTHOPAEDIC SURGERY

## 2019-07-25 NOTE — PROGRESS NOTES
healing, subsequent encounter    4. Closed compression fracture of thoracic vertebra with routine healing, subsequent encounter    5. Closed compression fracture of L3 lumbar vertebra with routine healing, subsequent encounter    6. DDD (degenerative disc disease), lumbar    7. Acquired spondylolisthesis    8. Localized osteoporosis without current pathological fracture    9. Spinal stenosis of lumbar region with neurogenic claudication        Plan:     Trial lumbar epidural steroid injections    Follow-up 12 weeks    May be forced to consider L3-4 PLIF with cement augmentation    Orders Placed This Encounter   Procedures    XR Lumbar Spine Flex and Ext Only     Standing Status:   Future     Number of Occurrences:   1     Standing Expiration Date:   7/25/2020        Jeanna Velázquez MD    Please note that this chart was generated using voicerecognition Dragon dictation software. Although every effort was made to ensurethe accuracy of this automated transcription, some errors in transcription may haveoccurred.

## 2019-07-30 ENCOUNTER — APPOINTMENT (OUTPATIENT)
Dept: GENERAL RADIOLOGY | Age: 64
DRG: 463 | End: 2019-07-30
Payer: MEDICAID

## 2019-07-30 ENCOUNTER — HOSPITAL ENCOUNTER (INPATIENT)
Age: 64
LOS: 3 days | Discharge: HOME HEALTH | DRG: 463 | End: 2019-08-02
Attending: EMERGENCY MEDICINE | Admitting: FAMILY MEDICINE
Payer: MEDICAID

## 2019-07-30 DIAGNOSIS — N39.0 URINARY TRACT INFECTION WITHOUT HEMATURIA, SITE UNSPECIFIED: Primary | ICD-10-CM

## 2019-07-30 DIAGNOSIS — R11.0 NAUSEA: ICD-10-CM

## 2019-07-30 LAB
-: ABNORMAL
ABSOLUTE EOS #: 0.13 K/UL (ref 0–0.44)
ABSOLUTE IMMATURE GRANULOCYTE: 0.04 K/UL (ref 0–0.3)
ABSOLUTE LYMPH #: 4.14 K/UL (ref 1.1–3.7)
ABSOLUTE MONO #: 0.78 K/UL (ref 0.1–1.2)
ALBUMIN SERPL-MCNC: 3.5 G/DL (ref 3.5–5.2)
ALBUMIN/GLOBULIN RATIO: 0.8 (ref 1–2.5)
ALLEN TEST: ABNORMAL
ALP BLD-CCNC: 106 U/L (ref 35–104)
ALT SERPL-CCNC: 12 U/L (ref 5–33)
AMORPHOUS: ABNORMAL
ANION GAP SERPL CALCULATED.3IONS-SCNC: 16 MMOL/L (ref 9–17)
ANION GAP: 10 MMOL/L (ref 7–16)
AST SERPL-CCNC: 19 U/L
BACTERIA: ABNORMAL
BASOPHILS # BLD: 0 % (ref 0–2)
BASOPHILS ABSOLUTE: 0.04 K/UL (ref 0–0.2)
BILIRUB SERPL-MCNC: 0.54 MG/DL (ref 0.3–1.2)
BILIRUBIN URINE: NEGATIVE
BUN BLDV-MCNC: 19 MG/DL (ref 8–23)
BUN/CREAT BLD: ABNORMAL (ref 9–20)
CALCIUM SERPL-MCNC: 9.5 MG/DL (ref 8.6–10.4)
CASTS UA: ABNORMAL /LPF (ref 0–8)
CHLORIDE BLD-SCNC: 98 MMOL/L (ref 98–107)
CHP ED QC CHECK: YES
CO2: 19 MMOL/L (ref 20–31)
COLOR: YELLOW
COMMENT UA: ABNORMAL
CREAT SERPL-MCNC: 0.9 MG/DL (ref 0.5–0.9)
CRYSTALS, UA: ABNORMAL /HPF
DIFFERENTIAL TYPE: ABNORMAL
EOSINOPHILS RELATIVE PERCENT: 1 % (ref 1–4)
EPITHELIAL CELLS UA: ABNORMAL /HPF (ref 0–5)
FIO2: ABNORMAL
GFR AFRICAN AMERICAN: >60 ML/MIN
GFR NON-AFRICAN AMERICAN: 48 ML/MIN
GFR NON-AFRICAN AMERICAN: >60 ML/MIN
GFR SERPL CREATININE-BSD FRML MDRD: 59 ML/MIN
GFR SERPL CREATININE-BSD FRML MDRD: ABNORMAL ML/MIN/{1.73_M2}
GLUCOSE BLD-MCNC: 205 MG/DL
GLUCOSE BLD-MCNC: 205 MG/DL (ref 65–105)
GLUCOSE BLD-MCNC: 270 MG/DL (ref 70–99)
GLUCOSE BLD-MCNC: 277 MG/DL
GLUCOSE BLD-MCNC: 277 MG/DL (ref 65–105)
GLUCOSE BLD-MCNC: 281 MG/DL (ref 74–100)
GLUCOSE URINE: NEGATIVE
HCO3 VENOUS: 20.9 MMOL/L (ref 22–29)
HCT VFR BLD CALC: 40.7 % (ref 36.3–47.1)
HEMOGLOBIN: 13.1 G/DL (ref 11.9–15.1)
IMMATURE GRANULOCYTES: 0 %
KETONES, URINE: NEGATIVE
LACTIC ACID, SEPSIS WHOLE BLOOD: 2.7 MMOL/L (ref 0.5–1.9)
LACTIC ACID, SEPSIS: ABNORMAL MMOL/L (ref 0.5–1.9)
LEUKOCYTE ESTERASE, URINE: ABNORMAL
LIPASE: 88 U/L (ref 13–60)
LYMPHOCYTES # BLD: 36 % (ref 24–43)
MCH RBC QN AUTO: 28.4 PG (ref 25.2–33.5)
MCHC RBC AUTO-ENTMCNC: 32.2 G/DL (ref 28.4–34.8)
MCV RBC AUTO: 88.1 FL (ref 82.6–102.9)
MODE: ABNORMAL
MONOCYTES # BLD: 7 % (ref 3–12)
MUCUS: ABNORMAL
NEGATIVE BASE EXCESS, VEN: 1 (ref 0–2)
NITRITE, URINE: NEGATIVE
NRBC AUTOMATED: 0 PER 100 WBC
O2 DEVICE/FLOW/%: ABNORMAL
O2 SAT, VEN: 60 % (ref 60–85)
OTHER OBSERVATIONS UA: ABNORMAL
PATIENT TEMP: ABNORMAL
PCO2, VEN: 26.7 MM HG (ref 41–51)
PDW BLD-RTO: 12.2 % (ref 11.8–14.4)
PH UA: 6.5 (ref 5–8)
PH VENOUS: 7.5 (ref 7.32–7.43)
PLATELET # BLD: 184 K/UL (ref 138–453)
PLATELET ESTIMATE: ABNORMAL
PMV BLD AUTO: 12.7 FL (ref 8.1–13.5)
PO2, VEN: 27.5 MM HG (ref 30–50)
POC CHLORIDE: 104 MMOL/L (ref 98–107)
POC CREATININE: 1.14 MG/DL (ref 0.51–1.19)
POC HEMATOCRIT: 40 % (ref 36–46)
POC HEMOGLOBIN: 13.7 G/DL (ref 12–16)
POC IONIZED CALCIUM: 1.2 MMOL/L (ref 1.15–1.33)
POC LACTIC ACID: 2.98 MMOL/L (ref 0.56–1.39)
POC PCO2 TEMP: ABNORMAL MM HG
POC PH TEMP: ABNORMAL
POC PO2 TEMP: ABNORMAL MM HG
POC POTASSIUM: 5 MMOL/L (ref 3.5–4.5)
POC SODIUM: 135 MMOL/L (ref 138–146)
POSITIVE BASE EXCESS, VEN: ABNORMAL (ref 0–3)
POTASSIUM SERPL-SCNC: 5 MMOL/L (ref 3.7–5.3)
PROTEIN UA: NEGATIVE
RBC # BLD: 4.62 M/UL (ref 3.95–5.11)
RBC # BLD: ABNORMAL 10*6/UL
RBC UA: ABNORMAL /HPF (ref 0–4)
RENAL EPITHELIAL, UA: ABNORMAL /HPF
SAMPLE SITE: ABNORMAL
SEG NEUTROPHILS: 56 % (ref 36–65)
SEGMENTED NEUTROPHILS ABSOLUTE COUNT: 6.38 K/UL (ref 1.5–8.1)
SODIUM BLD-SCNC: 133 MMOL/L (ref 135–144)
SPECIFIC GRAVITY UA: 1.01 (ref 1–1.03)
TOTAL CO2, VENOUS: 22 MMOL/L (ref 23–30)
TOTAL PROTEIN: 7.8 G/DL (ref 6.4–8.3)
TRICHOMONAS: ABNORMAL
TROPONIN INTERP: ABNORMAL
TROPONIN INTERP: ABNORMAL
TROPONIN T: ABNORMAL NG/ML
TROPONIN T: ABNORMAL NG/ML
TROPONIN, HIGH SENSITIVITY: 19 NG/L (ref 0–14)
TROPONIN, HIGH SENSITIVITY: 19 NG/L (ref 0–14)
TURBIDITY: CLEAR
URINE HGB: NEGATIVE
UROBILINOGEN, URINE: NORMAL
WBC # BLD: 11.5 K/UL (ref 3.5–11.3)
WBC # BLD: ABNORMAL 10*3/UL
WBC UA: ABNORMAL /HPF (ref 0–5)
YEAST: ABNORMAL

## 2019-07-30 PROCEDURE — 2580000003 HC RX 258: Performed by: STUDENT IN AN ORGANIZED HEALTH CARE EDUCATION/TRAINING PROGRAM

## 2019-07-30 PROCEDURE — 81001 URINALYSIS AUTO W/SCOPE: CPT

## 2019-07-30 PROCEDURE — 96375 TX/PRO/DX INJ NEW DRUG ADDON: CPT

## 2019-07-30 PROCEDURE — 82435 ASSAY OF BLOOD CHLORIDE: CPT

## 2019-07-30 PROCEDURE — 82330 ASSAY OF CALCIUM: CPT

## 2019-07-30 PROCEDURE — 84295 ASSAY OF SERUM SODIUM: CPT

## 2019-07-30 PROCEDURE — 85025 COMPLETE CBC W/AUTO DIFF WBC: CPT

## 2019-07-30 PROCEDURE — 96372 THER/PROPH/DIAG INJ SC/IM: CPT

## 2019-07-30 PROCEDURE — 82803 BLOOD GASES ANY COMBINATION: CPT

## 2019-07-30 PROCEDURE — 96365 THER/PROPH/DIAG IV INF INIT: CPT

## 2019-07-30 PROCEDURE — 82947 ASSAY GLUCOSE BLOOD QUANT: CPT

## 2019-07-30 PROCEDURE — 84484 ASSAY OF TROPONIN QUANT: CPT

## 2019-07-30 PROCEDURE — 96376 TX/PRO/DX INJ SAME DRUG ADON: CPT

## 2019-07-30 PROCEDURE — 6370000000 HC RX 637 (ALT 250 FOR IP): Performed by: STUDENT IN AN ORGANIZED HEALTH CARE EDUCATION/TRAINING PROGRAM

## 2019-07-30 PROCEDURE — 6360000002 HC RX W HCPCS: Performed by: PHYSICIAN ASSISTANT

## 2019-07-30 PROCEDURE — 2580000003 HC RX 258: Performed by: PHYSICIAN ASSISTANT

## 2019-07-30 PROCEDURE — 93005 ELECTROCARDIOGRAM TRACING: CPT | Performed by: PHYSICIAN ASSISTANT

## 2019-07-30 PROCEDURE — 83690 ASSAY OF LIPASE: CPT

## 2019-07-30 PROCEDURE — 87077 CULTURE AEROBIC IDENTIFY: CPT

## 2019-07-30 PROCEDURE — 80053 COMPREHEN METABOLIC PANEL: CPT

## 2019-07-30 PROCEDURE — 6360000002 HC RX W HCPCS: Performed by: STUDENT IN AN ORGANIZED HEALTH CARE EDUCATION/TRAINING PROGRAM

## 2019-07-30 PROCEDURE — 1200000000 HC SEMI PRIVATE

## 2019-07-30 PROCEDURE — 71046 X-RAY EXAM CHEST 2 VIEWS: CPT

## 2019-07-30 PROCEDURE — 82565 ASSAY OF CREATININE: CPT

## 2019-07-30 PROCEDURE — 84132 ASSAY OF SERUM POTASSIUM: CPT

## 2019-07-30 PROCEDURE — 83605 ASSAY OF LACTIC ACID: CPT

## 2019-07-30 PROCEDURE — 85014 HEMATOCRIT: CPT

## 2019-07-30 PROCEDURE — 87086 URINE CULTURE/COLONY COUNT: CPT

## 2019-07-30 PROCEDURE — 99285 EMERGENCY DEPT VISIT HI MDM: CPT

## 2019-07-30 PROCEDURE — 87186 SC STD MICRODIL/AGAR DIL: CPT

## 2019-07-30 RX ORDER — DEXTROSE MONOHYDRATE 25 G/50ML
12.5 INJECTION, SOLUTION INTRAVENOUS PRN
Status: DISCONTINUED | OUTPATIENT
Start: 2019-07-30 | End: 2019-08-02 | Stop reason: HOSPADM

## 2019-07-30 RX ORDER — INSULIN GLARGINE 100 [IU]/ML
15 INJECTION, SOLUTION SUBCUTANEOUS NIGHTLY
Status: DISCONTINUED | OUTPATIENT
Start: 2019-07-30 | End: 2019-07-31

## 2019-07-30 RX ORDER — ONDANSETRON 2 MG/ML
4 INJECTION INTRAMUSCULAR; INTRAVENOUS EVERY 6 HOURS PRN
Status: DISCONTINUED | OUTPATIENT
Start: 2019-07-30 | End: 2019-08-02 | Stop reason: HOSPADM

## 2019-07-30 RX ORDER — LOVASTATIN 20 MG/1
10 TABLET ORAL NIGHTLY
Status: DISCONTINUED | OUTPATIENT
Start: 2019-07-30 | End: 2019-08-02 | Stop reason: HOSPADM

## 2019-07-30 RX ORDER — SODIUM CHLORIDE 9 MG/ML
INJECTION, SOLUTION INTRAVENOUS CONTINUOUS
Status: DISCONTINUED | OUTPATIENT
Start: 2019-07-30 | End: 2019-08-02 | Stop reason: HOSPADM

## 2019-07-30 RX ORDER — MORPHINE SULFATE 4 MG/ML
1 INJECTION, SOLUTION INTRAMUSCULAR; INTRAVENOUS EVERY 4 HOURS PRN
Status: DISCONTINUED | OUTPATIENT
Start: 2019-07-30 | End: 2019-08-02 | Stop reason: HOSPADM

## 2019-07-30 RX ORDER — DEXTROSE MONOHYDRATE 50 MG/ML
100 INJECTION, SOLUTION INTRAVENOUS PRN
Status: DISCONTINUED | OUTPATIENT
Start: 2019-07-30 | End: 2019-08-02 | Stop reason: HOSPADM

## 2019-07-30 RX ORDER — ASPIRIN 81 MG/1
81 TABLET ORAL DAILY
Status: DISCONTINUED | OUTPATIENT
Start: 2019-07-30 | End: 2019-08-02 | Stop reason: HOSPADM

## 2019-07-30 RX ORDER — NICOTINE POLACRILEX 4 MG
15 LOZENGE BUCCAL PRN
Status: DISCONTINUED | OUTPATIENT
Start: 2019-07-30 | End: 2019-08-02 | Stop reason: HOSPADM

## 2019-07-30 RX ORDER — MONTELUKAST SODIUM 10 MG/1
10 TABLET ORAL NIGHTLY
Status: DISCONTINUED | OUTPATIENT
Start: 2019-07-30 | End: 2019-08-02 | Stop reason: HOSPADM

## 2019-07-30 RX ORDER — ACETAMINOPHEN 325 MG/1
650 TABLET ORAL EVERY 4 HOURS PRN
Status: DISCONTINUED | OUTPATIENT
Start: 2019-07-30 | End: 2019-08-02 | Stop reason: HOSPADM

## 2019-07-30 RX ORDER — ALBUTEROL SULFATE 90 UG/1
2 AEROSOL, METERED RESPIRATORY (INHALATION) EVERY 6 HOURS PRN
Status: DISCONTINUED | OUTPATIENT
Start: 2019-07-30 | End: 2019-08-02 | Stop reason: HOSPADM

## 2019-07-30 RX ORDER — ONDANSETRON 2 MG/ML
4 INJECTION INTRAMUSCULAR; INTRAVENOUS ONCE
Status: COMPLETED | OUTPATIENT
Start: 2019-07-30 | End: 2019-07-30

## 2019-07-30 RX ORDER — RALOXIFENE HYDROCHLORIDE 60 MG/1
1 TABLET, FILM COATED ORAL DAILY
Status: DISCONTINUED | OUTPATIENT
Start: 2019-07-30 | End: 2019-07-30

## 2019-07-30 RX ORDER — CIPROFLOXACIN 2 MG/ML
400 INJECTION, SOLUTION INTRAVENOUS EVERY 12 HOURS
Status: DISCONTINUED | OUTPATIENT
Start: 2019-07-30 | End: 2019-08-02 | Stop reason: HOSPADM

## 2019-07-30 RX ORDER — ACETAMINOPHEN 325 MG/1
650 TABLET ORAL EVERY 8 HOURS PRN
Status: DISCONTINUED | OUTPATIENT
Start: 2019-07-30 | End: 2019-08-02 | Stop reason: HOSPADM

## 2019-07-30 RX ORDER — 0.9 % SODIUM CHLORIDE 0.9 %
500 INTRAVENOUS SOLUTION INTRAVENOUS ONCE
Status: COMPLETED | OUTPATIENT
Start: 2019-07-30 | End: 2019-07-30

## 2019-07-30 RX ADMIN — INSULIN LISPRO 1 UNITS: 100 INJECTION, SOLUTION INTRAVENOUS; SUBCUTANEOUS at 22:11

## 2019-07-30 RX ADMIN — ASPIRIN 81 MG: 81 TABLET, COATED ORAL at 22:12

## 2019-07-30 RX ADMIN — ONDANSETRON 4 MG: 2 INJECTION INTRAMUSCULAR; INTRAVENOUS at 15:37

## 2019-07-30 RX ADMIN — SODIUM CHLORIDE 500 ML: 9 INJECTION, SOLUTION INTRAVENOUS at 13:16

## 2019-07-30 RX ADMIN — SODIUM CHLORIDE: 9 INJECTION, SOLUTION INTRAVENOUS at 17:58

## 2019-07-30 RX ADMIN — SERTRALINE 100 MG: 50 TABLET, FILM COATED ORAL at 22:11

## 2019-07-30 RX ADMIN — INSULIN GLARGINE 15 UNITS: 100 INJECTION, SOLUTION SUBCUTANEOUS at 22:10

## 2019-07-30 RX ADMIN — SODIUM CHLORIDE: 9 INJECTION, SOLUTION INTRAVENOUS at 22:08

## 2019-07-30 RX ADMIN — LOVASTATIN 10 MG: 20 TABLET ORAL at 22:17

## 2019-07-30 RX ADMIN — MEROPENEM 1 G: 1 INJECTION, POWDER, FOR SOLUTION INTRAVENOUS at 15:51

## 2019-07-30 RX ADMIN — ONDANSETRON 4 MG: 2 INJECTION INTRAMUSCULAR; INTRAVENOUS at 13:16

## 2019-07-30 RX ADMIN — MONTELUKAST SODIUM 10 MG: 10 TABLET, FILM COATED ORAL at 22:11

## 2019-07-30 RX ADMIN — CIPROFLOXACIN 400 MG: 2 INJECTION, SOLUTION INTRAVENOUS at 22:17

## 2019-07-30 ASSESSMENT — ENCOUNTER SYMPTOMS
SORE THROAT: 0
PHOTOPHOBIA: 0
EYE PAIN: 0
WHEEZING: 0
ABDOMINAL PAIN: 1
COLOR CHANGE: 0
EYE ITCHING: 0
VOMITING: 1
RHINORRHEA: 0
COUGH: 0
NAUSEA: 1
BACK PAIN: 0
EYE DISCHARGE: 0
SHORTNESS OF BREATH: 0

## 2019-07-30 NOTE — H&P
Consider further imaging in the morning if no improvement in symptoms    DM  POCT glucose  Hypoglycemia protocol  Lantus 15 Units nightly  Low dose ISS      Consultations:   Consults: IP CONSULT TO FAMILY MEDICINE  PT/OT       The severity of this patient's signs and symptoms (specify UTI) indicate the need for an inpatient admission.     Above plan discussed with the patient and family in room, who agreed to the above plan   Plan will be discussed with the attending, Dr. Shai Tamayo MD  Family Medicine Resident  7/30/2019 6:02 PM

## 2019-07-30 NOTE — ED PROVIDER NOTES
EMERGENCY DEPARTMENT ENCOUNTER   ATTENDING ATTESTATION     Pt Name: Eloise Lewis  MRN: [id number]  Colleengfshaniqua 1955  Date of evaluation: 7/30/19   Eloise Lewis is a 60 y.o. female with CC: Emesis (hx of diabetes, pt has been experiencing dizziness, emesis, abdominal pain since yesterday. pt states \"vomit is clear\")    MDM:     N/v since yesterday  DM - sugars in 200s last several days  No f/c    Labs, CMP, trop, sx tx  GB removed  1:53 PM  Awaiting labs    ED Course as of Jul 30 1919   Tue Jul 30, 2019   1650 4:50 PM  [id number] [facility] resident will be down to see    [JAYME]   Ambar 21 5:05 PM  [id number] [facility] attending Dr. Keyur Kelly, accepts the patient    [JAYME]      ED Course User Index  [JAYME] Nancy Turcios PA-C   3:47 PM  With significant abdominal tenderness on exam, lactate elevated, repeat 2.7. UA with leukocytes and some bacteria, troponin 19, repeat pending. Glucose 270 labs are grossly unremarkable. Patient with chief complaint of emesis but does have some belly pain. With lactate elevated and her age, concern for abdominal pathology, has signal kidney and we are hesitant to scan with contrast however radiology recommends scanning if we have a high concern for ischemic bowel. Offered patient CT versus observation with serial abdominal exams, she is somewhat improved and elected for observation with serial abdominal exams. Lactate spikes or abdominal exam worsens will need urgent CT scan. CRITICAL CARE:       EKG: All EKG's are interpreted by the Emergency Department Physician who either signs or Co-signs this chart in the absence of a cardiologist.      RADIOLOGY:All plain film, CT, MRI, and formal ultrasound images (except ED bedside ultrasound) are read by the radiologist, see reports below, unless otherwise noted in MDM or here. XR CHEST STANDARD (2 VW)   Final Result   No acute cardiopulmonary process.   Interval performance of vertebroplasties   upper thoracic and

## 2019-07-30 NOTE — ED PROVIDER NOTES
Pacemaker Mother     High Blood Pressure Father     Prostate Cancer Father     Breast Cancer Sister     Asthma Brother     Prostate Cancer Maternal Grandfather     High Blood Pressure Paternal Grandmother        Allergies:  Lac bovis; Nedocromil; Tramadol; Penicillins; and Tape [adhesive tape]    Home Medications:  Prior to Admission medications    Medication Sig Start Date End Date Taking? Authorizing Provider   vitamin D (ERGOCALCIFEROL) 36715 units CAPS capsule Take 1 capsule by mouth once a week for 8 doses 7/10/19 8/29/19  Nancy Weinstein MD   sertraline (ZOLOFT) 100 MG tablet TAKE ONE TABLET BY MOUTH DAILY 7/10/19   Nancy Weinstein MD   raloxifene (EVISTA) 60 MG tablet TAKE ONE TABLET BY MOUTH DAILY 7/10/19   Nancy Weinstein MD   Cholecalciferol (VITAMIN D3) 1000 units TABS Take 1 tablet by mouth daily 12/26/18   Nancy Weinstein MD   aspirin EC 81 MG EC tablet Take 1 tablet by mouth daily 12/11/18   Nancy Weinstein MD   lovastatin (MEVACOR) 40 MG tablet TAKE ONE AND ONE-HALF (1  1/2) TABLET BY MOUTH NIGHTLY 12/11/18   Nancy Weinstein MD   gemfibrozil (LOPID) 600 MG tablet TAKE ONE TABLET BY MOUTH TWICE A DAY 12/11/18   Nancy Weinstein MD   zolpidem (AMBIEN) 10 MG tablet Take 10 mg by mouth nightly as needed for Sleep. Beckie Stringer Historical Provider, MD   insulin glargine (BASAGLAR KWIKPEN) 100 UNIT/ML injection pen Inject 25 Units into the skin nightly    Historical Provider, MD   fluticasone (ARNUITY ELLIPTA) 200 MCG/ACT AEPB Inhale 1 Inhaler into the lungs daily    Historical Provider, MD   BiPAP Machine MISC by Does not apply route    Historical Provider, MD   calcium carbonate (TUMS) 500 MG chewable tablet Take 1 tablet by mouth 3 times daily as needed for Heartburn 1/10/17   Yaya Wasserman MD   Handicap Placard MISC by Does not apply route Duration: 1 year 4/28/16   Antonio Flowers MD   acetaminophen (TYLENOL) 500 MG tablet Take 500 mg by mouth every 6 hours as needed.     Historical Provider, MD provided for review. Dose modulation, iterative reconstruction, and/or weight based adjustment of the mA/kV was utilized to reduce the radiation dose to as low as reasonably achievable. COMPARISON: Previous CT lumbar myelogram 04/26/2016 and CT of the lumbosacral spine from 09/04/2018. HISTORY: ORDERING SYSTEM PROVIDED HISTORY: Closed compression fracture of L4 lumbar vertebra with routine healing, subsequent encounter TECHNOLOGIST PROVIDED HISTORY: Reason for Exam: post myelogram FINDINGS: BONES/ALIGNMENT: Good contrast filling thecal sac; there is unchanged alignment of the spine with new post-kyphoplasty bone cement L3 and L4 with inferior endplate deformity L4, unchanged post-laminectomy changes L5 with anterior fixation device, interlocking transpedicular unilateral screws on the left at L5-S1. Low lying conus medullaris again demonstrated at L3. Similar fracture deformity inferior S1 endplate and unchanged mild anterolisthesis L5 on S1. Osteopenia. Vertebral body heights otherwise maintained. No osseous destructive lesion is seen. SOFT TISSUES: No paraspinal mass is seen. No area of abnormal contrast enhancement. L1-L2: There is no significant disc protrusion, central spinal canal stenosis or neural foraminal narrowing. L2-L3: There is no significant disc protrusion, central spinal canal stenosis or neural foraminal narrowing. L3-L4: Mild disc bulging without significant central spinal canal stenosis or neural foraminal narrowing. Interval L4 height loss deformity inferior L4 endplate bone and cement leaking into the disc space. Some interspinous degenerative change. L4-L5: Mild disc protrusion with mild central spinal canal stenosis. Mild-moderate impingement on the neural foramina, greater on the right. At the L5 level, bulging adipose tissue impinging on the contrast filled canal posteriorly. L5-S1: Similar partial disc space obliteration status post fusion.  Post-laminectomy changes but no marked impingement on the canal left neural foramen; mild-moderate on the right neural foramen secondary to proliferative bony change/facet arthropathy. 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 No severe central canal impingement. Compared to 09/04/2018, status post kyphoplasty L3 and L4 vertebral bodies; mild loss of height and L4 inferior endplate deformity noted. Compared to CT myelogram from 2016, increased but still mild disc protrusion L4-L5 and new mild posterior impingement L5-S1. Some neural foraminal impingement, probably greatest L4-L5 on the right. Low-lying unchanged conus medullaris. Unchanged age-indeterminate deformity/fracture inferior S1 vertebral body; stable postsurgical and additional findings detailed above. Ir Myelogram Lumbosacral    Result Date: 7/22/2019  EXAMINATION: FLUOROSCOPIC LUMBAR MYELOGRAM 7/22/2019: HISTORY: ORDERING SYSTEM PROVIDED HISTORY: Closed compression fracture of L4 lumbar vertebra with routine healing, subsequent encounter Lower back pain. FLUOROSCOPY DOSE AND TYPE OR TIME AND EXPOSURES: Fluoroscopy time-57 seconds D AP-358 micro Gy cm squared PROCEDURE: : Karyna Iraheta. Jamel Wing MD Informed consent was obtained after the risks and benefits of the procedure were discussed with the patient and all questions were answered fully. [place] protocol was observed and a standard timeout was performed. The patient was positioned prone and the back was prepped and draped in the normal sterile fashion. 1% lidocaine was used for local anesthesia. The subarachnoid space was accessed with a 22-gauge 3.5\" spinal needle at the L2/L3 level. Free flow of clear CSF was noted. Approximately 14 ml of Isovue-M 200 was injected into the intrathecal space under fluoroscopic visualization. The stylet was reinserted, spinal needle was removed and brief pressure was applied at the puncture site. There were no immediate complications and the patient tolerated the procedure well.  AP,

## 2019-07-31 LAB
CAMPYLOBACTER PCR: NORMAL
E COLI ENTEROTOXIGENIC PCR: NORMAL
GLUCOSE BLD-MCNC: 162 MG/DL (ref 65–105)
GLUCOSE BLD-MCNC: 181 MG/DL (ref 65–105)
GLUCOSE BLD-MCNC: 237 MG/DL (ref 65–105)
GLUCOSE BLD-MCNC: 259 MG/DL (ref 65–105)
LACTIC ACID, WHOLE BLOOD: 1.6 MMOL/L (ref 0.7–2.1)
PLESIOMONAS SHIGELLOIDES PCR: NORMAL
SALMONELLA PCR: NORMAL
SHIGATOXIN GENE PCR: NORMAL
SHIGELLA SP PCR: NORMAL
SPECIMEN DESCRIPTION: NORMAL
VIBRIO PCR: NORMAL
YERSINIA ENTEROCOLITICA PCR: NORMAL

## 2019-07-31 PROCEDURE — 6370000000 HC RX 637 (ALT 250 FOR IP): Performed by: STUDENT IN AN ORGANIZED HEALTH CARE EDUCATION/TRAINING PROGRAM

## 2019-07-31 PROCEDURE — 94760 N-INVAS EAR/PLS OXIMETRY 1: CPT

## 2019-07-31 PROCEDURE — 87506 IADNA-DNA/RNA PROBE TQ 6-11: CPT

## 2019-07-31 PROCEDURE — 87449 NOS EACH ORGANISM AG IA: CPT

## 2019-07-31 PROCEDURE — 82947 ASSAY GLUCOSE BLOOD QUANT: CPT

## 2019-07-31 PROCEDURE — 36415 COLL VENOUS BLD VENIPUNCTURE: CPT

## 2019-07-31 PROCEDURE — 1200000000 HC SEMI PRIVATE

## 2019-07-31 PROCEDURE — 2580000003 HC RX 258: Performed by: FAMILY MEDICINE

## 2019-07-31 PROCEDURE — 99222 1ST HOSP IP/OBS MODERATE 55: CPT | Performed by: FAMILY MEDICINE

## 2019-07-31 PROCEDURE — 83605 ASSAY OF LACTIC ACID: CPT

## 2019-07-31 PROCEDURE — 2580000003 HC RX 258: Performed by: STUDENT IN AN ORGANIZED HEALTH CARE EDUCATION/TRAINING PROGRAM

## 2019-07-31 PROCEDURE — 87324 CLOSTRIDIUM AG IA: CPT

## 2019-07-31 PROCEDURE — 6360000002 HC RX W HCPCS: Performed by: STUDENT IN AN ORGANIZED HEALTH CARE EDUCATION/TRAINING PROGRAM

## 2019-07-31 RX ORDER — PROMETHAZINE HYDROCHLORIDE 25 MG/ML
6.25 INJECTION, SOLUTION INTRAMUSCULAR; INTRAVENOUS EVERY 6 HOURS PRN
Status: DISCONTINUED | OUTPATIENT
Start: 2019-07-31 | End: 2019-08-02 | Stop reason: HOSPADM

## 2019-07-31 RX ORDER — SODIUM CHLORIDE 0.9 % (FLUSH) 0.9 %
10 SYRINGE (ML) INJECTION EVERY 12 HOURS SCHEDULED
Status: DISCONTINUED | OUTPATIENT
Start: 2019-07-31 | End: 2019-08-02 | Stop reason: HOSPADM

## 2019-07-31 RX ORDER — SODIUM CHLORIDE 0.9 % (FLUSH) 0.9 %
10 SYRINGE (ML) INJECTION PRN
Status: DISCONTINUED | OUTPATIENT
Start: 2019-07-31 | End: 2019-08-02 | Stop reason: HOSPADM

## 2019-07-31 RX ORDER — ZOLPIDEM TARTRATE 5 MG/1
10 TABLET ORAL NIGHTLY PRN
Status: DISCONTINUED | OUTPATIENT
Start: 2019-07-31 | End: 2019-08-02 | Stop reason: HOSPADM

## 2019-07-31 RX ORDER — INSULIN GLARGINE 100 [IU]/ML
25 INJECTION, SOLUTION SUBCUTANEOUS NIGHTLY
Status: DISCONTINUED | OUTPATIENT
Start: 2019-07-31 | End: 2019-08-01

## 2019-07-31 RX ADMIN — ASPIRIN 81 MG: 81 TABLET, COATED ORAL at 09:30

## 2019-07-31 RX ADMIN — INSULIN LISPRO 1 UNITS: 100 INJECTION, SOLUTION INTRAVENOUS; SUBCUTANEOUS at 21:02

## 2019-07-31 RX ADMIN — LOVASTATIN 10 MG: 20 TABLET ORAL at 21:00

## 2019-07-31 RX ADMIN — Medication 10 ML: at 22:12

## 2019-07-31 RX ADMIN — Medication 10 ML: at 09:30

## 2019-07-31 RX ADMIN — CIPROFLOXACIN 400 MG: 2 INJECTION, SOLUTION INTRAVENOUS at 20:41

## 2019-07-31 RX ADMIN — SODIUM CHLORIDE, PRESERVATIVE FREE 10 ML: 5 INJECTION INTRAVENOUS at 09:00

## 2019-07-31 RX ADMIN — INSULIN LISPRO 1 UNITS: 100 INJECTION, SOLUTION INTRAVENOUS; SUBCUTANEOUS at 18:47

## 2019-07-31 RX ADMIN — SERTRALINE 100 MG: 50 TABLET, FILM COATED ORAL at 09:30

## 2019-07-31 RX ADMIN — INSULIN LISPRO 3 UNITS: 100 INJECTION, SOLUTION INTRAVENOUS; SUBCUTANEOUS at 09:39

## 2019-07-31 RX ADMIN — SODIUM CHLORIDE: 9 INJECTION, SOLUTION INTRAVENOUS at 20:57

## 2019-07-31 RX ADMIN — Medication 10 ML: at 09:00

## 2019-07-31 RX ADMIN — ZOLPIDEM TARTRATE 10 MG: 5 TABLET ORAL at 22:28

## 2019-07-31 RX ADMIN — INSULIN LISPRO 2 UNITS: 100 INJECTION, SOLUTION INTRAVENOUS; SUBCUTANEOUS at 13:00

## 2019-07-31 RX ADMIN — MONTELUKAST SODIUM 10 MG: 10 TABLET, FILM COATED ORAL at 20:41

## 2019-07-31 RX ADMIN — ONDANSETRON 4 MG: 2 INJECTION INTRAMUSCULAR; INTRAVENOUS at 22:11

## 2019-07-31 RX ADMIN — SODIUM CHLORIDE: 9 INJECTION, SOLUTION INTRAVENOUS at 01:57

## 2019-07-31 ASSESSMENT — PAIN SCALES - GENERAL
PAINLEVEL_OUTOF10: 0
PAINLEVEL_OUTOF10: 4
PAINLEVEL_OUTOF10: 4
PAINLEVEL_OUTOF10: 0

## 2019-07-31 ASSESSMENT — PAIN DESCRIPTION - DESCRIPTORS: DESCRIPTORS: ACHING

## 2019-07-31 ASSESSMENT — PAIN DESCRIPTION - LOCATION: LOCATION: ABDOMEN

## 2019-07-31 ASSESSMENT — PAIN DESCRIPTION - ORIENTATION: ORIENTATION: UPPER

## 2019-07-31 ASSESSMENT — PAIN DESCRIPTION - PAIN TYPE: TYPE: ACUTE PAIN

## 2019-07-31 ASSESSMENT — PAIN DESCRIPTION - FREQUENCY: FREQUENCY: CONTINUOUS

## 2019-07-31 NOTE — PROGRESS NOTES
mouth nightly as needed for Sleep. Fiona Galicia Historical Provider, MD   insulin glargine (BASAGLAR KWIKPEN) 100 UNIT/ML injection pen Inject 25 Units into the skin nightly    Historical Provider, MD   fluticasone (ARNUITY ELLIPTA) 200 MCG/ACT AEPB Inhale 1 Inhaler into the lungs daily    Historical Provider, MD   BiPAP Machine MISC by Does not apply route    Historical Provider, MD   calcium carbonate (TUMS) 500 MG chewable tablet Take 1 tablet by mouth 3 times daily as needed for Heartburn 1/10/17   Gaby Law MD   Handicap Placard MISC by Does not apply route Duration: 1 year 4/28/16   Latoya Orlando MD   acetaminophen (TYLENOL) 500 MG tablet Take 500 mg by mouth every 6 hours as needed. Historical Provider, MD   butalbital-acetaminophen-caffeine (FIORICET, ESGIC) per tablet Take 1 tablet by mouth every 8 hours as needed. Historical Provider, MD   montelukast (SINGULAIR) 10 MG tablet Take 10 mg by mouth nightly. Historical Provider, MD   albuterol (PROVENTIL HFA;VENTOLIN HFA) 108 (90 BASE) MCG/ACT inhaler Inhale 2 puffs into the lungs every 6 hours as needed. Historical Provider, MD       ALLERGIES:     Lac bovis; Nedocromil; Tramadol; Penicillins; and Tape [adhesive tape]      OBJECTIVE:       Vitals:    07/30/19 1758 07/31/19 0145 07/31/19 0203 07/31/19 0716   BP: (!) 118/106  130/65    Pulse: 88  75    Resp: 19  18 20   Temp:   97.7 °F (36.5 °C) 97.7 °F (36.5 °C)   TempSrc:   Oral Oral   SpO2:   100% 99%   Weight:  141 lb 12.1 oz (64.3 kg)     Height:  5' 2\" (1.575 m)           Intake/Output Summary (Last 24 hours) at 7/31/2019 1606  Last data filed at 7/31/2019 1217  Gross per 24 hour   Intake 879.25 ml   Output 150 ml   Net 729.25 ml       PHYSICAL EXAM:  General Appearance  Alert , awake , not in acute distress  HEENT - Head is normocephalic, atraumatic.   Lungs - Bilateral equal air entry , no wheezes, rales or rhonchi, aeration good  Cardiovascular - Heart sounds are normal.  Regular rhythm, Yersinia Enterocolitica PCR NEGATIVE: No Yersinia enterocolitica DNA Detected NEGATIVE: No Yersinia enterocolitica DNA Detected   POC Glucose Fingerstick    Collection Time: 07/31/19 12:13 PM   Result Value Ref Range    POC Glucose 237 (H) 65 - 105 mg/dL         Current Facility-Administered Medications   Medication Dose Route Frequency Provider Last Rate Last Dose    sodium chloride flush 0.9 % injection 10 mL  10 mL Intravenous 2 times per day Hoa Artis,         sodium chloride flush 0.9 % injection 10 mL  10 mL Intravenous PRN Hoa Artis DO        sodium chloride flush 0.9 % injection 10 mL  10 mL Intravenous 2 times per day Jose Hernandez MD   10 mL at 07/31/19 0930    sodium chloride flush 0.9 % injection 10 mL  10 mL Intravenous PRN Jose Hernandez MD        promethazine (PHENERGAN) injection 6.25 mg  6.25 mg Intramuscular Q6H PRN Gabi Araujo MD        insulin glargine (LANTUS) injection vial 25 Units  25 Units Subcutaneous Nightly Gabi Araujo MD        0.9 % sodium chloride infusion   Intravenous Continuous Shade SRINATH Jules 75 mL/hr at 07/30/19 1758      acetaminophen (TYLENOL) tablet 650 mg  650 mg Oral Q8H PRN Hoa Artis DO        albuterol sulfate  (90 Base) MCG/ACT inhaler 2 puff  2 puff Inhalation Q6H PRN Jose Hernandez MD        aspirin EC tablet 81 mg  81 mg Oral Daily Jose Hernandez MD   81 mg at 07/31/19 0930    lovastatin (MEVACOR) tablet 10 mg  10 mg Oral Nightly Jose Hernandez MD   10 mg at 07/30/19 2217    montelukast (SINGULAIR) tablet 10 mg  10 mg Oral Nightly Jose Hernandez MD   10 mg at 07/30/19 2211    sertraline (ZOLOFT) tablet 100 mg  100 mg Oral Daily Jose Hernandez MD   100 mg at 07/31/19 0930    magnesium hydroxide (MILK OF MAGNESIA) 400 MG/5ML suspension 30 mL  30 mL Oral Daily PRN Jose Hernandez MD        ondansetron [facility]) injection 4 mg  4 mg Intravenous Q6H PRN Jose Hernandez MD        0.9 % sodium chloride infusion   Intravenous Continuous Braden Charles Atkinson  mL/hr at 07/31/19 0157      acetaminophen (TYLENOL) tablet 650 mg  650 mg Oral Q4H PRN Aristeo Jaquez MD        morphine injection 1 mg  1 mg Intravenous Q4H PRN Aristeo Jaquez MD        ciprofloxacin (CIPRO) IVPB 400 mg  400 mg Intravenous Q12H Aristeo Jaquez MD   Stopped at 07/31/19 0154    insulin lispro (HUMALOG) injection vial 0-6 Units  0-6 Units Subcutaneous TID WC Aristeo Jaquez MD   2 Units at 07/31/19 1300    insulin lispro (HUMALOG) injection vial 0-3 Units  0-3 Units Subcutaneous Nightly Aristeo Jaquez MD   1 Units at 07/30/19 2211    glucose (GLUTOSE) 40 % oral gel 15 g  15 g Oral PRN Aristeo Jaquez MD        dextrose 50 % IV solution  12.5 g Intravenous PRN Aristeo Jaquez MD        glucagon (rDNA) injection 1 mg  1 mg Intramuscular PRN Aristeo Jaquez MD        dextrose 5 % solution  100 mL/hr Intravenous PRN Aristeo Jaquez MD           ASSESSMENT:     Active Problems:    UTI (urinary tract infection)  Resolved Problems:    * No resolved hospital problems. *      PLAN:      UTI  IVF at 100mL/hr  BMP/CBC daily   Ciprofloxacin 400 mg Q12H  Morphine, Tylenol for pain control  Zofran and Phenergan for nausea   NPO     Concern for possible mesenteric ischemia  - Monitor bowel movements for blood   - FOBT  - Serial Abdominal exams  -Vomiting and diarrhea since last night, ordered C. Diff results pending, stool cultures negative   -Lactic acid dec to 1.6 from 2.7 yday      DM  POCT glucose  Hypoglycemia protocol  Lantus 25 Units nightly  Low dose ISS    Discussed care plan with nurse after getting her input.     Above plan discussed with the patient , who agreed to the above plan     DVT prophylaxis: none due to concern for mesenteric ischemia  GI prophylaxis: none    Plan will be discussed with the attending, Dr Charly Hitchcock MD  Family Medicine Resident  7/31/2019 4:06 PM

## 2019-08-01 ENCOUNTER — APPOINTMENT (OUTPATIENT)
Dept: ULTRASOUND IMAGING | Age: 64
DRG: 463 | End: 2019-08-01
Payer: MEDICAID

## 2019-08-01 LAB
ANION GAP SERPL CALCULATED.3IONS-SCNC: 15 MMOL/L (ref 9–17)
BUN BLDV-MCNC: 12 MG/DL (ref 8–23)
BUN/CREAT BLD: ABNORMAL (ref 9–20)
C DIFF AG + TOXIN: NEGATIVE
CALCIUM SERPL-MCNC: 8.3 MG/DL (ref 8.6–10.4)
CHLORIDE BLD-SCNC: 99 MMOL/L (ref 98–107)
CO2: 19 MMOL/L (ref 20–31)
CREAT SERPL-MCNC: 0.91 MG/DL (ref 0.5–0.9)
CULTURE: ABNORMAL
EKG ATRIAL RATE: 66 BPM
EKG P AXIS: 29 DEGREES
EKG P-R INTERVAL: 134 MS
EKG Q-T INTERVAL: 430 MS
EKG QRS DURATION: 82 MS
EKG QTC CALCULATION (BAZETT): 450 MS
EKG R AXIS: 17 DEGREES
EKG T AXIS: 41 DEGREES
EKG VENTRICULAR RATE: 66 BPM
GFR AFRICAN AMERICAN: >60 ML/MIN
GFR NON-AFRICAN AMERICAN: >60 ML/MIN
GFR SERPL CREATININE-BSD FRML MDRD: ABNORMAL ML/MIN/{1.73_M2}
GFR SERPL CREATININE-BSD FRML MDRD: ABNORMAL ML/MIN/{1.73_M2}
GLUCOSE BLD-MCNC: 177 MG/DL (ref 65–105)
GLUCOSE BLD-MCNC: 198 MG/DL (ref 70–99)
GLUCOSE BLD-MCNC: 203 MG/DL (ref 65–105)
GLUCOSE BLD-MCNC: 217 MG/DL (ref 65–105)
GLUCOSE BLD-MCNC: 220 MG/DL (ref 65–105)
HCT VFR BLD CALC: 36.5 % (ref 36.3–47.1)
HEMOGLOBIN: 12.3 G/DL (ref 11.9–15.1)
LIPASE: 83 U/L (ref 13–60)
Lab: ABNORMAL
MAGNESIUM: 1.9 MG/DL (ref 1.6–2.6)
MCH RBC QN AUTO: 29.6 PG (ref 25.2–33.5)
MCHC RBC AUTO-ENTMCNC: 33.7 G/DL (ref 28.4–34.8)
MCV RBC AUTO: 88 FL (ref 82.6–102.9)
NRBC AUTOMATED: 0 PER 100 WBC
PDW BLD-RTO: 12 % (ref 11.8–14.4)
PLATELET # BLD: 177 K/UL (ref 138–453)
PMV BLD AUTO: 12.6 FL (ref 8.1–13.5)
POTASSIUM SERPL-SCNC: 3.5 MMOL/L (ref 3.7–5.3)
RBC # BLD: 4.15 M/UL (ref 3.95–5.11)
SEDIMENTATION RATE, ERYTHROCYTE: 30 MM (ref 0–20)
SODIUM BLD-SCNC: 133 MMOL/L (ref 135–144)
SPECIMEN DESCRIPTION: ABNORMAL
SPECIMEN DESCRIPTION: NORMAL
WBC # BLD: 11.4 K/UL (ref 3.5–11.3)

## 2019-08-01 PROCEDURE — 85027 COMPLETE CBC AUTOMATED: CPT

## 2019-08-01 PROCEDURE — 6360000002 HC RX W HCPCS: Performed by: STUDENT IN AN ORGANIZED HEALTH CARE EDUCATION/TRAINING PROGRAM

## 2019-08-01 PROCEDURE — 36415 COLL VENOUS BLD VENIPUNCTURE: CPT

## 2019-08-01 PROCEDURE — 2580000003 HC RX 258: Performed by: STUDENT IN AN ORGANIZED HEALTH CARE EDUCATION/TRAINING PROGRAM

## 2019-08-01 PROCEDURE — 80048 BASIC METABOLIC PNL TOTAL CA: CPT

## 2019-08-01 PROCEDURE — 76705 ECHO EXAM OF ABDOMEN: CPT

## 2019-08-01 PROCEDURE — 6370000000 HC RX 637 (ALT 250 FOR IP): Performed by: STUDENT IN AN ORGANIZED HEALTH CARE EDUCATION/TRAINING PROGRAM

## 2019-08-01 PROCEDURE — 2580000003 HC RX 258: Performed by: FAMILY MEDICINE

## 2019-08-01 PROCEDURE — 83735 ASSAY OF MAGNESIUM: CPT

## 2019-08-01 PROCEDURE — 1200000000 HC SEMI PRIVATE

## 2019-08-01 PROCEDURE — 99232 SBSQ HOSP IP/OBS MODERATE 35: CPT | Performed by: FAMILY MEDICINE

## 2019-08-01 PROCEDURE — 85651 RBC SED RATE NONAUTOMATED: CPT

## 2019-08-01 PROCEDURE — 83690 ASSAY OF LIPASE: CPT

## 2019-08-01 PROCEDURE — 82947 ASSAY GLUCOSE BLOOD QUANT: CPT

## 2019-08-01 PROCEDURE — 99254 IP/OBS CNSLTJ NEW/EST MOD 60: CPT | Performed by: NURSE PRACTITIONER

## 2019-08-01 RX ORDER — INSULIN GLARGINE 100 [IU]/ML
20 INJECTION, SOLUTION SUBCUTANEOUS NIGHTLY
Status: DISCONTINUED | OUTPATIENT
Start: 2019-08-01 | End: 2019-08-02 | Stop reason: HOSPADM

## 2019-08-01 RX ORDER — CIPROFLOXACIN 250 MG/1
250 TABLET, FILM COATED ORAL 2 TIMES DAILY
Qty: 4 TABLET | Refills: 0 | Status: SHIPPED | OUTPATIENT
Start: 2019-08-01 | End: 2019-08-03

## 2019-08-01 RX ORDER — INSULIN GLARGINE 100 [IU]/ML
10 INJECTION, SOLUTION SUBCUTANEOUS ONCE
Status: COMPLETED | OUTPATIENT
Start: 2019-08-01 | End: 2019-08-01

## 2019-08-01 RX ORDER — ONDANSETRON 4 MG/1
4 TABLET, FILM COATED ORAL EVERY 8 HOURS PRN
Qty: 14 TABLET | Refills: 0 | Status: SHIPPED | OUTPATIENT
Start: 2019-08-01

## 2019-08-01 RX ADMIN — INSULIN LISPRO 2 UNITS: 100 INJECTION, SOLUTION INTRAVENOUS; SUBCUTANEOUS at 17:37

## 2019-08-01 RX ADMIN — CIPROFLOXACIN 400 MG: 2 INJECTION, SOLUTION INTRAVENOUS at 20:50

## 2019-08-01 RX ADMIN — INSULIN GLARGINE 20 UNITS: 100 INJECTION, SOLUTION SUBCUTANEOUS at 20:50

## 2019-08-01 RX ADMIN — INSULIN LISPRO 2 UNITS: 100 INJECTION, SOLUTION INTRAVENOUS; SUBCUTANEOUS at 12:11

## 2019-08-01 RX ADMIN — Medication 10 ML: at 20:36

## 2019-08-01 RX ADMIN — INSULIN LISPRO 1 UNITS: 100 INJECTION, SOLUTION INTRAVENOUS; SUBCUTANEOUS at 20:50

## 2019-08-01 RX ADMIN — LOVASTATIN 10 MG: 20 TABLET ORAL at 20:36

## 2019-08-01 RX ADMIN — CIPROFLOXACIN 400 MG: 2 INJECTION, SOLUTION INTRAVENOUS at 08:34

## 2019-08-01 RX ADMIN — INSULIN GLARGINE 10 UNITS: 100 INJECTION, SOLUTION SUBCUTANEOUS at 12:11

## 2019-08-01 RX ADMIN — SODIUM CHLORIDE, PRESERVATIVE FREE 10 ML: 5 INJECTION INTRAVENOUS at 20:37

## 2019-08-01 RX ADMIN — MONTELUKAST SODIUM 10 MG: 10 TABLET, FILM COATED ORAL at 20:36

## 2019-08-01 ASSESSMENT — PAIN SCALES - GENERAL: PAINLEVEL_OUTOF10: 0

## 2019-08-01 NOTE — PROGRESS NOTES
Patient informed of d/c, patient is not wanting to leave states she doesn't have a ride home and doesn't want to take a cab. Attending team paged informed of the situation. SONYA has not been signed by physician and order for home care is needed. Attending team informed of needed d/c documents if they wish to proceeded with d/c.    Electronically signed by Terrence Carter RN on 8/1/2019 at 6:39 PM

## 2019-08-01 NOTE — CONSULTS
Component Value Date    HEPCAB NONREACTIVE 09/02/2016       Cancer Markers:  CEA:    No results for input(s): CEA in the last 72 hours. Ca 125:   No results for input(s):  in the last 72 hours. Ca 19-9:     Invalid input(s):   AFP: No results for input(s): AFP in the last 72 hours. Active Problems:    UTI (urinary tract infection)  Resolved Problems:    * No resolved hospital problems. *       Assessment:  1. Nausea, vomiting, and diarrhea-most likely viral. Stool cultures were negative.   -Supportive care  -Diet as tolerated  -No endoscopy indicated at this time.   -Pt will need OP repeat colonoscopy as there is colon cancer in the family and it has been more than 5 years since pt had scope    2. UTI-mgt per primary      This plan was formulated in collaboration with Dr. Antwan Lang    GI will s/o. Pt may follow up with previous GI specialist or Dr. Antwan Lang to arrange for OP colonosopy.    Electronically signed by:  [facility]  [phone]  8/1/2019    2:53 PM

## 2019-08-01 NOTE — PROGRESS NOTES
POC Glucose Fingerstick    Collection Time: 08/01/19  6:31 AM   Result Value Ref Range    POC Glucose 177 (H) 65 - 105 mg/dL         Imaging/Diagonstics:      Current Facility-Administered Medications   Medication Dose Route Frequency Provider Last Rate Last Dose    insulin glargine (LANTUS) injection vial 10 Units  10 Units Subcutaneous Once Vallie Councilman, MD        insulin glargine (LANTUS) injection vial 20 Units  20 Units Subcutaneous Nightly Vallie Councilman, MD        sodium chloride flush 0.9 % injection 10 mL  10 mL Intravenous 2 times per day Edmar Arias DO   10 mL at 07/31/19 0900    sodium chloride flush 0.9 % injection 10 mL  10 mL Intravenous PRN Edmar Arias DO        sodium chloride flush 0.9 % injection 10 mL  10 mL Intravenous 2 times per day Vallie Councilman, MD   10 mL at 07/31/19 0930    sodium chloride flush 0.9 % injection 10 mL  10 mL Intravenous PRN Vallie Councilman, MD   10 mL at 07/31/19 2212    promethazine (PHENERGAN) injection 6.25 mg  6.25 mg Intramuscular Q6H PRN Maribell Jones MD        zolpidem (AMBIEN) tablet 10 mg  10 mg Oral Nightly PRN Arthur Halsted, MD   10 mg at 07/31/19 2228    0.9 % sodium chloride infusion   Intravenous Continuous Isabela Arrieta PA-C 75 mL/hr at 07/30/19 1758      acetaminophen (TYLENOL) tablet 650 mg  650 mg Oral Q8H PRN Edmar Arias DO        albuterol sulfate  (90 Base) MCG/ACT inhaler 2 puff  2 puff Inhalation Q6H PRN Vallie Councilman, MD        aspirin EC tablet 81 mg  81 mg Oral Daily Vallie Councilman, MD   81 mg at 07/31/19 0930    lovastatin (MEVACOR) tablet 10 mg  10 mg Oral Nightly Vallie Councilman, MD   10 mg at 07/31/19 2100    montelukast (SINGULAIR) tablet 10 mg  10 mg Oral Nightly Vallie Councilman, MD   10 mg at 07/31/19 2041    sertraline (ZOLOFT) tablet 100 mg  100 mg Oral Daily Vallie Councilman, MD   100 mg at 07/31/19 0930    magnesium hydroxide (MILK OF MAGNESIA) 400 MG/5ML suspension 30 mL  30 mL Oral Daily PRN Vallie Councilman, MD Earlene Ralph ondansetron (ZOFRAN) injection 4 mg  4 mg Intravenous Q6H PRN Lokesh Valdez MD   4 mg at 07/31/19 2211    0.9 % sodium chloride infusion   Intravenous Continuous Lokesh Valdez  mL/hr at 07/31/19 2057      acetaminophen (TYLENOL) tablet 650 mg  650 mg Oral Q4H PRN Lokesh Valdez MD        morphine injection 1 mg  1 mg Intravenous Q4H PRN Lokesh Valdez MD        ciprofloxacin (CIPRO) IVPB 400 mg  400 mg Intravenous Q12H Lokesh Valdez  mL/hr at 08/01/19 0834 400 mg at 08/01/19 0834    insulin lispro (HUMALOG) injection vial 0-6 Units  0-6 Units Subcutaneous TID WC Lokesh Valdez MD   1 Units at 07/31/19 1847    insulin lispro (HUMALOG) injection vial 0-3 Units  0-3 Units Subcutaneous Nightly Lokesh Valdez MD   1 Units at 07/31/19 2102    glucose (GLUTOSE) 40 % oral gel 15 g  15 g Oral PRN Lokesh Valdez MD        dextrose 50 % IV solution  12.5 g Intravenous PRN Lokesh Valdez MD        glucagon (rDNA) injection 1 mg  1 mg Intramuscular PRN Lokesh Valdez MD        dextrose 5 % solution  100 mL/hr Intravenous PRN Lokesh Valdez MD           ASSESSMENT:     Active Problems:    UTI (urinary tract infection)  Resolved Problems:    * No resolved hospital problems. *      PLAN:     1. UTI  - IVF  - Ciprofloxacin 400 mg IV Q12, day 3 today  - lactic acid 1.6 on 7/31  - continue zofran and phenergan for nausea    2. Mid-epigastric abdominal pain  - C. Diff toxin and antigen negative  - stool Cx negative  - Lipase 83 today AM  - f/u US pancreas  - GI onboard    3. DM  - POCT glucose  - hypoglycemia protocol  - lantus 20U nightly  - low dose insulin sliding scale    Will continue to monitor the patient. Discussed care plan with nurse after getting her input.     Above plan discussed with the patient and family in room, who agreed to the above plan     Plan will be discussed with the attending, Dr. Luis Puente MD  Family Medicine Resident  8/1/2019 9:49 AM

## 2019-08-01 NOTE — CARE COORDINATION
Care Transition  Per floor RN she would like [facility] care. Referral sent. Called [facility]. and let her know of referral and thay will be leaving tomorrow due to meds was sent to pharmacy and now pharmacy is closed and SONYA needs to be completed. MD team paged and will complete in am. Patient now staying tonight.

## 2019-08-01 NOTE — DISCHARGE INSTR - COC
Continuity of Care Form    Patient Name: Joelle Swenson   :  1955  MRN:  [id number]    Admit date:  2019  Discharge date:  19    Code Status Order: Full Code   Advance Directives:   Advance Care Flowsheet Documentation     Date/Time Healthcare Directive Type of Healthcare Directive Copy in [address] Agent's Name Healthcare Agent's Phone Number    [phone]  No, patient does not have an advance directive for healthcare treatment -- -- -- -- --          Admitting Physician:  Courtney Sanchez DO  PCP: Cooper Emerson MD    Discharging Nurse: [facility] Unit/Room#:   Discharging Unit Phone Number: [phone]    Emergency Contact:   Extended Emergency Contact Information  Primary Emergency Contact: Florencia Jung  Address: [address] [place] [address] Road, [address] [place] [address] Phone: [phone]  Work Phone: [phone]  Mobile Phone: [phone]  Relation: Parent   needed? No  Secondary Emergency Contact: Ada Magaña [address] Phone: [phone]  Work Phone: [phone]  Mobile Phone: [phone]  Relation: Other   needed?  No    Past Surgical History:  Past Surgical History:   Procedure Laterality Date    BLADDER SURGERY  2014    Bladder Stimulator Implant    BLADDER SUSPENSION  2002    BREAST BIOPSY Left 2008    BREAST SURGERY Left 2008    lumpectomy    CHOLECYSTECTOMY  10/10/2003    COLONOSCOPY      x2    ENDOMETRIAL ABLATION      ENDOSCOPY, COLON, DIAGNOSTIC       egd x2    FIXATION KYPHOPLASTY  10/2018    KNEE SURGERY Bilateral     x 2 each side    KYPHOSIS SURGERY N/A 2019    KYPHOPLASTY L4 performed by Rehana Duggan MD at [address]  10/15/14    WITH FUSION POSTERIOR L4 L5 WITH SPINAL CORD MONITORING    NEPHROSTOMY Right     DC PERQ VERT [address] [facility] LMBR N/A 2018

## 2019-08-02 VITALS
RESPIRATION RATE: 16 BRPM | OXYGEN SATURATION: 96 % | HEART RATE: 71 BPM | DIASTOLIC BLOOD PRESSURE: 91 MMHG | WEIGHT: 141.31 LBS | SYSTOLIC BLOOD PRESSURE: 119 MMHG | BODY MASS INDEX: 26.01 KG/M2 | HEIGHT: 62 IN | TEMPERATURE: 97.9 F

## 2019-08-02 LAB
GLUCOSE BLD-MCNC: 111 MG/DL (ref 65–105)
GLUCOSE BLD-MCNC: 172 MG/DL (ref 65–105)

## 2019-08-02 PROCEDURE — 2580000003 HC RX 258: Performed by: STUDENT IN AN ORGANIZED HEALTH CARE EDUCATION/TRAINING PROGRAM

## 2019-08-02 PROCEDURE — 99239 HOSP IP/OBS DSCHRG MGMT >30: CPT | Performed by: FAMILY MEDICINE

## 2019-08-02 PROCEDURE — 82947 ASSAY GLUCOSE BLOOD QUANT: CPT

## 2019-08-02 PROCEDURE — 6370000000 HC RX 637 (ALT 250 FOR IP): Performed by: STUDENT IN AN ORGANIZED HEALTH CARE EDUCATION/TRAINING PROGRAM

## 2019-08-02 PROCEDURE — 6360000002 HC RX W HCPCS: Performed by: STUDENT IN AN ORGANIZED HEALTH CARE EDUCATION/TRAINING PROGRAM

## 2019-08-02 RX ADMIN — SERTRALINE 100 MG: 50 TABLET, FILM COATED ORAL at 08:00

## 2019-08-02 RX ADMIN — ASPIRIN 81 MG: 81 TABLET, COATED ORAL at 08:00

## 2019-08-02 RX ADMIN — CIPROFLOXACIN 400 MG: 2 INJECTION, SOLUTION INTRAVENOUS at 08:13

## 2019-08-02 RX ADMIN — Medication 10 ML: at 08:02

## 2019-08-02 NOTE — PROGRESS NOTES
(rDNA) injection 1 mg  1 mg Intramuscular PRN Stephanie Iniguez MD        dextrose 5 % solution  100 mL/hr Intravenous PRN Stephanie Iniguez MD           ASSESSMENT:     Active Problems:    UTI (urinary tract infection)  Resolved Problems:    * No resolved hospital problems. *      PLAN:     1. UTI  - on Cipro 400 mg IV BID, day 4/8  - IVF    2. Mid-epigastric abdominal pain  - GI consulted, appreciate recs  - US pancreas showing edematous appearing pancreas  - lipase 83 8/1/19  -  N/v/d likely viral  - continue zofran and phenergan PRN for nausea  - GI recommends supportive care    3. DM  - POCT glucose  - hypoglycemia protocol  - lantus 20U nightly  - low dose insulin sliding scale    Patient is clinically improving. Was supposed to go home yesterday but needed meds to bed and SONYA. Patient received meds to bed today AM and will likely d/c to home today. Discussed care plan with nurse after getting her input.     Above plan discussed with the patient and family in room, who agreed to the above plan     Plan will be discussed with the attending, Dr. Westley Sharma MD  Family Medicine Resident  8/2/2019 7:55 AM

## 2019-08-02 NOTE — DISCHARGE SUMMARY
[facility]  Discharge Summary      NAME:  Teresa Tran  :  1955  MRN:  [id number]    Admit date:  2019  Discharge date:  2019    Admitting Physician:  Yola Jara DO    Primary Diagnosis on Admission:   Present on Admission:   UTI (urinary tract infection)      Secondary Diagnoses:  does not have any pertinent problems on file. Admission Condition:  poor     Discharged Condition: fair    Hospital Course:   60year old female the PMHX of DM, nephrectomy presented to the ED with epigastric abdominal pain, nausea and vomiting. Patient's EKG was wnl and trop 19 at 2 checks. CXR wnl. Patient labs suggestive of a UTI. Due to continuous abdominal pain there was concern for mesenteric ischemia in ED and decision was made to admit. Patient was admitted for treatment of UTI and observation for concern of mesenteric ischemia. No CT abdomen w/ contrast was done due to single kidney. Patient was started on ciproflaxacin 400 mg Q12, IVF, and kept NPO d/t nausea and vomiting. Serial abdominal exams were done to monitor for signs of mesenteric ischemia. Patient given zofran and phenergan for nausea. Patient's lipase was slightly elevated in 80's and US pancreas whosed pancreatic edema sugggestive of pancreatitis. Patient improved during course of admission and started to tolerate liquid diet and advanced as tolerated. GI consulted for abdominal pain and suggested supportive care as symptoms likely viral. Patient's symptoms improving and today tolerated soft diet. Patient will be discharged home with remaining course (x2days) of ciproflaxacin and zofran PRN for nausea. Today on day of discharge pt feels better with no further new complaints. Vitals and Labs are at pts baseline. All consultants involved during this admission are agreeable to d/c.     Consults:  GI    Significant Diagnostic/theraputic interventions: EKG, US pancreas,

## 2019-08-06 ENCOUNTER — TELEPHONE (OUTPATIENT)
Dept: FAMILY MEDICINE CLINIC | Age: 64
End: 2019-08-06

## 2019-08-06 NOTE — TELEPHONE ENCOUNTER
[facility] Initial Follow Up Call    Call within 2 business days of discharge: Yes     Patient: Dayana Nash Patient : 1955 MRN: [id number]    [unfilled]    RARS: Readmission Risk Score: 17       Spoke with:  First attempt to reach out to pt for TC call left a vm to please call the office and speak to Cici Merino     Discharge department/facility: [address] [place] [address] services provided:  Scheduled appointment with PCP- 6-15-19 @ 130 pm    Follow Up  Future Appointments   Date Time Provider Magui Mckeon   8/15/2019  1:30 PM Parul Mayes MD [address] [place] [address]   10/17/2019  1:00 PM Kathrine Lloyd MD [facility]

## 2019-08-15 ENCOUNTER — OFFICE VISIT (OUTPATIENT)
Dept: FAMILY MEDICINE CLINIC | Age: 64
End: 2019-08-15
Payer: MEDICAID

## 2019-08-15 VITALS
DIASTOLIC BLOOD PRESSURE: 80 MMHG | HEIGHT: 62 IN | HEART RATE: 72 BPM | SYSTOLIC BLOOD PRESSURE: 137 MMHG | WEIGHT: 141 LBS | BODY MASS INDEX: 25.95 KG/M2

## 2019-08-15 DIAGNOSIS — N30.00 ACUTE CYSTITIS WITHOUT HEMATURIA: Primary | ICD-10-CM

## 2019-08-15 DIAGNOSIS — Z12.31 ENCOUNTER FOR SCREENING MAMMOGRAM FOR BREAST CANCER: ICD-10-CM

## 2019-08-15 DIAGNOSIS — Z12.11 ENCOUNTER FOR SCREENING COLONOSCOPY: ICD-10-CM

## 2019-08-15 LAB — HBA1C MFR BLD: 12.1 %

## 2019-08-15 PROCEDURE — 83036 HEMOGLOBIN GLYCOSYLATED A1C: CPT | Performed by: STUDENT IN AN ORGANIZED HEALTH CARE EDUCATION/TRAINING PROGRAM

## 2019-08-15 PROCEDURE — 99211 OFF/OP EST MAY X REQ PHY/QHP: CPT | Performed by: FAMILY MEDICINE

## 2019-08-15 PROCEDURE — 99214 OFFICE O/P EST MOD 30 MIN: CPT | Performed by: STUDENT IN AN ORGANIZED HEALTH CARE EDUCATION/TRAINING PROGRAM

## 2019-08-15 NOTE — PATIENT INSTRUCTIONS
the same time every morning, even if you feel tired. What to avoid  · Limit caffeine (coffee, tea, caffeinated sodas) during the day, and don't have any for at least 4 to 6 hours before bedtime. · Don't drink alcohol before bedtime. Alcohol can cause you to wake up more often during the night. · Don't smoke or use tobacco, especially in the evening. Nicotine can keep you awake. · Don't take naps during the day, especially close to bedtime. · Don't lie in bed awake for too long. If you can't fall asleep, or if you wake up in the middle of the night and can't get back to sleep within 15 minutes or so, get out of bed and go to another room until you feel sleepy. · Don't take medicine right before bed that may keep you awake or make you feel hyper or energized. Your doctor can tell you if your medicine may do this and if you can take it earlier in the day. If you can't sleep  · Imagine yourself in a peaceful, pleasant scene. Focus on the details and feelings of being in a place that is relaxing. · Get up and do a quiet or boring activity until you feel sleepy. · Don't drink any liquids after 6 p.m. if you wake up often because you have to go to the bathroom. Where can you learn more? Go to https://KontagentpeCinedigm.[facility]. org and sign in to your [employer] account. Enter U294 in the [facility] box to learn more about \"Learning About Sleeping Well. \"     If you do not have an account, please click on the \"Sign Up Now\" link. Current as of: September 11, 2018  Content Version: 12.1  © 5716-5893 Healthwise, [employer]. Care instructions adapted under license by [facility] ([facility]). If you have questions about a medical condition or this instruction, always ask your healthcare professional. [place] [address] any warranty or liability for your use of this information.

## 2019-08-15 NOTE — PROGRESS NOTES
Visit Information    Have you changed or started any medications since your last visit including any over-the-counter medicines, vitamins, or herbal medicines? no   Have you stopped taking any of your medications? Is so, why? -  no  Are you having any side effects from any of your medications? - no    Have you seen any other physician or provider since your last visit?  no   Have you had any other diagnostic tests since your last visit?  no   Have you been seen in the emergency room and/or had an admission in a hospital since we last saw you?  no   Have you had your routine dental cleaning in the past 6 months?  no     Do you have an active MyChart account? If no, what is the barrier?   Yes    Patient Care Team:  Martha Nowak MD as PCP - General (Family Medicine)  Briana Kelly MD as Consulting Physician  Alexander Bolaños MD as Surgeon (Orthopedic Surgery)  SUNNY Eric MD as Surgeon (Cardiothoracic Surgery)  Mera Donald MD as Consulting Physician  Madison Thomas MD as Consulting Physician (Endocrinology)  Michele Potts MD as Consulting Physician (Neurology)    Medical History Review  Past Medical, Family, and Social History reviewed and does not contribute to the patient presenting condition    Health Maintenance   Topic Date Due    Shingles Vaccine (1 of 2) 07/22/2007    Diabetic foot exam  04/28/2017    Diabetic retinal exam  04/28/2017    Breast cancer screen  05/17/2017    Colon Cancer Screen FIT/FOBT  03/13/2018    Cervical cancer screen  09/17/2018    A1C test (Diabetic or Prediabetic)  07/06/2019    Flu vaccine (1) 09/01/2019    Lipid screen  05/24/2020    Diabetic microalbuminuria test  05/25/2020    Potassium monitoring  08/01/2020    Creatinine monitoring  08/01/2020    DTaP/Tdap/Td vaccine (2 - Td) 02/09/2027    Pneumococcal 0-64 years Vaccine  Completed    Hepatitis C screen  Completed    HIV screen  Completed
butalbital-acetaminophen-caffeine (FIORICET, ESGIC) per tablet Take 1 tablet by mouth every 8 hours as needed.  montelukast (SINGULAIR) 10 MG tablet Take 10 mg by mouth nightly.  albuterol (PROVENTIL HFA;VENTOLIN HFA) 108 (90 BASE) MCG/ACT inhaler Inhale 2 puffs into the lungs every 6 hours as needed. Medication Reconciliation  Provider has reviewed medication record and it has been modified as necessary to accurately depict current medications since hospitalization. Zeus Langston has been instructed on these changes. See transitional care telephone note.      Social History     Socioeconomic History    Marital status:      Spouse name: None    Number of children: None    Years of education: 15    Highest education level: None   Occupational History    Occupation: disability     Employer: N/A   Social Needs    Financial resource strain: None    Food insecurity:     Worry: None     Inability: None    Transportation needs:     Medical: None     Non-medical: None   Tobacco Use    Smoking status: Never Smoker    Smokeless tobacco: Never Used   Substance and Sexual Activity    Alcohol use: No    Drug use: No    Sexual activity: None   Lifestyle    Physical activity:     Days per week: None     Minutes per session: None    Stress: None   Relationships    Social connections:     Talks on phone: None     Gets together: None     Attends [employer] service: None     Active member of club or organization: None     Attends meetings of clubs or organizations: None     Relationship status: None    Intimate partner violence:     Fear of current or ex partner: None     Emotionally abused: None     Physically abused: None     Forced sexual activity: None   Other Topics Concern    None   Social History Narrative    None       Past Medical History:   Diagnosis Date    CEFERINO (acute kidney injury) ([facility] [address] 11/19/2014    Asthma     Carotid artery plaque 4/2/2013    Clostridium difficile

## 2019-08-21 ENCOUNTER — TELEPHONE (OUTPATIENT)
Dept: GASTROENTEROLOGY | Age: 64
End: 2019-08-21

## 2019-08-27 ENCOUNTER — HOSPITAL ENCOUNTER (OUTPATIENT)
Dept: WOMENS IMAGING | Age: 64
Discharge: HOME | End: 2019-08-29
Payer: MEDICAID

## 2019-08-27 DIAGNOSIS — Z12.31 ENCOUNTER FOR SCREENING MAMMOGRAM FOR BREAST CANCER: ICD-10-CM

## 2019-08-27 PROCEDURE — 77063 BREAST TOMOSYNTHESIS BI: CPT

## 2019-08-29 PROBLEM — N39.0 UTI (URINARY TRACT INFECTION): Status: RESOLVED | Noted: 2019-07-30 | Resolved: 2019-08-29

## 2019-10-16 ENCOUNTER — APPOINTMENT (OUTPATIENT)
Dept: CT IMAGING | Age: 64
End: 2019-10-16
Payer: MEDICAID

## 2019-10-16 ENCOUNTER — HOSPITAL ENCOUNTER (EMERGENCY)
Age: 64
Discharge: HOME | End: 2019-10-16
Attending: EMERGENCY MEDICINE
Payer: MEDICAID

## 2019-10-16 ENCOUNTER — APPOINTMENT (OUTPATIENT)
Dept: GENERAL RADIOLOGY | Age: 64
End: 2019-10-16
Payer: MEDICAID

## 2019-10-16 VITALS
WEIGHT: 133 LBS | HEIGHT: 62 IN | HEART RATE: 66 BPM | OXYGEN SATURATION: 98 % | SYSTOLIC BLOOD PRESSURE: 153 MMHG | RESPIRATION RATE: 16 BRPM | BODY MASS INDEX: 24.48 KG/M2 | DIASTOLIC BLOOD PRESSURE: 81 MMHG | TEMPERATURE: 98.2 F

## 2019-10-16 DIAGNOSIS — N30.01 ACUTE CYSTITIS WITH HEMATURIA: Primary | ICD-10-CM

## 2019-10-16 DIAGNOSIS — R42 DIZZINESS: ICD-10-CM

## 2019-10-16 LAB
-: ABNORMAL
ABSOLUTE EOS #: 0.1 K/UL (ref 0–0.4)
ABSOLUTE IMMATURE GRANULOCYTE: ABNORMAL K/UL (ref 0–0.3)
ABSOLUTE LYMPH #: 2.4 K/UL (ref 1–4.8)
ABSOLUTE MONO #: 0.6 K/UL (ref 0.1–1.3)
ALBUMIN SERPL-MCNC: 3.4 G/DL (ref 3.5–5.2)
ALBUMIN/GLOBULIN RATIO: ABNORMAL (ref 1–2.5)
ALP BLD-CCNC: 95 U/L (ref 35–104)
ALT SERPL-CCNC: 8 U/L (ref 5–33)
AMORPHOUS: ABNORMAL
ANION GAP SERPL CALCULATED.3IONS-SCNC: 11 MMOL/L (ref 9–17)
AST SERPL-CCNC: 11 U/L
BACTERIA: ABNORMAL
BASOPHILS # BLD: 1 % (ref 0–2)
BASOPHILS ABSOLUTE: 0.1 K/UL (ref 0–0.2)
BETA-HYDROXYBUTYRATE: 0.21 MMOL/L (ref 0.02–0.27)
BILIRUB SERPL-MCNC: 0.59 MG/DL (ref 0.3–1.2)
BILIRUBIN URINE: NEGATIVE
BUN BLDV-MCNC: 11 MG/DL (ref 8–23)
BUN/CREAT BLD: ABNORMAL (ref 9–20)
CALCIUM SERPL-MCNC: 9.2 MG/DL (ref 8.6–10.4)
CASTS UA: ABNORMAL /LPF
CHLORIDE BLD-SCNC: 98 MMOL/L (ref 98–107)
CO2: 23 MMOL/L (ref 20–31)
COLOR: YELLOW
COMMENT UA: ABNORMAL
CREAT SERPL-MCNC: 0.85 MG/DL (ref 0.5–0.9)
CRYSTALS, UA: ABNORMAL /HPF
DIFFERENTIAL TYPE: ABNORMAL
EOSINOPHILS RELATIVE PERCENT: 2 % (ref 0–4)
EPITHELIAL CELLS UA: ABNORMAL /HPF
GFR AFRICAN AMERICAN: >60 ML/MIN
GFR NON-AFRICAN AMERICAN: >60 ML/MIN
GFR SERPL CREATININE-BSD FRML MDRD: ABNORMAL ML/MIN/{1.73_M2}
GFR SERPL CREATININE-BSD FRML MDRD: ABNORMAL ML/MIN/{1.73_M2}
GLUCOSE BLD-MCNC: 251 MG/DL (ref 70–99)
GLUCOSE URINE: NEGATIVE
HCT VFR BLD CALC: 38.6 % (ref 36–46)
HEMOGLOBIN: 12.8 G/DL (ref 12–16)
IMMATURE GRANULOCYTES: ABNORMAL %
KETONES, URINE: NEGATIVE
LEUKOCYTE ESTERASE, URINE: ABNORMAL
LIPASE: 36 U/L (ref 13–60)
LYMPHOCYTES # BLD: 37 % (ref 24–44)
MAGNESIUM: 2 MG/DL (ref 1.6–2.6)
MCH RBC QN AUTO: 27.6 PG (ref 26–34)
MCHC RBC AUTO-ENTMCNC: 33 G/DL (ref 31–37)
MCV RBC AUTO: 83.5 FL (ref 80–100)
MONOCYTES # BLD: 9 % (ref 1–7)
MUCUS: ABNORMAL
NITRITE, URINE: NEGATIVE
NRBC AUTOMATED: ABNORMAL PER 100 WBC
OTHER OBSERVATIONS UA: ABNORMAL
PDW BLD-RTO: 13.4 % (ref 11.5–14.9)
PH UA: 7 (ref 5–8)
PLATELET # BLD: 166 K/UL (ref 150–450)
PLATELET ESTIMATE: ABNORMAL
PMV BLD AUTO: 9.9 FL (ref 6–12)
POTASSIUM SERPL-SCNC: 3.4 MMOL/L (ref 3.7–5.3)
PROTEIN UA: ABNORMAL
RBC # BLD: 4.62 M/UL (ref 4–5.2)
RBC # BLD: ABNORMAL 10*6/UL
RBC UA: ABNORMAL /HPF
RENAL EPITHELIAL, UA: ABNORMAL /HPF
SEG NEUTROPHILS: 51 % (ref 36–66)
SEGMENTED NEUTROPHILS ABSOLUTE COUNT: 3.3 K/UL (ref 1.3–9.1)
SODIUM BLD-SCNC: 132 MMOL/L (ref 135–144)
SPECIFIC GRAVITY UA: 1.01 (ref 1–1.03)
TOTAL PROTEIN: 7.1 G/DL (ref 6.4–8.3)
TRICHOMONAS: ABNORMAL
TROPONIN INTERP: ABNORMAL
TROPONIN INTERP: ABNORMAL
TROPONIN T: ABNORMAL NG/ML
TROPONIN T: ABNORMAL NG/ML
TROPONIN, HIGH SENSITIVITY: 19 NG/L (ref 0–14)
TROPONIN, HIGH SENSITIVITY: 21 NG/L (ref 0–14)
TURBIDITY: CLEAR
URINE HGB: ABNORMAL
UROBILINOGEN, URINE: NORMAL
WBC # BLD: 6.6 K/UL (ref 3.5–11)
WBC # BLD: ABNORMAL 10*3/UL
WBC UA: ABNORMAL /HPF
YEAST: ABNORMAL

## 2019-10-16 PROCEDURE — 6370000000 HC RX 637 (ALT 250 FOR IP): Performed by: EMERGENCY MEDICINE

## 2019-10-16 PROCEDURE — 36415 COLL VENOUS BLD VENIPUNCTURE: CPT

## 2019-10-16 PROCEDURE — 71045 X-RAY EXAM CHEST 1 VIEW: CPT

## 2019-10-16 PROCEDURE — 87086 URINE CULTURE/COLONY COUNT: CPT

## 2019-10-16 PROCEDURE — 85025 COMPLETE CBC W/AUTO DIFF WBC: CPT

## 2019-10-16 PROCEDURE — 84484 ASSAY OF TROPONIN QUANT: CPT

## 2019-10-16 PROCEDURE — 83735 ASSAY OF MAGNESIUM: CPT

## 2019-10-16 PROCEDURE — 99285 EMERGENCY DEPT VISIT HI MDM: CPT

## 2019-10-16 PROCEDURE — 82010 KETONE BODYS QUAN: CPT

## 2019-10-16 PROCEDURE — 83690 ASSAY OF LIPASE: CPT

## 2019-10-16 PROCEDURE — 93005 ELECTROCARDIOGRAM TRACING: CPT | Performed by: EMERGENCY MEDICINE

## 2019-10-16 PROCEDURE — 80053 COMPREHEN METABOLIC PANEL: CPT

## 2019-10-16 PROCEDURE — 81001 URINALYSIS AUTO W/SCOPE: CPT

## 2019-10-16 PROCEDURE — 70450 CT HEAD/BRAIN W/O DYE: CPT

## 2019-10-16 RX ORDER — CIPROFLOXACIN 500 MG/1
500 TABLET, FILM COATED ORAL ONCE
Status: COMPLETED | OUTPATIENT
Start: 2019-10-16 | End: 2019-10-16

## 2019-10-16 RX ORDER — CIPROFLOXACIN 500 MG/1
500 TABLET, FILM COATED ORAL 2 TIMES DAILY
Qty: 14 TABLET | Refills: 0 | Status: SHIPPED | OUTPATIENT
Start: 2019-10-16 | End: 2019-10-23

## 2019-10-16 RX ADMIN — CIPROFLOXACIN 500 MG: 500 TABLET, FILM COATED ORAL at 18:47

## 2019-10-16 ASSESSMENT — ENCOUNTER SYMPTOMS
BLOOD IN STOOL: 0
NAUSEA: 0
VOMITING: 0
ABDOMINAL PAIN: 0
COUGH: 0
COLOR CHANGE: 0
SHORTNESS OF BREATH: 0
SORE THROAT: 0
CONSTIPATION: 0
BACK PAIN: 0
DIARRHEA: 0
TROUBLE SWALLOWING: 0

## 2019-10-17 LAB
CULTURE: NORMAL
EKG ATRIAL RATE: 62 BPM
EKG P AXIS: 61 DEGREES
EKG P-R INTERVAL: 140 MS
EKG Q-T INTERVAL: 472 MS
EKG QRS DURATION: 88 MS
EKG QTC CALCULATION (BAZETT): 479 MS
EKG R AXIS: 41 DEGREES
EKG T AXIS: 50 DEGREES
EKG VENTRICULAR RATE: 62 BPM
Lab: NORMAL
SPECIMEN DESCRIPTION: NORMAL

## 2019-10-17 PROCEDURE — 93010 ELECTROCARDIOGRAM REPORT: CPT | Performed by: INTERNAL MEDICINE

## 2019-10-18 ENCOUNTER — OFFICE VISIT (OUTPATIENT)
Dept: FAMILY MEDICINE CLINIC | Age: 64
End: 2019-10-18
Payer: MEDICAID

## 2019-10-18 VITALS
WEIGHT: 141 LBS | DIASTOLIC BLOOD PRESSURE: 78 MMHG | HEART RATE: 74 BPM | SYSTOLIC BLOOD PRESSURE: 130 MMHG | HEIGHT: 62 IN | BODY MASS INDEX: 25.95 KG/M2

## 2019-10-18 DIAGNOSIS — Z00.00 HEALTHCARE MAINTENANCE: ICD-10-CM

## 2019-10-18 DIAGNOSIS — R42 DIZZINESS: Primary | ICD-10-CM

## 2019-10-18 DIAGNOSIS — Z12.11 COLON CANCER SCREENING: ICD-10-CM

## 2019-10-18 PROCEDURE — G8417 CALC BMI ABV UP PARAM F/U: HCPCS | Performed by: STUDENT IN AN ORGANIZED HEALTH CARE EDUCATION/TRAINING PROGRAM

## 2019-10-18 PROCEDURE — G8482 FLU IMMUNIZE ORDER/ADMIN: HCPCS | Performed by: STUDENT IN AN ORGANIZED HEALTH CARE EDUCATION/TRAINING PROGRAM

## 2019-10-18 PROCEDURE — 99211 OFF/OP EST MAY X REQ PHY/QHP: CPT | Performed by: FAMILY MEDICINE

## 2019-10-18 PROCEDURE — 1036F TOBACCO NON-USER: CPT | Performed by: STUDENT IN AN ORGANIZED HEALTH CARE EDUCATION/TRAINING PROGRAM

## 2019-10-18 PROCEDURE — G8427 DOCREV CUR MEDS BY ELIG CLIN: HCPCS | Performed by: STUDENT IN AN ORGANIZED HEALTH CARE EDUCATION/TRAINING PROGRAM

## 2019-10-18 PROCEDURE — 99213 OFFICE O/P EST LOW 20 MIN: CPT | Performed by: STUDENT IN AN ORGANIZED HEALTH CARE EDUCATION/TRAINING PROGRAM

## 2019-10-18 PROCEDURE — G8598 ASA/ANTIPLAT THER USED: HCPCS | Performed by: STUDENT IN AN ORGANIZED HEALTH CARE EDUCATION/TRAINING PROGRAM

## 2019-10-18 PROCEDURE — 90686 IIV4 VACC NO PRSV 0.5 ML IM: CPT | Performed by: FAMILY MEDICINE

## 2019-10-18 PROCEDURE — 3017F COLORECTAL CA SCREEN DOC REV: CPT | Performed by: STUDENT IN AN ORGANIZED HEALTH CARE EDUCATION/TRAINING PROGRAM

## 2019-10-18 ASSESSMENT — ENCOUNTER SYMPTOMS
SHORTNESS OF BREATH: 0
CHEST TIGHTNESS: 0

## 2019-10-22 ENCOUNTER — OFFICE VISIT (OUTPATIENT)
Dept: ORTHOPEDIC SURGERY | Age: 64
End: 2019-10-22
Payer: MEDICAID

## 2019-10-22 DIAGNOSIS — M81.6 LOCALIZED OSTEOPOROSIS WITHOUT CURRENT PATHOLOGICAL FRACTURE: ICD-10-CM

## 2019-10-22 DIAGNOSIS — S32.030D CLOSED COMPRESSION FRACTURE OF L3 LUMBAR VERTEBRA WITH ROUTINE HEALING, SUBSEQUENT ENCOUNTER: ICD-10-CM

## 2019-10-22 DIAGNOSIS — M43.10 ACQUIRED SPONDYLOLISTHESIS: ICD-10-CM

## 2019-10-22 DIAGNOSIS — M51.36 DDD (DEGENERATIVE DISC DISEASE), LUMBAR: ICD-10-CM

## 2019-10-22 DIAGNOSIS — S32.040D CLOSED COMPRESSION FRACTURE OF L4 LUMBAR VERTEBRA WITH ROUTINE HEALING, SUBSEQUENT ENCOUNTER: Primary | ICD-10-CM

## 2019-10-22 DIAGNOSIS — S32.020D CLOSED COMPRESSION FRACTURE OF L2 LUMBAR VERTEBRA WITH ROUTINE HEALING, SUBSEQUENT ENCOUNTER: ICD-10-CM
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
## 2019-10-22 DIAGNOSIS — S22.000D CLOSED COMPRESSION FRACTURE OF THORACIC VERTEBRA WITH ROUTINE HEALING, SUBSEQUENT ENCOUNTER: ICD-10-CM

## 2019-10-22 DIAGNOSIS — M80.00XD AGE-RELATED OSTEOPOROSIS WITH CURRENT PATHOLOGICAL FRACTURE WITH ROUTINE HEALING, SUBSEQUENT ENCOUNTER: ICD-10-CM

## 2019-10-22 PROCEDURE — 3017F COLORECTAL CA SCREEN DOC REV: CPT | Performed by: ORTHOPAEDIC SURGERY

## 2019-10-22 PROCEDURE — G8417 CALC BMI ABV UP PARAM F/U: HCPCS | Performed by: ORTHOPAEDIC SURGERY

## 2019-10-22 PROCEDURE — 1036F TOBACCO NON-USER: CPT | Performed by: ORTHOPAEDIC SURGERY

## 2019-10-22 PROCEDURE — 99213 OFFICE O/P EST LOW 20 MIN: CPT | Performed by: ORTHOPAEDIC SURGERY

## 2019-10-22 PROCEDURE — G8598 ASA/ANTIPLAT THER USED: HCPCS | Performed by: ORTHOPAEDIC SURGERY

## 2019-10-22 PROCEDURE — G8427 DOCREV CUR MEDS BY ELIG CLIN: HCPCS | Performed by: ORTHOPAEDIC SURGERY

## 2019-10-22 PROCEDURE — G8482 FLU IMMUNIZE ORDER/ADMIN: HCPCS | Performed by: ORTHOPAEDIC SURGERY

## 2020-03-26 ENCOUNTER — APPOINTMENT (OUTPATIENT)
Dept: GENERAL RADIOLOGY | Age: 65
DRG: 420 | End: 2020-03-26
Payer: MEDICAID

## 2020-03-26 ENCOUNTER — HOSPITAL ENCOUNTER (INPATIENT)
Age: 65
LOS: 1 days | Discharge: HOME | DRG: 420 | End: 2020-03-27
Attending: EMERGENCY MEDICINE | Admitting: FAMILY MEDICINE
Payer: MEDICAID

## 2020-03-26 ENCOUNTER — APPOINTMENT (OUTPATIENT)
Dept: CT IMAGING | Age: 65
DRG: 420 | End: 2020-03-26
Payer: MEDICAID

## 2020-03-26 PROBLEM — N39.0 UTI (URINARY TRACT INFECTION): Status: ACTIVE | Noted: 2020-03-26

## 2020-03-26 PROBLEM — E11.10 DKA, TYPE 2, NOT AT GOAL (HCC): Status: ACTIVE | Noted: 2020-03-26

## 2020-03-26 LAB
-: ABNORMAL
ABSOLUTE EOS #: <0.03 K/UL (ref 0–0.44)
ABSOLUTE IMMATURE GRANULOCYTE: 0.1 K/UL (ref 0–0.3)
ABSOLUTE LYMPH #: 1.45 K/UL (ref 1.1–3.7)
ABSOLUTE MONO #: 0.73 K/UL (ref 0.1–1.2)
ALBUMIN SERPL-MCNC: 3.5 G/DL (ref 3.5–5.2)
ALBUMIN/GLOBULIN RATIO: 0.8 (ref 1–2.5)
ALLEN TEST: ABNORMAL
ALP BLD-CCNC: 116 U/L (ref 35–104)
ALT SERPL-CCNC: 15 U/L (ref 5–33)
AMORPHOUS: ABNORMAL
ANION GAP SERPL CALCULATED.3IONS-SCNC: 13 MMOL/L (ref 9–17)
ANION GAP SERPL CALCULATED.3IONS-SCNC: 14 MMOL/L (ref 9–17)
ANION GAP SERPL CALCULATED.3IONS-SCNC: 18 MMOL/L (ref 9–17)
AST SERPL-CCNC: 16 U/L
BACTERIA: ABNORMAL
BASOPHILS # BLD: 0 % (ref 0–2)
BASOPHILS ABSOLUTE: 0.04 K/UL (ref 0–0.2)
BETA-HYDROXYBUTYRATE: 0.58 MMOL/L (ref 0.02–0.27)
BILIRUB SERPL-MCNC: 0.52 MG/DL (ref 0.3–1.2)
BILIRUBIN URINE: NEGATIVE
BUN BLDV-MCNC: 19 MG/DL (ref 8–23)
BUN BLDV-MCNC: 20 MG/DL (ref 8–23)
BUN BLDV-MCNC: 22 MG/DL (ref 8–23)
BUN/CREAT BLD: ABNORMAL (ref 9–20)
CALCIUM SERPL-MCNC: 8.1 MG/DL (ref 8.6–10.4)
CALCIUM SERPL-MCNC: 8.9 MG/DL (ref 8.6–10.4)
CALCIUM SERPL-MCNC: 9.2 MG/DL (ref 8.6–10.4)
CARBOXYHEMOGLOBIN: 0.6 % (ref 0–5)
CASTS UA: ABNORMAL /LPF (ref 0–8)
CHLORIDE BLD-SCNC: 97 MMOL/L (ref 98–107)
CHLORIDE BLD-SCNC: 97 MMOL/L (ref 98–107)
CHLORIDE BLD-SCNC: 99 MMOL/L (ref 98–107)
CO2: 16 MMOL/L (ref 20–31)
CO2: 17 MMOL/L (ref 20–31)
CO2: 18 MMOL/L (ref 20–31)
COLOR: YELLOW
CREAT SERPL-MCNC: 0.87 MG/DL (ref 0.5–0.9)
CREAT SERPL-MCNC: 0.87 MG/DL (ref 0.5–0.9)
CREAT SERPL-MCNC: 1.08 MG/DL (ref 0.5–0.9)
CRYSTALS, UA: ABNORMAL /HPF
DIFFERENTIAL TYPE: ABNORMAL
EOSINOPHILS RELATIVE PERCENT: 0 % (ref 1–4)
EPITHELIAL CELLS UA: ABNORMAL /HPF (ref 0–5)
FIO2: ABNORMAL
GFR AFRICAN AMERICAN: >60 ML/MIN
GFR NON-AFRICAN AMERICAN: 51 ML/MIN
GFR NON-AFRICAN AMERICAN: >60 ML/MIN
GFR NON-AFRICAN AMERICAN: >60 ML/MIN
GFR SERPL CREATININE-BSD FRML MDRD: ABNORMAL ML/MIN/{1.73_M2}
GLUCOSE BLD-MCNC: 197 MG/DL (ref 65–105)
GLUCOSE BLD-MCNC: 220 MG/DL (ref 65–105)
GLUCOSE BLD-MCNC: 240 MG/DL (ref 65–105)
GLUCOSE BLD-MCNC: 241 MG/DL (ref 65–105)
GLUCOSE BLD-MCNC: 251 MG/DL (ref 70–99)
GLUCOSE BLD-MCNC: 259 MG/DL (ref 65–105)
GLUCOSE BLD-MCNC: 338 MG/DL (ref 65–105)
GLUCOSE BLD-MCNC: 414 MG/DL (ref 65–105)
GLUCOSE BLD-MCNC: 418 MG/DL (ref 65–105)
GLUCOSE BLD-MCNC: 437 MG/DL (ref 65–105)
GLUCOSE BLD-MCNC: 454 MG/DL (ref 65–105)
GLUCOSE BLD-MCNC: 475 MG/DL (ref 70–99)
GLUCOSE BLD-MCNC: 519 MG/DL (ref 70–99)
GLUCOSE URINE: ABNORMAL
HCO3 VENOUS: 19.7 MMOL/L (ref 24–30)
HCT VFR BLD CALC: 42.6 % (ref 36.3–47.1)
HEMOGLOBIN: 13.7 G/DL (ref 11.9–15.1)
IMMATURE GRANULOCYTES: 1 %
INR BLD: 0.9
KETONES, URINE: ABNORMAL
LACTIC ACID, SEPSIS WHOLE BLOOD: 2.6 MMOL/L (ref 0.5–1.9)
LACTIC ACID, SEPSIS: ABNORMAL MMOL/L (ref 0.5–1.9)
LACTIC ACID, WHOLE BLOOD: 2.4 MMOL/L (ref 0.7–2.1)
LACTIC ACID, WHOLE BLOOD: 2.9 MMOL/L (ref 0.7–2.1)
LACTIC ACID: ABNORMAL MMOL/L
LEUKOCYTE ESTERASE, URINE: NEGATIVE
LYMPHOCYTES # BLD: 11 % (ref 24–43)
MAGNESIUM: 1.9 MG/DL (ref 1.6–2.6)
MAGNESIUM: 1.9 MG/DL (ref 1.6–2.6)
MCH RBC QN AUTO: 27.9 PG (ref 25.2–33.5)
MCHC RBC AUTO-ENTMCNC: 32.2 G/DL (ref 28.4–34.8)
MCV RBC AUTO: 86.8 FL (ref 82.6–102.9)
METHEMOGLOBIN: ABNORMAL % (ref 0–1.5)
MODE: ABNORMAL
MONOCYTES # BLD: 6 % (ref 3–12)
MUCUS: ABNORMAL
MYOGLOBIN: 292 NG/ML (ref 25–58)
NEGATIVE BASE EXCESS, VEN: 3.4 MMOL/L (ref 0–2)
NITRITE, URINE: NEGATIVE
NOTIFICATION TIME: ABNORMAL
NOTIFICATION: ABNORMAL
NRBC AUTOMATED: 0 PER 100 WBC
O2 DEVICE/FLOW/%: ABNORMAL
O2 SAT, VEN: 94.7 % (ref 60–85)
OTHER OBSERVATIONS UA: ABNORMAL
OXYHEMOGLOBIN: ABNORMAL % (ref 95–98)
PARTIAL THROMBOPLASTIN TIME: 21.7 SEC (ref 20.5–30.5)
PATIENT TEMP: 37
PCO2, VEN, TEMP ADJ: ABNORMAL MMHG (ref 39–55)
PCO2, VEN: 31 (ref 39–55)
PDW BLD-RTO: 11.9 % (ref 11.8–14.4)
PEEP/CPAP: ABNORMAL
PH UA: 6.5 (ref 5–8)
PH VENOUS: 7.42 (ref 7.32–7.42)
PH, VEN, TEMP ADJ: ABNORMAL (ref 7.32–7.42)
PHOSPHORUS: 2.5 MG/DL (ref 2.6–4.5)
PHOSPHORUS: 2.8 MG/DL (ref 2.6–4.5)
PLATELET # BLD: 204 K/UL (ref 138–453)
PLATELET ESTIMATE: ABNORMAL
PMV BLD AUTO: 12.4 FL (ref 8.1–13.5)
PO2, VEN, TEMP ADJ: ABNORMAL MMHG (ref 30–50)
PO2, VEN: 71 (ref 30–50)
POSITIVE BASE EXCESS, VEN: ABNORMAL MMOL/L (ref 0–2)
POTASSIUM SERPL-SCNC: 4 MMOL/L (ref 3.7–5.3)
POTASSIUM SERPL-SCNC: 4.2 MMOL/L (ref 3.7–5.3)
POTASSIUM SERPL-SCNC: 4.5 MMOL/L (ref 3.7–5.3)
PROTEIN UA: ABNORMAL
PROTHROMBIN TIME: 9.4 SEC (ref 9–12)
PSV: ABNORMAL
PT. POSITION: ABNORMAL
RBC # BLD: 4.91 M/UL (ref 3.95–5.11)
RBC # BLD: ABNORMAL 10*6/UL
RBC UA: ABNORMAL /HPF (ref 0–4)
RENAL EPITHELIAL, UA: ABNORMAL /HPF
RESPIRATORY RATE: ABNORMAL
SAMPLE SITE: ABNORMAL
SEG NEUTROPHILS: 82 % (ref 36–65)
SEGMENTED NEUTROPHILS ABSOLUTE COUNT: 10.96 K/UL (ref 1.5–8.1)
SET RATE: ABNORMAL
SODIUM BLD-SCNC: 127 MMOL/L (ref 135–144)
SODIUM BLD-SCNC: 129 MMOL/L (ref 135–144)
SODIUM BLD-SCNC: 133 MMOL/L (ref 135–144)
SPECIFIC GRAVITY UA: 1.02 (ref 1–1.03)
TEXT FOR RESPIRATORY: ABNORMAL
TOTAL CK: 185 U/L (ref 26–192)
TOTAL HB: ABNORMAL G/DL (ref 12–16)
TOTAL PROTEIN: 8 G/DL (ref 6.4–8.3)
TOTAL RATE: ABNORMAL
TRICHOMONAS: ABNORMAL
TROPONIN INTERP: ABNORMAL
TROPONIN T: ABNORMAL NG/ML
TROPONIN, HIGH SENSITIVITY: 29 NG/L (ref 0–14)
TURBIDITY: CLEAR
URINE HGB: ABNORMAL
UROBILINOGEN, URINE: NORMAL
VT: ABNORMAL
WBC # BLD: 13.3 K/UL (ref 3.5–11.3)
WBC # BLD: ABNORMAL 10*3/UL
WBC UA: ABNORMAL /HPF (ref 0–5)
YEAST: ABNORMAL

## 2020-03-26 PROCEDURE — 2580000003 HC RX 258: Performed by: STUDENT IN AN ORGANIZED HEALTH CARE EDUCATION/TRAINING PROGRAM

## 2020-03-26 PROCEDURE — 97162 PT EVAL MOD COMPLEX 30 MIN: CPT

## 2020-03-26 PROCEDURE — 6360000002 HC RX W HCPCS: Performed by: EMERGENCY MEDICINE

## 2020-03-26 PROCEDURE — 87449 NOS EACH ORGANISM AG IA: CPT

## 2020-03-26 PROCEDURE — 82550 ASSAY OF CK (CPK): CPT

## 2020-03-26 PROCEDURE — 6370000000 HC RX 637 (ALT 250 FOR IP)

## 2020-03-26 PROCEDURE — 80053 COMPREHEN METABOLIC PANEL: CPT

## 2020-03-26 PROCEDURE — 94761 N-INVAS EAR/PLS OXIMETRY MLT: CPT

## 2020-03-26 PROCEDURE — 80048 BASIC METABOLIC PNL TOTAL CA: CPT

## 2020-03-26 PROCEDURE — 87086 URINE CULTURE/COLONY COUNT: CPT

## 2020-03-26 PROCEDURE — 71045 X-RAY EXAM CHEST 1 VIEW: CPT

## 2020-03-26 PROCEDURE — 97530 THERAPEUTIC ACTIVITIES: CPT

## 2020-03-26 PROCEDURE — 87324 CLOSTRIDIUM AG IA: CPT

## 2020-03-26 PROCEDURE — 94640 AIRWAY INHALATION TREATMENT: CPT

## 2020-03-26 PROCEDURE — 82010 KETONE BODYS QUAN: CPT

## 2020-03-26 PROCEDURE — 96366 THER/PROPH/DIAG IV INF ADDON: CPT

## 2020-03-26 PROCEDURE — 85025 COMPLETE CBC W/AUTO DIFF WBC: CPT

## 2020-03-26 PROCEDURE — 83036 HEMOGLOBIN GLYCOSYLATED A1C: CPT

## 2020-03-26 PROCEDURE — 97166 OT EVAL MOD COMPLEX 45 MIN: CPT

## 2020-03-26 PROCEDURE — 93005 ELECTROCARDIOGRAM TRACING: CPT | Performed by: FAMILY MEDICINE

## 2020-03-26 PROCEDURE — G0378 HOSPITAL OBSERVATION PER HR: HCPCS

## 2020-03-26 PROCEDURE — 36415 COLL VENOUS BLD VENIPUNCTURE: CPT

## 2020-03-26 PROCEDURE — 99285 EMERGENCY DEPT VISIT HI MDM: CPT

## 2020-03-26 PROCEDURE — 82947 ASSAY GLUCOSE BLOOD QUANT: CPT

## 2020-03-26 PROCEDURE — 97535 SELF CARE MNGMENT TRAINING: CPT

## 2020-03-26 PROCEDURE — 93005 ELECTROCARDIOGRAM TRACING: CPT | Performed by: EMERGENCY MEDICINE

## 2020-03-26 PROCEDURE — 6360000002 HC RX W HCPCS

## 2020-03-26 PROCEDURE — 83874 ASSAY OF MYOGLOBIN: CPT

## 2020-03-26 PROCEDURE — 2060000000 HC ICU INTERMEDIATE R&B

## 2020-03-26 PROCEDURE — 86403 PARTICLE AGGLUT ANTBDY SCRN: CPT

## 2020-03-26 PROCEDURE — 82805 BLOOD GASES W/O2 SATURATION: CPT

## 2020-03-26 PROCEDURE — 96365 THER/PROPH/DIAG IV INF INIT: CPT

## 2020-03-26 PROCEDURE — 87040 BLOOD CULTURE FOR BACTERIA: CPT

## 2020-03-26 PROCEDURE — 2580000003 HC RX 258

## 2020-03-26 PROCEDURE — 96372 THER/PROPH/DIAG INJ SC/IM: CPT

## 2020-03-26 PROCEDURE — 84100 ASSAY OF PHOSPHORUS: CPT

## 2020-03-26 PROCEDURE — 6360000002 HC RX W HCPCS: Performed by: STUDENT IN AN ORGANIZED HEALTH CARE EDUCATION/TRAINING PROGRAM

## 2020-03-26 PROCEDURE — 2580000003 HC RX 258: Performed by: EMERGENCY MEDICINE

## 2020-03-26 PROCEDURE — 85730 THROMBOPLASTIN TIME PARTIAL: CPT

## 2020-03-26 PROCEDURE — 85610 PROTHROMBIN TIME: CPT

## 2020-03-26 PROCEDURE — 2500000003 HC RX 250 WO HCPCS

## 2020-03-26 PROCEDURE — 2580000003 HC RX 258: Performed by: INTERNAL MEDICINE

## 2020-03-26 PROCEDURE — 83735 ASSAY OF MAGNESIUM: CPT

## 2020-03-26 PROCEDURE — 84484 ASSAY OF TROPONIN QUANT: CPT

## 2020-03-26 PROCEDURE — 96361 HYDRATE IV INFUSION ADD-ON: CPT

## 2020-03-26 PROCEDURE — 70450 CT HEAD/BRAIN W/O DYE: CPT

## 2020-03-26 PROCEDURE — 83605 ASSAY OF LACTIC ACID: CPT

## 2020-03-26 PROCEDURE — 81001 URINALYSIS AUTO W/SCOPE: CPT

## 2020-03-26 RX ORDER — GEMFIBROZIL 600 MG/1
1 TABLET, FILM COATED ORAL 2 TIMES DAILY
Status: CANCELLED | OUTPATIENT
Start: 2020-03-26

## 2020-03-26 RX ORDER — ONDANSETRON 4 MG/1
4 TABLET, FILM COATED ORAL EVERY 8 HOURS PRN
Status: CANCELLED | OUTPATIENT
Start: 2020-03-26

## 2020-03-26 RX ORDER — ASPIRIN 81 MG/1
81 TABLET ORAL DAILY
Status: CANCELLED | OUTPATIENT
Start: 2020-03-26

## 2020-03-26 RX ORDER — DEXTROSE MONOHYDRATE 25 G/50ML
12.5 INJECTION, SOLUTION INTRAVENOUS PRN
Status: CANCELLED | OUTPATIENT
Start: 2020-03-26

## 2020-03-26 RX ORDER — POTASSIUM CHLORIDE 7.45 MG/ML
10 INJECTION INTRAVENOUS PRN
Status: DISCONTINUED | OUTPATIENT
Start: 2020-03-26 | End: 2020-03-27

## 2020-03-26 RX ORDER — SERTRALINE HYDROCHLORIDE 100 MG/1
1 TABLET, FILM COATED ORAL DAILY
Status: CANCELLED | OUTPATIENT
Start: 2020-03-26

## 2020-03-26 RX ORDER — RALOXIFENE HYDROCHLORIDE 60 MG/1
60 TABLET, FILM COATED ORAL DAILY
Status: DISCONTINUED | OUTPATIENT
Start: 2020-03-26 | End: 2020-03-26

## 2020-03-26 RX ORDER — SODIUM CHLORIDE 0.9 % (FLUSH) 0.9 %
10 SYRINGE (ML) INJECTION EVERY 12 HOURS SCHEDULED
Status: DISCONTINUED | OUTPATIENT
Start: 2020-03-26 | End: 2020-03-27 | Stop reason: HOSPADM

## 2020-03-26 RX ORDER — VITAMIN B COMPLEX
1000 TABLET ORAL DAILY
Status: DISCONTINUED | OUTPATIENT
Start: 2020-03-26 | End: 2020-03-27 | Stop reason: HOSPADM

## 2020-03-26 RX ORDER — AMLODIPINE BESYLATE 5 MG/1
5 TABLET ORAL DAILY
Status: DISCONTINUED | OUTPATIENT
Start: 2020-03-26 | End: 2020-03-27 | Stop reason: HOSPADM

## 2020-03-26 RX ORDER — HYDRALAZINE HYDROCHLORIDE 20 MG/ML
10 INJECTION INTRAMUSCULAR; INTRAVENOUS EVERY 6 HOURS PRN
Status: DISCONTINUED | OUTPATIENT
Start: 2020-03-26 | End: 2020-03-27 | Stop reason: HOSPADM

## 2020-03-26 RX ORDER — GEMFIBROZIL 600 MG/1
600 TABLET, FILM COATED ORAL 2 TIMES DAILY
Status: DISCONTINUED | OUTPATIENT
Start: 2020-03-26 | End: 2020-03-27 | Stop reason: HOSPADM

## 2020-03-26 RX ORDER — CALCIUM CARBONATE 200(500)MG
500 TABLET,CHEWABLE ORAL 3 TIMES DAILY PRN
Status: DISCONTINUED | OUTPATIENT
Start: 2020-03-26 | End: 2020-03-27 | Stop reason: HOSPADM

## 2020-03-26 RX ORDER — POTASSIUM CHLORIDE 7.45 MG/ML
10 INJECTION INTRAVENOUS PRN
Status: CANCELLED | OUTPATIENT
Start: 2020-03-26

## 2020-03-26 RX ORDER — DEXTROSE AND SODIUM CHLORIDE 5; .45 G/100ML; G/100ML
INJECTION, SOLUTION INTRAVENOUS CONTINUOUS PRN
Status: DISCONTINUED | OUTPATIENT
Start: 2020-03-26 | End: 2020-03-27

## 2020-03-26 RX ORDER — MELATONIN
1000 DAILY
Status: CANCELLED | OUTPATIENT
Start: 2020-03-26

## 2020-03-26 RX ORDER — RALOXIFENE HYDROCHLORIDE 60 MG/1
1 TABLET, FILM COATED ORAL DAILY
Status: CANCELLED | OUTPATIENT
Start: 2020-03-26

## 2020-03-26 RX ORDER — INSULIN GLARGINE 100 [IU]/ML
25 INJECTION, SOLUTION SUBCUTANEOUS NIGHTLY
Status: DISCONTINUED | OUTPATIENT
Start: 2020-03-26 | End: 2020-03-27

## 2020-03-26 RX ORDER — MAGNESIUM SULFATE 1 G/100ML
1 INJECTION INTRAVENOUS PRN
Status: CANCELLED | OUTPATIENT
Start: 2020-03-26

## 2020-03-26 RX ORDER — CALCIUM CARBONATE 200(500)MG
1 TABLET,CHEWABLE ORAL 3 TIMES DAILY PRN
Status: CANCELLED | OUTPATIENT
Start: 2020-03-26

## 2020-03-26 RX ORDER — SODIUM CHLORIDE 0.9 % (FLUSH) 0.9 %
10 SYRINGE (ML) INJECTION PRN
Status: DISCONTINUED | OUTPATIENT
Start: 2020-03-26 | End: 2020-03-27 | Stop reason: HOSPADM

## 2020-03-26 RX ORDER — MONTELUKAST SODIUM 10 MG/1
10 TABLET ORAL NIGHTLY
Status: CANCELLED | OUTPATIENT
Start: 2020-03-26

## 2020-03-26 RX ORDER — BUTALBITAL, ACETAMINOPHEN AND CAFFEINE 50; 325; 40 MG/1; MG/1; MG/1
1 TABLET ORAL EVERY 8 HOURS PRN
Status: CANCELLED | OUTPATIENT
Start: 2020-03-26

## 2020-03-26 RX ORDER — ATORVASTATIN CALCIUM 10 MG/1
10 TABLET, FILM COATED ORAL DAILY
Status: DISCONTINUED | OUTPATIENT
Start: 2020-03-26 | End: 2020-03-27 | Stop reason: HOSPADM

## 2020-03-26 RX ORDER — MAGNESIUM SULFATE 1 G/100ML
1 INJECTION INTRAVENOUS PRN
Status: DISCONTINUED | OUTPATIENT
Start: 2020-03-26 | End: 2020-03-27

## 2020-03-26 RX ORDER — FLUTICASONE PROPIONATE 110 UG/1
1 AEROSOL, METERED RESPIRATORY (INHALATION) 2 TIMES DAILY
Status: DISCONTINUED | OUTPATIENT
Start: 2020-03-26 | End: 2020-03-27 | Stop reason: HOSPADM

## 2020-03-26 RX ORDER — DEXTROSE MONOHYDRATE 25 G/50ML
12.5 INJECTION, SOLUTION INTRAVENOUS PRN
Status: DISCONTINUED | OUTPATIENT
Start: 2020-03-26 | End: 2020-03-27 | Stop reason: HOSPADM

## 2020-03-26 RX ORDER — SODIUM CHLORIDE 450 MG/100ML
INJECTION, SOLUTION INTRAVENOUS CONTINUOUS
Status: CANCELLED | OUTPATIENT
Start: 2020-03-26

## 2020-03-26 RX ORDER — FLUTICASONE PROPIONATE 110 UG/1
1 AEROSOL, METERED RESPIRATORY (INHALATION) 2 TIMES DAILY
Status: CANCELLED | OUTPATIENT
Start: 2020-03-26

## 2020-03-26 RX ORDER — BUTALBITAL, ACETAMINOPHEN AND CAFFEINE 50; 325; 40 MG/1; MG/1; MG/1
1 TABLET ORAL EVERY 8 HOURS PRN
Status: DISCONTINUED | OUTPATIENT
Start: 2020-03-26 | End: 2020-03-27 | Stop reason: HOSPADM

## 2020-03-26 RX ORDER — DEXTROSE AND SODIUM CHLORIDE 5; .45 G/100ML; G/100ML
INJECTION, SOLUTION INTRAVENOUS CONTINUOUS PRN
Status: CANCELLED | OUTPATIENT
Start: 2020-03-26

## 2020-03-26 RX ORDER — ASPIRIN 81 MG/1
81 TABLET ORAL DAILY
Status: DISCONTINUED | OUTPATIENT
Start: 2020-03-26 | End: 2020-03-27 | Stop reason: HOSPADM

## 2020-03-26 RX ORDER — 0.9 % SODIUM CHLORIDE 0.9 %
1000 INTRAVENOUS SOLUTION INTRAVENOUS ONCE
Status: COMPLETED | OUTPATIENT
Start: 2020-03-26 | End: 2020-03-26

## 2020-03-26 RX ORDER — MONTELUKAST SODIUM 10 MG/1
10 TABLET ORAL NIGHTLY
Status: DISCONTINUED | OUTPATIENT
Start: 2020-03-26 | End: 2020-03-27 | Stop reason: HOSPADM

## 2020-03-26 RX ORDER — ONDANSETRON 4 MG/1
4 TABLET, FILM COATED ORAL EVERY 8 HOURS PRN
Status: DISCONTINUED | OUTPATIENT
Start: 2020-03-26 | End: 2020-03-27 | Stop reason: HOSPADM

## 2020-03-26 RX ORDER — ATORVASTATIN CALCIUM 10 MG/1
10 TABLET, FILM COATED ORAL DAILY
Status: CANCELLED | OUTPATIENT
Start: 2020-03-26

## 2020-03-26 RX ORDER — SODIUM CHLORIDE 450 MG/100ML
INJECTION, SOLUTION INTRAVENOUS CONTINUOUS
Status: DISCONTINUED | OUTPATIENT
Start: 2020-03-26 | End: 2020-03-26

## 2020-03-26 RX ADMIN — SODIUM CHLORIDE: 4.5 INJECTION, SOLUTION INTRAVENOUS at 16:15

## 2020-03-26 RX ADMIN — ONDANSETRON HYDROCHLORIDE 4 MG: 4 TABLET, FILM COATED ORAL at 16:14

## 2020-03-26 RX ADMIN — FLUTICASONE PROPIONATE 1 PUFF: 110 AEROSOL, METERED RESPIRATORY (INHALATION) at 19:34

## 2020-03-26 RX ADMIN — CEFTRIAXONE SODIUM 1 G: 1 INJECTION, POWDER, FOR SOLUTION INTRAMUSCULAR; INTRAVENOUS at 11:43

## 2020-03-26 RX ADMIN — POTASSIUM CHLORIDE 10 MEQ: 7.46 INJECTION, SOLUTION INTRAVENOUS at 19:05

## 2020-03-26 RX ADMIN — SODIUM CHLORIDE 6.4 UNITS/HR: 9 INJECTION, SOLUTION INTRAVENOUS at 16:26

## 2020-03-26 RX ADMIN — FLUTICASONE PROPIONATE 1 PUFF: 110 AEROSOL, METERED RESPIRATORY (INHALATION) at 14:59

## 2020-03-26 RX ADMIN — Medication 10 ML: at 16:15

## 2020-03-26 RX ADMIN — GEMFIBROZIL 600 MG: 600 TABLET ORAL at 20:39

## 2020-03-26 RX ADMIN — AMLODIPINE BESYLATE 5 MG: 5 TABLET ORAL at 13:46

## 2020-03-26 RX ADMIN — ATORVASTATIN CALCIUM 10 MG: 10 TABLET, FILM COATED ORAL at 13:46

## 2020-03-26 RX ADMIN — SERTRALINE 100 MG: 50 TABLET, FILM COATED ORAL at 13:46

## 2020-03-26 RX ADMIN — POTASSIUM CHLORIDE 10 MEQ: 7.46 INJECTION, SOLUTION INTRAVENOUS at 22:44

## 2020-03-26 RX ADMIN — POTASSIUM CHLORIDE 10 MEQ: 7.46 INJECTION, SOLUTION INTRAVENOUS at 20:43

## 2020-03-26 RX ADMIN — POTASSIUM CHLORIDE 10 MEQ: 7.46 INJECTION, SOLUTION INTRAVENOUS at 21:41

## 2020-03-26 RX ADMIN — HYDRALAZINE HYDROCHLORIDE 10 MG: 20 INJECTION INTRAMUSCULAR; INTRAVENOUS at 16:15

## 2020-03-26 RX ADMIN — SODIUM CHLORIDE 1000 ML: 9 INJECTION, SOLUTION INTRAVENOUS at 19:58

## 2020-03-26 RX ADMIN — VITAMIN D, TAB 1000IU (100/BT) 1000 UNITS: 25 TAB at 13:46

## 2020-03-26 RX ADMIN — SODIUM CHLORIDE 1000 ML: 9 INJECTION, SOLUTION INTRAVENOUS at 09:58

## 2020-03-26 RX ADMIN — SODIUM PHOSPHATE, MONOBASIC, MONOHYDRATE 10 MMOL: 276; 142 INJECTION, SOLUTION INTRAVENOUS at 19:05

## 2020-03-26 RX ADMIN — POTASSIUM CHLORIDE 10 MEQ: 7.46 INJECTION, SOLUTION INTRAVENOUS at 17:51

## 2020-03-26 RX ADMIN — MONTELUKAST SODIUM 10 MG: 10 TABLET, FILM COATED ORAL at 20:39

## 2020-03-26 RX ADMIN — Medication 81 MG: at 13:46

## 2020-03-26 RX ADMIN — DEXTROSE AND SODIUM CHLORIDE: 5; 450 INJECTION, SOLUTION INTRAVENOUS at 20:57

## 2020-03-26 RX ADMIN — ENOXAPARIN SODIUM 40 MG: 40 INJECTION SUBCUTANEOUS at 13:46

## 2020-03-26 RX ADMIN — POTASSIUM CHLORIDE 10 MEQ: 7.46 INJECTION, SOLUTION INTRAVENOUS at 23:43

## 2020-03-26 ASSESSMENT — ENCOUNTER SYMPTOMS
WHEEZING: 0
COUGH: 0
CONSTIPATION: 0
ABDOMINAL PAIN: 0
BACK PAIN: 0
DIARRHEA: 0
SHORTNESS OF BREATH: 0
VOMITING: 0
NAUSEA: 0

## 2020-03-26 ASSESSMENT — PAIN SCALES - GENERAL
PAINLEVEL_OUTOF10: 0

## 2020-03-26 ASSESSMENT — PAIN SCALES - WONG BAKER: WONGBAKER_NUMERICALRESPONSE: 0

## 2020-03-26 ASSESSMENT — PAIN DESCRIPTION - LOCATION: LOCATION: BACK

## 2020-03-26 NOTE — ED PROVIDER NOTES
to edit the dictations but occasionally words are mis-transcribed.)    Kwasi Benitez.  Jose Alfredo Styles MD, [address] [facility]  Attending Emergency Medicine Physician        Tomasa Richards MD  03/26/20 2989

## 2020-03-26 NOTE — PROGRESS NOTES
Concurrent Group Co-treatment   Time In 8877         Time Out 1420         Minutes 25             1 of 135 [address] Delorise Jean Claude

## 2020-03-26 NOTE — PROGRESS NOTES
Depression, Fall, Fibromyalgia, Hiatal hernia, HTN (hypertension), Hyperlipidemia, Hypokalemia, gastrointestinal losses, Kidney stone, Obesity (BMI 30-39.9), Osteoarthritis, Osteoporosis, Renal cancer ([facility] Short of breath on exertion, Type II or unspecified type diabetes mellitus without mention of complication, not stated as uncontrolled, Unspecified sleep apnea, Urge urinary incontinence, Wears glasses, Wears glasses, and Zygomatic fracture, right side, initial encounter for closed fracture ([facility] [address] has a past surgical history that includes knee surgery (Bilateral); bladder suspension (08/09/2002); Nephrostomy (Right); total nephrectomy (Right, 06/20/2013); lumbar laminectomy (10/15/14); Bladder surgery (06/05/2014); Endometrial ablation; Cholecystectomy (10/10/2003); Breast surgery (Left, 03/03/2008); Breast biopsy (Left, 03/03/2008); pr perq vert agmntj cavity crtj uni/bi cannulj lmbr (N/A, 9/12/2018); Fixation Kyphoplasty (10/2018); Kyphosis surgery (N/A, 5/8/2019); Endoscopy, colon, diagnostic; and Colonoscopy. Restrictions  Restrictions/Precautions  Required Braces or Orthoses?: No  Position Activity Restriction  Other position/activity restrictions: Up with assist    Subjective   General  Patient assessed for rehabilitation services?: Yes  Family / Caregiver Present: No  Patient Currently in Pain: Yes  Pain Assessment  Pain Assessment: Faces  Pain Level: 0  Vernon-Baker Pain Rating: No hurt  Pain Location: Back  Non-Pharmaceutical Pain Intervention(s): Repositioned; Therapeutic presence(application of lotion )  Response to Pain Intervention: Patient Satisfied  Vital Signs  Patient Currently in Pain: Yes    Social/Functional History  Social/Functional History  Lives With: Other (comment)(MIL )  Type of Home: House  Home Layout: Two level  Home Access: Stairs to enter without rails  Entrance Stairs - Number of Steps: 2  Bathroom Shower/Tub: Tub/Shower unit  Bathroom Toilet: Standard  Bathroom Equipment: Grab bars in shower, Shower chair  Home Equipment: Rolling walker  Receives Help From: (mother in law and sister in law supportive)  ADL Assistance: Independent  Homemaking Assistance: Independent  Homemaking Responsibilities: (states she helps out but unable to provide specific information)  Ambulation Assistance: Independent  Transfer Assistance: Independent  Active : No  Patient's  Info: Pt's sister-in-law  Occupation: On disability  Additional Comments: Pt confused throughout asking social history questions, not able to provide all information and providing inappropriate answers to asked questions     Objective   Vision: Impaired  Vision Exceptions: Wears glasses at all times  Hearing: Within functional limits    Orientation  Overall Orientation Status: Impaired  Orientation Level: Oriented to place;Oriented to person;Disoriented to time;Disoriented to situation     Balance  Sitting Balance: Stand by assistance(forward flexed posture noted as time progressed; Reporting dizziness and fatigue ~10 min )  Standing Balance: Contact guard assistance(EOB for ~2 minutes; reporting increased dizziness unable to take steps to [facility] )  Functional Mobility  Functional - Mobility Device: No device  Activity: Other  Assist Level: Minimal assistance  Functional Mobility Comments: Side steps to [facility]  ADL  Feeding: Modified independent ;Verbal cueing; Increased time to complete  Grooming: Increased time to complete;Contact guard assistance(sat EOB and completed face washing; opening lotion and assistance to provide in hands (S/U);  Pt applying lotion on forehead, proceeding to rub into hair with verbal cue on appropriate use of lotion, with no s/s of concern.  )  UE Bathing: Increased time to complete;Contact guard assistance  LE Bathing: Increased time to complete;Contact guard assistance  UE Dressing: Increased time to complete;Contact guard assistance  LE Dressing: Increased time to complete;Contact guard assistance(donning socks sitting EOB, increased time to complete safely )  Toileting: Increased time to complete;Minimal assistance  Additional Comments: Pt requiring tactile cues for proper placement of lotion   Tone RUE  RUE Tone: Normotonic  Tone LUE  LUE Tone: Normotonic  Coordination  Movements Are Fluid And Coordinated: Yes  Coordination and Movement description: Decreased accuracy; Right UE     Bed mobility  Supine to Sit: Minimal assistance  Sit to Supine: Minimal assistance  Scooting: Minimal assistance  Transfers  Sit to stand: Minimal assistance(Mod A without use of 2ww )  Stand to sit: Minimal assistance  Transfer Comments: required verbal/tactile cues for hand placement for transfer to bed      Cognition  Overall Cognitive Status: Exceptions  Arousal/Alertness: Delayed responses to stimuli  Following Commands:  Follows one step commands with repetition  Memory: Decreased recall of biographical Information  Safety Judgement: Decreased awareness of need for assistance;Decreased awareness of need for safety  Problem Solving: Decreased awareness of errors  Insights: Decreased awareness of deficits  Initiation: Requires cues for all  Sequencing: Requires cues for all        Sensation  Overall Sensation Status: Impaired(Reports numbness in B feet)        LUE AROM : WFL  Left Hand AROM: WFL  RUE AROM : WFL  Right Hand AROM: WFL  LUE Strength  Gross LUE Strength: Exceptions to [facility]  L Shoulder Flex: 4/5  L Elbow Flex: 4/5  L Elbow Ext: 4/5  L Hand General: 4/5  RUE Strength  Gross RUE Strength: Exceptions to [facility]  R Shoulder Flex: 4/5  R Elbow Flex: 4/5  R Elbow Ext: 4/5  R Hand General: 4/5              Plan   Plan  Times per week: 3-4x/wk     AM-PAC Score        AM-[facility] Inpatient Daily Activity Raw Score: 19 (03/26/20 1543)  AM-PAC Inpatient ADL T-Scale Score : 40.22 (03/26/20 1543)  ADL Inpatient CMS 0-100% Score: 42.8 (03/26/20 1543)  ADL Inpatient CMS G-Code Modifier : CK (03/26/20 1543)    Goals  Short term goals  Time Frame for Short term goals: Patient will, by discharge  Short term goal 1: demonstrate UB self-cares at Mod I  Short term goal 2: demonstrate LB self-cares at Mod I using good safety awareness  Short term goal 3: follow 4 step task to complete grooming tasks appropriately <2 verbal cues at Supervision  Short term goal 4: demonstrate functional transfers/mobility using LRD at Supervision to engage in ADLs safely   Short term goal 5: demonstrate ~10 min of dynamic standing tolerance using LRD to engage in ADLs safely        Therapy Time   Individual Concurrent Group Co-treatment   Time In 1429         Time Out 1459         Minutes 30         Timed Code Treatment Minutes: [facility], OTR/L

## 2020-03-26 NOTE — H&P
effect or midline shift. No abnormal extra-axial fluid collection. Cortical atrophy and chronic white matter changes in the brain and associated ventricular enlargement are again demonstrated. ORBITS: The visualized portion of the orbits demonstrate no acute abnormality. SINUSES: The visualized paranasal sinuses and mastoid air cells demonstrate no acute abnormality. SOFT TISSUES/SKULL:  No acute abnormality of the visualized skull or soft tissues. Unchanged appearance of the brain without acute CT abnormality identified. Xr Chest Portable    Result Date: 3/26/2020  EXAMINATION: ONE XRAY VIEW OF THE CHEST 3/26/2020 9:36 am COMPARISON: October 16, 2019, July 30, 2019 HISTORY: ORDERING SYSTEM PROVIDED HISTORY: sepsis TECHNOLOGIST PROVIDED HISTORY: sepsis FINDINGS: The cardiomediastinal silhouette is unchanged in appearance. There is no consolidation, pneumothorax, or evidence of edema. No effusion is appreciated. The osseous structures are unchanged in appearance. No acute airspace disease identified.        Assessment :      Primary Problem  <principal problem not specified>    Active Hospital Problems    Diagnosis Date Noted    UTI (urinary tract infection) [N39.0] 03/26/2020    DKA, type 2, not at goal [facility]) [E11.10] 03/26/2020       Plan:     Patient status Admit as inpatient in the  Progressive Unit/Step down    DKA, 2/2 poor insulin compliance (DM2) vs Urosepsis  -NPO effective immediately except sips with meds  -DKA protocol started  -BMP q4h, initial Mag and Phos levels  -Glucose checks every hour  -Urinalysis reviewed and urine ketones:   -Betahydroxybutarate levels: pending  -IV 0.45% NS at 250 mL/hr  - (corrected sodium 140)  -Insulin Regular started at 0.1 Units/kg/hr  -D5 and 0.45% NS at 150 mL/hr when blood glucose less than 250 mg/dL  -Will monitor bicarb and anion gap, bridge with home lantus dose and begin diet PO when bicarb >15 and gap closed x 2 measurements  -Discontinue Insulin drip 2 hours post PO intake. -Hypoglycemia, phos and potassium replacement protocols in place  -HBA1C pending  -VBG pending     AMS likely 2/2 Cystitis   WBC 13.3  UA 3+ protein, 3+ glucose, small ketones, negative nitrites and negative leukocytes, moderate bacteria  Urine culture  Lactic acid 2.6  Blood culture no growth  CT head normal  Chest x-ray normal  Rocephin     HTN  Norvasc 5 mg po daily    Hyperlipidemia  Lipitor 10 mg p.o. daily    DVT proph  Lovenox 40 mg subQ      Consultations:   IP CONSULT TO INTERNAL MEDICINE  IP CONSULT TO FAMILY MEDICINE  IP CONSULT TO SOCIAL WORK  IP CONSULT TO SOCIAL WORK    Patient is admitted as inpatient status because of co-morbiditieslisted above, severity of signs and symptoms as outlined, requirement for current medical therapies and most importantly because of direct risk to patient if care not provided in a hospital setting. Angeles Enrique MD  3/26/2020  12:52 PM    Copy sent to Dr. Jeison Fishman MD     ATTENDING NOTE    I have reviewed and discussed key elements of [facility] with the resident including plan of care and follow up and agree with the care jess plan.

## 2020-03-26 NOTE — ED NOTES
Per Dr. Manisha Moore, patient is at low risk for Covid-19. No need for negative pressure and N-95 masks. Pt OK to be outside of room without mask.      Pa Buckner RN  03/26/20 3660

## 2020-03-26 NOTE — ED PROVIDER NOTES
partner violence     Fear of current or ex partner: Not on file     Emotionally abused: Not on file     Physically abused: Not on file     Forced sexual activity: Not on file   Other Topics Concern    Not on file   Social History Narrative    Not on file       Patient advised to stop smoking or to avoid tobacco use. Family History   Problem Relation Age of Onset    Diabetes Mother     High Blood Pressure Mother     Pacemaker Mother     High Blood Pressure Father     Prostate Cancer Father     Breast Cancer Sister     Asthma Brother     Prostate Cancer Maternal Grandfather     High Blood Pressure Paternal Grandmother         Allergies:  Lac bovis; Nedocromil; Tramadol; Penicillins; and Tape [adhesive tape]    HomeMedications:  Prior to Admission medications    Medication Sig Start Date End Date Taking? Authorizing Provider   ondansetron (ZOFRAN) 4 MG tablet Take 1 tablet by mouth every 8 hours as needed for Nausea or Vomiting 8/1/19   Liana Arndt MD   vitamin D (ERGOCALCIFEROL) 87244 units CAPS capsule Take 1 capsule by mouth once a week for 8 doses 7/10/19 8/29/19  Albertina Nash MD   sertraline (ZOLOFT) 100 MG tablet TAKE ONE TABLET BY MOUTH DAILY 7/10/19   Albertina Nash MD   raloxifene (EVISTA) 60 MG tablet TAKE ONE TABLET BY MOUTH DAILY 7/10/19   Albertina Nash MD   Cholecalciferol (VITAMIN D3) 1000 units TABS Take 1 tablet by mouth daily 12/26/18   Albertina Nash MD   aspirin EC 81 MG EC tablet Take 1 tablet by mouth daily 12/11/18   Albertina Nash MD   lovastatin (MEVACOR) 40 MG tablet TAKE ONE AND ONE-HALF (1  1/2) TABLET BY MOUTH NIGHTLY 12/11/18   Albertina Nash MD   gemfibrozil (LOPID) 600 MG tablet TAKE ONE TABLET BY MOUTH TWICE A DAY 12/11/18   Albertina Nash MD   zolpidem (AMBIEN) 10 MG tablet Take 10 mg by mouth nightly as needed for Sleep. Leila Lovell     Historical Provider, MD   insulin glargine (BASAGLAR KWIKPEN) 100 UNIT/ML injection pen Inject 25 Units into the skin nightly    Historical ondansetron (ZOFRAN) tablet 4 mg    raloxifene (EVISTA) tablet 60 mg    sertraline (ZOLOFT) tablet 100 mg    enoxaparin (LOVENOX) injection 40 mg    dextrose 50 % IV solution    potassium chloride 10 mEq/100 mL IVPB (Peripheral Line)    magnesium sulfate 1 g in dextrose 5% 100 mL IVPB    OR Linked Order Group     sodium phosphate 10 mmol in dextrose 5 % 250 mL IVPB     sodium phosphate 15 mmol in dextrose 5 % 250 mL IVPB     sodium phosphate 20 mmol in dextrose 5 % 500 mL IVPB    0.45 % sodium chloride infusion    dextrose 5 % and 0.45 % sodium chloride infusion    insulin regular (HUMULIN R;NOVOLIN R) 100 Units in sodium chloride 0.9 % 100 mL infusion    cefTRIAXone (ROCEPHIN) 1 g IVPB in 50 mL D5W minibag    insulin glargine (LANTUS) injection vial 25 Units       DIAGNOSTIC RESULTS / EMERGENCY DEPARTMENT COURSE / MDM     LABS:  Results for orders placed or performed during the hospital encounter of 03/26/20   CBC Auto Differential   Result Value Ref Range    WBC 13.3 (H) 3.5 - 11.3 k/uL    RBC 4.91 3.95 - 5.11 m/uL    Hemoglobin 13.7 11.9 - 15.1 g/dL    Hematocrit 42.6 36.3 - 47.1 %    MCV 86.8 82.6 - 102.9 fL    MCH 27.9 25.2 - 33.5 pg    MCHC 32.2 28.4 - 34.8 g/dL    RDW 11.9 11.8 - 14.4 %    Platelets 029 932 - 848 k/uL    MPV 12.4 8.1 - 13.5 fL    NRBC Automated 0.0 0.0 per 100 WBC    Differential Type NOT REPORTED     Seg Neutrophils 82 (H) 36 - 65 %    Lymphocytes 11 (L) 24 - 43 %    Monocytes 6 3 - 12 %    Eosinophils % 0 (L) 1 - 4 %    Basophils 0 0 - 2 %    Immature Granulocytes 1 (H) 0 %    Segs Absolute 10.96 (H) 1.50 - 8.10 k/uL    Absolute Lymph # 1.45 1.10 - 3.70 k/uL    Absolute Mono # 0.73 0.10 - 1.20 k/uL    Absolute Eos # <0.03 0.00 - 0.44 k/uL    Basophils Absolute 0.04 0.00 - 0.20 k/uL    Absolute Immature Granulocyte 0.10 0.00 - 0.30 k/uL    WBC Morphology NOT REPORTED     RBC Morphology NOT REPORTED     Platelet Estimate NOT REPORTED    Comprehensive Metabolic Panel   Result who either signs or Co-signs this chart in the absence of a cardiologist.    EMERGENCY DEPARTMENT COURSE:  Patient seen and evaluated by myself and attending. ED Course as of Mar 26 1301   Thu Mar 26, 2020   1056 Slight leukocytosis of 13.3. Urinalysis suggestive of UTI. This was a clean-catch specimen. Initial lactate 2.6 will repeat. Patient given normal saline. Stable throughout her stay here. Internal medicine consulted for admission at this time. [BW]      ED Course User Index  [BW] Benjie Johnson DO   Discussed case with family medicine resident who agrees for admission. Patient admitted. All questions answered patient was admitted, patient agreeable with plan. Sepsis Times and Checklist  Vital Signs: BP: (!) 163/88  Pulse: 92  Resp: 16  Temp: 98.7 °F (37.1 °C) SpO2: 98 %  SIRS (>2)   Temp > 38.3C or < 36C   HR > 90   RR > 20   WBC > 12 or < 4 or >10% bands  SIRS (>2) and confirmed or suspected source of infection = Sepsis  Is Sepsis due to likely bacterial infection?: Yes       Severe Sepsis Identified:  Date: 3/26/2020   Time: 1048  Sepsis Orders:  ·  CBC: Yes  ·  CMP: Yes  ·  PT/PTT: Yes  ·  Blood Cultures x2: Yes  ·  Urinalysis and Urine Culture: Yes  ·  Lactate: Yes  ·  Broad Spectrum Antibiotics Given (within 3 hours of sepsis identification,  after blood cultures):  Yes    (If unable to obtain IV access for IV antibiotics within 3 hours of  identification of sepsis, IM antibiotics is acceptable.)  ·             If lactate >2.0 MUST repeat within 6 hours    If elevated, is elevated lactate from a likely infectious source?: Yes. IV Fluid Bolus:  Is lactate > 4.0:  No          PROCEDURES:  None    CONSULTS:  IP CONSULT TO INTERNAL MEDICINE  IP CONSULT TO FAMILY MEDICINE  IP CONSULT TO SOCIAL WORK  IP CONSULT TO SOCIAL WORK      FINAL IMPRESSION      1. Urinary tract infection with hematuria, site unspecified    2.  Sepsis, due to unspecified organism, unspecified whether acute

## 2020-03-26 NOTE — PROGRESS NOTES
Pharmacy Note      Saranya Morgan was ordered raloxifene Travis Jeans). Raloxifene therapy is associated with an increased risk of venous thromboembolism. The  recommends holding raloxifene at least 72 hours prior to and during prolonged immobility (post-surgical and prolonged bed rest) and restarting therapy when the patient is fully ambulatory. Because many hospitalized patients have risk factors for thromboembolism, raloxifene therapy is held at [facility] per [employer] policy. Consider risk versus benefits when resuming raloxifene at discharge.     Jean Barry, PharmD 3/26/2020 1:12 PM

## 2020-03-27 VITALS
OXYGEN SATURATION: 96 % | HEART RATE: 84 BPM | RESPIRATION RATE: 21 BRPM | SYSTOLIC BLOOD PRESSURE: 113 MMHG | BODY MASS INDEX: 26.52 KG/M2 | DIASTOLIC BLOOD PRESSURE: 53 MMHG | TEMPERATURE: 98.2 F | WEIGHT: 145 LBS

## 2020-03-27 LAB
ABSOLUTE EOS #: 0.22 K/UL (ref 0–0.44)
ABSOLUTE IMMATURE GRANULOCYTE: 0.08 K/UL (ref 0–0.3)
ABSOLUTE LYMPH #: 3.86 K/UL (ref 1.1–3.7)
ABSOLUTE MONO #: 1.1 K/UL (ref 0.1–1.2)
ANION GAP SERPL CALCULATED.3IONS-SCNC: 11 MMOL/L (ref 9–17)
ANION GAP SERPL CALCULATED.3IONS-SCNC: 12 MMOL/L (ref 9–17)
BASOPHILS # BLD: 0 % (ref 0–2)
BASOPHILS ABSOLUTE: 0.03 K/UL (ref 0–0.2)
BUN BLDV-MCNC: 14 MG/DL (ref 8–23)
BUN BLDV-MCNC: 16 MG/DL (ref 8–23)
BUN/CREAT BLD: ABNORMAL (ref 9–20)
BUN/CREAT BLD: ABNORMAL (ref 9–20)
C DIFF AG + TOXIN: NEGATIVE
CALCIUM SERPL-MCNC: 7.9 MG/DL (ref 8.6–10.4)
CALCIUM SERPL-MCNC: 8.2 MG/DL (ref 8.6–10.4)
CHLORIDE BLD-SCNC: 101 MMOL/L (ref 98–107)
CHLORIDE BLD-SCNC: 106 MMOL/L (ref 98–107)
CO2: 15 MMOL/L (ref 20–31)
CO2: 17 MMOL/L (ref 20–31)
CREAT SERPL-MCNC: 0.81 MG/DL (ref 0.5–0.9)
CREAT SERPL-MCNC: 0.82 MG/DL (ref 0.5–0.9)
CULTURE: ABNORMAL
DIFFERENTIAL TYPE: ABNORMAL
EKG ATRIAL RATE: 104 BPM
EKG ATRIAL RATE: 78 BPM
EKG P AXIS: 24 DEGREES
EKG P AXIS: 60 DEGREES
EKG P-R INTERVAL: 158 MS
EKG P-R INTERVAL: 180 MS
EKG Q-T INTERVAL: 372 MS
EKG Q-T INTERVAL: 400 MS
EKG QRS DURATION: 82 MS
EKG QRS DURATION: 82 MS
EKG QTC CALCULATION (BAZETT): 456 MS
EKG QTC CALCULATION (BAZETT): 489 MS
EKG R AXIS: 16 DEGREES
EKG R AXIS: 27 DEGREES
EKG T AXIS: 42 DEGREES
EKG T AXIS: 9 DEGREES
EKG VENTRICULAR RATE: 104 BPM
EKG VENTRICULAR RATE: 78 BPM
EOSINOPHILS RELATIVE PERCENT: 2 % (ref 1–4)
ESTIMATED AVERAGE GLUCOSE: 398 MG/DL
GFR AFRICAN AMERICAN: >60 ML/MIN
GFR AFRICAN AMERICAN: >60 ML/MIN
GFR NON-AFRICAN AMERICAN: >60 ML/MIN
GFR NON-AFRICAN AMERICAN: >60 ML/MIN
GFR SERPL CREATININE-BSD FRML MDRD: ABNORMAL ML/MIN/{1.73_M2}
GLUCOSE BLD-MCNC: 118 MG/DL (ref 65–105)
GLUCOSE BLD-MCNC: 122 MG/DL (ref 65–105)
GLUCOSE BLD-MCNC: 147 MG/DL (ref 65–105)
GLUCOSE BLD-MCNC: 151 MG/DL (ref 65–105)
GLUCOSE BLD-MCNC: 155 MG/DL (ref 70–99)
GLUCOSE BLD-MCNC: 166 MG/DL (ref 65–105)
GLUCOSE BLD-MCNC: 168 MG/DL (ref 65–105)
GLUCOSE BLD-MCNC: 177 MG/DL (ref 65–105)
GLUCOSE BLD-MCNC: 184 MG/DL (ref 65–105)
GLUCOSE BLD-MCNC: 188 MG/DL (ref 70–99)
GLUCOSE BLD-MCNC: 195 MG/DL (ref 65–105)
GLUCOSE BLD-MCNC: 216 MG/DL (ref 65–105)
GLUCOSE BLD-MCNC: 331 MG/DL (ref 65–105)
HBA1C MFR BLD: 15.5 % (ref 4–6)
HCT VFR BLD CALC: 38.4 % (ref 36.3–47.1)
HEMOGLOBIN: 12.4 G/DL (ref 11.9–15.1)
IMMATURE GRANULOCYTES: 1 %
LACTIC ACID, WHOLE BLOOD: 1.1 MMOL/L (ref 0.7–2.1)
LYMPHOCYTES # BLD: 31 % (ref 24–43)
Lab: ABNORMAL
MAGNESIUM: 1.8 MG/DL (ref 1.6–2.6)
MAGNESIUM: 2.3 MG/DL (ref 1.6–2.6)
MCH RBC QN AUTO: 29 PG (ref 25.2–33.5)
MCHC RBC AUTO-ENTMCNC: 32.3 G/DL (ref 28.4–34.8)
MCV RBC AUTO: 89.7 FL (ref 82.6–102.9)
MONOCYTES # BLD: 9 % (ref 3–12)
NRBC AUTOMATED: 0 PER 100 WBC
PDW BLD-RTO: 12.1 % (ref 11.8–14.4)
PHOSPHORUS: 2.1 MG/DL (ref 2.6–4.5)
PHOSPHORUS: 2.3 MG/DL (ref 2.6–4.5)
PLATELET # BLD: 180 K/UL (ref 138–453)
PLATELET ESTIMATE: ABNORMAL
PMV BLD AUTO: 11.8 FL (ref 8.1–13.5)
POTASSIUM SERPL-SCNC: 4.1 MMOL/L (ref 3.7–5.3)
POTASSIUM SERPL-SCNC: 4.5 MMOL/L (ref 3.7–5.3)
PROCALCITONIN: 0.21 NG/ML
RBC # BLD: 4.28 M/UL (ref 3.95–5.11)
RBC # BLD: ABNORMAL 10*6/UL
SEG NEUTROPHILS: 58 % (ref 36–65)
SEGMENTED NEUTROPHILS ABSOLUTE COUNT: 7.28 K/UL (ref 1.5–8.1)
SODIUM BLD-SCNC: 128 MMOL/L (ref 135–144)
SODIUM BLD-SCNC: 134 MMOL/L (ref 135–144)
SPECIMEN DESCRIPTION: ABNORMAL
SPECIMEN DESCRIPTION: NORMAL
WBC # BLD: 12.6 K/UL (ref 3.5–11.3)
WBC # BLD: ABNORMAL 10*3/UL

## 2020-03-27 PROCEDURE — 85025 COMPLETE CBC W/AUTO DIFF WBC: CPT

## 2020-03-27 PROCEDURE — 94640 AIRWAY INHALATION TREATMENT: CPT

## 2020-03-27 PROCEDURE — 93010 ELECTROCARDIOGRAM REPORT: CPT | Performed by: INTERNAL MEDICINE

## 2020-03-27 PROCEDURE — 94760 N-INVAS EAR/PLS OXIMETRY 1: CPT

## 2020-03-27 PROCEDURE — 83605 ASSAY OF LACTIC ACID: CPT

## 2020-03-27 PROCEDURE — 6370000000 HC RX 637 (ALT 250 FOR IP): Performed by: STUDENT IN AN ORGANIZED HEALTH CARE EDUCATION/TRAINING PROGRAM

## 2020-03-27 PROCEDURE — 99239 HOSP IP/OBS DSCHRG MGMT >30: CPT | Performed by: FAMILY MEDICINE

## 2020-03-27 PROCEDURE — 84145 PROCALCITONIN (PCT): CPT

## 2020-03-27 PROCEDURE — 96372 THER/PROPH/DIAG INJ SC/IM: CPT

## 2020-03-27 PROCEDURE — G0108 DIAB MANAGE TRN  PER INDIV: HCPCS

## 2020-03-27 PROCEDURE — G0378 HOSPITAL OBSERVATION PER HR: HCPCS

## 2020-03-27 PROCEDURE — 2500000003 HC RX 250 WO HCPCS

## 2020-03-27 PROCEDURE — 82947 ASSAY GLUCOSE BLOOD QUANT: CPT

## 2020-03-27 PROCEDURE — 84100 ASSAY OF PHOSPHORUS: CPT

## 2020-03-27 PROCEDURE — 2580000003 HC RX 258

## 2020-03-27 PROCEDURE — 83735 ASSAY OF MAGNESIUM: CPT

## 2020-03-27 PROCEDURE — 6370000000 HC RX 637 (ALT 250 FOR IP)

## 2020-03-27 PROCEDURE — 6360000002 HC RX W HCPCS

## 2020-03-27 PROCEDURE — 36415 COLL VENOUS BLD VENIPUNCTURE: CPT

## 2020-03-27 PROCEDURE — 80048 BASIC METABOLIC PNL TOTAL CA: CPT

## 2020-03-27 PROCEDURE — 2580000003 HC RX 258: Performed by: INTERNAL MEDICINE

## 2020-03-27 RX ORDER — POTASSIUM CHLORIDE 20 MEQ/1
40 TABLET, EXTENDED RELEASE ORAL PRN
Status: DISCONTINUED | OUTPATIENT
Start: 2020-03-27 | End: 2020-03-27 | Stop reason: HOSPADM

## 2020-03-27 RX ORDER — INSULIN GLARGINE 100 [IU]/ML
40 INJECTION, SOLUTION SUBCUTANEOUS 2 TIMES DAILY
Qty: 5 PEN | Refills: 3 | Status: SHIPPED | OUTPATIENT
Start: 2020-03-27 | End: 2020-05-12

## 2020-03-27 RX ORDER — AMLODIPINE BESYLATE 10 MG/1
10 TABLET ORAL DAILY
Qty: 30 TABLET | Refills: 3 | Status: SHIPPED | OUTPATIENT
Start: 2020-03-27

## 2020-03-27 RX ORDER — METFORMIN HYDROCHLORIDE 500 MG/1
500 TABLET, EXTENDED RELEASE ORAL 2 TIMES DAILY
Qty: 90 TABLET | Refills: 1 | Status: SHIPPED | OUTPATIENT
Start: 2020-03-27

## 2020-03-27 RX ORDER — DEXTROSE MONOHYDRATE 25 G/50ML
12.5 INJECTION, SOLUTION INTRAVENOUS PRN
Status: DISCONTINUED | OUTPATIENT
Start: 2020-03-27 | End: 2020-03-27 | Stop reason: HOSPADM

## 2020-03-27 RX ORDER — INSULIN GLARGINE 100 [IU]/ML
25 INJECTION, SOLUTION SUBCUTANEOUS NIGHTLY
Status: DISCONTINUED | OUTPATIENT
Start: 2020-03-27 | End: 2020-03-27 | Stop reason: HOSPADM

## 2020-03-27 RX ORDER — CEPHALEXIN 500 MG/1
500 CAPSULE ORAL 2 TIMES DAILY
Qty: 16 CAPSULE | Refills: 0 | Status: SHIPPED | OUTPATIENT
Start: 2020-03-28 | End: 2020-04-05

## 2020-03-27 RX ORDER — SULFAMETHOXAZOLE AND TRIMETHOPRIM 400; 80 MG/1; MG/1
1 TABLET ORAL
Qty: 16 TABLET | Refills: 0 | Status: ON HOLD | OUTPATIENT
Start: 2020-04-06 | End: 2020-05-02 | Stop reason: HOSPADM

## 2020-03-27 RX ORDER — NICOTINE POLACRILEX 4 MG
15 LOZENGE BUCCAL PRN
Status: DISCONTINUED | OUTPATIENT
Start: 2020-03-27 | End: 2020-03-27 | Stop reason: HOSPADM

## 2020-03-27 RX ORDER — POTASSIUM CHLORIDE 7.45 MG/ML
10 INJECTION INTRAVENOUS PRN
Status: DISCONTINUED | OUTPATIENT
Start: 2020-03-27 | End: 2020-03-27 | Stop reason: HOSPADM

## 2020-03-27 RX ORDER — DEXTROSE MONOHYDRATE 50 MG/ML
100 INJECTION, SOLUTION INTRAVENOUS PRN
Status: DISCONTINUED | OUTPATIENT
Start: 2020-03-27 | End: 2020-03-27 | Stop reason: HOSPADM

## 2020-03-27 RX ADMIN — SODIUM PHOSPHATE, MONOBASIC, MONOHYDRATE 15 MMOL: 276; 142 INJECTION, SOLUTION INTRAVENOUS at 09:04

## 2020-03-27 RX ADMIN — CEFTRIAXONE SODIUM 1 G: 1 INJECTION, POWDER, FOR SOLUTION INTRAMUSCULAR; INTRAVENOUS at 12:37

## 2020-03-27 RX ADMIN — POTASSIUM CHLORIDE 10 MEQ: 7.46 INJECTION, SOLUTION INTRAVENOUS at 06:00

## 2020-03-27 RX ADMIN — INSULIN GLARGINE 25 UNITS: 100 INJECTION, SOLUTION SUBCUTANEOUS at 08:58

## 2020-03-27 RX ADMIN — SERTRALINE 100 MG: 50 TABLET, FILM COATED ORAL at 08:58

## 2020-03-27 RX ADMIN — POTASSIUM CHLORIDE 10 MEQ: 7.46 INJECTION, SOLUTION INTRAVENOUS at 01:37

## 2020-03-27 RX ADMIN — POTASSIUM CHLORIDE 10 MEQ: 7.46 INJECTION, SOLUTION INTRAVENOUS at 07:05

## 2020-03-27 RX ADMIN — VITAMIN D, TAB 1000IU (100/BT) 1000 UNITS: 25 TAB at 08:58

## 2020-03-27 RX ADMIN — Medication 10 ML: at 09:04

## 2020-03-27 RX ADMIN — INSULIN LISPRO 8 UNITS: 100 INJECTION, SOLUTION INTRAVENOUS; SUBCUTANEOUS at 16:16

## 2020-03-27 RX ADMIN — DEXTROSE AND SODIUM CHLORIDE: 5; 450 INJECTION, SOLUTION INTRAVENOUS at 10:27

## 2020-03-27 RX ADMIN — DEXTROSE AND SODIUM CHLORIDE: 5; 450 INJECTION, SOLUTION INTRAVENOUS at 03:38

## 2020-03-27 RX ADMIN — Medication 81 MG: at 08:58

## 2020-03-27 RX ADMIN — POTASSIUM CHLORIDE 10 MEQ: 7.46 INJECTION, SOLUTION INTRAVENOUS at 03:34

## 2020-03-27 RX ADMIN — INSULIN LISPRO 4 UNITS: 100 INJECTION, SOLUTION INTRAVENOUS; SUBCUTANEOUS at 12:13

## 2020-03-27 RX ADMIN — ENOXAPARIN SODIUM 40 MG: 40 INJECTION SUBCUTANEOUS at 08:58

## 2020-03-27 RX ADMIN — GEMFIBROZIL 600 MG: 600 TABLET ORAL at 08:58

## 2020-03-27 RX ADMIN — ATORVASTATIN CALCIUM 10 MG: 10 TABLET, FILM COATED ORAL at 08:58

## 2020-03-27 RX ADMIN — AMLODIPINE BESYLATE 5 MG: 5 TABLET ORAL at 08:58

## 2020-03-27 RX ADMIN — FLUTICASONE PROPIONATE 1 PUFF: 110 AEROSOL, METERED RESPIRATORY (INHALATION) at 08:04

## 2020-03-27 RX ADMIN — POTASSIUM CHLORIDE 10 MEQ: 7.46 INJECTION, SOLUTION INTRAVENOUS at 04:32

## 2020-03-27 ASSESSMENT — ENCOUNTER SYMPTOMS
PHOTOPHOBIA: 0
WHEEZING: 0
COUGH: 0
ABDOMINAL PAIN: 0
NAUSEA: 0
VOMITING: 0
APNEA: 0
SHORTNESS OF BREATH: 0
COLOR CHANGE: 0

## 2020-03-27 NOTE — PROGRESS NOTES
Patient discharged home with homecare via [address] [place] [address]. All meds to beds medications delivered, questions answered, plan of care updated, patient belongings present. Removed all peripheral IV's. Attempted multiple times to contact family voicemail left with Wilian Rockwell.      Electronically signed by Chey Jalloh RN on 3/27/2020 at 5:48 PM

## 2020-03-27 NOTE — DISCHARGE INSTR - COC
Virus Vaccine 10/17/2014, 10/26/2015    Influenza, Leroy, IM, (6 mo and older Fluzone, Flulaval, Fluarix and 3 yrs and older Afluria) 09/18/2017    Influenza, Quadv, IM, PF (6 mo and older Fluzone, Flulaval, Fluarix, and 3 yrs and older Afluria) 12/11/2018, 10/18/2019    Pneumococcal Conjugate 13-valent ([id number]) 04/13/2015    Pneumococcal Polysaccharide ([id number]) 04/28/2016    Tdap (Boostrix, Adacel) 02/09/2017       Active Problems:  Patient Active Problem List   Diagnosis Code    Carpal tunnel syndrome G56.00    DDD (degenerative disc disease), lumbar M51.36    Prolapsed intervertebral disk [id number]    Headache R51    Hip arthritis M16.10    Diabetic neuropathy ([facility]) E11.40    Dermatomyositis ([facility] [address] M33.90    GERD (gastroesophageal reflux disease) K21.9    Moderate persistent asthma J45.40    Carotid artery plaque I65.29    HLD (hyperlipidemia) E78.5    HTN (hypertension) I10    Obesity (BMI 30-39. 9) E66.9    History of renal cell cancer Z85.528    S/p nephrectomy Z90.5    Episode of dizziness R42    Orthostasis I95.1    Diabetic autonomic neuropathy associated with type 2 diabetes mellitus (HCC) E11.43    Risk for falls Z91.81    Cervical spondylosis with myelopathy M47.12    Displacement of cervical intervertebral disc without myelopathy M50.20    Cervicalgia M54.2    Spinal stenosis of lumbar region with neurogenic claudication M48.062    Acquired spondylolisthesis M43.10    Orthostatic hypotension I95.1    DM (diabetes mellitus), type 2, uncontrolled with complications ([facility]) X89.1, E11.65    Clostridium difficile diarrhea A04.72    CEFERINO (acute kidney injury) ([facility]) N17.9    Colitis K52.9    Hypokalemia, gastrointestinal losses E87.6    Dizziness R42    Hypotension I95.9    Depression F32.9    Osteoporosis M81.0    Type 2 diabetes mellitus with diabetic polyneuropathy, with long-term current use of insulin ([facility]) E11.42, Z79.4    Lumbar disc herniation M51.26    Need

## 2020-03-27 NOTE — PROGRESS NOTES
Occupational Therapy Not Seen Note    DATE: 3/27/2020  Name: Laura Huston  : 1955  MRN: [id number]    Patient not available for Occupational Therapy due to:    Patient Declined EOB or OOB activity d/t fatigue.     Next Scheduled Treatment: Re-check 3/30/2020    Electronically signed by ALBERT Hurd on 3/27/2020 at 3:23 PM

## 2020-03-27 NOTE — PROGRESS NOTES
CLINICAL PHARMACY NOTE: MEDS TO [address] Select Patient?: No  Total # of Prescriptions Filled: 5   The following medications were delivered to the patient:  · NORVASC   · LANTUS   · BACTRIM   · KEFLEX   · METFORMIN   Total # of Interventions Completed: 0  Time Spent (min): 0    Additional Documentation:

## 2020-03-27 NOTE — PROGRESS NOTES
Inpatient Diabetes  Education     Type ( phone call)  and Reason for Visit: Patient Education  Consulted for patient history of type 2 DM with poor control, this admission with DKA/ insulin gtt  Lab Results   Component Value Date    LABA1C 12.1 08/15/2019      Call into patient room 3-8285 - no answer. RN notified and phone was moved closer to patient. Writer explained diabetes education role and phone support strategies at this time. Patient consented  to talk with educator. Writer discussed with patient her recent health and social needs. Patient expressed BG being higher due to more frequent snacking, stress of her SO being in a care at SNF for neuro / psych issues and not being able to visit with him. She expressed that she does have a home BG meter and does check, she was aware of high BG but did not call to PCP ( care at [facility]) She uses lantus at home and stated she did take 30 units nightly by pen, she has been on insulin long term and know to to inject and reports good site rotation. Verbally reviewed following information with patient  Survival skills Diagnosis, A1C, Blood glucose targets, hypo and hyperglycemia, importance of home blood glucose monitoring, heathy eating  plate method for CHO control portions, be active as recommended by health care providers, take medications oral and or insulin as directed.     Written educational materials provided added to AVS   Contact number provided on AVS     Planned for follow up call in one week and requested outpatient referral for PCP       Mariann Crystal RN CDE

## 2020-03-27 NOTE — PROGRESS NOTES
Bovis     Nedocromil     Tramadol     Penicillins Hives and Rash    Tape Osie Adan Tape] Rash     OK to use paper tape per patient       Current Meds:   Scheduled Meds:    insulin lispro  0-12 Units Subcutaneous TID WC    insulin lispro  0-6 Units Subcutaneous Nightly    insulin glargine  25 Units Subcutaneous Nightly    sodium chloride flush  10 mL Intravenous 2 times per day    aspirin EC  81 mg Oral Daily    Vitamin D  1,000 Units Oral Daily    fluticasone  1 puff Inhalation BID    gemfibrozil  600 mg Oral BID    atorvastatin  10 mg Oral Daily    montelukast  10 mg Oral Nightly    sertraline  100 mg Oral Daily    enoxaparin  40 mg Subcutaneous Daily    cefTRIAXone (ROCEPHIN) IV  1 g Intravenous Q24H    amLODIPine  5 mg Oral Daily     Continuous Infusions:    dextrose      dextrose 5 % and 0.45 % NaCl 150 mL/hr at 03/27/20 0338    insulin 2.7 Units/hr (03/27/20 0901)     PRN Meds: glucose, dextrose, glucagon (rDNA), dextrose, sodium chloride flush, butalbital-acetaminophen-caffeine, calcium carbonate, ondansetron, dextrose, potassium chloride, magnesium sulfate, sodium phosphate IVPB **OR** sodium phosphate IVPB **OR** sodium phosphate IVPB, dextrose 5 % and 0.45 % NaCl, hydrALAZINE    Data:     Past Medical History:   has a past medical history of CEFERINO (acute kidney injury) ([facility] [address] Asthma, Carotid artery plaque, Clostridium difficile diarrhea, Dehydration, Depression, Fall, Fibromyalgia, Hiatal hernia, HTN (hypertension), Hyperlipidemia, Hypokalemia, gastrointestinal losses, Kidney stone, Obesity (BMI 30-39.9), Osteoarthritis, Osteoporosis, Renal cancer ([facility] [address] Short of breath on exertion, Type II or unspecified type diabetes mellitus without mention of complication, not stated as uncontrolled, Unspecified sleep apnea, Urge urinary incontinence, Wears glasses, Wears glasses, and Zygomatic fracture, right side, initial encounter for closed fracture ([facility] [address]     Social History:   reports that she has never smoked. She has never used smokeless tobacco. She reports that she does not drink alcohol or use drugs. Family History:   Family History   Problem Relation Age of Onset    Diabetes Mother     High Blood Pressure Mother     Pacemaker Mother     High Blood Pressure Father     Prostate Cancer Father     Breast Cancer Sister     Asthma Brother     Prostate Cancer Maternal Grandfather     High Blood Pressure Paternal Grandmother        Vitals:  /75   Pulse 82   Temp 100.3 °F (37.9 °C) (Temporal)   Resp 18   Wt 145 lb (65.8 kg)   LMP 2002   SpO2 95%   BMI 26.52 kg/m²   Temp (24hrs), Av.6 °F (37 °C), Min:97.7 °F (36.5 °C), Max:100.3 °F (37.9 °C)    Recent Labs     20  0532 20  0636 20  0752 20  0857   POCGLU 147* 184* 168* 195*       I/O(24Hr): Intake/Output Summary (Last 24 hours) at 3/27/2020 0934  Last data filed at 3/27/2020 3648  Gross per 24 hour   Intake 3107 ml   Output 500 ml   Net 2607 ml       Labs:    [unfilled]    Lab Results   Component Value Date/Time    SPECIAL NOT REPORTED 2020 10:39 AM     Lab Results   Component Value Date/Time    CULTURE STREPTOCOCCUS SPECIES >117017 CFU/ML (A) 2020 10:39 AM       [unfilled]    Radiology:    Ct Head Wo Contrast    Result Date: 3/26/2020  EXAMINATION: CT OF THE HEAD WITHOUT CONTRAST  3/26/2020 10:28 am TECHNIQUE: CT of the head was performed without the administration of intravenous contrast. Dose modulation, iterative reconstruction, and/or weight based adjustment of the mA/kV was utilized to reduce the radiation dose to as low as reasonably achievable. COMPARISON: 10/16/2019 HISTORY: ORDERING SYSTEM PROVIDED HISTORY: AMS TECHNOLOGIST PROVIDED HISTORY: AMS Reason for Exam: ams FINDINGS: BRAIN/VENTRICLES: There is no acute intracranial hemorrhage, mass effect or midline shift. No abnormal extra-axial fluid collection.   Cortical atrophy and chronic white matter changes in the brain and

## 2020-03-27 NOTE — PLAN OF CARE
Problem: Falls - Risk of:  Goal: Will remain free from falls  Description: Will remain free from falls  3/27/2020 1630 by Ramesh Cheng RN  Outcome: Met This Shift  Note: Patient has remained from falls, wearing non slip socks, bed alarm on.   Electronically signed by Ramesh Cheng RN on 3/27/2020 at 4:31 PM      3/27/2020 0412 by Ron Barone RN  Outcome: Ongoing  Goal: Absence of physical injury  Description: Absence of physical injury  3/27/2020 1630 by Ramesh Cheng RN  Outcome: Met This Shift  3/27/2020 0412 by Ron Barone RN  Outcome: Ongoing     Problem: Musculor/Skeletal Functional Status  Goal: Highest potential functional level  3/27/2020 1630 by Ramesh Cheng RN  Outcome: Ongoing  3/27/2020 0412 by Ron Barone RN  Outcome: Ongoing
Problem: RESPIRATORY  Intervention: Respiratory assessment  Note: BRONCHOSPASM/BRONCHOCONSTRICTION     [x]         IMPROVE AERATION/BREATH SOUNDS  [x]   ADMINISTER BRONCHODILATOR THERAPY AS APPROPRIATE  [x]   ASSESS BREATH SOUNDS  []   IMPLEMENT AEROSOL/MDI PROTOCOL  [x]   PATIENT EDUCATION AS NEEDED
Completed

## 2020-03-27 NOTE — DISCHARGE SUMMARY
[id number] [facility]     Discharge Summary     Patient ID: Michelle Kim  :  1955   MRN: [id number]     ACCOUNT:  [de-identified]   Patient's PCP: Dora Birmingham MD  Admit Date: 3/26/2020   Discharge Date: 3/27/2020     Length of Stay: 1  Code Status:  Full Code  Admitting Physician: Ginny Leslie MD  Discharge Physician: Caprice Sorto MD     Active Discharge Diagnoses:       Primary Problem  DKA, type 2, not at goal [facility])      [place] Problems    Diagnosis Date Noted    UTI (urinary tract infection) [N39.0] 2020    DKA, type 2, not at goal [facility]) [E11.10] 2020    HTN (hypertension) [I10] 2013       Admission Condition:  poor     Discharged Condition: stable    Hospital Stay:       Hospital Course:  Michelle Kim is a 60 y.o. female who was admitted for the management of   DKA, type 2, not at goal [facility]) , presented to ER with Fever (pt found down on her home floor by her mother in law this morning. pt was incontinent of urine. family member called ems. pt found to be confused, febrile. arrives via ems, altered. maintaining own airway. ) and Altered Mental Status    Patient's glucose levels of more than 500 patient had increased anion gap and positive ketones in the urine including positive hydroxybutyrate. Patient's pH was normal and bicarbonate was also normal and she was treated as a DKA patient was put on insulin regular drip which was later bridged to her home Lantus after blood glucose levels normalized. She was given fluids. Patient in the ED was slightly delirious secondary to UTI and or underlying dementia which may be her new baseline altogether her mentation improved the next day and she was alert and oriented x3.  Patient was initially suspected of having sepsis but sepsis was ruled out seen patient had no positive blood cultures, lactic acid was slightly increased which later on normalized and she was hemodynamically stable. Patient had a UTI and hence an increased white blood count which normalized later on on Rocephin. She will be discharged on Keflex for 10 days and will be started on Bactrim twice weekly for 2 months as a prophylactic antibiotic against recurrent UTI. During the hospital stay patient's blood pressure was also high and she was started on Norvasc and was given hydralazine as needed and will be discharged on Norvasc 10 mg p.o. daily. Last hemoglobin A1c was 15 and she will be discharged on metformin extended release 500 mg twice daily. Patient was also given Lantus 40 units in the morning and 40 units in the evening as per her last endocrinologist visit note. She will follow-up with us as primary and endocrinologist.  Home health care was also consulted while she was in the hospital      Significant therapeutic interventions: Insulin regular drip, Lantus, IV fluids, CT of the head, chest x-ray, Rocephin  Significant Diagnostic Studies:   Labs / Micro:  CBC:   Lab Results   Component Value Date    WBC 12.6 03/27/2020    RBC 4.28 03/27/2020    HGB 12.4 03/27/2020    HCT 38.4 03/27/2020    MCV 89.7 03/27/2020    MCH 29.0 03/27/2020    MCHC 32.3 03/27/2020    RDW 12.1 03/27/2020     03/27/2020       Radiology:  Ct Head Wo Contrast    Result Date: 3/26/2020  EXAMINATION: CT OF THE HEAD WITHOUT CONTRAST  3/26/2020 10:28 am TECHNIQUE: CT of the head was performed without the administration of intravenous contrast. Dose modulation, iterative reconstruction, and/or weight based adjustment of the mA/kV was utilized to reduce the radiation dose to as low as reasonably achievable. COMPARISON: 10/16/2019 HISTORY: ORDERING SYSTEM PROVIDED HISTORY: AMS TECHNOLOGIST PROVIDED HISTORY: AMS Reason for Exam: ams FINDINGS: BRAIN/VENTRICLES: There is no acute intracranial hemorrhage, mass effect or midline shift. No abnormal extra-axial fluid collection.   Cortical atrophy and tolerated    Discharge Medications:      Medication List      START taking these medications    amLODIPine 10 MG tablet  Commonly known as:  NORVASC  Take 1 tablet by mouth daily     cephALEXin 500 MG capsule  Commonly known as:  KEFLEX  Take 1 capsule by mouth 2 times daily for 8 days  Start taking on:  March 28, 2020     metFORMIN 500 MG extended release tablet  Commonly known as:  GLUCOPHAGE-XR  Take 1 tablet by mouth 2 times daily     sulfamethoxazole-trimethoprim 400-80 MG per tablet  Commonly known as:   Bactrim  Take 1 tablet by mouth Twice a Week Take one tab Monday and one tab on fridays for 2 months  Start taking on:  April 6, 2020        CHANGE how you take these medications    Lantus SoloStar 100 UNIT/ML injection pen  Generic drug:  insulin glargine  Inject 40 Units into the skin 2 times daily  What changed:    · how much to take  · when to take this        CONTINUE taking these medications    acetaminophen 500 MG tablet  Commonly known as:  TYLENOL     albuterol sulfate  (90 Base) MCG/ACT inhaler     Arnuity Ellipta 200 MCG/ACT Aepb  Generic drug:  fluticasone     aspirin EC 81 MG EC tablet  Take 1 tablet by mouth daily     BiPAP Machine Misc     butalbital-acetaminophen-caffeine -40 MG per tablet  Commonly known as:  FIORICET, ESGIC     calcium carbonate 500 MG chewable tablet  Commonly known as:  TUMS  Take 1 tablet by mouth 3 times daily as needed for Heartburn     gemfibrozil 600 MG tablet  Commonly known as:  LOPID  TAKE ONE TABLET BY MOUTH TWICE A DAY     Handicap Placard Misc  by Does not apply route Duration: 1 year     lovastatin 40 MG tablet  Commonly known as:  MEVACOR  TAKE ONE AND ONE-HALF (1  1/2) TABLET BY MOUTH NIGHTLY     montelukast 10 MG tablet  Commonly known as:  SINGULAIR     ondansetron 4 MG tablet  Commonly known as:  ZOFRAN  Take 1 tablet by mouth every 8 hours as needed for Nausea or Vomiting     raloxifene 60 MG tablet  Commonly known as:  EVISTA  TAKE ONE TABLET

## 2020-03-28 ENCOUNTER — CARE COORDINATION (OUTPATIENT)
Dept: CASE MANAGEMENT | Age: 65
End: 2020-03-28

## 2020-03-28 NOTE — CARE COORDINATION
[facility] Transitions Initial Follow Up Call    Call within 2 business days of discharge: Yes    Patient: Francisco Delgado Patient : 1955   MRN: [id number]  Reason for Admission: DKA, COVID screening  Discharge Date: 3/27/20 RARS: Readmission Risk Score: 21      Last Discharge [facility]       Complaint Diagnosis Description Type Department Provider    3/26/20 Fever; Altered Mental Status Urinary tract infection with hematuria, site unspecified . .. ED to Hosp-Admission (Discharged) (ADMITTED) [facility] Curt Lainez MD; Manuel Pollard ... Spoke with: Mikel [facility]: [facility]    Non-face-to-face services provided:  Obtained and reviewed discharge summary and/or continuity of care documents     Spoke with patient who reports she is feeling well today. She denies any f/c, dysuria, has slight cough but denies any other symptoms. She has not yet checked her blood sugar today. [facility] care to come out later today. COVID screening done. States she has all new medications at home. COVID-19 Screening Initial Follow-up Note    Patient contacted regarding Kamla Cowan. Care Transition Nurse/ Ambulatory Care Manager contacted the patient by telephone to perform post discharge assessment. Verified name and  with patient as identifiers. Provided introduction to self, and explanation of the CTN/ACM role, and reason for call due to risk factors for infection and/or exposure to COVID-19. Symptoms reviewed with patient who verbalized the following symptoms: cough and no new/worsening symptoms       Due to no new or worsening symptoms encounter was not routed to provider for escalation. Patient has following risk factors of: DM. CTN/ACM reviewed discharge instructions, medical action plan and red flags such as increased shortness of breath, increasing fever and signs of decompensation with patient who verbalized understanding.    Discussed exposure protocols and quarantine with CDC Guidelines What to do if you are sick with coronavirus disease 2019 Patient who was given an opportunity for questions and concerns. The patient agrees to contact the [facility] exposure line [phone], local health department [place] Department of Health: ([phone]) and PCP office for questions related to their healthcare. CTN/ACM provided contact information for future reference.     Reviewed and educated patient on any new and changed medications related to discharge diagnosis     Plan for follow-up call in 14 days based on severity of symptoms and risk factors      Care Transitions 24 Hour Call    Schedule Follow Up Appointment with PCP:  Completed  Do you have any ongoing symptoms?:  No  Do you have a copy of your discharge instructions?:  Yes  Do you have all of your prescriptions and are they filled?:  Yes  Have you been contacted by a [employer] [address]?:  No  Have you scheduled your follow up appointment?:  No  Were you discharged with any Home Care or Post Acute Services:  Yes  Post Acute Services:  [address] (Comment: [facility])  Do you feel like you have everything you need to keep you well at home?:  Yes  Care Transitions Interventions         Follow Up  Future Appointments   Date Time Provider Magui Mckeon   4/3/2020 11:30 AM Francisco Ornelas, [address] ED [facility]   4/13/2020  2:15 PM MD Deniz Mills RN

## 2020-04-01 LAB
CULTURE: NORMAL
CULTURE: NORMAL
Lab: NORMAL
Lab: NORMAL
SPECIMEN DESCRIPTION: NORMAL
SPECIMEN DESCRIPTION: NORMAL

## 2020-04-03 ENCOUNTER — TELEPHONE (OUTPATIENT)
Dept: FAMILY MEDICINE CLINIC | Age: 65
End: 2020-04-03

## 2020-04-03 ENCOUNTER — TELEPHONE (OUTPATIENT)
Dept: DIABETES SERVICES | Age: 65
End: 2020-04-03

## 2020-04-10 ENCOUNTER — CARE COORDINATION (OUTPATIENT)
Dept: CASE MANAGEMENT | Age: 65
End: 2020-04-10

## 2020-04-10 NOTE — CARE COORDINATION
[facility] Transitions Follow Up Call    4/10/2020    Patient: Robbie Hernanedz  Patient : 1955   MRN: [id number]  Reason for Admission: DKA  Discharge Date: 3/27/20 RARS: Readmission Risk Score: 21         Spoke with: Titi Lyon    Spoke with patient who reports she is feeling well and having no issues. She denies any f/c, n/v, cough or shortness of breath and states her blood sugars are running in the 200's. [facility] home care is following. You Patient resolved from the Care Transitions episode on 4/10  Patient/family has been provided the following resources and education related to COVID-19:                         Signs, symptoms and red flags related to COVID-19            CDC exposure and quarantine guidelines            [facility] exposure contact - [phone]            Contact for their local Department of Health                 Patient currently reports that the following symptoms have improved:  no new/worsening symptoms     No further outreach scheduled with this CTN/ACM. Episode of Care resolved. Patient has this CTN/ACM contact information if future needs arise. Care Transitions Subsequent and Final Call    Schedule Follow Up Appointment with PCP:  Completed  Subsequent and Final Calls  Do you have any ongoing symptoms?:  No  Have your medications changed?:  No  Do you have any questions related to your medications?:  No  Do you currently have any active services?:  Yes  Are you currently active with any services?:  Home Health  Do you have any needs or concerns that I can assist you with?:  No  Identified Barriers:  Lack of Education  Care Transitions Interventions  Other Interventions:             Follow Up  Future Appointments   Date Time Provider Magui Mckeon   4/15/2020  1:30 PM Kolton Flores RN [address] ED [facility]

## 2020-04-15 ENCOUNTER — TELEPHONE (OUTPATIENT)
Dept: DIABETES SERVICES | Age: 65
End: 2020-04-15

## 2020-04-15 SDOH — ECONOMIC STABILITY: FOOD INSECURITY: ADDITIONAL INFORMATION: NO

## 2020-04-15 NOTE — PROGRESS NOTES
tablet Take 500 mg by mouth every 6 hours as needed.  butalbital-acetaminophen-caffeine (FIORICET, ESGIC) per tablet Take 1 tablet by mouth every 8 hours as needed.  montelukast (SINGULAIR) 10 MG tablet Take 10 mg by mouth nightly.  albuterol (PROVENTIL HFA;VENTOLIN HFA) 108 (90 BASE) MCG/ACT inhaler Inhale 2 puffs into the lungs every 6 hours as needed. No current facility-administered medications for this visit. :     Comments:  Allergies: Allergies   Allergen Reactions    Lac Bovis     Nedocromil     Tramadol     Penicillins Hives and Rash    Tape Mateo Kugreta Tape] Rash     OK to use paper tape per patient       A1C blood level   Lab Results   Component Value Date    LABA1C 15.5 (H) 03/26/2020    LABA1C 12.1 08/15/2019    LABA1C 9.6 07/06/2018     Lab Results   Component Value Date    GLUF 110 (H) 05/24/2019    LABMICR 158 (H) 05/25/2019    CREATININE 0.82 03/27/2020       Blood pressure   BP Readings from Last 3 Encounters:   03/27/20 (!) 113/53   10/18/19 130/78   10/16/19 (!) 153/81        Cholesterol  Lab Results   Component Value Date    LDLCHOLESTEROL 70 05/24/2019    LDLDIRECT 155 (H) 10/13/2015        Diabetes Self- Management Education Record    Participant Name: Britni Campa  Referring Provider: Phi Zamora MD   Assessment/Evaluation Ratings:  1=Needs Instruction   4=Demonstrates Understanding/Competency  2=Needs Review   NC=Not Covered    3=Comprehends Key Points  N/A=Not Applicable  Topics/Learning Objectives Pre-session Assess Date:  4/15/20rs Instr. Date Reinforce Date Post- session Eval Comments   Diabetes disease process & Treatment process: Define diabetes & pre-diabetes; Identify own type of diabetes; role of the pancreas; signs/symptoms; diagnostic criteria; prevention & treatment options; contributing factors.  1    Dx in 2002  Follow with Dr Rabia Marrufo in [facility] clinic  She was inpatient 3/2020 and foot infection/ uti / dka   - had a stoke in stress with trying to decide how her home needs and fixed income   Developing strategies to promote health/change behavior: Identify 7 self-care behaviors; Personal health risk factors; Benefits, challenges & strategies for behavioral change;    1      - has cats in home - finds them stress relief    Individualized goal selection. My goal , to help me improve my health, I will:   1. Healthy Eating       Plan  Follow-up Appointments planned telephone    Instruction Method: [x]Lecture/Discussion  []Power Point Presentation  [x]Handouts  []Return Demonstration    Education Materials/Equipment Provided (VIA Mail for phone visits)  :    [x]Self-Management - Initial assessment - Enrolment in to ADA  Where do I Begin, Living with Type 2 diabetes ADA home support program and  handout on diabetes education classes. --4/15/20rs    [x]Self-Management  Class 1 -Self-Management  Class 1 - \"Diabetes - your take control guide\" - ADA booklet -  o  \"ready, set, start counting\" teaching sheet in diet section   o  GLP-1 understanding 8 core deficits puzzle sheet in the medication section  o one day food diary and envelope for return of diet HX   o  You tube and website resource sheet-Understanding Type 2 Diabetes from Animated Diabetes Patient https://youtu. be/EQfZt06eDZI      [] Self-Management  Class 2 - Meal Plan and handout for serving sizes, smarter snacking, Ready Set Carb Counting / Plate Method, Nutrient Conversion and International Diabetes [address] Eating for People with Diabetes and Nutrition in the [employer] Resources - fast facts about fast food    [] Self-Management  Class 3 -  Diabetes ID card,  foot care tips sheet, Healthy I  Continuing Your Journey with Diabetes map handout, Individualized Diabetes report card    [] Self-Management Class 4 - BD Booklet  Sick Day Rules and  Dinning Out Guide , recipe hand outs and tips, diabetes Cookbooks  ( when available)     []Self-Management - 3 month follow -  AADE7 Self care behaviors work sheets, 2020 [employer],  Online resource list - March 2020      []Self-Management  Gestational - RN class -Resource materials sent out : care booklet - \" Gestational Diabetes Mellitus ( GDM) toolkit form [place] gestational diabetes postpartum care learning collaborative 2018. \"Simple Guidelines for meal planning with gestational diabetes. SMBG sheets to fax back to MFM weekly. BD  healthy injection site selection and rotation with 6 mm insulin syringe and 4 mm pen needle. Gestational diabetes handout from [facility] 2016. Did you have gestational diabetes when you were pregnant? Handout from [facility]  April 2014    []Self-Management Gestational - RD class - My Food Plan for Gestational diabetes    []Glucose Meter     []Insulin Kit     []Other      Encounter Type Date Start Time End Time Comments No Show Dates   Assessment 4/15/20rs       []In Person  []Telephone    Class 1 - Understanding diabetes     []TelephoneAmerican Diabetes Association  www. diabetes. org    Class 2- Nutrition and diabetes      []Telephone  Healthy Eating with Diabetes- Automatic Data of Diabetes and Digestive and Kidney   https://Wireu. be/[email or web address]    Class 3 - Preventing Complications     []Telephone    Class 4 -  In depth Nutrition and sick day care    []Telephone  Diabetes Food hub  www. diabetesfoodFull Capture Solutionsb.org     Class 5 - 3 month follow up / goal reassessment        Gestational - RN         Gestational - RD        Individual MNT         Shared Med Appt         Yearly Follow-up        Meter Instrx      How to Measure Your Blood Sugar - [facility] Patient Education  https://Wireu. be/[email or web address]    Insulin Instrx      []Pen  []Vial & Syringe   BD Diabetes Care: How to Inject Insulin with a Pen Needle  https://youtu. be/[email or web address]    Diabetes Care: How to Inject Insulin with a Syringe  https://youtu. be/9uSSBu-5eSY       DSMS Support :   [] MNT      [] Annual update

## 2020-04-22 ENCOUNTER — TELEPHONE (OUTPATIENT)
Dept: DIABETES SERVICES | Age: 65
End: 2020-04-22

## 2020-04-23 ENCOUNTER — TELEPHONE (OUTPATIENT)
Dept: DIABETES SERVICES | Age: 65
End: 2020-04-23

## 2020-04-23 NOTE — PROGRESS NOTES
500 mg BID - she concerned that it is cause Gi upset-  - 4/23/20 - She stated gave up on metformin xr   Insulin / Injectable - Appropriate injection sites; proper storage; supplies needed; proper technique; safe needle disposal guidelines. 1    Phone adjustment - lantus- 40 units twice per day     If over 200 will use slide scale- meal time insulin - unsure of name and dose - unsure if patient is taking any meal time insulin       Monitoring blood glucose, interpreting and using results:  Identify recommended & personal blood glucose targets; importance of testing; testing supplies; HgbA1C target levels; Factors affecting blood glucose; Importance of logging blood glucose levels for pattern recognition; ketone testing; safe lancet disposal.   1 4/23/20rs    am - 130 in pm 4/11  300 am - 160 in pm - 4/12  250 am- 4/13  180 am 4/14  340  Am - 4/15    Lab Results   Component Value Date    LABA1C 15.5 (H) 03/26/2020    LABA1C 12.1 08/15/2019    LABA1C 9.6 07/06/2018     - writer called to Dr Ni Tobar office - requested the insulin plan and notes - per MA over phone - for lispro is 6 units with BK and 10 Units with dinner + Medium correction scale with all meals    4/23/20:  229 - 4/18 - am  230- 4/18 - pm  202- 4/19- am  247- 4/20- am  240- 4 /20 - pm  200 - 4/21- am  240 - 4/21 - pm  247 - 4/22 - am           Prevention, detection & treatment of acute complications:  Identify symptoms of hyper & hypoglycemia, and prevention & treatment strategies. 1 4/23/20rs   - struggling with high BG Often fatigued  - stated she does not get any symptoms of high and stated she feels ok with BG higher - she stated does not like low feels   Describe sick day guidelines & indications for  physician notification. Identify short term consequences of poor control.  Disaster preparedness strategies    1       Prevention, detection & treatment of chronic complications:  Define the natural course of diabetes & describe the understanding 8 core deficits puzzle sheet in the medication section  o one day food diary and envelope for return of diet HX   o  You tube and website resource sheet-Understanding Type 2 Diabetes from Animated Diabetes Patient https://youtu. be/VMwJy24zJQM      [] Self-Management  Class 2 - Meal Plan and handout for serving sizes, smarter snacking, Ready Set Carb Counting / Plate Method, Nutrient Conversion and International Diabetes [address] Eating for People with Diabetes and Nutrition in the [employer] Resources - fast facts about fast food    [] Self-Management  Class 3 -  Diabetes ID card,  foot care tips sheet, Healthy I  Continuing Your Journey with Diabetes map handout, Individualized Diabetes report card    [] Self-Management Class 4 - BD Booklet  Sick Day Rules and  Dinning Out Guide , recipe hand outs and tips, diabetes Cookbooks  ( when available)     []Self-Management - 3 month follow -  AADE7 Self care behaviors work sheets, 2020 [employer],  Online resource list - March 2020      []Self-Management  Gestational - RN class -Resource materials sent out : care booklet - \" Gestational Diabetes Mellitus ( GDM) toolkit form [place] gestational diabetes postpartum care learning collaborative 2018. \"Simple Guidelines for meal planning with gestational diabetes. SMBG sheets to fax back to MFM weekly. BD  healthy injection site selection and rotation with 6 mm insulin syringe and 4 mm pen needle. Gestational diabetes handout from [facility] 2016. Did you have gestational diabetes when you were pregnant?  Handout from [facility]  April 2014    []Self-Management Gestational - RD class - My Food Plan for Gestational diabetes    []Glucose Meter     []Insulin Kit     []Other      Encounter Type Date Start Time End Time Comments No Show Dates   Assessment 4/15/20rs       []In Person  [x]Telephone    Class 1 - Understanding diabetes 4/23/20rs  9 30   1030   [x]TelephoneAmerican

## 2020-04-26 PROBLEM — N39.0 UTI (URINARY TRACT INFECTION): Status: RESOLVED | Noted: 2020-03-26 | Resolved: 2020-04-26

## 2020-05-01 ENCOUNTER — TELEPHONE (OUTPATIENT)
Dept: DIABETES SERVICES | Age: 65
End: 2020-05-01

## 2020-05-01 ENCOUNTER — HOSPITAL ENCOUNTER (INPATIENT)
Age: 65
LOS: 1 days | Discharge: HOME | DRG: 420 | End: 2020-05-02
Attending: EMERGENCY MEDICINE | Admitting: FAMILY MEDICINE
Payer: MEDICAID

## 2020-05-01 ENCOUNTER — APPOINTMENT (OUTPATIENT)
Dept: CT IMAGING | Age: 65
DRG: 420 | End: 2020-05-01
Payer: MEDICAID

## 2020-05-01 LAB
-: ABNORMAL
-: NORMAL
ABSOLUTE EOS #: 0.14 K/UL (ref 0–0.4)
ABSOLUTE IMMATURE GRANULOCYTE: 0 K/UL (ref 0–0.3)
ABSOLUTE LYMPH #: 4.17 K/UL (ref 1–4.8)
ABSOLUTE MONO #: 0.56 K/UL (ref 0.1–0.8)
ALBUMIN SERPL-MCNC: 3.5 G/DL (ref 3.5–5.2)
ALBUMIN/GLOBULIN RATIO: 0.8 (ref 1–2.5)
ALP BLD-CCNC: 108 U/L (ref 35–104)
ALT SERPL-CCNC: 15 U/L (ref 5–33)
AMORPHOUS: ABNORMAL
ANION GAP SERPL CALCULATED.3IONS-SCNC: 18 MMOL/L (ref 9–17)
AST SERPL-CCNC: 13 U/L
ATYPICAL LYMPHOCYTE ABSOLUTE COUNT: 0.14 K/UL
ATYPICAL LYMPHOCYTES: 1 %
BACTERIA: ABNORMAL
BASOPHILS # BLD: 1 % (ref 0–2)
BASOPHILS ABSOLUTE: 0.14 K/UL (ref 0–0.2)
BETA-HYDROXYBUTYRATE: 0.12 MMOL/L (ref 0.02–0.27)
BILIRUB SERPL-MCNC: 0.56 MG/DL (ref 0.3–1.2)
BILIRUBIN DIRECT: 0.12 MG/DL
BILIRUBIN URINE: NEGATIVE
BILIRUBIN, INDIRECT: 0.44 MG/DL (ref 0–1)
BUN BLDV-MCNC: 16 MG/DL (ref 8–23)
BUN/CREAT BLD: ABNORMAL (ref 9–20)
CALCIUM SERPL-MCNC: 9.9 MG/DL (ref 8.6–10.4)
CASTS UA: ABNORMAL /LPF (ref 0–8)
CHLORIDE BLD-SCNC: 91 MMOL/L (ref 98–107)
CO2: 17 MMOL/L (ref 20–31)
COLOR: YELLOW
CREAT SERPL-MCNC: 0.92 MG/DL (ref 0.5–0.9)
CRYSTALS, UA: ABNORMAL /HPF
DIFFERENTIAL TYPE: ABNORMAL
EOSINOPHILS RELATIVE PERCENT: 1 % (ref 1–4)
EPITHELIAL CELLS UA: ABNORMAL /HPF (ref 0–5)
GFR AFRICAN AMERICAN: >60 ML/MIN
GFR NON-AFRICAN AMERICAN: >60 ML/MIN
GFR SERPL CREATININE-BSD FRML MDRD: ABNORMAL ML/MIN/{1.73_M2}
GFR SERPL CREATININE-BSD FRML MDRD: ABNORMAL ML/MIN/{1.73_M2}
GLOBULIN: ABNORMAL G/DL (ref 1.5–3.8)
GLUCOSE BLD-MCNC: 215 MG/DL (ref 65–105)
GLUCOSE BLD-MCNC: 417 MG/DL (ref 70–99)
GLUCOSE URINE: ABNORMAL
HCT VFR BLD CALC: 40.6 % (ref 36.3–47.1)
HEMOGLOBIN: 13.9 G/DL (ref 11.9–15.1)
IMMATURE GRANULOCYTES: 0 %
KETONES, URINE: NEGATIVE
LEUKOCYTE ESTERASE, URINE: ABNORMAL
LIPASE: 156 U/L (ref 13–60)
LYMPHOCYTES # BLD: 30 % (ref 24–44)
MAGNESIUM: 1.7 MG/DL (ref 1.6–2.6)
MCH RBC QN AUTO: 28.4 PG (ref 25.2–33.5)
MCHC RBC AUTO-ENTMCNC: 34.2 G/DL (ref 28.4–34.8)
MCV RBC AUTO: 82.9 FL (ref 82.6–102.9)
MONOCYTES # BLD: 4 % (ref 1–7)
MORPHOLOGY: NORMAL
MUCUS: ABNORMAL
NITRITE, URINE: NEGATIVE
NRBC AUTOMATED: 0 PER 100 WBC
OTHER OBSERVATIONS UA: ABNORMAL
PDW BLD-RTO: 11.9 % (ref 11.8–14.4)
PH UA: 5 (ref 5–8)
PHOSPHORUS: 3 MG/DL (ref 2.6–4.5)
PLATELET # BLD: 189 K/UL (ref 138–453)
PLATELET ESTIMATE: ABNORMAL
PMV BLD AUTO: 11.6 FL (ref 8.1–13.5)
POTASSIUM SERPL-SCNC: 4.7 MMOL/L (ref 3.7–5.3)
PROTEIN UA: ABNORMAL
RBC # BLD: 4.9 M/UL (ref 3.95–5.11)
RBC # BLD: ABNORMAL 10*6/UL
RBC UA: ABNORMAL /HPF (ref 0–4)
REASON FOR REJECTION: NORMAL
RENAL EPITHELIAL, UA: ABNORMAL /HPF
SEG NEUTROPHILS: 63 % (ref 36–66)
SEGMENTED NEUTROPHILS ABSOLUTE COUNT: 8.75 K/UL (ref 1.8–7.7)
SODIUM BLD-SCNC: 126 MMOL/L (ref 135–144)
SPECIFIC GRAVITY UA: 1.02 (ref 1–1.03)
TOTAL PROTEIN: 8 G/DL (ref 6.4–8.3)
TRICHOMONAS: ABNORMAL
TROPONIN INTERP: ABNORMAL
TROPONIN T: ABNORMAL NG/ML
TROPONIN, HIGH SENSITIVITY: 28 NG/L (ref 0–14)
TURBIDITY: CLEAR
URINE HGB: ABNORMAL
UROBILINOGEN, URINE: NORMAL
WBC # BLD: 13.9 K/UL (ref 3.5–11.3)
WBC # BLD: ABNORMAL 10*3/UL
WBC UA: ABNORMAL /HPF (ref 0–5)
YEAST: ABNORMAL
ZZ NTE CLEAN UP: ORDERED TEST: NORMAL
ZZ NTE WITH NAME CLEAN UP: SPECIMEN SOURCE: NORMAL

## 2020-05-01 PROCEDURE — 6360000002 HC RX W HCPCS: Performed by: STUDENT IN AN ORGANIZED HEALTH CARE EDUCATION/TRAINING PROGRAM

## 2020-05-01 PROCEDURE — 1200000000 HC SEMI PRIVATE

## 2020-05-01 PROCEDURE — 70450 CT HEAD/BRAIN W/O DYE: CPT

## 2020-05-01 PROCEDURE — 80048 BASIC METABOLIC PNL TOTAL CA: CPT

## 2020-05-01 PROCEDURE — 6370000000 HC RX 637 (ALT 250 FOR IP): Performed by: STUDENT IN AN ORGANIZED HEALTH CARE EDUCATION/TRAINING PROGRAM

## 2020-05-01 PROCEDURE — 82947 ASSAY GLUCOSE BLOOD QUANT: CPT

## 2020-05-01 PROCEDURE — 86403 PARTICLE AGGLUT ANTBDY SCRN: CPT

## 2020-05-01 PROCEDURE — G0378 HOSPITAL OBSERVATION PER HR: HCPCS

## 2020-05-01 PROCEDURE — 96365 THER/PROPH/DIAG IV INF INIT: CPT

## 2020-05-01 PROCEDURE — 87086 URINE CULTURE/COLONY COUNT: CPT

## 2020-05-01 PROCEDURE — 99285 EMERGENCY DEPT VISIT HI MDM: CPT

## 2020-05-01 PROCEDURE — 93005 ELECTROCARDIOGRAM TRACING: CPT | Performed by: STUDENT IN AN ORGANIZED HEALTH CARE EDUCATION/TRAINING PROGRAM

## 2020-05-01 PROCEDURE — 2580000003 HC RX 258: Performed by: STUDENT IN AN ORGANIZED HEALTH CARE EDUCATION/TRAINING PROGRAM

## 2020-05-01 PROCEDURE — 83735 ASSAY OF MAGNESIUM: CPT

## 2020-05-01 PROCEDURE — 96361 HYDRATE IV INFUSION ADD-ON: CPT

## 2020-05-01 PROCEDURE — 81001 URINALYSIS AUTO W/SCOPE: CPT

## 2020-05-01 PROCEDURE — 85025 COMPLETE CBC W/AUTO DIFF WBC: CPT

## 2020-05-01 PROCEDURE — 83690 ASSAY OF LIPASE: CPT

## 2020-05-01 PROCEDURE — 80076 HEPATIC FUNCTION PANEL: CPT

## 2020-05-01 PROCEDURE — 82010 KETONE BODYS QUAN: CPT

## 2020-05-01 PROCEDURE — 84484 ASSAY OF TROPONIN QUANT: CPT

## 2020-05-01 PROCEDURE — 96375 TX/PRO/DX INJ NEW DRUG ADDON: CPT

## 2020-05-01 PROCEDURE — 84100 ASSAY OF PHOSPHORUS: CPT

## 2020-05-01 RX ORDER — SODIUM CHLORIDE 0.9 % (FLUSH) 0.9 %
10 SYRINGE (ML) INJECTION EVERY 12 HOURS SCHEDULED
Status: DISCONTINUED | OUTPATIENT
Start: 2020-05-01 | End: 2020-05-02 | Stop reason: HOSPADM

## 2020-05-01 RX ORDER — POTASSIUM CHLORIDE 7.45 MG/ML
10 INJECTION INTRAVENOUS PRN
Status: DISCONTINUED | OUTPATIENT
Start: 2020-05-01 | End: 2020-05-02 | Stop reason: HOSPADM

## 2020-05-01 RX ORDER — ONDANSETRON 2 MG/ML
4 INJECTION INTRAMUSCULAR; INTRAVENOUS EVERY 6 HOURS PRN
Status: DISCONTINUED | OUTPATIENT
Start: 2020-05-01 | End: 2020-05-02 | Stop reason: HOSPADM

## 2020-05-01 RX ORDER — FLUTICASONE PROPIONATE 110 UG/1
4 AEROSOL, METERED RESPIRATORY (INHALATION) 2 TIMES DAILY
Status: DISCONTINUED | OUTPATIENT
Start: 2020-05-01 | End: 2020-05-02 | Stop reason: HOSPADM

## 2020-05-01 RX ORDER — NICOTINE POLACRILEX 4 MG
15 LOZENGE BUCCAL PRN
Status: DISCONTINUED | OUTPATIENT
Start: 2020-05-01 | End: 2020-05-02 | Stop reason: HOSPADM

## 2020-05-01 RX ORDER — ACETAMINOPHEN 325 MG/1
650 TABLET ORAL EVERY 6 HOURS PRN
Status: DISCONTINUED | OUTPATIENT
Start: 2020-05-01 | End: 2020-05-02 | Stop reason: HOSPADM

## 2020-05-01 RX ORDER — ZOLPIDEM TARTRATE 5 MG/1
10 TABLET ORAL NIGHTLY PRN
Status: DISCONTINUED | OUTPATIENT
Start: 2020-05-01 | End: 2020-05-02 | Stop reason: HOSPADM

## 2020-05-01 RX ORDER — HYDRALAZINE HYDROCHLORIDE 20 MG/ML
10 INJECTION INTRAMUSCULAR; INTRAVENOUS EVERY 6 HOURS PRN
Status: DISCONTINUED | OUTPATIENT
Start: 2020-05-01 | End: 2020-05-02 | Stop reason: HOSPADM

## 2020-05-01 RX ORDER — MAGNESIUM SULFATE 1 G/100ML
2 INJECTION INTRAVENOUS PRN
Status: DISCONTINUED | OUTPATIENT
Start: 2020-05-01 | End: 2020-05-02 | Stop reason: HOSPADM

## 2020-05-01 RX ORDER — DEXTROSE MONOHYDRATE 25 G/50ML
12.5 INJECTION, SOLUTION INTRAVENOUS PRN
Status: DISCONTINUED | OUTPATIENT
Start: 2020-05-01 | End: 2020-05-02 | Stop reason: HOSPADM

## 2020-05-01 RX ORDER — SODIUM CHLORIDE, SODIUM LACTATE, POTASSIUM CHLORIDE, AND CALCIUM CHLORIDE .6; .31; .03; .02 G/100ML; G/100ML; G/100ML; G/100ML
1000 INJECTION, SOLUTION INTRAVENOUS ONCE
Status: COMPLETED | OUTPATIENT
Start: 2020-05-01 | End: 2020-05-01

## 2020-05-01 RX ORDER — ACETAMINOPHEN 650 MG/1
650 SUPPOSITORY RECTAL EVERY 6 HOURS PRN
Status: DISCONTINUED | OUTPATIENT
Start: 2020-05-01 | End: 2020-05-02 | Stop reason: HOSPADM

## 2020-05-01 RX ORDER — SODIUM CHLORIDE 9 MG/ML
INJECTION, SOLUTION INTRAVENOUS CONTINUOUS
Status: DISCONTINUED | OUTPATIENT
Start: 2020-05-01 | End: 2020-05-02 | Stop reason: HOSPADM

## 2020-05-01 RX ORDER — SODIUM CHLORIDE 0.9 % (FLUSH) 0.9 %
10 SYRINGE (ML) INJECTION PRN
Status: DISCONTINUED | OUTPATIENT
Start: 2020-05-01 | End: 2020-05-02 | Stop reason: HOSPADM

## 2020-05-01 RX ORDER — ONDANSETRON 2 MG/ML
4 INJECTION INTRAMUSCULAR; INTRAVENOUS ONCE
Status: COMPLETED | OUTPATIENT
Start: 2020-05-01 | End: 2020-05-01

## 2020-05-01 RX ORDER — PROMETHAZINE HYDROCHLORIDE 25 MG/1
12.5 TABLET ORAL EVERY 6 HOURS PRN
Status: DISCONTINUED | OUTPATIENT
Start: 2020-05-01 | End: 2020-05-02 | Stop reason: HOSPADM

## 2020-05-01 RX ORDER — CEPHALEXIN 500 MG/1
500 CAPSULE ORAL ONCE
Status: COMPLETED | OUTPATIENT
Start: 2020-05-01 | End: 2020-05-01

## 2020-05-01 RX ORDER — POLYETHYLENE GLYCOL 3350 17 G/17G
17 POWDER, FOR SOLUTION ORAL DAILY PRN
Status: DISCONTINUED | OUTPATIENT
Start: 2020-05-01 | End: 2020-05-02 | Stop reason: HOSPADM

## 2020-05-01 RX ORDER — DEXTROSE MONOHYDRATE 50 MG/ML
100 INJECTION, SOLUTION INTRAVENOUS PRN
Status: DISCONTINUED | OUTPATIENT
Start: 2020-05-01 | End: 2020-05-02 | Stop reason: HOSPADM

## 2020-05-01 RX ORDER — AMLODIPINE BESYLATE 10 MG/1
10 TABLET ORAL DAILY
Status: DISCONTINUED | OUTPATIENT
Start: 2020-05-02 | End: 2020-05-02 | Stop reason: HOSPADM

## 2020-05-01 RX ORDER — POTASSIUM CHLORIDE 20 MEQ/1
40 TABLET, EXTENDED RELEASE ORAL PRN
Status: DISCONTINUED | OUTPATIENT
Start: 2020-05-01 | End: 2020-05-02 | Stop reason: HOSPADM

## 2020-05-01 RX ADMIN — INSULIN LISPRO 10 UNITS: 100 INJECTION, SOLUTION INTRAVENOUS; SUBCUTANEOUS at 16:42

## 2020-05-01 RX ADMIN — SODIUM CHLORIDE, POTASSIUM CHLORIDE, SODIUM LACTATE AND CALCIUM CHLORIDE 1000 ML: 600; 310; 30; 20 INJECTION, SOLUTION INTRAVENOUS at 15:05

## 2020-05-01 RX ADMIN — CEFTRIAXONE SODIUM 1 G: 1 INJECTION, POWDER, FOR SOLUTION INTRAMUSCULAR; INTRAVENOUS at 22:42

## 2020-05-01 RX ADMIN — CEPHALEXIN 500 MG: 500 CAPSULE ORAL at 17:31

## 2020-05-01 RX ADMIN — SODIUM CHLORIDE: 9 INJECTION, SOLUTION INTRAVENOUS at 22:29

## 2020-05-01 RX ADMIN — ONDANSETRON 4 MG: 2 INJECTION INTRAMUSCULAR; INTRAVENOUS at 15:07

## 2020-05-01 RX ADMIN — INSULIN LISPRO 2 UNITS: 100 INJECTION, SOLUTION INTRAVENOUS; SUBCUTANEOUS at 22:44

## 2020-05-01 ASSESSMENT — ENCOUNTER SYMPTOMS
SHORTNESS OF BREATH: 0
ABDOMINAL PAIN: 0
DIARRHEA: 1
ABDOMINAL DISTENTION: 0
CHEST TIGHTNESS: 0
VOMITING: 1
ANAL BLEEDING: 0
NAUSEA: 1

## 2020-05-01 ASSESSMENT — PAIN DESCRIPTION - ONSET: ONSET: ON-GOING

## 2020-05-01 ASSESSMENT — PAIN DESCRIPTION - DESCRIPTORS: DESCRIPTORS: SHARP

## 2020-05-01 ASSESSMENT — PAIN DESCRIPTION - FREQUENCY: FREQUENCY: CONTINUOUS

## 2020-05-01 ASSESSMENT — PAIN DESCRIPTION - LOCATION: LOCATION: ABDOMEN

## 2020-05-01 ASSESSMENT — PAIN SCALES - GENERAL: PAINLEVEL_OUTOF10: 7

## 2020-05-01 ASSESSMENT — PAIN DESCRIPTION - ORIENTATION: ORIENTATION: MID

## 2020-05-01 ASSESSMENT — PAIN DESCRIPTION - PROGRESSION: CLINICAL_PROGRESSION: NOT CHANGED

## 2020-05-01 ASSESSMENT — PAIN DESCRIPTION - PAIN TYPE: TYPE: ACUTE PAIN

## 2020-05-01 NOTE — H&P
diabetes mellitus without mention of complication, not stated as uncontrolled, Unspecified sleep apnea, Urge urinary incontinence, Wears glasses, Wears glasses, and Zygomatic fracture, right side, initial encounter for closed fracture ([facility] [address] PAST SURGICAL HISTORY:      has a past surgical history that includes knee surgery (Bilateral); bladder suspension (08/09/2002); Nephrostomy (Right); total nephrectomy (Right, 06/20/2013); lumbar laminectomy (10/15/14); Bladder surgery (06/05/2014); Endometrial ablation; Cholecystectomy (10/10/2003); Breast surgery (Left, 03/03/2008); Breast biopsy (Left, 03/03/2008); pr perq vert agmntj cavity crtj uni/bi cannulj lmbr (N/A, 9/12/2018); Fixation Kyphoplasty (10/2018); Kyphosis surgery (N/A, 5/8/2019); Endoscopy, colon, diagnostic; and Colonoscopy. FAMILY HISTORY:     family history includes Asthma in her brother; Breast Cancer in her sister; Diabetes in her mother; High Blood Pressure in her father, mother, and paternal grandmother; Pacemaker in her mother; Prostate Cancer in her father and maternal grandfather. HOME MEDICATIONS:     Prior to Admission medications    Medication Sig Start Date End Date Taking?  Authorizing Provider   insulin lispro (HUMALOG) 100 UNIT/ML injection vial Inject 10 Units into the skin 3 times daily (before meals) 10 units with Breakfast  + moderate correction scale  Lunch only moderate correction scale  6 units with dinner + moderate correction scale   Moderate correction scale : 200 - 250  = 3 units, 251- 300 = 6 units, 301 - 350 = 10 units, and over 350 = 16 units  Max dose per day 64 units  Per Dr Lindy [facility] - 4/8/2020    Historical Provider, MD   sulfamethoxazole-trimethoprim (BACTRIM) 400-80 MG per tablet Take 1 tablet by mouth Twice a Week Take one tab Monday and one tab on fridays for 2 months 4/6/20 6/6/20  Pallavi Costa MD   amLODIPine (NORVASC) 10 MG tablet Take 1 tablet by mouth daily 3/27/20   Pallavi Costa MD   insulin Range    Color, UA YELLOW YELLOW    Turbidity UA CLEAR CLEAR    Glucose, Ur 3+ (A) NEGATIVE    Bilirubin Urine NEGATIVE NEGATIVE    Ketones, Urine NEGATIVE NEGATIVE    Specific Gravity, UA 1.020 1.005 - 1.030    Urine Hgb SMALL (A) NEGATIVE    pH, UA 5.0 5.0 - 8.0    Protein, UA 2+ (A) NEGATIVE    Urobilinogen, Urine Normal Normal    Nitrite, Urine NEGATIVE NEGATIVE    Leukocyte Esterase, Urine SMALL (A) NEGATIVE    -          WBC, UA 20 TO 50 0 - 5 /HPF    RBC, UA 2 TO 5 0 - 4 /HPF    Casts UA  0 - 8 /LPF     2 TO 5 HYALINE Reference range defined for non-centrifuged specimen. Crystals, UA NOT REPORTED None /HPF    Epithelial Cells UA 0 TO 2 0 - 5 /HPF    Renal Epithelial, UA NOT REPORTED 0 /HPF    Bacteria, UA NOT REPORTED None    Mucus, UA NOT REPORTED None    Trichomonas, UA NOT REPORTED None    Amorphous, UA NOT REPORTED None    Other Observations UA NOT REPORTED NOT REQ. Yeast, UA NOT REPORTED None       Imaging/Diagonstics:  Ct Head Wo Contrast    Result Date: 5/1/2020  EXAMINATION: CT OF THE HEAD WITHOUT CONTRAST  5/1/2020 3:54 pm TECHNIQUE: CT of the head was performed without the administration of intravenous contrast. Dose modulation, iterative reconstruction, and/or weight based adjustment of the mA/kV was utilized to reduce the radiation dose to as low as reasonably achievable. COMPARISON: March 26, 2020 HISTORY: ORDERING SYSTEM PROVIDED HISTORY: ams TECHNOLOGIST PROVIDED HISTORY: [facility] Reason for Exam: AMS Acuity: Unknown Type of Exam: Unknown FINDINGS: BRAIN/VENTRICLES: There is no acute intracranial hemorrhage, mass effect or midline shift. No abnormal extra-axial fluid collection. The gray-white differentiation is maintained without evidence of an acute infarct. There is no evidence of hydrocephalus. Mild to moderate atrophy is present. ORBITS: The visualized portion of the orbits demonstrate no acute abnormality.  SINUSES: The visualized paranasal sinuses and mastoid air cells demonstrate no

## 2020-05-01 NOTE — ED PROVIDER NOTES
[facility] ED  Emergency Department Encounter  EmergencyMedicine Resident     Pt Name:Jazmin Jansen  MRN: [id number]  [place] 1955  Date of evaluation: 5/1/20  PCP:  Baljit Burks MD    [address] [place] [address]       Chief Complaint   Patient presents with    Nausea    Emesis       HISTORY OF PRESENT ILLNESS  (Location/Symptom, Timing/Onset, Context/Setting, Quality, Duration, Modifying Factors, Severity.)      Benja Chao is a 60 y.o. female history of hospitalization last month for UTI/DKA, poor compliance with insulin, history of renal cell carcinoma status post nephrectomy, history of C. difficile colitis. Patient states that she has had nausea vomiting this morning, and is supposed to be on Bactrim long-term for 2 months for UTI prophylaxis but is been noncompliant. Has not been checking sugars. Has not routinely been taking insulin recently. Denies fevers chills cough headache belly pain. Does endorse some associated mild diarrhea for the last 2 days. Came to hospital after mother-in-law called. Denies history of cardiac disease. Endorses mild confusion. Denies dysuria or flank pain. PAST MEDICAL / SURGICAL / SOCIAL / FAMILY HISTORY      has a past medical history of CEFERINO (acute kidney injury) ([address] Asthma, Carotid artery plaque, Clostridium difficile diarrhea, Dehydration, Depression, Fall, Fibromyalgia, Hiatal hernia, HTN (hypertension), Hyperlipidemia, Hypokalemia, gastrointestinal losses, Kidney stone, Obesity (BMI 30-39.9), Osteoarthritis, Osteoporosis, Renal cancer ([address] Short of breath on exertion, Type II or unspecified type diabetes mellitus without mention of complication, not stated as uncontrolled, Unspecified sleep apnea, Urge urinary incontinence, Wears glasses, Wears glasses, and Zygomatic fracture, right side, initial encounter for closed fracture ([address]      has a past surgical history that includes knee surgery (Bilateral); bladder suspension (08/09/2002); Nephrostomy (Right); total nephrectomy (Right, 06/20/2013); lumbar laminectomy (10/15/14); Bladder surgery (06/05/2014); Endometrial ablation; Cholecystectomy (10/10/2003); Breast surgery (Left, 03/03/2008); Breast biopsy (Left, 03/03/2008); pr perq vert agmntj cavity crtj uni/bi cannulj lmbr (N/A, 9/12/2018); Fixation Kyphoplasty (10/2018); Kyphosis surgery (N/A, 5/8/2019); Endoscopy, colon, diagnostic; and Colonoscopy.     Social History     Socioeconomic History    Marital status:      Spouse name: Not on file    Number of children: Not on file    Years of education: 15    Highest education level: Not on file   Occupational History    Occupation: disability     Employer: N/A   Social Needs    Financial resource strain: Not on file    Food insecurity     Worry: Not on file     Inability: Not on file   [employer] needs     Medical: Not on file     Non-medical: Not on file   Tobacco Use    Smoking status: Never Smoker    Smokeless tobacco: Never Used   Substance and Sexual Activity    Alcohol use: No    Drug use: No    Sexual activity: Not on file   Lifestyle    Physical activity     Days per week: Not on file     Minutes per session: Not on file    Stress: Not on file   Relationships    Social connections     Talks on phone: Not on file     Gets together: Not on file     Attends Anglican service: Not on file     Active member of club or organization: Not on file     Attends meetings of clubs or organizations: Not on file     Relationship status: Not on file    Intimate partner violence     Fear of current or ex partner: Not on file     Emotionally abused: Not on file     Physically abused: Not on file     Forced sexual activity: Not on file   Other Topics Concern    Not on file   Social History Narrative    Not on file       Family History   Problem Relation Age of Onset    Diabetes Mother     High Blood Pressure Mother     Pacemaker Mother     High Blood

## 2020-05-01 NOTE — ED PROVIDER NOTES
change  T Waves: no acute change  Q Waves: none    Clinical Impression: no acute changes and normal EKG    (Please note that portions of this note were completed with a voice recognition program.  Efforts were made to edit the dictations but occasionally words are mis-transcribed.)    Arcadio Leyva.  Abdullahi Turcios MD, [address] [facility]  Attending Emergency Medicine Physician        Elena Downing MD  05/01/20 4752

## 2020-05-02 VITALS
TEMPERATURE: 98.1 F | BODY MASS INDEX: 18.4 KG/M2 | WEIGHT: 100 LBS | HEART RATE: 70 BPM | OXYGEN SATURATION: 98 % | RESPIRATION RATE: 18 BRPM | DIASTOLIC BLOOD PRESSURE: 71 MMHG | HEIGHT: 62 IN | SYSTOLIC BLOOD PRESSURE: 126 MMHG

## 2020-05-02 PROBLEM — S52.501A CLOSED FRACTURE OF DISTAL END OF RIGHT RADIUS: Status: RESOLVED | Noted: 2018-05-27 | Resolved: 2020-05-02

## 2020-05-02 PROBLEM — S32.030A CLOSED COMPRESSION FRACTURE OF THIRD LUMBAR VERTEBRA (HCC): Status: RESOLVED | Noted: 2019-05-08 | Resolved: 2020-05-02

## 2020-05-02 PROBLEM — S32.020A CLOSED COMPRESSION FRACTURE OF L2 VERTEBRA (HCC): Status: RESOLVED | Noted: 2018-10-08 | Resolved: 2020-05-02

## 2020-05-02 PROBLEM — S72.452A CLOSED SUPRACONDYLAR FRACTURE OF LEFT FEMUR (HCC): Status: RESOLVED | Noted: 2018-09-06 | Resolved: 2020-05-02

## 2020-05-02 PROBLEM — S22.000A CLOSED COMPRESSION FRACTURE OF THORACIC VERTEBRA (HCC): Status: RESOLVED | Noted: 2018-10-08 | Resolved: 2020-05-02

## 2020-05-02 LAB
-: NORMAL
ANION GAP SERPL CALCULATED.3IONS-SCNC: 16 MMOL/L (ref 9–17)
BUN BLDV-MCNC: 13 MG/DL (ref 8–23)
BUN/CREAT BLD: ABNORMAL (ref 9–20)
CALCIUM SERPL-MCNC: 8.3 MG/DL (ref 8.6–10.4)
CHLORIDE BLD-SCNC: 99 MMOL/L (ref 98–107)
CO2: 19 MMOL/L (ref 20–31)
CREAT SERPL-MCNC: 0.98 MG/DL (ref 0.5–0.9)
CULTURE: ABNORMAL
EKG ATRIAL RATE: 71 BPM
EKG P AXIS: 29 DEGREES
EKG P-R INTERVAL: 152 MS
EKG Q-T INTERVAL: 410 MS
EKG QRS DURATION: 82 MS
EKG QTC CALCULATION (BAZETT): 445 MS
EKG R AXIS: 0 DEGREES
EKG T AXIS: 26 DEGREES
EKG VENTRICULAR RATE: 71 BPM
FOLATE: 11.1 NG/ML
GFR AFRICAN AMERICAN: >60 ML/MIN
GFR NON-AFRICAN AMERICAN: 58 ML/MIN
GFR SERPL CREATININE-BSD FRML MDRD: ABNORMAL ML/MIN/{1.73_M2}
GFR SERPL CREATININE-BSD FRML MDRD: ABNORMAL ML/MIN/{1.73_M2}
GLUCOSE BLD-MCNC: 257 MG/DL (ref 65–105)
GLUCOSE BLD-MCNC: 265 MG/DL (ref 65–105)
GLUCOSE BLD-MCNC: 287 MG/DL (ref 70–99)
HCT VFR BLD CALC: 39.7 % (ref 36.3–47.1)
HEMOGLOBIN: 12.8 G/DL (ref 11.9–15.1)
Lab: ABNORMAL
MAGNESIUM: 1.6 MG/DL (ref 1.6–2.6)
MCH RBC QN AUTO: 28.6 PG (ref 25.2–33.5)
MCHC RBC AUTO-ENTMCNC: 32.2 G/DL (ref 28.4–34.8)
MCV RBC AUTO: 88.8 FL (ref 82.6–102.9)
NRBC AUTOMATED: 0 PER 100 WBC
PDW BLD-RTO: 12.1 % (ref 11.8–14.4)
PLATELET # BLD: 217 K/UL (ref 138–453)
PMV BLD AUTO: 12.4 FL (ref 8.1–13.5)
POTASSIUM SERPL-SCNC: 4.1 MMOL/L (ref 3.7–5.3)
RBC # BLD: 4.47 M/UL (ref 3.95–5.11)
REASON FOR REJECTION: NORMAL
SODIUM BLD-SCNC: 134 MMOL/L (ref 135–144)
SPECIMEN DESCRIPTION: ABNORMAL
TSH SERPL DL<=0.05 MIU/L-ACNC: 1.86 MIU/L (ref 0.3–5)
VITAMIN B-12: 724 PG/ML (ref 232–1245)
WBC # BLD: 11.2 K/UL (ref 3.5–11.3)
ZZ NTE CLEAN UP: ORDERED TEST: NORMAL
ZZ NTE WITH NAME CLEAN UP: SPECIMEN SOURCE: NORMAL

## 2020-05-02 PROCEDURE — 84443 ASSAY THYROID STIM HORMONE: CPT

## 2020-05-02 PROCEDURE — 82947 ASSAY GLUCOSE BLOOD QUANT: CPT

## 2020-05-02 PROCEDURE — 83735 ASSAY OF MAGNESIUM: CPT

## 2020-05-02 PROCEDURE — 2580000003 HC RX 258: Performed by: STUDENT IN AN ORGANIZED HEALTH CARE EDUCATION/TRAINING PROGRAM

## 2020-05-02 PROCEDURE — 97162 PT EVAL MOD COMPLEX 30 MIN: CPT

## 2020-05-02 PROCEDURE — 82746 ASSAY OF FOLIC ACID SERUM: CPT

## 2020-05-02 PROCEDURE — 94640 AIRWAY INHALATION TREATMENT: CPT

## 2020-05-02 PROCEDURE — 6360000002 HC RX W HCPCS: Performed by: STUDENT IN AN ORGANIZED HEALTH CARE EDUCATION/TRAINING PROGRAM

## 2020-05-02 PROCEDURE — 6370000000 HC RX 637 (ALT 250 FOR IP): Performed by: STUDENT IN AN ORGANIZED HEALTH CARE EDUCATION/TRAINING PROGRAM

## 2020-05-02 PROCEDURE — 97166 OT EVAL MOD COMPLEX 45 MIN: CPT

## 2020-05-02 PROCEDURE — 93010 ELECTROCARDIOGRAM REPORT: CPT | Performed by: INTERNAL MEDICINE

## 2020-05-02 PROCEDURE — 94761 N-INVAS EAR/PLS OXIMETRY MLT: CPT

## 2020-05-02 PROCEDURE — G0378 HOSPITAL OBSERVATION PER HR: HCPCS

## 2020-05-02 PROCEDURE — 99223 1ST HOSP IP/OBS HIGH 75: CPT | Performed by: FAMILY MEDICINE

## 2020-05-02 PROCEDURE — 85027 COMPLETE CBC AUTOMATED: CPT

## 2020-05-02 PROCEDURE — 97116 GAIT TRAINING THERAPY: CPT

## 2020-05-02 PROCEDURE — 97535 SELF CARE MNGMENT TRAINING: CPT

## 2020-05-02 PROCEDURE — 96372 THER/PROPH/DIAG INJ SC/IM: CPT

## 2020-05-02 PROCEDURE — 96366 THER/PROPH/DIAG IV INF ADDON: CPT

## 2020-05-02 PROCEDURE — 97530 THERAPEUTIC ACTIVITIES: CPT

## 2020-05-02 PROCEDURE — 80048 BASIC METABOLIC PNL TOTAL CA: CPT

## 2020-05-02 PROCEDURE — 36415 COLL VENOUS BLD VENIPUNCTURE: CPT

## 2020-05-02 PROCEDURE — 96367 TX/PROPH/DG ADDL SEQ IV INF: CPT

## 2020-05-02 PROCEDURE — 82607 VITAMIN B-12: CPT

## 2020-05-02 RX ORDER — BUTALBITAL, ACETAMINOPHEN AND CAFFEINE 50; 325; 40 MG/1; MG/1; MG/1
1 TABLET ORAL EVERY 4 HOURS PRN
Status: DISCONTINUED | OUTPATIENT
Start: 2020-05-02 | End: 2020-05-02 | Stop reason: HOSPADM

## 2020-05-02 RX ORDER — CIPROFLOXACIN 500 MG/1
500 TABLET, FILM COATED ORAL 2 TIMES DAILY
Qty: 14 TABLET | Refills: 0 | Status: SHIPPED | OUTPATIENT
Start: 2020-05-02 | End: 2020-05-09

## 2020-05-02 RX ORDER — INSULIN GLARGINE 100 [IU]/ML
25 INJECTION, SOLUTION SUBCUTANEOUS 2 TIMES DAILY
Status: DISCONTINUED | OUTPATIENT
Start: 2020-05-02 | End: 2020-05-02 | Stop reason: HOSPADM

## 2020-05-02 RX ORDER — CEPHALEXIN 500 MG/1
500 CAPSULE ORAL 2 TIMES DAILY
Qty: 20 CAPSULE | Refills: 0 | Status: CANCELLED | OUTPATIENT
Start: 2020-05-02 | End: 2020-05-12

## 2020-05-02 RX ADMIN — FLUTICASONE PROPIONATE 4 PUFF: 110 AEROSOL, METERED RESPIRATORY (INHALATION) at 09:15

## 2020-05-02 RX ADMIN — SERTRALINE 100 MG: 50 TABLET, FILM COATED ORAL at 11:08

## 2020-05-02 RX ADMIN — MAGNESIUM SULFATE 2 G: 1 INJECTION INTRAVENOUS at 11:08

## 2020-05-02 RX ADMIN — INSULIN LISPRO 6 UNITS: 100 INJECTION, SOLUTION INTRAVENOUS; SUBCUTANEOUS at 07:38

## 2020-05-02 RX ADMIN — ENOXAPARIN SODIUM 40 MG: 40 INJECTION SUBCUTANEOUS at 08:17

## 2020-05-02 RX ADMIN — INSULIN GLARGINE 25 UNITS: 100 INJECTION, SOLUTION SUBCUTANEOUS at 10:17

## 2020-05-02 RX ADMIN — BUTALBITAL, ACETAMINOPHEN AND CAFFEINE 1 TABLET: 50; 325; 40 TABLET ORAL at 12:08

## 2020-05-02 RX ADMIN — INSULIN LISPRO 6 UNITS: 100 INJECTION, SOLUTION INTRAVENOUS; SUBCUTANEOUS at 11:11

## 2020-05-02 RX ADMIN — SODIUM CHLORIDE: 9 INJECTION, SOLUTION INTRAVENOUS at 08:18

## 2020-05-02 RX ADMIN — AMLODIPINE BESYLATE 10 MG: 10 TABLET ORAL at 08:17

## 2020-05-02 RX ADMIN — SODIUM CHLORIDE: 9 INJECTION, SOLUTION INTRAVENOUS at 07:55

## 2020-05-02 ASSESSMENT — PAIN SCALES - GENERAL
PAINLEVEL_OUTOF10: 8
PAINLEVEL_OUTOF10: 6
PAINLEVEL_OUTOF10: 8
PAINLEVEL_OUTOF10: 4

## 2020-05-02 ASSESSMENT — PAIN DESCRIPTION - PAIN TYPE
TYPE: ACUTE PAIN
TYPE: CHRONIC PAIN

## 2020-05-02 ASSESSMENT — PAIN DESCRIPTION - DESCRIPTORS: DESCRIPTORS: HEADACHE

## 2020-05-02 ASSESSMENT — PAIN DESCRIPTION - LOCATION: LOCATION: HEAD

## 2020-05-02 NOTE — FLOWSHEET NOTE
05/02/20 1444   Encounter Summary   Services provided to: Patient   Support System Family members   Continue Visiting   (05/02/2020)   Complexity of Encounter Moderate   Length of Encounter 15 minutes   Spiritual Assessment Completed Yes   Routine   Type Initial   Spiritual/Restorationist   Type Spiritual support   Assessment Calm; Approachable; Hopeful   Intervention Active listening;Nurtured hope;Prayer   Outcome Expressed gratitude

## 2020-05-02 NOTE — PROGRESS NOTES
Shower/Tub: Tub/Shower unit  Bathroom Toilet: Standard  Bathroom Equipment: Grab bars in shower  Bathroom Accessibility: Accessible  Home Equipment: Rolling walker, Cane, 4 wheeled walker, [address] Help From: Family  ADL Assistance: Independent  Homemaking Assistance: Independent  Homemaking Responsibilities: Yes  Meal Prep Responsibility: Primary  Cleaning Responsibility: Secondary  Ambulation Assistance: Independent  Transfer Assistance: Independent  Active : No  Patient's  Info: Pt's sister-in-law  Occupation: Unemployed  Leisure & Hobbies: [employer]        Objective   Vision: Impaired  Vision Exceptions: Wears glasses at all times  Hearing: Within functional limits    Orientation  Overall Orientation Status: Within Functional Limits     Balance  Sitting Balance: Independent(~9 min EOB total )  Standing Balance: Stand by assistance  Functional Mobility  Functional - Mobility Device: Rolling Walker  Activity: To/from bathroom  Assist Level: Stand by assistance  Functional Mobility Comments: OT facilitated safe use of walker by instructing patient on placement for toileting and hygiene tasks with fair return   Toilet Transfers  Toilet - Technique: Ambulating  Equipment Used: Grab bars  Toilet Transfer: Stand by assistance  ADL  Feeding: Independent  Grooming: Modified independent ; Increased time to complete(OT facilitated standing sinkside washing hands, OT instructed patient on proper placement of walker at sink with fair return )  UE Bathing: Modified independent ; Increased time to complete  LE Bathing: Stand by assistance; Increased time to complete  UE Dressing: Modified independent ; Increased time to complete  LE Dressing: Stand by assistance; Increased time to complete(donning brief from floor to over hips )  Toileting: Stand by assistance; Increased time to complete(OT facilitated safe transfer educating patient on use of grab bar and doffing brief to below knees; completing pericare at [facility] and clothing mgmt up at [facility])  Additional Comments: OT provided therapeutic listening to patient's current home situation and situation with her , providing words of encouragement and empathy with good return   Tone RUE  RUE Tone: Normotonic  Tone LUE  LUE Tone: Normotonic  Coordination  Movements Are Fluid And Coordinated: Yes     Bed mobility  Supine to Sit: Modified independent  Sit to Supine: Modified independent  Scooting: Modified independent  Comment: increased time to complete all tasks   Transfers  Sit to stand: Stand by assistance  Stand to sit: Stand by assistance     Cognition  Overall Cognitive Status: Exceptions  Arousal/Alertness: Appropriate responses to stimuli  Safety Judgement: Decreased awareness of need for assistance;Decreased awareness of need for safety  Problem Solving: Decreased awareness of errors  Insights: Decreased awareness of deficits        Sensation  Overall Sensation Status: WFL        LUE AROM : WFL  Left Hand AROM: WFL  RUE AROM : WFL  Right Hand AROM: WFL  LUE Strength  Gross LUE Strength: WFL  L Hand General: 4+/5  RUE Strength  Gross RUE Strength: WFL  R Hand General: 4+/5              Plan   Plan  Times per week: 3-4x/wk     AM-PAC Score        AM-[facility] Inpatient Daily Activity Raw Score: 21 (05/02/20 1417)  AM-PAC Inpatient ADL T-Scale Score : 44.27 (05/02/20 1417)  ADL Inpatient CMS 0-100% Score: 32.79 (05/02/20 1417)  ADL Inpatient CMS G-Code Modifier : Todd Carlin (05/02/20 1417)    Goals  Short term goals  Time Frame for Short term goals: Patient will, by discharge  Short term goal 1: demonstrate UB self-cares independently   Short term goal 2: demonstrate LB self-cares independently   Short term goal 3: demonstrate functional transfers/mobility using LRD independently using good safety awareness to engage in ADLs safely   Short term goal 4: demonstrate ~10 min of dynamic standing tolerance using LRD at Mod I to engage in ADLs safely   Short term goal 5:

## 2020-05-02 NOTE — PROGRESS NOTES
closed fracture ([facility] [address] PAST SURGICAL HISTORY:      has a past surgical history that includes knee surgery (Bilateral); bladder suspension (08/09/2002); Nephrostomy (Right); total nephrectomy (Right, 06/20/2013); lumbar laminectomy (10/15/14); Bladder surgery (06/05/2014); Endometrial ablation; Cholecystectomy (10/10/2003); Breast surgery (Left, 03/03/2008); Breast biopsy (Left, 03/03/2008); pr perq vert agmntj cavity crtj uni/bi cannulj lmbr (N/A, 9/12/2018); Fixation Kyphoplasty (10/2018); Kyphosis surgery (N/A, 5/8/2019); Endoscopy, colon, diagnostic; and Colonoscopy. SOCIAL HISTORY:      reports that she has never smoked. She has never used smokeless tobacco. She reports that she does not drink alcohol or use drugs. FAMILY HISTORY:     family history includes Asthma in her brother; Breast Cancer in her sister; Diabetes in her mother; High Blood Pressure in her father, mother, and paternal grandmother; Pacemaker in her mother; Prostate Cancer in her father and maternal grandfather. HOME MEDICATIONS:      Prior to Admission medications    Medication Sig Start Date End Date Taking?  Authorizing Provider   insulin lispro (HUMALOG) 100 UNIT/ML injection vial Inject 10 Units into the skin 3 times daily (before meals) 10 units with Breakfast  + moderate correction scale  Lunch only moderate correction scale  6 units with dinner + moderate correction scale   Moderate correction scale : 200 - 250  = 3 units, 251- 300 = 6 units, 301 - 350 = 10 units, and over 350 = 16 units  Max dose per day 64 units  Per Dr Jennifer Marsh clinic - 4/8/2020   Yes Historical Provider, MD   amLODIPine (NORVASC) 10 MG tablet Take 1 tablet by mouth daily 3/27/20  Yes Uriel Arroyo MD   insulin glargine (LANTUS SOLOSTAR) 100 UNIT/ML injection pen Inject 40 Units into the skin 2 times daily 3/27/20  Yes Uriel Arroyo MD   metFORMIN (GLUCOPHAGE-XR) 500 MG extended release tablet Take 1 tablet by mouth 2 times daily 3/27/20  Yes Ely Jasso 05/02/20  6:08 AM   Result Value Ref Range    WBC 11.2 3.5 - 11.3 k/uL    RBC 4.47 3.95 - 5.11 m/uL    Hemoglobin 12.8 11.9 - 15.1 g/dL    Hematocrit 39.7 36.3 - 47.1 %    MCV 88.8 82.6 - 102.9 fL    MCH 28.6 25.2 - 33.5 pg    MCHC 32.2 28.4 - 34.8 g/dL    RDW 12.1 11.8 - 14.4 %    Platelets 155 669 - 155 k/uL    MPV 12.4 8.1 - 13.5 fL    NRBC Automated 0.0 0.0 per 100 WBC   SPECIMEN REJECTION    Collection Time: 05/02/20  6:08 AM   Result Value Ref Range    Specimen Source BLOOD     Ordered Test [facility]- [facility]     Reason for Rejection Unable to perform testing: Specimen hemolyzed. - NOT REPORTED    POC Glucose Fingerstick    Collection Time: 05/02/20  6:35 AM   Result Value Ref Range    POC Glucose 257 (H) 65 - 105 mg/dL         Imaging/Diagonstics:  Ct Head Wo Contrast    Result Date: 5/1/2020  EXAMINATION: CT OF THE HEAD WITHOUT CONTRAST  5/1/2020 3:54 pm TECHNIQUE: CT of the head was performed without the administration of intravenous contrast. Dose modulation, iterative reconstruction, and/or weight based adjustment of the mA/kV was utilized to reduce the radiation dose to as low as reasonably achievable. COMPARISON: March 26, 2020 HISTORY: ORDERING SYSTEM PROVIDED HISTORY: ams TECHNOLOGIST PROVIDED HISTORY: ams Reason for Exam: AMS Acuity: Unknown Type of Exam: Unknown FINDINGS: BRAIN/VENTRICLES: There is no acute intracranial hemorrhage, mass effect or midline shift. No abnormal extra-axial fluid collection. The gray-white differentiation is maintained without evidence of an acute infarct. There is no evidence of hydrocephalus. Mild to moderate atrophy is present. ORBITS: The visualized portion of the orbits demonstrate no acute abnormality. SINUSES: The visualized paranasal sinuses and mastoid air cells demonstrate no acute abnormality. SOFT TISSUES/SKULL:  No acute abnormality of the visualized skull or soft tissues. No acute intracranial abnormality.        Current Facility-Administered Medications

## 2020-05-02 NOTE — PLAN OF CARE
Problem: Falls - Risk of:  Goal: Will remain free from falls  Description: Will remain free from falls  Outcome: Met This Shift  Goal: Absence of physical injury  Description: Absence of physical injury  Outcome: Met This Shift     Problem: Pain:  Goal: Pain level will decrease  Description: Pain level will decrease  Outcome: Ongoing  Goal: Control of acute pain  Description: Control of acute pain  Outcome: Ongoing  Goal: Control of chronic pain  Description: Control of chronic pain  Outcome: Ongoing

## 2020-05-02 NOTE — DISCHARGE INSTR - COC
Vaccine 10/17/2014, 10/26/2015    Influenza, Fransico Alpha, IM, (6 mo and older Fluzone, Flulaval, Fluarix and 3 yrs and older Afluria) 09/18/2017    Influenza, Quadv, IM, PF (6 mo and older Fluzone, Flulaval, Fluarix, and 3 yrs and older Afluria) 12/11/2018, 10/18/2019    Pneumococcal Conjugate 13-valent ([id number]) 04/13/2015    Pneumococcal Polysaccharide (Intultxto89) 04/28/2016    Tdap (Boostrix, Adacel) 02/09/2017       Active Problems:  Patient Active Problem List   Diagnosis Code    Carpal tunnel syndrome G56.00    DDD (degenerative disc disease), lumbar M51.36    Prolapsed intervertebral disk [id number]    Headache R51    Hip arthritis M16.10    Diabetic neuropathy ([facility]) E11.40    Dermatomyositis ([facility] [address] M33.90    GERD (gastroesophageal reflux disease) K21.9    Moderate persistent asthma J45.40    Carotid artery plaque I65.29    HLD (hyperlipidemia) E78.5    HTN (hypertension) I10    Obesity (BMI 30-39. 9) E66.9    History of renal cell cancer Z85.528    S/p nephrectomy Z90.5    Episode of dizziness R42    Orthostasis I95.1    Diabetic autonomic neuropathy associated with type 2 diabetes mellitus (HCC) E11.43    Risk for falls Z91.81    Cervical spondylosis with myelopathy M47.12    Displacement of cervical intervertebral disc without myelopathy M50.20    Cervicalgia M54.2    Spinal stenosis of lumbar region with neurogenic claudication M48.062    Acquired spondylolisthesis M43.10    Orthostatic hypotension I95.1    DM (diabetes mellitus), type 2, uncontrolled with complications ([facility]) M92.0, E11.65    Clostridium difficile diarrhea A04.72    CEFERINO (acute kidney injury) ([facility]) N17.9    Colitis K52.9    Hypokalemia, gastrointestinal losses E87.6    Dizziness R42    Hypotension I95.9    Depression F32.9    Osteoporosis M81.0    Type 2 diabetes mellitus with diabetic polyneuropathy, with long-term current use of insulin ([facility]) E11.42, Z79.4    Lumbar disc herniation M51.26    Need for Tdap vaccination Z23    EJ on CPAP G47.33, Z99.89    Hypokalemia E87.6    Falls W19. XXXA    Right radial fracture S52. 91XA    Zygomatic fracture, right side, initial encounter for closed fracture ([facility] [address] S02.40EA    Thrombocytopenia (HCC) D69.6    Hypomagnesemia E83.42    Facial laceration S01.81XA    Ambulatory dysfunction R26.2    Hypovitaminosis D E55.9    CKD (chronic kidney disease) stage 2, GFR 60-89 ml/min N18.2    Age-related osteoporosis with current pathological fracture with routine healing M80.00XD    Hyperglycemia R73.9    Chronic UTI N39.0    DKA, type 2, not at goal [facility]) E11.10       Isolation/Infection:   Isolation          No Isolation        Patient Infection Status     Infection Onset Added Last Indicated Last Indicated By Review Planned Expiration Resolved Resolved By    VRE  12/02/14 12/02/14 Bettina Sethi RN        Urine 11/2014      Resolved    C-diff Rule Out 03/26/20 03/26/20 03/26/20 C DIFF TOXIN/ANTIGEN (Ordered)   03/27/20 Rule-Out Test Resulted          Nurse Assessment:  Last Vital Signs: /71   Pulse 70   Temp 98.1 °F (36.7 °C) (Oral)   Resp 18   Ht 5' 2\" (1.575 m)   Wt 100 lb (45.4 kg)   LMP 01/01/2002   SpO2 98%   BMI 18.29 kg/m²     Last documented pain score (0-10 scale): Pain Level: 8  Last Weight:   Wt Readings from Last 1 Encounters:   05/01/20 100 lb (45.4 kg)     Mental Status:  oriented and alert    IV Access:  - None    Nursing Mobility/ADLs:  Walking   Independent  Transfer  Independent  Bathing  Independent  Dressing  Independent  Toileting  Independent  Feeding  Independent  Med Admin  Independent  Med Delivery   whole    Wound Care Documentation and Therapy:  Incision 09/12/18 Back (Active)   Number of days: 597        Elimination:  Continence:   · Bowel: Yes  Bladder: Yes incont at times  Urinary Catheter: None   Colostomy/Ileostomy/Ileal Conduit: No           Intake/Output Summary (Last 24 hours) at 5/2/2020 1041  Last data filed at requires {Admit to Appropriate Level of Care:41738} for {GREATER/LESS:613675127} 30 days.      Update Admission H&P: {CHP DME Changes in OGP:708708149}    PHYSICIAN SIGNATURE:  {Esignature:[id number]}

## 2020-05-04 ENCOUNTER — CARE COORDINATION (OUTPATIENT)
Dept: CASE MANAGEMENT | Age: 65
End: 2020-05-04

## 2020-05-04 NOTE — CARE COORDINATION
[facility]    Call within 2 business days of discharge: Yes    Patient: Maria Esther Ray Patient : 1955   MRN: [id number]  Reason for Admission: UTI/Covid-19 [place]  Discharge Date: 20 RARS: Readmission Risk Score: 25      Last Discharge [facility]       Complaint Diagnosis Description Type Department Provider    20 Nausea; Emesis Urinary tract infection without hematuria, site unspecified . .. ED to Hosp-Admission (Discharged) (ADMITTED) GONZALEZ Shoemaker MD; [facility] Porter. .. Spoke with: [address]: [facility]    Was able to contact Azul Smith for initial outreach. She said that she was doing fine. She is waiting on sister to  new medications. Routed to scheduling pool and encouraged her to follow up with [facility]. Patient contacted regarding Yvon Sandra. Care Transition Nurse/ Ambulatory Care Manager contacted the patient by telephone to perform post discharge assessment. Verified name and  with patient as identifiers. Provided introduction to self, and explanation of the CTN/ACM role, and reason for call due to risk factors for infection and/or exposure to COVID-19. Symptoms reviewed with patient who verbalized the following symptoms: no new symptoms. Due to no new or worsening symptoms encounter was not routed to provider for escalation. Patient has following risk factors of: diabetes and chronic kidney disease. CTN/ACM reviewed discharge instructions, medical action plan and red flags such as increased shortness of breath, increasing fever and signs of decompensation with patient who verbalized understanding. Discussed exposure protocols and quarantine with CDC Guidelines What to do if you are sick with coronavirus disease .  Patient was given an opportunity for questions and concerns.  The patient agrees to contact the [facility] exposure line [phone], local [facility] department [address]: ([phone]) and PCP office for questions related to their healthcare. CTN/ACM provided contact information for future needs. Reviewed and educated patient on any new and changed medications related to discharge diagnosis     Patient/family/caregiver given information for [facility] and agrees to enroll no  Does not have email. Plan for follow-up call in 5-7 days based on severity of symptoms and risk factors. Care Transitions 24 Hour Call    Do you have any ongoing symptoms?:  No  Do you have a copy of your discharge instructions?:  Yes  Do you have all of your prescriptions and are they filled?:  No  Have you been contacted by a [address]?:  No  Have you scheduled your follow up appointment?:  No  Were you discharged with any Home Care or Post Acute Services:  No  Post Acute Services:  Home Health (Comment: [place])  Do you feel like you have everything you need to keep you well at home?:  Yes  Care Transitions Interventions         Follow Up  No future appointments.     Petrona Flores RN

## 2020-05-11 ENCOUNTER — CARE COORDINATION (OUTPATIENT)
Dept: CASE MANAGEMENT | Age: 65
End: 2020-05-11

## 2020-05-12 ENCOUNTER — VIRTUAL VISIT (OUTPATIENT)
Dept: FAMILY MEDICINE CLINIC | Age: 65
End: 2020-05-12
Payer: MEDICAID

## 2020-05-12 VITALS — WEIGHT: 130 LBS | HEIGHT: 62 IN | BODY MASS INDEX: 23.92 KG/M2

## 2020-05-12 PROCEDURE — 1111F DSCHRG MED/CURRENT MED MERGE: CPT | Performed by: STUDENT IN AN ORGANIZED HEALTH CARE EDUCATION/TRAINING PROGRAM

## 2020-05-12 PROCEDURE — 99213 OFFICE O/P EST LOW 20 MIN: CPT | Performed by: STUDENT IN AN ORGANIZED HEALTH CARE EDUCATION/TRAINING PROGRAM

## 2020-05-12 RX ORDER — INSULIN GLARGINE 100 [IU]/ML
INJECTION, SOLUTION SUBCUTANEOUS
Qty: 5 PEN | Refills: 3 | Status: ON HOLD | OUTPATIENT
Start: 2020-05-12 | End: 2021-05-14 | Stop reason: SDUPTHER

## 2020-05-12 NOTE — PROGRESS NOTES
Visit Information    Have you changed or started any medications since your last visit including any over-the-counter medicines, vitamins, or herbal medicines? no   Have you stopped taking any of your medications? Is so, why? -  no  Are you having any side effects from any of your medications? - no    Have you seen any other physician or provider since your last visit?  no   Have you had any other diagnostic tests since your last visit? yes   Have you been seen in the emergency room and/or had an admission in a hospital since we last saw you?  yes    Have you had your routine dental cleaning in the past 6 months?  no     Do you have an active MyChart account? If no, what is the barrier?   Yes    Patient Care Team:  Dora Birmingham MD as PCP - General (Family Medicine)  Thad Parsons MD as Consulting Physician  Troy Morin MD as Surgeon (Orthopedic Surgery)  SUNNY Garcia MD as Surgeon (Cardiothoracic Surgery)  Juancarlos Streeter MD as Consulting Physician  Christiane Delgado MD as Consulting Physician (Endocrinology)  Susanna Landeros MD as Consulting Physician (Neurology)  Sadia Kang RN as Care Transitions Nurse  Nando Owen RN as Care Transitions Nurse  Taqueria Luong RN as Care Transitions Nurse    Medical History Review  Past Medical, Family, and Social History reviewed and does contribute to the patient presenting condition    Health Maintenance   Topic Date Due    Diabetic foot exam  04/28/2017    Diabetic retinal exam  04/28/2017    Colon Cancer Screen FIT/FOBT  03/13/2018    Cervical cancer screen  09/17/2018    Lipid screen  05/24/2020    Diabetic microalbuminuria test  05/25/2020    Shingles Vaccine (1 of 2) 08/15/2020 (Originally 7/22/2007)    A1C test (Diabetic or Prediabetic)  06/26/2020    Breast cancer screen  08/27/2020    DTaP/Tdap/Td vaccine (2 - Td) 02/09/2027    Flu vaccine  Completed    Pneumococcal 0-64 years Vaccine  Completed    Hepatitis C screen  Completed    HIV screen  Completed    Hepatitis A vaccine  Aged Out    Hib vaccine  Aged Out    Meningococcal (ACWY) vaccine  Aged Out

## 2020-05-12 NOTE — PROGRESS NOTES
Post-Discharge Transitional Care Management Services or Hospital Follow Up      Byron Lagunas   YOB: 1955    Date of Office Visit:  5/12/2020  Date of Hospital Admission: 5/1/20  Date of Hospital Discharge: 5/2/20  Readmission Risk Score(high >=14%. Medium >=10%):Readmission Risk Score: 25      Care management risk score Rising risk (score 2-5) and Complex Care (Scores >=6): 6     Non face to face  following discharge, date last encounter closed (first attempt may have been earlier): 5/4/2020  1:47 PM 5/4/2020  1:47 PM    Call initiated 2 business days of discharge: Yes     Patient Active Problem List   Diagnosis    Carpal tunnel syndrome    DDD (degenerative disc disease), lumbar    Prolapsed intervertebral disk    Headache    Hip arthritis    Diabetic neuropathy ([address]    Dermatomyositis ([address]    GERD (gastroesophageal reflux disease)    Moderate persistent asthma    Carotid artery plaque    HLD (hyperlipidemia)    HTN (hypertension)    Obesity (BMI 30-39. 9)    History of renal cell cancer    S/p nephrectomy    Episode of dizziness    Orthostasis    Diabetic autonomic neuropathy associated with type 2 diabetes mellitus ([address]    Risk for falls    Cervical spondylosis with myelopathy    Displacement of cervical intervertebral disc without myelopathy    Cervicalgia    Spinal stenosis of lumbar region with neurogenic claudication    Acquired spondylolisthesis    Orthostatic hypotension    DM (diabetes mellitus), type 2, uncontrolled with complications (HCC)    Clostridium difficile diarrhea    CEFERINO (acute kidney injury) ([address]    Colitis    Hypokalemia, gastrointestinal losses    Dizziness    Hypotension    Depression    Osteoporosis    Type 2 diabetes mellitus with diabetic polyneuropathy, with long-term current use of insulin (HCC)    Lumbar disc herniation    Need for Tdap vaccination    EJ on CPAP    Hypokalemia    Falls    Right radial fracture  Zygomatic fracture, right side, initial encounter for closed fracture (HCC)    Thrombocytopenia (HCC)    Hypomagnesemia    Facial laceration    Ambulatory dysfunction    Hypovitaminosis D    CKD (chronic kidney disease) stage 2, GFR 60-89 ml/min    Age-related osteoporosis with current pathological fracture with routine healing    Hyperglycemia    Urinary tract infection without hematuria    DKA, type 2, not at goal [facility])       Allergies   Allergen Reactions    Lac Bovis     Nedocromil     Tramadol     Penicillins Hives and Rash    Tape [Adhesive Tape] Rash     OK to use paper tape per patient       Medications listed as ordered at the time of discharge from hospital   Eleazar Knows   Home Medication Instructions UTK:[id number]    Printed on:05/12/20 3562   Medication Information                      acetaminophen (TYLENOL) 500 MG tablet  Take 500 mg by mouth every 6 hours as needed. albuterol (PROVENTIL HFA;VENTOLIN HFA) 108 (90 BASE) MCG/ACT inhaler  Inhale 2 puffs into the lungs every 6 hours as needed. amLODIPine (NORVASC) 10 MG tablet  Take 1 tablet by mouth daily             aspirin EC 81 MG EC tablet  Take 1 tablet by mouth daily             BiPAP Machine MISC  by Does not apply route             butalbital-acetaminophen-caffeine (FIORICET, ESGIC) per tablet  Take 1 tablet by mouth every 8 hours as needed.              calcium carbonate (TUMS) 500 MG chewable tablet  Take 1 tablet by mouth 3 times daily as needed for Heartburn             Cholecalciferol (VITAMIN D3) 1000 units TABS  Take 1 tablet by mouth daily             fluticasone (ARNUITY ELLIPTA) 200 MCG/ACT AEPB  Inhale 1 Inhaler into the lungs daily             gemfibrozil (LOPID) 600 MG tablet  TAKE ONE TABLET BY MOUTH TWICE A DAY             Handicap Placard MISC  by Does not apply route Duration: 1 year             insulin glargine (LANTUS SOLOSTAR) 100 UNIT/ML injection pen  Inject 40

## 2020-05-18 ENCOUNTER — CARE COORDINATION (OUTPATIENT)
Dept: CASE MANAGEMENT | Age: 65
End: 2020-05-18

## 2020-05-19 ENCOUNTER — CARE COORDINATION (OUTPATIENT)
Dept: CASE MANAGEMENT | Age: 65
End: 2020-05-19

## 2020-05-26 ENCOUNTER — HOSPITAL ENCOUNTER (OUTPATIENT)
Age: 65
Setting detail: SPECIMEN
Discharge: HOME | End: 2020-05-26
Payer: MEDICAID

## 2020-05-26 ENCOUNTER — OFFICE VISIT (OUTPATIENT)
Dept: FAMILY MEDICINE CLINIC | Age: 65
End: 2020-05-26
Payer: MEDICAID

## 2020-05-26 ENCOUNTER — TELEPHONE (OUTPATIENT)
Dept: FAMILY MEDICINE CLINIC | Age: 65
End: 2020-05-26

## 2020-05-26 VITALS
DIASTOLIC BLOOD PRESSURE: 76 MMHG | TEMPERATURE: 98.4 F | BODY MASS INDEX: 26.51 KG/M2 | HEART RATE: 77 BPM | SYSTOLIC BLOOD PRESSURE: 127 MMHG | WEIGHT: 145 LBS

## 2020-05-26 LAB
CHOLESTEROL/HDL RATIO: 3.9
CHOLESTEROL: 181 MG/DL
CREATININE URINE: 134.7 MG/DL (ref 28–217)
HBA1C MFR BLD: 14 %
HDLC SERPL-MCNC: 46 MG/DL
LDL CHOLESTEROL: 89 MG/DL (ref 0–130)
MICROALBUMIN/CREAT 24H UR: 1506 MG/L
MICROALBUMIN/CREAT UR-RTO: 1118 MCG/MG CREAT
TRIGL SERPL-MCNC: 230 MG/DL
VLDLC SERPL CALC-MCNC: ABNORMAL MG/DL (ref 1–30)

## 2020-05-26 PROCEDURE — G8427 DOCREV CUR MEDS BY ELIG CLIN: HCPCS | Performed by: STUDENT IN AN ORGANIZED HEALTH CARE EDUCATION/TRAINING PROGRAM

## 2020-05-26 PROCEDURE — 99213 OFFICE O/P EST LOW 20 MIN: CPT | Performed by: STUDENT IN AN ORGANIZED HEALTH CARE EDUCATION/TRAINING PROGRAM

## 2020-05-26 PROCEDURE — 3017F COLORECTAL CA SCREEN DOC REV: CPT | Performed by: STUDENT IN AN ORGANIZED HEALTH CARE EDUCATION/TRAINING PROGRAM

## 2020-05-26 PROCEDURE — 83036 HEMOGLOBIN GLYCOSYLATED A1C: CPT | Performed by: STUDENT IN AN ORGANIZED HEALTH CARE EDUCATION/TRAINING PROGRAM

## 2020-05-26 PROCEDURE — 2022F DILAT RTA XM EVC RTNOPTHY: CPT | Performed by: STUDENT IN AN ORGANIZED HEALTH CARE EDUCATION/TRAINING PROGRAM

## 2020-05-26 PROCEDURE — 1036F TOBACCO NON-USER: CPT | Performed by: STUDENT IN AN ORGANIZED HEALTH CARE EDUCATION/TRAINING PROGRAM

## 2020-05-26 PROCEDURE — 1111F DSCHRG MED/CURRENT MED MERGE: CPT | Performed by: STUDENT IN AN ORGANIZED HEALTH CARE EDUCATION/TRAINING PROGRAM

## 2020-05-26 PROCEDURE — 3046F HEMOGLOBIN A1C LEVEL >9.0%: CPT | Performed by: STUDENT IN AN ORGANIZED HEALTH CARE EDUCATION/TRAINING PROGRAM

## 2020-05-26 PROCEDURE — G8417 CALC BMI ABV UP PARAM F/U: HCPCS | Performed by: STUDENT IN AN ORGANIZED HEALTH CARE EDUCATION/TRAINING PROGRAM

## 2020-05-26 ASSESSMENT — ENCOUNTER SYMPTOMS
ABDOMINAL PAIN: 0
NAUSEA: 0
VOMITING: 0
CONSTIPATION: 0
DIARRHEA: 0
COUGH: 0
SHORTNESS OF BREATH: 0

## 2020-05-26 NOTE — PROGRESS NOTES
Visit Information    Have you changed or started any medications since your last visit including any over-the-counter medicines, vitamins, or herbal medicines? no   Have you stopped taking any of your medications? Is so, why? -  no  Are you having any side effects from any of your medications? - no    Have you seen any other physician or provider since your last visit?  no   Have you had any other diagnostic tests since your last visit?  no   Have you been seen in the emergency room and/or had an admission in a hospital since we last saw you?  no   Have you had your routine dental cleaning in the past 6 months?  no     Do you have an active MyChart account? If no, what is the barrier?   No: declined    Patient Care Team:  Baljit Burks MD as PCP - General (Family Medicine)  Arturo Nunn MD as Consulting Physician  Alexandru Ferreira MD as Surgeon (Orthopedic Surgery)  SUNNY Sue MD as Surgeon (Cardiothoracic Surgery)  Kolby Read MD as Consulting Physician  Tracie Cosby MD as Consulting Physician (Endocrinology)  Isaak Bruno MD as Consulting Physician (Neurology)    Medical History Review  Past Medical, Family, and Social History reviewed and does contribute to the patient presenting condition    Health Maintenance   Topic Date Due    Diabetic foot exam  04/28/2017    Diabetic retinal exam  04/28/2017    Colon Cancer Screen FIT/FOBT  03/13/2018    Cervical cancer screen  09/17/2018    Diabetic microalbuminuria test  05/25/2020    Lipid screen  05/24/2020    Shingles Vaccine (1 of 2) 08/15/2020 (Originally 7/22/2007)    A1C test (Diabetic or Prediabetic)  06/26/2020    Breast cancer screen  08/27/2020    DTaP/Tdap/Td vaccine (2 - Td) 02/09/2027    Flu vaccine  Completed    Pneumococcal 0-64 years Vaccine  Completed    Hepatitis C screen  Completed    HIV screen  Completed    Hepatitis A vaccine  Aged Out    Hib vaccine  Aged Out    Meningococcal (ACWY) vaccine

## 2020-05-26 NOTE — PROGRESS NOTES
Subjective:    Soy Crump is a 60 y.o. female with  has a past medical history of CEFERINO (acute kidney injury) ([facility] [address] Asthma, Carotid artery plaque, Clostridium difficile diarrhea, Dehydration, Depression, Fall, Fibromyalgia, Hiatal hernia, HTN (hypertension), Hyperlipidemia, Hypokalemia, gastrointestinal losses, Kidney stone, Obesity (BMI 30-39.9), Osteoarthritis, Osteoporosis, Renal cancer ([facility] [address] Short of breath on exertion, Type II or unspecified type diabetes mellitus without mention of complication, not stated as uncontrolled, Unspecified sleep apnea, Urge urinary incontinence, Wears glasses, Wears glasses, and Zygomatic fracture, right side, initial encounter for closed fracture ([facility] [address] Family History   Problem Relation Age of Onset    Diabetes Mother     High Blood Pressure Mother     Pacemaker Mother     High Blood Pressure Father     Prostate Cancer Father     Breast Cancer Sister     Asthma Brother     Prostate Cancer Maternal Grandfather     High Blood Pressure Paternal Grandmother        Presented tothe office today for:  Chief Complaint   Patient presents with    Diabetes     Here for blood sugar check, does not see endocrinologist until 6-18-20. States BS has been inm the 200's       HPI     61 yo female presents for 2 week follow up DM II. Patient had virtual visit two weeks ago for follow up of hospital visit where she was admitted for hyperglycemia. Last visit, instructed patient to incease nightly 40U insulin to 45U insulin and keep blood glucose log book to bring to appt. Patient did not increase dose. Today patient did not bring blood glucose log book   However, states fasting blood sugars remains 180-200. Denies HA, blurry, sob, chest pain. Appt with Dr. Wesley Stevens 6/18/20     During hospital visit early May, patient was complaining of persistent migraines midly alleviated by fiorecet/tylenol. And states she has mild memory loss.  Will refer to neurology Symptoms have not worsened     Review of Systems   Constitutional: Negative for chills, fatigue and fever. Eyes: Negative for visual disturbance. Respiratory: Negative for cough and shortness of breath. Cardiovascular: Negative for chest pain, palpitations and leg swelling. Gastrointestinal: Negative for abdominal pain, constipation, diarrhea, nausea and vomiting. Endocrine: Positive for polydipsia. Genitourinary: Negative for dysuria and frequency. Musculoskeletal: Negative for myalgias. Neurological: Negative for weakness, light-headedness and headaches. Objective:    /76   Pulse 77   Temp 98.4 °F (36.9 °C)   Wt 145 lb (65.8 kg)   LMP 01/01/2002   BMI 26.51 kg/m²    BP Readings from Last 3 Encounters:   05/26/20 127/76   05/02/20 126/71   03/27/20 (!) 113/53       Physical Exam  Vitals signs reviewed. Constitutional:       General: She is not in acute distress. Eyes:      Conjunctiva/sclera: Conjunctivae normal.   Neck:      Musculoskeletal: Neck supple. Thyroid: No thyromegaly. Cardiovascular:      Rate and Rhythm: Normal rate and regular rhythm. Heart sounds: Normal heart sounds. Pulmonary:      Effort: Pulmonary effort is normal.      Breath sounds: Normal breath sounds. Abdominal:      General: Bowel sounds are normal.      Palpations: Abdomen is soft. Musculoskeletal:         General: No tenderness. Right lower leg: No edema. Left lower leg: No edema. Feet:    Feet:      Right foot:      Protective Sensation: 10 sites tested. 4 sites sensed. Left foot:      Protective Sensation: 10 sites tested. 4 sites sensed. Skin:     General: Skin is warm and dry. Neurological:      Mental Status: She is alert. Motor: No abnormal muscle tone.          Lab Results   Component Value Date    WBC 11.2 05/02/2020    HGB 12.8 05/02/2020    HCT 39.7 05/02/2020     05/02/2020    CHOL 167 10/30/2017    TRIG 261 (H) 10/30/2017    HDL 37

## 2020-05-26 NOTE — PROGRESS NOTES
Attending Physician Statement  I have discussed the care of Henrique Mcghee, including pertinent history and exam findings,  with the resident. I have reviewed the key elements of all parts of the encounter with the resident. I agree with the assessment, plan and orders as documented by the resident.   (GE Modifier)    Herberth Garrison

## 2020-06-23 ENCOUNTER — OFFICE VISIT (OUTPATIENT)
Dept: NEUROLOGY | Age: 65
End: 2020-06-23
Payer: MEDICAID

## 2020-06-23 VITALS
BODY MASS INDEX: 33.04 KG/M2 | TEMPERATURE: 97.3 F | WEIGHT: 175 LBS | HEIGHT: 61 IN | HEART RATE: 78 BPM | OXYGEN SATURATION: 94 % | SYSTOLIC BLOOD PRESSURE: 110 MMHG | DIASTOLIC BLOOD PRESSURE: 67 MMHG

## 2020-06-23 PROCEDURE — 3017F COLORECTAL CA SCREEN DOC REV: CPT | Performed by: STUDENT IN AN ORGANIZED HEALTH CARE EDUCATION/TRAINING PROGRAM

## 2020-06-23 PROCEDURE — 1036F TOBACCO NON-USER: CPT | Performed by: STUDENT IN AN ORGANIZED HEALTH CARE EDUCATION/TRAINING PROGRAM

## 2020-06-23 PROCEDURE — G8427 DOCREV CUR MEDS BY ELIG CLIN: HCPCS | Performed by: STUDENT IN AN ORGANIZED HEALTH CARE EDUCATION/TRAINING PROGRAM

## 2020-06-23 PROCEDURE — 99205 OFFICE O/P NEW HI 60 MIN: CPT | Performed by: STUDENT IN AN ORGANIZED HEALTH CARE EDUCATION/TRAINING PROGRAM

## 2020-06-23 PROCEDURE — G8417 CALC BMI ABV UP PARAM F/U: HCPCS | Performed by: STUDENT IN AN ORGANIZED HEALTH CARE EDUCATION/TRAINING PROGRAM

## 2020-06-23 RX ORDER — AMMONIUM LACTATE 12 G/100G
1 CREAM TOPICAL PRN
COMMUNITY

## 2020-06-23 RX ORDER — CALCIUM CARBONATE/VITAMIN D3 500-10/5ML
400 LIQUID (ML) ORAL DAILY
Qty: 30 CAPSULE | Refills: 3 | Status: SHIPPED | OUTPATIENT
Start: 2020-06-23 | End: 2020-07-23

## 2020-06-23 ASSESSMENT — ENCOUNTER SYMPTOMS
NAUSEA: 0
EYE PAIN: 0
BACK PAIN: 1
PHOTOPHOBIA: 0
SINUS PAIN: 0
COUGH: 0
NAUSEA: 1
PHOTOPHOBIA: 1
SHORTNESS OF BREATH: 0
SORE THROAT: 0
CONSTIPATION: 0
EYE REDNESS: 0
ABDOMINAL PAIN: 0
EYE DISCHARGE: 0
DIARRHEA: 0
VOMITING: 0

## 2020-06-23 NOTE — PROGRESS NOTES
 Marital status:      Spouse name: Not on file    Number of children: Not on file    Years of education: 15    Highest education level: Not on file   Occupational History    Occupation: disability     Employer: N/A   Social Needs    Financial resource strain: Not on file    Food insecurity     Worry: Not on file     Inability: Not on file   [employer] needs     Medical: Not on file     Non-medical: Not on file   Tobacco Use    Smoking status: Never Smoker    Smokeless tobacco: Never Used   Substance and Sexual Activity    Alcohol use: No    Drug use: No    Sexual activity: Not on file   Lifestyle    Physical activity     Days per week: Not on file     Minutes per session: Not on file    Stress: Not on file   Relationships    Social connections     Talks on phone: Not on file     Gets together: Not on file     Attends Moravian service: Not on file     Active member of club or organization: Not on file     Attends meetings of clubs or organizations: Not on file     Relationship status: Not on file    Intimate partner violence     Fear of current or ex partner: Not on file     Emotionally abused: Not on file     Physically abused: Not on file     Forced sexual activity: Not on file   Other Topics Concern    Not on file   Social History Narrative    Not on file        Current Outpatient Medications   Medication Sig Dispense Refill    ammonium lactate (AMLACTIN) 12 % cream Apply 1 Bottle topically as needed for Dry Skin Apply topically as needed.       insulin glargine (LANTUS SOLOSTAR) 100 UNIT/ML injection pen Inject 45units nightly and 40units in the morning 5 pen 3    insulin lispro (HUMALOG) 100 UNIT/ML injection vial Inject 10 Units into the skin 3 times daily (before meals) 10 units with Breakfast  + moderate correction scale  Lunch only moderate correction scale  6 units with dinner + moderate correction scale   Moderate correction scale : 200 - 250  = 3 units, 251- 300 = 6 differentials, medications used for headache management, their side effects and coordinating care.       Electronically signed by Radha Castillo MD on 6/23/2020 at 1:19 PM

## 2020-07-07 ENCOUNTER — TELEPHONE (OUTPATIENT)
Dept: FAMILY MEDICINE CLINIC | Age: 65
End: 2020-07-07

## 2020-07-07 NOTE — TELEPHONE ENCOUNTER
Spoke with patient to discuss her outstanding cologuard test. Informed patient order will  soon, and if she has any questions in regards to performing her test, to call writer.

## 2020-07-15 ENCOUNTER — HOSPITAL ENCOUNTER (OUTPATIENT)
Age: 65
Discharge: HOME | End: 2020-07-15
Payer: MEDICAID

## 2020-07-15 LAB
ABSOLUTE EOS #: 0.2 K/UL (ref 0–0.4)
ABSOLUTE IMMATURE GRANULOCYTE: NORMAL K/UL (ref 0–0.3)
ABSOLUTE LYMPH #: 4.4 K/UL (ref 1–4.8)
ABSOLUTE MONO #: 0.6 K/UL (ref 0.1–1.3)
ALBUMIN SERPL-MCNC: 3.2 G/DL (ref 3.5–5.2)
ALBUMIN/GLOBULIN RATIO: ABNORMAL (ref 1–2.5)
ALP BLD-CCNC: 100 U/L (ref 35–104)
ALT SERPL-CCNC: 7 U/L (ref 5–33)
ANION GAP SERPL CALCULATED.3IONS-SCNC: 12 MMOL/L (ref 9–17)
AST SERPL-CCNC: 11 U/L
BASOPHILS # BLD: 1 % (ref 0–2)
BASOPHILS ABSOLUTE: 0.1 K/UL (ref 0–0.2)
BILIRUB SERPL-MCNC: 0.36 MG/DL (ref 0.3–1.2)
BUN BLDV-MCNC: 21 MG/DL (ref 8–23)
BUN/CREAT BLD: ABNORMAL (ref 9–20)
CALCIUM SERPL-MCNC: 9.4 MG/DL (ref 8.6–10.4)
CHLORIDE BLD-SCNC: 98 MMOL/L (ref 98–107)
CO2: 24 MMOL/L (ref 20–31)
CREAT SERPL-MCNC: 1.18 MG/DL (ref 0.5–0.9)
DIFFERENTIAL TYPE: NORMAL
EOSINOPHILS RELATIVE PERCENT: 2 % (ref 0–4)
GFR AFRICAN AMERICAN: 56 ML/MIN
GFR NON-AFRICAN AMERICAN: 46 ML/MIN
GFR SERPL CREATININE-BSD FRML MDRD: ABNORMAL ML/MIN/{1.73_M2}
GFR SERPL CREATININE-BSD FRML MDRD: ABNORMAL ML/MIN/{1.73_M2}
GLUCOSE BLD-MCNC: 373 MG/DL (ref 70–99)
HCT VFR BLD CALC: 41.4 % (ref 36–46)
HEMOGLOBIN: 13.3 G/DL (ref 12–16)
IMMATURE GRANULOCYTES: NORMAL %
LYMPHOCYTES # BLD: 40 % (ref 24–44)
MCH RBC QN AUTO: 27.4 PG (ref 26–34)
MCHC RBC AUTO-ENTMCNC: 32.1 G/DL (ref 31–37)
MCV RBC AUTO: 85.4 FL (ref 80–100)
MONOCYTES # BLD: 6 % (ref 1–7)
NRBC AUTOMATED: NORMAL PER 100 WBC
PDW BLD-RTO: 13.7 % (ref 11.5–14.9)
PLATELET # BLD: 211 K/UL (ref 150–450)
PLATELET ESTIMATE: NORMAL
PMV BLD AUTO: 9.7 FL (ref 6–12)
POTASSIUM SERPL-SCNC: 4.6 MMOL/L (ref 3.7–5.3)
RBC # BLD: 4.84 M/UL (ref 4–5.2)
RBC # BLD: NORMAL 10*6/UL
SEG NEUTROPHILS: 51 % (ref 36–66)
SEGMENTED NEUTROPHILS ABSOLUTE COUNT: 5.7 K/UL (ref 1.3–9.1)
SODIUM BLD-SCNC: 134 MMOL/L (ref 135–144)
T. PALLIDUM, IGG: NONREACTIVE
TOTAL PROTEIN: 7.7 G/DL (ref 6.4–8.3)
WBC # BLD: 11 K/UL (ref 3.5–11)
WBC # BLD: NORMAL 10*3/UL

## 2020-07-15 PROCEDURE — 86780 TREPONEMA PALLIDUM: CPT

## 2020-07-15 PROCEDURE — 36415 COLL VENOUS BLD VENIPUNCTURE: CPT

## 2020-07-15 PROCEDURE — 80053 COMPREHEN METABOLIC PANEL: CPT

## 2020-07-15 PROCEDURE — 85025 COMPLETE CBC W/AUTO DIFF WBC: CPT

## 2020-08-13 ENCOUNTER — HOSPITAL ENCOUNTER (OUTPATIENT)
Dept: NEUROLOGY | Age: 65
Discharge: HOME | End: 2020-08-13
Payer: MEDICAID

## 2020-08-13 ENCOUNTER — HOSPITAL ENCOUNTER (OUTPATIENT)
Dept: MRI IMAGING | Age: 65
Discharge: HOME | End: 2020-08-15
Payer: MEDICAID

## 2020-08-13 ENCOUNTER — HOSPITAL ENCOUNTER (OUTPATIENT)
Dept: NON INVASIVE DIAGNOSTICS | Age: 65
Discharge: HOME | End: 2020-08-13
Payer: MEDICAID

## 2020-08-13 LAB
LV EF: 55 %
LVEF MODALITY: NORMAL

## 2020-08-13 PROCEDURE — 70551 MRI BRAIN STEM W/O DYE: CPT

## 2020-08-13 PROCEDURE — 95816 EEG AWAKE AND DROWSY: CPT | Performed by: PSYCHIATRY & NEUROLOGY

## 2020-08-13 PROCEDURE — 93306 TTE W/DOPPLER COMPLETE: CPT

## 2020-08-13 PROCEDURE — 95819 EEG AWAKE AND ASLEEP: CPT

## 2020-08-16 NOTE — PROCEDURES
drowsiness is abnormal with FIRDA pattern with diffusely slow background  intermittent frontal, intermittent rhythmic delta pattern indicating  mild encephalopathy. This study did not demonstrate any ongoing  electrographic seizure activity. No epileptiform discharge is noted. No electrographic seizures noted. Clinical correlation is recommended.         Katherine Be    D: 08/16/2020 16:29:23       T: 08/16/2020 16:31:21     [email or web address]  Job#: [id number]     Doc#: [id number]    CC:

## 2020-09-01 ENCOUNTER — OFFICE VISIT (OUTPATIENT)
Dept: NEUROLOGY | Age: 65
End: 2020-09-01
Payer: MEDICAID

## 2020-09-01 VITALS
OXYGEN SATURATION: 95 % | HEART RATE: 90 BPM | SYSTOLIC BLOOD PRESSURE: 130 MMHG | HEIGHT: 62 IN | DIASTOLIC BLOOD PRESSURE: 73 MMHG | BODY MASS INDEX: 27.42 KG/M2 | TEMPERATURE: 97.7 F | WEIGHT: 149 LBS

## 2020-09-01 PROCEDURE — 3017F COLORECTAL CA SCREEN DOC REV: CPT | Performed by: STUDENT IN AN ORGANIZED HEALTH CARE EDUCATION/TRAINING PROGRAM

## 2020-09-01 PROCEDURE — 99214 OFFICE O/P EST MOD 30 MIN: CPT | Performed by: STUDENT IN AN ORGANIZED HEALTH CARE EDUCATION/TRAINING PROGRAM

## 2020-09-01 PROCEDURE — G8417 CALC BMI ABV UP PARAM F/U: HCPCS | Performed by: STUDENT IN AN ORGANIZED HEALTH CARE EDUCATION/TRAINING PROGRAM

## 2020-09-01 PROCEDURE — 1036F TOBACCO NON-USER: CPT | Performed by: STUDENT IN AN ORGANIZED HEALTH CARE EDUCATION/TRAINING PROGRAM

## 2020-09-01 PROCEDURE — G8427 DOCREV CUR MEDS BY ELIG CLIN: HCPCS | Performed by: STUDENT IN AN ORGANIZED HEALTH CARE EDUCATION/TRAINING PROGRAM

## 2020-09-01 RX ORDER — DONEPEZIL HYDROCHLORIDE 5 MG/1
5 TABLET, FILM COATED ORAL NIGHTLY
Qty: 30 TABLET | Refills: 3 | Status: SHIPPED | OUTPATIENT
Start: 2020-09-01

## 2020-09-01 RX ORDER — CALCIUM CARBONATE/VITAMIN D3 500-10/5ML
400 LIQUID (ML) ORAL DAILY
Qty: 30 CAPSULE | Refills: 3 | Status: SHIPPED | OUTPATIENT
Start: 2020-09-01 | End: 2020-10-01

## 2020-09-01 ASSESSMENT — ENCOUNTER SYMPTOMS
EYE DISCHARGE: 0
PHOTOPHOBIA: 0
NAUSEA: 0
EYE REDNESS: 0
SHORTNESS OF BREATH: 0
CONSTIPATION: 0
SORE THROAT: 0
EYE PAIN: 0
BACK PAIN: 1
SINUS PAIN: 0
ABDOMINAL PAIN: 0
COUGH: 0
VOMITING: 0
DIARRHEA: 0

## 2020-09-01 NOTE — PROGRESS NOTES
[address] [place] [address],  [address], [facility], [facility], [address] [place] [address]  P: [phone]  F: [phone]    NEUROLOGY CLINIC NOTE     PATIENT NAME: Leela Looney  PATIENT MRN: [id number]  PRIMARY CARE PHYSICIAN: Krzysztof Goncalves MD    Interval history 9/1/2020  Patient was last seen in the clinic in June 2020,  Since last visit continues to have ongoing problems with cognitive impairment and learning and retaining new information. Denies any worsening of the symptoms. She is able to manage all activities of daily living by herself. Neuropsychological evaluation not yet done. Continues to complain of headache, occurs around 2-3 times in a week, associated with photophobia, nausea vomiting. She was supposed to take magnesium oxide 400 mg daily for headaches, did not take the medication. Taking Tylenol as needed for the headaches, endorses improvement in the headache with that. MRI of the brain on 8/13/2020 did not show any acute intracranial abnormality, showed mild parenchymal volume loss, sequelae of minimal chronic microvascular ischemic changes. EEG showed FIRDA pattern, suggestive of mild encephalopathy. No epileptiform discharges or electrographic seizures noted. Notes from 6/23/2020  HPI:      Leela Looney 63 y.o. right handed female  with PMH of hypertension, hyperlipidemia, fibromyalgia, asthma, renal cancer, diabetes, kidney stones, osteoarthritis  who comes in for evaluation of cognitive impairment and chronic migraine headaches. History was obtained from the patient and collateral history was obtained from the accompanying family- sister in law and medical records. Cognitive impairment history  The patient lives with her  Mother in law  Patient was at her baseline until almost 2 years ago. Symptoms were initially noticed by the patient and family, have been going on for the past 1-2 years, and progressively getting worse.   The patient and the family have noticed ongoing problems with learning and retaining new information, trouble remembering events or names, forgets appointments, forgetting schedules  The family denies problems with language or word finding difficulty,   reports episodes of  intermittent confusion,   denies changes in personality or changes in mood  reports difficulty with performing  learned tasks  The patient and family denies any hallucinations or behavior changes. Patient is able to manage his ADLs (activities of daily living) including showering, cooking and dressing. she also has noticed problems handling complex tasks for example balancing a checkbook, problems with reasoning i.e. unable to cope with unexpected events. Patient reports problems with spatial ability and orientation, specifically getting lost in familiar places. Patient is currently not driving   Patient denies history of tremors   Patient describes multiple episodes of mechanical falls and tripping over things. She also endorses some falls due to weakness in her muscles. She uses a walker for ambulation, can walk few steps without a walker. Patient describes multiple episodes of passing out. She endorses episodes of passing out at least once or twice in a month, denies any seizures or seizure-like activity. As per sister-in-law patient can have some staring episodes where she is not responding lasting for couple of minutes. No prior history of seizures. Denies family history of seizures. The family reports that patient has lost interest in his hobbies/activites. Family history of dementia: yes - brother, recently diagnosed with dementia. History of traumatic brain injury/brain surgery/Stroke: no  History of Depression: yes - on treatment.      Headaches:  Patient has a history of chronic headaches started since she was 15years old, gradually getting worse for last couple of years, endorses bitemporal headache 8-9/10 intensity, sharp and stabbing in nature, occurs around 2-3 times in a week, last for couple of hours to days. She endorses associated nausea, vomiting, photophobia, blurry vision, sometimes lightheadedness and fatigue with the headaches. She is to follow-up with a neurologist Dr. Allison Stringer for the headaches. She is to get Botox injections for the headache in the past, denies significant improvement in the headache. As per patient she used to have syncopal episodes after Botox injections. She does not remember at all what medication she has tried for the headache, as per patient she is taking only Tylenol around 6 to 7 tablets in a week. Does not remember what other medications she has tried for the headaches in the past.  Was not sure what dose of Tylenol she was taking. PATIENT HISTORY:     Past Medical History:   Diagnosis Date    CEFERINO (acute kidney injury) ([facility] 11/19/2014    Asthma     Carotid artery plaque 4/2/2013    Clostridium difficile diarrhea 11/19/2014    Dehydration 11/19/2014    Depression     Fall     history fell down stairs .  Fibromyalgia     Hiatal hernia     HTN (hypertension) 4/2/2013    Hyperlipidemia     Hypokalemia, gastrointestinal losses 11/21/2014    Kidney stone     b/l    Obesity (BMI 30-39. 9) 5/28/2013    Osteoarthritis     Osteoporosis     Renal cancer ([address] 8/7/2013    right    Short of breath on exertion     Type II or unspecified type diabetes mellitus without mention of complication, not stated as uncontrolled     Unspecified sleep apnea     does not use machine    Urge urinary incontinence     mid urethral sling in 2002    Wears glasses     Wears glasses     Zygomatic fracture, right side, initial encounter for closed fracture ([address] 5/26/2018        Past Surgical History:   Procedure Laterality Date    BLADDER SURGERY  06/05/2014    Bladder Stimulator Implant    BLADDER SUSPENSION  08/09/2002    BREAST BIOPSY Left 03/03/2008    BREAST SURGERY Left 03/03/2008    lumpectomy    CHOLECYSTECTOMY  10/10/2003    COLONOSCOPY      x2    ENDOMETRIAL ABLATION      ENDOSCOPY, COLON, DIAGNOSTIC       egd x2    FIXATION KYPHOPLASTY  10/2018    KNEE SURGERY Bilateral     x 2 each side    KYPHOSIS SURGERY N/A 5/8/2019    KYPHOPLASTY L4 performed by Gabe Robbins MD at [address]  10/15/14    WITH FUSION POSTERIOR L4 L5 WITH SPINAL CORD MONITORING    NEPHROSTOMY Right     DE [address]Q [address] UNI/BI CANNULJ LMBR N/A 9/12/2018    KYPHOPLASTY T2 & L2 performed by Gabe Robbins MD at [address] Right 06/20/2013    right due to CA        Social History     Socioeconomic History    Marital status:      Spouse name: Not on file    Number of children: Not on file    Years of education: 15    Highest education level: Not on file   Occupational History    Occupation: disability     Employer: N/A   Social Needs    Financial resource strain: Not on file    Food insecurity     Worry: Not on file     Inability: Not on file   [employer] needs     Medical: Not on file     Non-medical: Not on file   Tobacco Use    Smoking status: Never Smoker    Smokeless tobacco: Never Used   Substance and Sexual Activity    Alcohol use: No    Drug use: No    Sexual activity: Not on file   Lifestyle    Physical activity     Days per week: Not on file     Minutes per session: Not on file    Stress: Not on file   Relationships    Social connections     Talks on phone: Not on file     Gets together: Not on file     Attends Muslim service: Not on file     Active member of club or organization: Not on file     Attends meetings of clubs or organizations: Not on file     Relationship status: Not on file    Intimate partner violence     Fear of current or ex partner: Not on file     Emotionally abused: Not on file     Physically abused: Not on file     Forced sexual activity: Not on file   Other Topics Concern    Not on file   Social History Narrative    Not on file        Current Outpatient Medications   Medication Sig Dispense Refill    donepezil (ARICEPT) 5 MG tablet Take 1 tablet by mouth nightly 30 tablet 3    Magnesium Oxide 400 MG CAPS Take 400 mg by mouth daily 30 capsule 3    ammonium lactate (AMLACTIN) 12 % cream Apply 1 Bottle topically as needed for Dry Skin Apply topically as needed.  insulin glargine (LANTUS SOLOSTAR) 100 UNIT/ML injection pen Inject 45units nightly and 40units in the morning 5 pen 3    insulin lispro (HUMALOG) 100 UNIT/ML injection vial Inject 10 Units into the skin 3 times daily (before meals) 10 units with Breakfast  + moderate correction scale  Lunch only moderate correction scale  6 units with dinner + moderate correction scale   Moderate correction scale : 200 - 250  = 3 units, 251- 300 = 6 units, 301 - 350 = 10 units, and over 350 = 16 units  Max dose per day 64 units  Per Dr Elen Low [facility] - 4/8/2020      amLODIPine (NORVASC) 10 MG tablet Take 1 tablet by mouth daily 30 tablet 3    metFORMIN (GLUCOPHAGE-XR) 500 MG extended release tablet Take 1 tablet by mouth 2 times daily 90 tablet 1    ondansetron (ZOFRAN) 4 MG tablet Take 1 tablet by mouth every 8 hours as needed for Nausea or Vomiting 14 tablet 0    sertraline (ZOLOFT) 100 MG tablet TAKE ONE TABLET BY MOUTH DAILY 30 tablet 0    raloxifene (EVISTA) 60 MG tablet TAKE ONE TABLET BY MOUTH DAILY 30 tablet 0    Cholecalciferol (VITAMIN D3) 1000 units TABS Take 1 tablet by mouth daily 90 tablet 1    aspirin EC 81 MG EC tablet Take 1 tablet by mouth daily 30 tablet 5    lovastatin (MEVACOR) 40 MG tablet TAKE ONE AND ONE-HALF (1  1/2) TABLET BY MOUTH NIGHTLY 30 tablet 2    gemfibrozil (LOPID) 600 MG tablet TAKE ONE TABLET BY MOUTH TWICE A DAY 60 tablet 0    zolpidem (AMBIEN) 10 MG tablet Take 10 mg by mouth nightly as needed for Sleep. .      fluticasone (ARNUITY ELLIPTA) 200 MCG/ACT AEPB Inhale 1 Inhaler into the lungs daily      BiPAP Machine MISC by Does not apply route      calcium carbonate (TUMS) 500 MG chewable tablet Take 1 tablet by mouth 3 times daily as needed for Heartburn 180 tablet 5    Handicap Placard MISC by Does not apply route Duration: 1 year 1 each 0    acetaminophen (TYLENOL) 500 MG tablet Take 500 mg by mouth every 6 hours as needed.  butalbital-acetaminophen-caffeine (FIORICET, ESGIC) per tablet Take 1 tablet by mouth every 8 hours as needed.  montelukast (SINGULAIR) 10 MG tablet Take 10 mg by mouth nightly.  albuterol (PROVENTIL HFA;VENTOLIN HFA) 108 (90 BASE) MCG/ACT inhaler Inhale 2 puffs into the lungs every 6 hours as needed.  vitamin D (ERGOCALCIFEROL) 90226 units CAPS capsule Take 1 capsule by mouth once a week for 8 doses 8 capsule 0     No current facility-administered medications for this visit. Allergies   Allergen Reactions    Lac Bovis     Nedocromil     Tramadol     Penicillins Hives and Rash    Tape [Adhesive Tape] Rash     OK to use paper tape per patient        REVIEW OF SYSTEMS:     Review of Systems   Constitutional: Positive for fatigue. Negative for chills, diaphoresis, fever and unexpected weight change. HENT: Negative for congestion, ear discharge, ear pain, hearing loss, sinus pain and sore throat. Eyes: Negative for photophobia, pain, discharge, redness and visual disturbance. Respiratory: Negative for cough and shortness of breath. Cardiovascular: Negative for chest pain, palpitations and leg swelling. Gastrointestinal: Negative for abdominal pain, constipation, diarrhea, nausea and vomiting. Endocrine: Negative for polydipsia and polyuria. Genitourinary: Negative for difficulty urinating and hematuria. Musculoskeletal: Positive for arthralgias, back pain and gait problem. Negative for neck pain. Skin: Negative for pallor and rash. Neurological: Positive for dizziness, weakness and headaches.  Negative for tremors, seizures, syncope, facial asymmetry, speech difficulty, light-headedness and numbness. Hematological: Does not bruise/bleed easily. Psychiatric/Behavioral: Positive for confusion, decreased concentration, dysphoric mood and sleep disturbance. Negative for agitation, behavioral problems and hallucinations. VITALS  /73 (Site: Left Upper Arm, Position: Sitting, Cuff Size: Medium Adult)   Pulse 90   Temp 97.7 °F (36.5 °C) (Temporal)   Ht 5' 2\" (1.575 m)   Wt 149 lb (67.6 kg)   LMP 01/01/2002   SpO2 95%   BMI 27.25 kg/m²      PHYSICAL EXAMINATION:     Constitutional: Well developed, well nourished and in no acute distress. Head:  normocephalic, atraumatic. Neck: supple, no carotid bruits, thyroid not palpable  Respiratory: Clear to auscultation bilaterally with no use of accessory muscles during respiration. Cardiovascular: normal rate, regular rhythm, no murmur, gallop, rub.   Abdomen: Soft, nontender, nondistended, normal bowel sounds, no hepatomegaly or splenomegaly  Extremities:  peripheral pulses palpable, no pedal edema or calf pain with palpation  Psych: normal affect      NEUROLOGICAL EXAMINATION:     Mental status   Alert and oriented; impaired memory with episodes of confusion, speech or language problems; no hallucinations or delusions     Cranial nerves   II - visual fields intact to confrontation                                                III, IV, VI - extra-ocular muscles full: no pupillary defect; no CHERRY, no nystagmus, no ptosis   V - normal facial sensation                                                               VII - normal facial symmetry                                                             VIII - intact hearing                                                                             IX, X - symmetrical palate                                                                  XI - symmetrical shoulder shrug XII - midline tongue without atrophy or fasciculation     Motor function  Normal muscle bulk and tone  Muscle strength:  4+/5 in bilateral upper and lower extremities, generalized weakness     Sensory function Intact to touch in bilateral upper and lower extremities. Cerebellar Finger to nose intact bilaterally  No involuntary movements or tremors     Reflex function 1+ DTR and symmetric. Negative Babinski     Gait                   Patient uses a walker for ambulation, has difficulty getting off the chair, was able to take few steps without a walker, had a slow cautious gait. MMSE done in the clinic. 2020        PRIOR TESTS AND IMAGING: Following images and Labs were reviewed by the examiner       MRI Brain without contrast 2020  Impression    1. No acute intracranial abnormality. 2. Mild parenchymal volume loss.  Sequela of minimal chronic microvascular    ischemic changes. Routine EE2020  ELECTRODIAGNOSTIC INTERPRETATION:  This EEG performed during wakefulness  and drowsiness is abnormal with FIRDA pattern with diffusely slow background  intermittent frontal, intermittent rhythmic delta pattern indicating  mild encephalopathy. This study did not demonstrate any ongoing  electrographic seizure activity. No epileptiform discharge is noted. No electrographic seizures noted. Clinical correlation is recommended. SUBRAHMANYAM CHODISETTY    Neuropsychological testing:     TSH  1.86 on 2020   Vit B12  724 on 2020   Folate  11.1 on 2020   Treponema pallidum antibody negative       2D echo  Summary  Left ventricle is normal in size. Moderate left ventricular hypertrophy. Global left ventricular systolic function is normal. Estimated LV EF >55 %. Evidence of mild (grade I) diastolic dysfunction. Both atria are normal in size. Negative bubble study, no shunt noted. Normal right ventricular size and function.   Mild mitral regurgitation.         ASSESSMENT / PLAN:     Negro Matute 60 y.o. right handed female  who comes in for evaluation of cognitive impairment and chronic migraine headaches. · Cognitive impairment- Unclear etiology, MRI brain did not show any acute finding, showed mild Parenchymal volume loss and chronic small vessel ischemic changes  MMSE in the clinic: 20 /30   Chronic headaches   Chronic migraine headaches   History of syncopal episodes   Hypertension   Hyperlipidemia   Fibromyalgia   Asthma   Renal cancer-right   Diabetes   Kidney stones   Osteoarthritis      Vitamin B12 724 on 5/2/2020  TSH 1.86 on 5/2/2020    PLAN:    MRI Brain -images reviewed and discussed with the patient   EEG-showed FIRDA pattern with mild encephalopathy,    Reviewed VDRL, CBC CMP   Will give a trial of  Donepezil 5 mg at night time. Discussed all possible side effects of the medication with the patient. She voiced understanding. Instructed patient to call the clinic if develop any side effects.  Neuropsychological evaluation for cognitive impairment- not yet completed.  Magnesium oxide 400 mg daily for headache prophylaxis   Tylenol 500 mg as needed for moderate to severe headaches. No more than 14 days in a month to avoid medication overuse headache     Syncopal episodes-Holter monitor pending and 2D echo- results reviewed   May consider tilt table test if needed as per above evaluations   Orthostats negative     Follow up in the clinic in 3 months   Instructed patient to call the clinic if symptoms worsen or develop any new symptoms. I have spent 25 minutes face to face with the patient more than 50% of this time was spent counseling on the following healthy behaviors: medical compliance, smoking cessation, blood pressure control and coordinating care.     Electronically signed by Ramona Cole MD on 9/1/2020 at 2:27 PM

## 2021-05-08 ENCOUNTER — APPOINTMENT (OUTPATIENT)
Dept: GENERAL RADIOLOGY | Age: 66
DRG: 420 | End: 2021-05-08
Payer: MEDICAID

## 2021-05-08 ENCOUNTER — HOSPITAL ENCOUNTER (INPATIENT)
Age: 66
LOS: 6 days | Discharge: OTHER INSTITUTION | DRG: 420 | End: 2021-05-14
Attending: EMERGENCY MEDICINE | Admitting: INTERNAL MEDICINE
Payer: MEDICAID

## 2021-05-08 ENCOUNTER — APPOINTMENT (OUTPATIENT)
Dept: CT IMAGING | Age: 66
DRG: 420 | End: 2021-05-08
Payer: MEDICAID

## 2021-05-08 DIAGNOSIS — R41.82 ALTERED MENTAL STATUS, UNSPECIFIED ALTERED MENTAL STATUS TYPE: Primary | ICD-10-CM

## 2021-05-08 PROBLEM — E11.00 HYPEROSMOLAR HYPERGLYCEMIC STATE (HHS) (HCC): Status: ACTIVE | Noted: 2021-05-08

## 2021-05-08 PROBLEM — G93.41 METABOLIC ENCEPHALOPATHY: Status: ACTIVE | Noted: 2021-05-08

## 2021-05-08 LAB
-: ABNORMAL
ABSOLUTE EOS #: 0.03 K/UL (ref 0–0.44)
ABSOLUTE IMMATURE GRANULOCYTE: 0.15 K/UL (ref 0–0.3)
ABSOLUTE LYMPH #: 1.02 K/UL (ref 1.1–3.7)
ABSOLUTE MONO #: 0.57 K/UL (ref 0.1–1.2)
ACETAMINOPHEN LEVEL: <5 UG/ML (ref 10–30)
ALBUMIN SERPL-MCNC: 3.4 G/DL (ref 3.5–5.2)
ALBUMIN/GLOBULIN RATIO: 0.8 (ref 1–2.5)
ALLEN TEST: ABNORMAL
ALP BLD-CCNC: 123 U/L (ref 35–104)
ALT SERPL-CCNC: 21 U/L (ref 5–33)
AMORPHOUS: ABNORMAL
ANION GAP SERPL CALCULATED.3IONS-SCNC: 15 MMOL/L (ref 9–17)
ANION GAP: 9 MMOL/L (ref 7–16)
AST SERPL-CCNC: 22 U/L
BACTERIA: ABNORMAL
BASOPHILS # BLD: 0 % (ref 0–2)
BASOPHILS ABSOLUTE: 0.03 K/UL (ref 0–0.2)
BETA-HYDROXYBUTYRATE: 0.94 MMOL/L (ref 0.02–0.27)
BILIRUB SERPL-MCNC: 0.43 MG/DL (ref 0.3–1.2)
BILIRUBIN URINE: NEGATIVE
BUN BLDV-MCNC: 27 MG/DL (ref 8–23)
BUN/CREAT BLD: ABNORMAL (ref 9–20)
CALCIUM SERPL-MCNC: 9.2 MG/DL (ref 8.6–10.4)
CASTS UA: ABNORMAL /LPF (ref 0–8)
CHLORIDE BLD-SCNC: 96 MMOL/L (ref 98–107)
CO2: 20 MMOL/L (ref 20–31)
COLOR: YELLOW
CREAT SERPL-MCNC: 1.15 MG/DL (ref 0.5–0.9)
CRYSTALS, UA: ABNORMAL /HPF
DIFFERENTIAL TYPE: ABNORMAL
EOSINOPHILS RELATIVE PERCENT: 0 % (ref 1–4)
EPITHELIAL CELLS UA: ABNORMAL /HPF (ref 0–5)
ETHANOL PERCENT: <0.01 %
ETHANOL: <10 MG/DL
FIO2: ABNORMAL
GFR AFRICAN AMERICAN: 58 ML/MIN
GFR NON-AFRICAN AMERICAN: 39 ML/MIN
GFR NON-AFRICAN AMERICAN: 48 ML/MIN
GFR SERPL CREATININE-BSD FRML MDRD: 47 ML/MIN
GFR SERPL CREATININE-BSD FRML MDRD: ABNORMAL ML/MIN/{1.73_M2}
GLUCOSE BLD-MCNC: 422 MG/DL (ref 65–105)
GLUCOSE BLD-MCNC: 465 MG/DL (ref 65–105)
GLUCOSE BLD-MCNC: 562 MG/DL (ref 70–99)
GLUCOSE BLD-MCNC: 624 MG/DL (ref 74–100)
GLUCOSE URINE: ABNORMAL
HCO3 VENOUS: 23.6 MMOL/L (ref 22–29)
HCT VFR BLD CALC: 39.6 % (ref 36.3–47.1)
HEMOGLOBIN: 12.7 G/DL (ref 11.9–15.1)
IMMATURE GRANULOCYTES: 1 %
INR BLD: 0.9
KETONES, URINE: ABNORMAL
LACTIC ACID, SEPSIS WHOLE BLOOD: 2.3 MMOL/L (ref 0.5–1.9)
LACTIC ACID, SEPSIS WHOLE BLOOD: 2.7 MMOL/L (ref 0.5–1.9)
LACTIC ACID, SEPSIS: ABNORMAL MMOL/L (ref 0.5–1.9)
LACTIC ACID, SEPSIS: ABNORMAL MMOL/L (ref 0.5–1.9)
LEUKOCYTE ESTERASE, URINE: NEGATIVE
LYMPHOCYTES # BLD: 6 % (ref 24–43)
MAGNESIUM: 2 MG/DL (ref 1.6–2.6)
MCH RBC QN AUTO: 28.3 PG (ref 25.2–33.5)
MCHC RBC AUTO-ENTMCNC: 32.1 G/DL (ref 28.4–34.8)
MCV RBC AUTO: 88.2 FL (ref 82.6–102.9)
MODE: ABNORMAL
MONOCYTES # BLD: 4 % (ref 3–12)
MUCUS: ABNORMAL
MYOGLOBIN: 382 NG/ML (ref 25–58)
NEGATIVE BASE EXCESS, VEN: 2 (ref 0–2)
NITRITE, URINE: NEGATIVE
NRBC AUTOMATED: 0 PER 100 WBC
O2 DEVICE/FLOW/%: ABNORMAL
O2 SAT, VEN: 49 % (ref 60–85)
OTHER OBSERVATIONS UA: ABNORMAL
PARTIAL THROMBOPLASTIN TIME: 17.5 SEC (ref 20.5–30.5)
PATIENT TEMP: ABNORMAL
PCO2, VEN: 43.2 MM HG (ref 41–51)
PDW BLD-RTO: 12 % (ref 11.8–14.4)
PH UA: 6 (ref 5–8)
PH VENOUS: 7.34 (ref 7.32–7.43)
PLATELET # BLD: 230 K/UL (ref 138–453)
PLATELET ESTIMATE: ABNORMAL
PMV BLD AUTO: 12 FL (ref 8.1–13.5)
PO2, VEN: 28 MM HG (ref 30–50)
POC BUN: 27 MG/DL (ref 8–26)
POC CHLORIDE: 103 MMOL/L (ref 98–107)
POC CREATININE: 1.37 MG/DL (ref 0.51–1.19)
POC HEMATOCRIT: 44 % (ref 36–46)
POC HEMOGLOBIN: 15 G/DL (ref 12–16)
POC IONIZED CALCIUM: 1.25 MMOL/L (ref 1.15–1.33)
POC LACTIC ACID: 2.55 MMOL/L (ref 0.56–1.39)
POC PCO2 TEMP: ABNORMAL MM HG
POC PH TEMP: ABNORMAL
POC PO2 TEMP: ABNORMAL MM HG
POC POTASSIUM: 4.8 MMOL/L (ref 3.5–4.5)
POC SODIUM: 135 MMOL/L (ref 138–146)
POC TCO2: 24 MMOL/L (ref 22–30)
POSITIVE BASE EXCESS, VEN: ABNORMAL (ref 0–3)
POTASSIUM SERPL-SCNC: 4.5 MMOL/L (ref 3.7–5.3)
PROTEIN UA: ABNORMAL
PROTHROMBIN TIME: 9.5 SEC (ref 9.1–12.3)
RBC # BLD: 4.49 M/UL (ref 3.95–5.11)
RBC # BLD: ABNORMAL 10*6/UL
RBC UA: ABNORMAL /HPF (ref 0–4)
RENAL EPITHELIAL, UA: ABNORMAL /HPF
SALICYLATE LEVEL: 1 MG/DL (ref 3–10)
SAMPLE SITE: ABNORMAL
SARS-COV-2, RAPID: NOT DETECTED
SEG NEUTROPHILS: 89 % (ref 36–65)
SEGMENTED NEUTROPHILS ABSOLUTE COUNT: 14.52 K/UL (ref 1.5–8.1)
SODIUM BLD-SCNC: 131 MMOL/L (ref 135–144)
SPECIFIC GRAVITY UA: 1.03 (ref 1–1.03)
SPECIMEN DESCRIPTION: NORMAL
TOTAL CK: 162 U/L (ref 26–192)
TOTAL CO2, VENOUS: ABNORMAL MMOL/L (ref 23–30)
TOTAL PROTEIN: 7.7 G/DL (ref 6.4–8.3)
TOXIC TRICYCLIC SC,BLOOD: NEGATIVE
TRICHOMONAS: ABNORMAL
TROPONIN INTERP: ABNORMAL
TROPONIN INTERP: ABNORMAL
TROPONIN T: ABNORMAL NG/ML
TROPONIN T: ABNORMAL NG/ML
TROPONIN, HIGH SENSITIVITY: 30 NG/L (ref 0–14)
TROPONIN, HIGH SENSITIVITY: 33 NG/L (ref 0–14)
TSH SERPL DL<=0.05 MIU/L-ACNC: 1.32 MIU/L (ref 0.3–5)
TURBIDITY: CLEAR
URINE HGB: ABNORMAL
UROBILINOGEN, URINE: NORMAL
WBC # BLD: 16.3 K/UL (ref 3.5–11.3)
WBC # BLD: ABNORMAL 10*3/UL
WBC UA: ABNORMAL /HPF (ref 0–5)
YEAST: ABNORMAL

## 2021-05-08 PROCEDURE — 85014 HEMATOCRIT: CPT

## 2021-05-08 PROCEDURE — 82565 ASSAY OF CREATININE: CPT

## 2021-05-08 PROCEDURE — 85610 PROTHROMBIN TIME: CPT

## 2021-05-08 PROCEDURE — 87635 SARS-COV-2 COVID-19 AMP PRB: CPT

## 2021-05-08 PROCEDURE — 82803 BLOOD GASES ANY COMBINATION: CPT

## 2021-05-08 PROCEDURE — 2060000000 HC ICU INTERMEDIATE R&B

## 2021-05-08 PROCEDURE — 2580000003 HC RX 258: Performed by: STUDENT IN AN ORGANIZED HEALTH CARE EDUCATION/TRAINING PROGRAM

## 2021-05-08 PROCEDURE — 80051 ELECTROLYTE PANEL: CPT

## 2021-05-08 PROCEDURE — 81001 URINALYSIS AUTO W/SCOPE: CPT

## 2021-05-08 PROCEDURE — 93005 ELECTROCARDIOGRAM TRACING: CPT | Performed by: STUDENT IN AN ORGANIZED HEALTH CARE EDUCATION/TRAINING PROGRAM

## 2021-05-08 PROCEDURE — 83605 ASSAY OF LACTIC ACID: CPT

## 2021-05-08 PROCEDURE — 87040 BLOOD CULTURE FOR BACTERIA: CPT

## 2021-05-08 PROCEDURE — 82010 KETONE BODYS QUAN: CPT

## 2021-05-08 PROCEDURE — 71045 X-RAY EXAM CHEST 1 VIEW: CPT

## 2021-05-08 PROCEDURE — 82947 ASSAY GLUCOSE BLOOD QUANT: CPT

## 2021-05-08 PROCEDURE — 6370000000 HC RX 637 (ALT 250 FOR IP): Performed by: STUDENT IN AN ORGANIZED HEALTH CARE EDUCATION/TRAINING PROGRAM

## 2021-05-08 PROCEDURE — 80143 DRUG ASSAY ACETAMINOPHEN: CPT

## 2021-05-08 PROCEDURE — 82330 ASSAY OF CALCIUM: CPT

## 2021-05-08 PROCEDURE — 96374 THER/PROPH/DIAG INJ IV PUSH: CPT

## 2021-05-08 PROCEDURE — 85730 THROMBOPLASTIN TIME PARTIAL: CPT

## 2021-05-08 PROCEDURE — 99285 EMERGENCY DEPT VISIT HI MDM: CPT

## 2021-05-08 PROCEDURE — 82550 ASSAY OF CK (CPK): CPT

## 2021-05-08 PROCEDURE — 72125 CT NECK SPINE W/O DYE: CPT

## 2021-05-08 PROCEDURE — 80053 COMPREHEN METABOLIC PANEL: CPT

## 2021-05-08 PROCEDURE — 85025 COMPLETE CBC W/AUTO DIFF WBC: CPT

## 2021-05-08 PROCEDURE — 80048 BASIC METABOLIC PNL TOTAL CA: CPT

## 2021-05-08 PROCEDURE — 83735 ASSAY OF MAGNESIUM: CPT

## 2021-05-08 PROCEDURE — 6360000002 HC RX W HCPCS: Performed by: STUDENT IN AN ORGANIZED HEALTH CARE EDUCATION/TRAINING PROGRAM

## 2021-05-08 PROCEDURE — 87086 URINE CULTURE/COLONY COUNT: CPT

## 2021-05-08 PROCEDURE — 83874 ASSAY OF MYOGLOBIN: CPT

## 2021-05-08 PROCEDURE — 84520 ASSAY OF UREA NITROGEN: CPT

## 2021-05-08 PROCEDURE — 70450 CT HEAD/BRAIN W/O DYE: CPT

## 2021-05-08 PROCEDURE — 84443 ASSAY THYROID STIM HORMONE: CPT

## 2021-05-08 PROCEDURE — G0480 DRUG TEST DEF 1-7 CLASSES: HCPCS

## 2021-05-08 PROCEDURE — 84100 ASSAY OF PHOSPHORUS: CPT

## 2021-05-08 PROCEDURE — 80179 DRUG ASSAY SALICYLATE: CPT

## 2021-05-08 PROCEDURE — 80307 DRUG TEST PRSMV CHEM ANLYZR: CPT

## 2021-05-08 PROCEDURE — 84484 ASSAY OF TROPONIN QUANT: CPT

## 2021-05-08 RX ORDER — ATORVASTATIN CALCIUM 10 MG/1
10 TABLET, FILM COATED ORAL DAILY
Status: DISCONTINUED | OUTPATIENT
Start: 2021-05-09 | End: 2021-05-14 | Stop reason: HOSPADM

## 2021-05-08 RX ORDER — 0.9 % SODIUM CHLORIDE 0.9 %
15 INTRAVENOUS SOLUTION INTRAVENOUS ONCE
Status: COMPLETED | OUTPATIENT
Start: 2021-05-08 | End: 2021-05-09

## 2021-05-08 RX ORDER — ASPIRIN 81 MG/1
81 TABLET ORAL DAILY
Status: DISCONTINUED | OUTPATIENT
Start: 2021-05-09 | End: 2021-05-14 | Stop reason: HOSPADM

## 2021-05-08 RX ORDER — IPRATROPIUM BROMIDE AND ALBUTEROL SULFATE 2.5; .5 MG/3ML; MG/3ML
1 SOLUTION RESPIRATORY (INHALATION) EVERY 4 HOURS PRN
Status: DISCONTINUED | OUTPATIENT
Start: 2021-05-08 | End: 2021-05-14 | Stop reason: HOSPADM

## 2021-05-08 RX ORDER — DONEPEZIL HYDROCHLORIDE 5 MG/1
5 TABLET, FILM COATED ORAL NIGHTLY
Status: DISCONTINUED | OUTPATIENT
Start: 2021-05-08 | End: 2021-05-14 | Stop reason: HOSPADM

## 2021-05-08 RX ORDER — MAGNESIUM SULFATE 1 G/100ML
1000 INJECTION INTRAVENOUS PRN
Status: DISCONTINUED | OUTPATIENT
Start: 2021-05-08 | End: 2021-05-14 | Stop reason: HOSPADM

## 2021-05-08 RX ORDER — SODIUM CHLORIDE 9 MG/ML
INJECTION, SOLUTION INTRAVENOUS CONTINUOUS
Status: DISCONTINUED | OUTPATIENT
Start: 2021-05-08 | End: 2021-05-10

## 2021-05-08 RX ORDER — LORAZEPAM 2 MG/ML
0.5 INJECTION INTRAMUSCULAR ONCE
Status: COMPLETED | OUTPATIENT
Start: 2021-05-08 | End: 2021-05-08

## 2021-05-08 RX ORDER — POTASSIUM CHLORIDE 7.45 MG/ML
10 INJECTION INTRAVENOUS PRN
Status: DISCONTINUED | OUTPATIENT
Start: 2021-05-08 | End: 2021-05-12

## 2021-05-08 RX ORDER — LABETALOL HYDROCHLORIDE 5 MG/ML
20 INJECTION, SOLUTION INTRAVENOUS EVERY 4 HOURS PRN
Status: DISCONTINUED | OUTPATIENT
Start: 2021-05-08 | End: 2021-05-14 | Stop reason: HOSPADM

## 2021-05-08 RX ORDER — GEMFIBROZIL 600 MG/1
600 TABLET, FILM COATED ORAL 2 TIMES DAILY
Status: DISCONTINUED | OUTPATIENT
Start: 2021-05-08 | End: 2021-05-14 | Stop reason: HOSPADM

## 2021-05-08 RX ORDER — AMLODIPINE BESYLATE 10 MG/1
10 TABLET ORAL DAILY
Status: DISCONTINUED | OUTPATIENT
Start: 2021-05-09 | End: 2021-05-14 | Stop reason: HOSPADM

## 2021-05-08 RX ORDER — 0.9 % SODIUM CHLORIDE 0.9 %
1000 INTRAVENOUS SOLUTION INTRAVENOUS ONCE
Status: COMPLETED | OUTPATIENT
Start: 2021-05-08 | End: 2021-05-08

## 2021-05-08 RX ADMIN — CEFEPIME HYDROCHLORIDE 1000 MG: 1 INJECTION, POWDER, FOR SOLUTION INTRAMUSCULAR; INTRAVENOUS at 15:44

## 2021-05-08 RX ADMIN — INSULIN LISPRO 10 UNITS: 100 INJECTION, SOLUTION INTRAVENOUS; SUBCUTANEOUS at 22:56

## 2021-05-08 RX ADMIN — SODIUM CHLORIDE 1000 ML: 9 INJECTION, SOLUTION INTRAVENOUS at 12:31

## 2021-05-08 RX ADMIN — LORAZEPAM 0.5 MG: 2 INJECTION INTRAMUSCULAR; INTRAVENOUS at 12:49

## 2021-05-08 RX ADMIN — Medication 1250 MG: at 16:40

## 2021-05-08 RX ADMIN — SODIUM CHLORIDE 1089 ML: 9 INJECTION, SOLUTION INTRAVENOUS at 21:53

## 2021-05-08 NOTE — ED PROVIDER NOTES
[facility]     Emergency Department     Faculty Attestation    I performed a history and physical examination of the patient and discussed management with the resident. I reviewed the residents note and agree with the documented findings and plan of care. Any areas of disagreement are noted on the chart. I was personally present for the key portions of any procedures. I have documented in the chart those procedures where I was not present during the key portions. I have reviewed the emergency nurses triage note. I agree with the chief complaint, past medical history, past surgical history, allergies, medications, social and family history as documented unless otherwise noted below. For Physician Assistant/ Nurse Practitioner cases/documentation I have personally evaluated this patient and have completed at least one if not all key elements of the E/M (history, physical exam, and MDM). Additional findings are as noted. This patient was evaluated in the Emergency Department for symptoms described in the history of present illness. He/she was evaluated in the context of the global COVID-19 pandemic, which necessitated consideration that the patient might be at risk for infection with the SARS-CoV-2 virus that causes COVID-19. Institutional protocols and algorithms that pertain to the evaluation of patients at risk for COVID-19 are in a state of rapid change based on information released by regulatory bodies including the CDC and federal and state organizations. These policies and algorithms were followed during the patient's care in the ED. Patient arrives by EMS for altered mental status found down unknown downtime. No family here for additional history. Patient is confused not able to answer questions, invoking the level 5 EM caveat. Equal pupils. Equal  strength no facial asymmetry. Full range of motion all joints no long bone tenderness or deformities.   Lungs clear heart normal abdomen soft nontender. Patient is very unkept arrived quite soiled. We will proceed with broad work-up and plan to admit.   X line EKG interpretation sinus tachycardia 107 normal intervals normal axis no acute ST or T changes normal EKG except for rate      Critical Care     none    Cesar Olson MD, Claudean Rhein  Attending Emergency  Physician             Cesar Olson MD  05/08/21 [id number]

## 2021-05-08 NOTE — PROGRESS NOTES
Senior Note    HPI        ______________________    She has a history of CKD 3 and was following nephrology back in 2019. History of renal cell carcinoma status post right nephrectomy  History of urinary incontinence. She follows with urology and had sacral nerve stimulator placed to help with the urgency. Her last visit with them was 8/20/2020 with plan for 1 year KUB. Results     Sodium 131 potassium 4.5 chloride 96 bicarb 20 BUN 27 creatinine 1.15 lactic acid 2.1 glucose 624  Troponin 3033  VBG: pH 7.345, CO2 43.2, bicarb 23.6. WBC 16.3 hemoglobin 12.7 platelets 658    CT head, cervical spine 5/8/2021  Impression   No acute intracranial abnormality.  Cerebral atrophy.       No acute fracture or subluxation of the cervical spine.  Moderate   degenerative changes. CXR 5/8/2021  Impression   No acute cardiopulmonary disease     Assessment    Principal Problem:    AMS (altered mental status)  Active Problems:    HTN (hypertension)    Uncontrolled type 2 diabetes mellitus with complication, with long-term current use of insulin (HCC)    Hyperosmolar hyperglycemic state (HHS) (HCC)    Metabolic encephalopathy  Resolved Problems:    * No resolved hospital problems. *      Plan    1. Will start on insulin infusion, IVF resuscitation  2. Check blood glucose every hour  3. BMP q 4hours. 4. IV fluid resuscitation. 5. Will continue Vanc and Cefepime pending cultures  6. Will monitor urine output, BUN, creatinine  7. Will need to call family if her mentation does not improve with treatment. May also need LP if fever persists as there seems to be no other source. 8. BP control with home medications. While NPO, will order PRN labetalol  9. Continue Zoloft, Aricept. Will hold off of sedatives at this time    DVT prophylaxis: Lovenox  GI prophylaxis: None needed  Discharge planning: ongoing with Case Management    Pt seen and examined at bedside. Agree with Intern's assessment and plan.        Rhonda Flores MD PGY-3, Internal medicine resident  [facility] [place], [place]  5/8/2021 8:00 PM

## 2021-05-08 NOTE — ED PROVIDER NOTES
[facility] ED  Emergency Department  Emergency Medicine Resident Sign-out     Care of Deborah Martinez was assumed from Dr. Thien Gray and is being seen for Altered Mental Status  . The patient's initial evaluation and plan have been discussed with the prior provider who initially evaluated the patient.      EMERGENCY DEPARTMENT COURSE / MEDICAL DECISION MAKING:       MEDICATIONS GIVEN:  Orders Placed This Encounter   Medications    0.9 % sodium chloride bolus    LORazepam (ATIVAN) injection 0.5 mg    vancomycin (VANCOCIN) 15 mg/kg in dextrose 5 % 250 mL IVPB     Order Specific Question:   Antimicrobial Indications     Answer:   Sepsis of Unknown Etiology    cefepime (MAXIPIME) 1000 mg IVPB minibag     Order Specific Question:   Antimicrobial Indications     Answer:   Sepsis of Unknown Etiology       LABS / RADIOLOGY:     Labs Reviewed   CBC WITH AUTO DIFFERENTIAL - Abnormal; Notable for the following components:       Result Value    WBC 16.3 (*)     Seg Neutrophils 89 (*)     Lymphocytes 6 (*)     Eosinophils % 0 (*)     Immature Granulocytes 1 (*)     Segs Absolute 14.52 (*)     Absolute Lymph # 1.02 (*)     All other components within normal limits   COMPREHENSIVE METABOLIC PANEL - Abnormal; Notable for the following components:    Glucose 562 (*)     BUN 27 (*)     CREATININE 1.15 (*)     Sodium 131 (*)     Chloride 96 (*)     Alkaline Phosphatase 123 (*)     Albumin 3.4 (*)     Albumin/Globulin Ratio 0.8 (*)     GFR Non- 48 (*)     GFR  58 (*)     All other components within normal limits   LACTATE, SEPSIS - Abnormal; Notable for the following components:    Lactic Acid, Sepsis, Whole Blood 2.7 (*)     All other components within normal limits   APTT - Abnormal; Notable for the following components:    PTT 17.5 (*)     All other components within normal limits   TROPONIN - Abnormal; Notable for the following components:    Troponin, High Sensitivity 33 (*)     All other components within normal limits   URINALYSIS WITH MICROSCOPIC - Abnormal; Notable for the following components:    Glucose, Ur 3+ (*)     Ketones, Urine SMALL (*)     Urine Hgb MODERATE (*)     Protein, UA 3+ (*)     Bacteria, UA FEW (*)     All other components within normal limits   BETA-HYDROXYBUTYRATE - Abnormal; Notable for the following components:    Beta-Hydroxybutyrate 0.94 (*)     All other components within normal limits   TOX SCR, BLD, ED - Abnormal; Notable for the following components:    Acetaminophen Level <5 (*)     Salicylate Lvl 1 (*)     All other components within normal limits   MYOGLOBIN, SERUM - Abnormal; Notable for the following components:    Myoglobin 382 (*)     All other components within normal limits   ELECTROLYTES PLUS - Abnormal; Notable for the following components:    POC Sodium 135 (*)     POC Potassium 4.8 (*)     All other components within normal limits   TROPONIN - Abnormal; Notable for the following components:    Troponin, High Sensitivity 30 (*)     All other components within normal limits   LACTATE, SEPSIS - Abnormal; Notable for the following components:    Lactic Acid, Sepsis, Whole Blood 2.3 (*)     All other components within normal limits   VENOUS BLOOD GAS, POINT OF CARE - Abnormal; Notable for the following components:    pO2, Nic 28.0 (*)     O2 Sat, Nic 49 (*)     All other components within normal limits   CREATININE W/GFR POINT OF CARE - Abnormal; Notable for the following components:    POC Creatinine 1.37 (*)     GFR Comment 47 (*)     GFR Non- 39 (*)     All other components within normal limits   UREA N (POC) - Abnormal; Notable for the following components:    POC BUN 27 (*)     All other components within normal limits   LACTIC ACID,POINT OF CARE - Abnormal; Notable for the following components:    POC Lactic Acid 2.55 (*)     All other components within normal limits   POCT GLUCOSE - Abnormal; Notable for the osteophytes. T2 kyphoplasty performed since prior. SOFT TISSUES: There is no prevertebral soft tissue swelling. No acute intracranial abnormality. Cerebral atrophy. No acute fracture or subluxation of the cervical spine. Moderate degenerative changes. Ct Cervical Spine Wo Contrast    Result Date: 5/8/2021  EXAMINATION: CT OF THE HEAD WITHOUT CONTRAST; CT OF THE CERVICAL SPINE WITHOUT CONTRAST 5/8/2021 2:11 pm TECHNIQUE: CT of the head was performed without the administration of intravenous contrast. Dose modulation, iterative reconstruction, and/or weight based adjustment of the mA/kV was utilized to reduce the radiation dose to as low as reasonably achievable.; CT of the cervical spine was performed without the administration of intravenous contrast. Multiplanar reformatted images are provided for review. Dose modulation, iterative reconstruction, and/or weight based adjustment of the mA/kV was utilized to reduce the radiation dose to as low as reasonably achievable. COMPARISON: CT head 05/01/2020, CT cervical 09/04/2018 HISTORY: ORDERING SYSTEM PROVIDED HISTORY: Found down, AMS TECHNOLOGIST PROVIDED HISTORY: Found down, AMS Decision Support Exception - unselect if not a suspected or confirmed emergency medical condition->Emergency Medical Condition (MA) Reason for Exam: Found down, AMS FINDINGS: CT head: BRAIN/VENTRICLES: Mild cerebral atrophy. No midline shift. Basal cisterns are normally outlined. No intra or extra-axial hemorrhage. No acute infarct. ORBITS: The visualized portion of the orbits demonstrate no acute abnormality. SINUSES: The visualized paranasal sinuses and mastoid air cells demonstrate no acute abnormality. SOFT TISSUES/SKULL:  No acute abnormality of the visualized skull or soft tissues. CT CERVICAL SPINE: BONES/ALIGNMENT: Subtle grade 1 C4-C5 anterolisthesis unchanged. No acute fracture or subluxation. DEGENERATIVE CHANGES: Multilevel facet arthropathy.   C5-C6 disc space

## 2021-05-08 NOTE — PROGRESS NOTES
Pharmacy Note  Vancomycin Consult    Yolanda Steele is a 63 y.o. female started on Vancomycin for sepsis of unknown etiology; consult received from Dr. Nikki Kelly to manage therapy. Also receiving the following antibiotics: cefepime x1. Patient Active Problem List   Diagnosis    Carpal tunnel syndrome    DDD (degenerative disc disease), lumbar    Prolapsed intervertebral disk    Headache    Hip arthritis    Diabetic neuropathy (HCC)    Dermatomyositis (HCC)    GERD (gastroesophageal reflux disease)    Moderate persistent asthma    Carotid artery plaque    HLD (hyperlipidemia)    HTN (hypertension)    Obesity (BMI 30-39. 9)    History of renal cell cancer    S/p nephrectomy    Orthostasis    Diabetic autonomic neuropathy associated with type 2 diabetes mellitus ([facility] [address]    Risk for falls    Cervical spondylosis with myelopathy    Displacement of cervical intervertebral disc without myelopathy    Cervicalgia    Spinal stenosis of lumbar region with neurogenic claudication    Acquired spondylolisthesis    Orthostatic hypotension    DM (diabetes mellitus), type 2, uncontrolled with complications ([facility])    Clostridium difficile diarrhea    CEFERINO (acute kidney injury) ([facility] [address]    Colitis    Hypokalemia, gastrointestinal losses    Dizziness    Hypotension    Depression    Osteoporosis    Type 2 diabetes mellitus with diabetic polyneuropathy, with long-term current use of insulin ([facility])    Lumbar disc herniation    Need for Tdap vaccination    EJ on CPAP    Hypokalemia    Falls    Right radial fracture    Zygomatic fracture, right side, initial encounter for closed fracture ([facility])    Thrombocytopenia ([facility])    Hypomagnesemia    Facial laceration    Ambulatory dysfunction    Hypovitaminosis D    CKD (chronic kidney disease) stage 2, GFR 60-89 ml/min    Age-related osteoporosis with current pathological fracture with routine healing    Hyperglycemia    Urinary tract infection without hematuria    Uncontrolled type 2 diabetes mellitus with complication, with long-term current use of insulin (HCC)    AMS (altered mental status)     Allergies:  Lac bovis, Nedocromil, Tramadol, Penicillins, and Tape [adhesive tape]     Temp max: 100.7    Recent Labs     05/08/21  1154 05/08/21  1218   BUN  --  27*   CREATININE 1.37* 1.15*   WBC  --  16.3*     No intake or output data in the 24 hours ending 05/08/21 1539  Culture Date      Source                       Results  5/8/21                  blood                          in process  5/8/21                  urine                           in process    Ht Readings from Last 1 Encounters:   09/01/20 5' 2\" (1.575 m)        Wt Readings from Last 1 Encounters:   05/08/21 160 lb (72.6 kg)       Body mass index is 29.26 kg/m². CrCl cannot be calculated (Unknown ideal weight.). Goal Trough Level: 10-20 mcg/mL    Assessment/Plan:  Will initiate Vancomycin 1250mg IV every 24 hours. Timing of trough level will be determined based on culture results, renal function, and clinical response. Thank you for the consult. Will continue to follow.      Tawny Zaidi, IdaliaD

## 2021-05-08 NOTE — ED PROVIDER NOTES
[facility] ED  Emergency Department Encounter  Emergency Medicine Resident     Pt Name: Mackenzie Boyce  MRN: [id number]  [place] 1955  Date of evaluation: 5/8/21  PCP:  Deepti Vance MD    [address] [place] [address]       Chief Complaint   Patient presents with    Altered Mental Status       HISTORY OFPRESENT ILLNESS  (Location/Symptom, Timing/Onset, Context/Setting, Quality, Duration, Modifying Factors,Severity.)      Mackenzie Boyce is a 63 y.o. female who presents with complaints of altered mental status. Per EMS patient was found facedown on the floor. Unknown downtime, no family at bedside. Patient does not answer questions, does open eyes to voice, however history otherwise significantly limited. Patient was covered in stool. PAST MEDICAL / SURGICAL / SOCIAL / FAMILY HISTORY      has a past medical history of CEFERINO (acute kidney injury) ([facility] [address] Asthma, Carotid artery plaque, Clostridium difficile diarrhea, Dehydration, Depression, Fall, Fibromyalgia, Hiatal hernia, HTN (hypertension), Hyperlipidemia, Hypokalemia, gastrointestinal losses, Kidney stone, Obesity (BMI 30-39.9), Osteoarthritis, Osteoporosis, Renal cancer ([address] Short of breath on exertion, Type II or unspecified type diabetes mellitus without mention of complication, not stated as uncontrolled, Unspecified sleep apnea, Urge urinary incontinence, Wears glasses, Wears glasses, and Zygomatic fracture, right side, initial encounter for closed fracture ([facility] [address] has a past surgical history that includes knee surgery (Bilateral); bladder suspension (08/09/2002); Nephrostomy (Right); total nephrectomy (Right, 06/20/2013); lumbar laminectomy (10/15/14); Bladder surgery (06/05/2014); Endometrial ablation; Cholecystectomy (10/10/2003); Breast surgery (Left, 03/03/2008); Breast biopsy (Left, 03/03/2008); pr perq vert agmntj cavity crtj uni/bi cannulj lmbr (N/A, 9/12/2018); Fixation Kyphoplasty (10/2018);  Kyphosis surgery (N/A, 5/8/2019); Endoscopy, colon, diagnostic; and Colonoscopy. Social History     Socioeconomic History    Marital status:      Spouse name: Not on file    Number of children: Not on file    Years of education: 15    Highest education level: Not on file   Occupational History    Occupation: disability     Employer: N/A   Social Needs    Financial resource strain: Not on file    Food insecurity     Worry: Not on file     Inability: Not on file   [employer] needs     Medical: Not on file     Non-medical: Not on file   Tobacco Use    Smoking status: Never Smoker    Smokeless tobacco: Never Used   Substance and Sexual Activity    Alcohol use: No    Drug use: No    Sexual activity: Not on file   Lifestyle    Physical activity     Days per week: Not on file     Minutes per session: Not on file    Stress: Not on file   Relationships    Social connections     Talks on phone: Not on file     Gets together: Not on file     Attends Catholic service: Not on file     Active member of club or organization: Not on file     Attends meetings of clubs or organizations: Not on file     Relationship status: Not on file    Intimate partner violence     Fear of current or ex partner: Not on file     Emotionally abused: Not on file     Physically abused: Not on file     Forced sexual activity: Not on file   Other Topics Concern    Not on file   Social History Narrative    Not on file       Family History   Problem Relation Age of Onset    Diabetes Mother     High Blood Pressure Mother     Pacemaker Mother     High Blood Pressure Father     Prostate Cancer Father     Breast Cancer Sister     Asthma Brother     Prostate Cancer Maternal Grandfather     High Blood Pressure Paternal Grandmother         Allergies:  Lac bovis, Nedocromil, Tramadol, Penicillins, and Tape [adhesive tape]    Home Medications:  Prior to Admission medications    Medication Sig Start Date End Date Taking? Authorizing Provider   donepezil (ARICEPT) 5 MG tablet Take 1 tablet by mouth nightly 9/1/20   Kong Sepulveda MD   ammonium lactate (AMLACTIN) 12 % cream Apply 1 Bottle topically as needed for Dry Skin Apply topically as needed.     Historical Provider, MD   insulin glargine (LANTUS SOLOSTAR) 100 UNIT/ML injection pen Inject 45units nightly and 40units in the morning 5/12/20   Kike Subramanian MD   insulin lispro (HUMALOG) 100 UNIT/ML injection vial Inject 10 Units into the skin 3 times daily (before meals) 10 units with Breakfast  + moderate correction scale  Lunch only moderate correction scale  6 units with dinner + moderate correction scale   Moderate correction scale : 200 - 250  = 3 units, 251- 300 = 6 units, 301 - 350 = 10 units, and over 350 = 16 units  Max dose per day 64 units  Per Dr Lyn Pillai [facility] - 4/8/2020    Historical Provider, MD   amLODIPine (NORVASC) 10 MG tablet Take 1 tablet by mouth daily 3/27/20   Alma Lee MD   metFORMIN (GLUCOPHAGE-XR) 500 MG extended release tablet Take 1 tablet by mouth 2 times daily 3/27/20   Alma Lee MD   ondansetron (ZOFRAN) 4 MG tablet Take 1 tablet by mouth every 8 hours as needed for Nausea or Vomiting 8/1/19   Jenny Dalton MD   vitamin D (ERGOCALCIFEROL) 92074 units CAPS capsule Take 1 capsule by mouth once a week for 8 doses 7/10/19 5/1/20  Tiffanie Etienne MD   sertraline (ZOLOFT) 100 MG tablet TAKE ONE TABLET BY MOUTH DAILY 7/10/19   Tiffanie Etienne MD   raloxifene (EVISTA) 60 MG tablet TAKE ONE TABLET BY MOUTH DAILY 7/10/19   Tiffanie Etienne MD   Cholecalciferol (VITAMIN D3) 1000 units TABS Take 1 tablet by mouth daily 12/26/18   Tiffanie Etienne MD   aspirin EC 81 MG EC tablet Take 1 tablet by mouth daily 12/11/18   Tiffanie Etienne MD   lovastatin (MEVACOR) 40 MG tablet TAKE ONE AND ONE-HALF (1  1/2) TABLET BY MOUTH NIGHTLY 12/11/18   Tiffanie Etienne MD   gemfibrozil (LOPID) 600 MG tablet TAKE ONE TABLET BY MOUTH TWICE A DAY 12/11/18   Tiffanie Etienne MD zolpidem (AMBIEN) 10 MG tablet Take 10 mg by mouth nightly as needed for Sleep. Rivka Isaac Historical Provider, MD   fluticasone (ARNUITY ELLIPTA) 200 MCG/ACT AEPB Inhale 1 Inhaler into the lungs daily    Historical Provider, MD   BiPAP Machine MISC by Does not apply route    Historical Provider, MD   calcium carbonate (TUMS) 500 MG chewable tablet Take 1 tablet by mouth 3 times daily as needed for Heartburn 1/10/17   Pillo Zuleta MD   Handicap Placard MISC by Does not apply route Duration: 1 year 4/28/16   Michelle Biggs MD   acetaminophen (TYLENOL) 500 MG tablet Take 500 mg by mouth every 6 hours as needed. Historical Provider, MD   butalbital-acetaminophen-caffeine (FIORICET, ESGIC) per tablet Take 1 tablet by mouth every 8 hours as needed. Historical Provider, MD   montelukast (SINGULAIR) 10 MG tablet Take 10 mg by mouth nightly. Historical Provider, MD   albuterol (PROVENTIL HFA;VENTOLIN HFA) 108 (90 BASE) MCG/ACT inhaler Inhale 2 puffs into the lungs every 6 hours as needed. Historical Provider, MD       REVIEW OFSYSTEMS    (2-9 systems for level 4, 10 or more for level 5)      Review of Systems   Unable to perform ROS: Mental status change       PHYSICAL EXAM   (up to 7 for level 4, 8 or more forlevel 5)      INITIAL VITALS:   ED Triage Vitals   BP Temp Temp Source Pulse Resp SpO2 Height Weight   05/08/21 1231 05/08/21 1200 05/08/21 1200 05/08/21 1200 05/08/21 1200 05/08/21 1200 -- --   (!) 138/95 100.7 °F (38.2 °C) Oral 107 21 96 %         Physical Exam  Constitutional:       Appearance: She is ill-appearing. She is not toxic-appearing. Comments: Opens eyes to voice, does not follow commands, answer questions, appears unkempt   HENT:      Head: Normocephalic and atraumatic. Mouth/Throat:      Mouth: Mucous membranes are dry. Eyes:      Extraocular Movements: Extraocular movements intact. Pupils: Pupils are equal, round, and reactive to light.    Neck:      Musculoskeletal: Normal range of motion and neck supple. No neck rigidity. Cardiovascular:      Rate and Rhythm: Regular rhythm. Tachycardia present. Pulses: Normal pulses. Pulmonary:      Effort: Pulmonary effort is normal. No respiratory distress. Breath sounds: Normal breath sounds. No wheezing or rales. Abdominal:      General: There is no distension. Palpations: Abdomen is soft. Tenderness: There is no abdominal tenderness. There is no guarding or rebound. Comments: Midline surgical scar   Musculoskeletal:         General: No swelling or tenderness.    Neurological:      Comments: Moving all extremities equally, does not answer question, does open eyes to voice, withdraws all extremities to pain, extraocular movement intact         DIFFERENTIAL  DIAGNOSIS     PLAN (LABS / Reeda Silverado / EKG):  Orders Placed This Encounter   Procedures    Culture, Blood 1    Culture, Blood 1    COVID-19, Rapid    Culture, Urine    XR CHEST PORTABLE    CT HEAD WO CONTRAST    CT CERVICAL SPINE WO CONTRAST    CBC Auto Differential    Comprehensive Metabolic Panel    Lactate, Sepsis    Protime-INR    APTT    Troponin    Urinalysis with Microscopic    BETA-HYDROXYBUTYRATE    TOX SCR, BLD, ED    CK    MYOGLOBIN, SERUM    ELECTROLYTES PLUS    Hemoglobin and hematocrit, blood    CALCIUM, IONIC (POC)    Magnesium    Troponin    TSH with Reflex    Lactate, Sepsis    Basic Metabolic Panel    Magnesium    Phosphorus    CBC WITH AUTO DIFFERENTIAL    Diet NPO Effective Now    Telemetry monitoring    Notify physician if blood glucose (BG) less than 80 mg/dL   [facility] physician if blood glucose (BG) less than 200 mg/dL AND two of the following three criteria are met on two consecutive BMPs    Daily weights    Nurse to change IV Fluid when blood glucose (BG) reaches 250 mg/dL    Telemetry monitoring - 48 hour duration    Notify physician if multiplier less than 0.01 results in insulin rate of 0 units/hr    Monitor for signs/symptoms of urinary retention    Inpatient consult to Internal Medicine    Pharmacy to Dose: Vancomycin    Venous Blood Gas, POC    Creatinine W/GFR Point of Care    POCT urea (BUN)    Lactic Acid, POC    POCT Glucose    POC Glucose Fingerstick    POCT Glucose - every hour    POC Glucose Fingerstick    POC Glucose Fingerstick    POC Glucose Fingerstick    POC Glucose Fingerstick    EKG 12 Lead    PATIENT STATUS (FROM ED OR OR/PROCEDURAL) Inpatient       MEDICATIONS ORDERED:  Orders Placed This Encounter   Medications    0.9 % sodium chloride bolus    LORazepam (ATIVAN) injection 0.5 mg    DISCONTD: vancomycin (VANCOCIN) 15 mg/kg in dextrose 5 % 250 mL IVPB     Order Specific Question:   Antimicrobial Indications     Answer:   Sepsis of Unknown Etiology    cefepime (MAXIPIME) 1000 mg IVPB minibag     Order Specific Question:   Antimicrobial Indications     Answer:   Sepsis of Unknown Etiology    vancomycin (VANCOCIN) intermittent dosing (placeholder)     Order Specific Question:   Antimicrobial Indications     Answer:   Sepsis of Unknown Etiology    vancomycin (VANCOCIN) 1250 mg in dextrose 5 % 250 mL IVPB     Order Specific Question:   Antimicrobial Indications     Answer:   Sepsis of Unknown Etiology    ipratropium-albuterol (DUONEB) nebulizer solution 1 ampule    amLODIPine (NORVASC) tablet 10 mg    aspirin EC tablet 81 mg    donepezil (ARICEPT) tablet 5 mg    gemfibrozil (LOPID) tablet 600 mg    atorvastatin (LIPITOR) tablet 10 mg    sertraline (ZOLOFT) tablet 100 mg    potassium chloride 10 mEq/100 mL IVPB (Peripheral Line)    magnesium sulfate 1000 mg in dextrose 5% 100 mL IVPB    OR Linked Order Group     sodium phosphate 10 mmol in dextrose 5 % 250 mL IVPB     sodium phosphate 15 mmol in dextrose 5 % 250 mL IVPB     sodium phosphate 20 mmol in dextrose 5 % 500 mL IVPB    enoxaparin (LOVENOX) injection 40 mg    FOLLOWED BY Linked Order Group     0.9 % sodium chloride bolus     0.9 % sodium chloride infusion    insulin regular (HUMULIN R;NOVOLIN R) 100 Units in sodium chloride 0.9 % 100 mL infusion    labetalol (NORMODYNE;TRANDATE) injection 20 mg    insulin lispro (HUMALOG) injection vial 10 Units    cefepime (MAXIPIME) 2000 mg IVPB minibag     Order Specific Question:   Antimicrobial Indications     Answer:   Sepsis of Unknown Etiology           Initial MDM/Plan/ED COURSE:    63 y.o. female who presents with altered mental status. On exam patient appears unkempt, does not answer questions or follow commands but does open eyes to voice. Signs are within normal limit except for mild tachycardia at 107 and febrile at 38.2. Patient does also appear tachypneic on exam.  Moving all extremities equally and withdrawing to pain all extremities. Abdomen soft, nontender, nondistended. Lungs clear to auscultation bilaterally. Cardiac tachycardic but regular. No external signs of trauma. Patient does appear unkempt, hair matted and covered in stool on arrival.  Pupils equal, round, reactive to light. No lower extremity swelling or calf tenderness. Neck supple with full range of motion. 2+ radial, DP and PT pulses bilaterally. We will plan for septic work-up, CK/myoglobin, ketones given hyperglycemia on point-of-care labs, CT head, CT cervical.  Impression is possible urosepsis causing altered mental status. Will give IV fluids. ED Course as of May 09 0608   Sat May 08, 2021   1403 SARS-CoV-2, Rapid: Not Detected [LW]   1500 Patient reevaluated. She is able to answer her name, saying that she is not in pain anywhere.    [LW]   [phone] with internal medicine who is agreeable for admission. Did discuss patient's altered mental status with fever although until mental status significantly proved since arrival, neck supple with full range of motion. They agree with holding off on LP at this time. Broad-spectrum antibiotics started.     [LW]   2515 No evidence of DKA    [LW]   1524 Patient signed out to Dr. Mercedes James awaiting chest x-ray, transfer to the floor.    [LW]      ED Course User Index  [LW] Hunter Rendon,        DIAGNOSTIC RESULTS / Sai Sánchez COURSE / MDM     LABS:  Labs Reviewed   CBC WITH AUTO DIFFERENTIAL - Abnormal; Notable for the following components:       Result Value    WBC 16.3 (*)     Seg Neutrophils 89 (*)     Lymphocytes 6 (*)     Eosinophils % 0 (*)     Immature Granulocytes 1 (*)     Segs Absolute 14.52 (*)     Absolute Lymph # 1.02 (*)     All other components within normal limits   COMPREHENSIVE METABOLIC PANEL - Abnormal; Notable for the following components:    Glucose 562 (*)     BUN 27 (*)     CREATININE 1.15 (*)     Sodium 131 (*)     Chloride 96 (*)     Alkaline Phosphatase 123 (*)     Albumin 3.4 (*)     Albumin/Globulin Ratio 0.8 (*)     GFR Non- 48 (*)     GFR  58 (*)     All other components within normal limits   LACTATE, SEPSIS - Abnormal; Notable for the following components:    Lactic Acid, Sepsis, Whole Blood 2.7 (*)     All other components within normal limits   APTT - Abnormal; Notable for the following components:    PTT 17.5 (*)     All other components within normal limits   TROPONIN - Abnormal; Notable for the following components:    Troponin, High Sensitivity 33 (*)     All other components within normal limits   URINALYSIS WITH MICROSCOPIC - Abnormal; Notable for the following components:    Glucose, Ur 3+ (*)     Ketones, Urine SMALL (*)     Urine Hgb MODERATE (*)     Protein, UA 3+ (*)     Bacteria, UA FEW (*)     All other components within normal limits   BETA-HYDROXYBUTYRATE - Abnormal; Notable for the following components:    Beta-Hydroxybutyrate 0.94 (*)     All other components within normal limits   TOX SCR, BLD, ED - Abnormal; Notable for the following components:    Acetaminophen Level <5 (*)     Salicylate Lvl 1 (*)     All other components within normal limits   MYOGLOBIN, SERUM - Abnormal; Notable for the following components:    Myoglobin 382 (*)     All other components within normal limits   ELECTROLYTES PLUS - Abnormal; Notable for the following components:    POC Sodium 135 (*)     POC Potassium 4.8 (*)     All other components within normal limits   TROPONIN - Abnormal; Notable for the following components:    Troponin, High Sensitivity 30 (*)     All other components within normal limits   LACTATE, SEPSIS - Abnormal; Notable for the following components:    Lactic Acid, Sepsis, Whole Blood 2.3 (*)     All other components within normal limits   BASIC METABOLIC PANEL - Abnormal; Notable for the following components:    Glucose 397 (*)     CREATININE 0.95 (*)     Sodium 133 (*)     GFR Non- 59 (*)     All other components within normal limits   BASIC METABOLIC PANEL - Abnormal; Notable for the following components:    Glucose 213 (*)     CREATININE 1.07 (*)     Calcium 7.9 (*)     Potassium 3.6 (*)     CO2 19 (*)     GFR Non- 52 (*)     All other components within normal limits   PHOSPHORUS - Abnormal; Notable for the following components:    Phosphorus 2.2 (*)     All other components within normal limits   CBC WITH AUTO DIFFERENTIAL - Abnormal; Notable for the following components:    WBC 15.4 (*)     RBC 3.87 (*)     Hemoglobin 11.0 (*)     Hematocrit 35.2 (*)     Immature Granulocytes 1 (*)     Monocytes 10 (*)     Eosinophils % 0 (*)     Segs Absolute 8.47 (*)     Absolute Lymph # 5.24 (*)     Absolute Mono # 1.54 (*)     All other components within normal limits   VENOUS BLOOD GAS, POINT OF CARE - Abnormal; Notable for the following components:    pO2, Nic 28.0 (*)     O2 Sat, Nic 49 (*)     All other components within normal limits   CREATININE W/GFR POINT OF CARE - Abnormal; Notable for the following components:    POC Creatinine 1.37 (*)     GFR Comment 47 (*)     GFR Non- 39 (*)     All other components within normal limits   UREA N (POC) - Abnormal; Notable for the following components:    POC BUN 27 (*)     All other components within normal limits   LACTIC ACID,POINT OF CARE - Abnormal; Notable for the following components:    POC Lactic Acid 2.55 (*)     All other components within normal limits   POCT GLUCOSE - Abnormal; Notable for the following components:    POC Glucose 624 (*)     All other components within normal limits   POC GLUCOSE FINGERSTICK - Abnormal; Notable for the following components:    POC Glucose 465 (*)     All other components within normal limits   POC GLUCOSE FINGERSTICK - Abnormal; Notable for the following components:    POC Glucose 422 (*)     All other components within normal limits   POC GLUCOSE FINGERSTICK - Abnormal; Notable for the following components:    POC Glucose 330 (*)     All other components within normal limits   POC GLUCOSE FINGERSTICK - Abnormal; Notable for the following components:    POC Glucose 269 (*)     All other components within normal limits   POC GLUCOSE FINGERSTICK - Abnormal; Notable for the following components:    POC Glucose 209 (*)     All other components within normal limits   POCT GLUCOSE - Normal   POCT GLUCOSE - Normal   CULTURE, BLOOD 1   COVID-19, RAPID   CULTURE, BLOOD 1   CULTURE, URINE   PROTIME-INR   CK   HGB/HCT   CALCIUM, IONIC (POC)   MAGNESIUM   TSH WITH REFLEX   MAGNESIUM   MAGNESIUM   PHOSPHORUS   BASIC METABOLIC PANEL   MAGNESIUM   PHOSPHORUS   BASIC METABOLIC PANEL   MAGNESIUM   PHOSPHORUS   BASIC METABOLIC PANEL   MAGNESIUM   PHOSPHORUS   POCT GLUCOSE   POCT GLUCOSE   POCT GLUCOSE   POCT GLUCOSE   POCT GLUCOSE   POCT GLUCOSE   POCT GLUCOSE   POCT GLUCOSE   POCT GLUCOSE   POCT GLUCOSE   POCT GLUCOSE   POCT GLUCOSE   POCT GLUCOSE   POCT GLUCOSE   POCT GLUCOSE   POCT GLUCOSE   POCT GLUCOSE   POCT GLUCOSE   POCT GLUCOSE           Ct Head Wo Contrast    Result Date: 5/8/2021  EXAMINATION: CT OF THE HEAD WITHOUT CONTRAST; CT OF THE CERVICAL SPINE WITHOUT CONTRAST 5/8/2021 2:11 pm TECHNIQUE: CT of the head was performed without the administration of intravenous contrast. Dose modulation, iterative reconstruction, and/or weight based adjustment of the mA/kV was utilized to reduce the radiation dose to as low as reasonably achievable.; CT of the cervical spine was performed without the administration of intravenous contrast. Multiplanar reformatted images are provided for review. Dose modulation, iterative reconstruction, and/or weight based adjustment of the mA/kV was utilized to reduce the radiation dose to as low as reasonably achievable. COMPARISON: CT head 05/01/2020, CT cervical 09/04/2018 HISTORY: ORDERING SYSTEM PROVIDED HISTORY: Found down, AMS TECHNOLOGIST PROVIDED HISTORY: Found down, AMS Decision Support Exception - unselect if not a suspected or confirmed emergency medical condition->Emergency Medical Condition (MA) Reason for Exam: Found down, AMS FINDINGS: CT head: BRAIN/VENTRICLES: Mild cerebral atrophy. No midline shift. Basal cisterns are normally outlined. No intra or extra-axial hemorrhage. No acute infarct. ORBITS: The visualized portion of the orbits demonstrate no acute abnormality. SINUSES: The visualized paranasal sinuses and mastoid air cells demonstrate no acute abnormality. SOFT TISSUES/SKULL:  No acute abnormality of the visualized skull or soft tissues. CT CERVICAL SPINE: BONES/ALIGNMENT: Subtle grade 1 C4-C5 anterolisthesis unchanged. No acute fracture or subluxation. DEGENERATIVE CHANGES: Multilevel facet arthropathy. C5-C6 disc space narrowing with marginal osteophytes. T2 kyphoplasty performed since prior. SOFT TISSUES: There is no prevertebral soft tissue swelling. No acute intracranial abnormality. Cerebral atrophy. No acute fracture or subluxation of the cervical spine. Moderate degenerative changes.      Ct Cervical Spine Wo Contrast    Result Date: 5/8/2021  EXAMINATION: CT OF THE HEAD WITHOUT CONTRAST; CT OF THE CERVICAL SPINE WITHOUT CONTRAST 5/8/2021 2:11 pm TECHNIQUE: CT of the head was performed without the administration of intravenous contrast. Dose modulation, iterative reconstruction, and/or weight based adjustment of the mA/kV was utilized to reduce the radiation dose to as low as reasonably achievable.; CT of the cervical spine was performed without the administration of intravenous contrast. Multiplanar reformatted images are provided for review. Dose modulation, iterative reconstruction, and/or weight based adjustment of the mA/kV was utilized to reduce the radiation dose to as low as reasonably achievable. COMPARISON: CT head 05/01/2020, CT cervical 09/04/2018 HISTORY: ORDERING SYSTEM PROVIDED HISTORY: Found down, AMS TECHNOLOGIST PROVIDED HISTORY: Found down, AMS Decision Support Exception - unselect if not a suspected or confirmed emergency medical condition->Emergency Medical Condition (MA) Reason for Exam: Found down, AMS FINDINGS: CT head: BRAIN/VENTRICLES: Mild cerebral atrophy. No midline shift. Basal cisterns are normally outlined. No intra or extra-axial hemorrhage. No acute infarct. ORBITS: The visualized portion of the orbits demonstrate no acute abnormality. SINUSES: The visualized paranasal sinuses and mastoid air cells demonstrate no acute abnormality. SOFT TISSUES/SKULL:  No acute abnormality of the visualized skull or soft tissues. CT CERVICAL SPINE: BONES/ALIGNMENT: Subtle grade 1 C4-C5 anterolisthesis unchanged. No acute fracture or subluxation. DEGENERATIVE CHANGES: Multilevel facet arthropathy. C5-C6 disc space narrowing with marginal osteophytes. T2 kyphoplasty performed since prior. SOFT TISSUES: There is no prevertebral soft tissue swelling. No acute intracranial abnormality. Cerebral atrophy. No acute fracture or subluxation of the cervical spine. Moderate degenerative changes.      Xr Chest Portable    Result Date: 5/8/2021  EXAMINATION: ONE XRAY VIEW OF THE CHEST 5/8/2021 9:38 am COMPARISON: 03/26/2020 HISTORY: ORDERING SYSTEM PROVIDED HISTORY: ams TECHNOLOGIST PROVIDED HISTORY: ams Reason for Exam: AMS port supine at 355pm FINDINGS: The cardiac size is normal. No acute infiltrates or pleural effusions are seen. Pulmonary vascularity appears normal. There is mild to moderate ectasia of the thoracic aorta. There are degenerative changes in the spine and stable vertebroplasty in the upper thoracic spine. No acute bony abnormalities. The hilar structures are normal.     No acute cardiopulmonary disease           PROCEDURES:  None    CONSULTS:  IP CONSULT TO INTERNAL MEDICINE  PHARMACY TO DOSE VANCOMYCIN    CRITICAL CARE:  Please see attending note    FINAL IMPRESSION      1. Altered mental status, unspecified altered mental status type          DISPOSITION / [address] Admitted 05/08/2021 03:11:05 PM      PATIENT REFERRED TO:  No follow-up provider specified.     DISCHARGE MEDICATIONS:  New Prescriptions    No medications on file       Amna Farooq DO  Emergency Medicine Resident    (Please note that portions of this note were completed with a voice recognition program.Efforts were made to edit the dictations but occasionally words are mis-transcribed.)       Amna Fraooq DO  Resident  05/09/21 0747

## 2021-05-09 ENCOUNTER — APPOINTMENT (OUTPATIENT)
Dept: GENERAL RADIOLOGY | Age: 66
DRG: 420 | End: 2021-05-09
Payer: MEDICAID

## 2021-05-09 LAB
ABSOLUTE EOS #: 0 K/UL (ref 0–0.4)
ABSOLUTE IMMATURE GRANULOCYTE: 0.15 K/UL (ref 0–0.3)
ABSOLUTE LYMPH #: 5.24 K/UL (ref 1–4.8)
ABSOLUTE MONO #: 1.54 K/UL (ref 0.1–0.8)
ANION GAP SERPL CALCULATED.3IONS-SCNC: 11 MMOL/L (ref 9–17)
ANION GAP SERPL CALCULATED.3IONS-SCNC: 11 MMOL/L (ref 9–17)
BASOPHILS # BLD: 0 % (ref 0–2)
BASOPHILS ABSOLUTE: 0 K/UL (ref 0–0.2)
BUN BLDV-MCNC: 19 MG/DL (ref 8–23)
BUN BLDV-MCNC: 20 MG/DL (ref 8–23)
BUN/CREAT BLD: ABNORMAL (ref 9–20)
BUN/CREAT BLD: ABNORMAL (ref 9–20)
CALCIUM SERPL-MCNC: 7.9 MG/DL (ref 8.6–10.4)
CALCIUM SERPL-MCNC: 8.6 MG/DL (ref 8.6–10.4)
CHLORIDE BLD-SCNC: 100 MMOL/L (ref 98–107)
CHLORIDE BLD-SCNC: 105 MMOL/L (ref 98–107)
CHP ED QC CHECK: NORMAL
CO2: 19 MMOL/L (ref 20–31)
CO2: 22 MMOL/L (ref 20–31)
CREAT SERPL-MCNC: 0.95 MG/DL (ref 0.5–0.9)
CREAT SERPL-MCNC: 1.07 MG/DL (ref 0.5–0.9)
CULTURE: NORMAL
DIFFERENTIAL TYPE: ABNORMAL
EOSINOPHILS RELATIVE PERCENT: 0 % (ref 1–4)
ESTIMATED AVERAGE GLUCOSE: 410 MG/DL
GFR AFRICAN AMERICAN: >60 ML/MIN
GFR AFRICAN AMERICAN: >60 ML/MIN
GFR NON-AFRICAN AMERICAN: 52 ML/MIN
GFR NON-AFRICAN AMERICAN: 59 ML/MIN
GFR SERPL CREATININE-BSD FRML MDRD: ABNORMAL ML/MIN/{1.73_M2}
GLUCOSE BLD-MCNC: 206 MG/DL (ref 65–105)
GLUCOSE BLD-MCNC: 207 MG/DL
GLUCOSE BLD-MCNC: 209 MG/DL (ref 65–105)
GLUCOSE BLD-MCNC: 213 MG/DL (ref 70–99)
GLUCOSE BLD-MCNC: 237 MG/DL
GLUCOSE BLD-MCNC: 237 MG/DL (ref 65–105)
GLUCOSE BLD-MCNC: 269 MG/DL
GLUCOSE BLD-MCNC: 269 MG/DL (ref 65–105)
GLUCOSE BLD-MCNC: 293 MG/DL (ref 65–105)
GLUCOSE BLD-MCNC: 298 MG/DL (ref 65–105)
GLUCOSE BLD-MCNC: 330 MG/DL (ref 65–105)
GLUCOSE BLD-MCNC: 397 MG/DL (ref 70–99)
HBA1C MFR BLD: 15.9 % (ref 4–6)
HCT VFR BLD CALC: 35.2 % (ref 36.3–47.1)
HEMOGLOBIN: 11 G/DL (ref 11.9–15.1)
IMMATURE GRANULOCYTES: 1 %
LYMPHOCYTES # BLD: 34 % (ref 24–44)
Lab: NORMAL
MAGNESIUM: 1.8 MG/DL (ref 1.6–2.6)
MAGNESIUM: 1.8 MG/DL (ref 1.6–2.6)
MAGNESIUM: 1.9 MG/DL (ref 1.6–2.6)
MAGNESIUM: 2.1 MG/DL (ref 1.6–2.6)
MCH RBC QN AUTO: 28.4 PG (ref 25.2–33.5)
MCHC RBC AUTO-ENTMCNC: 31.3 G/DL (ref 28.4–34.8)
MCV RBC AUTO: 91 FL (ref 82.6–102.9)
MONOCYTES # BLD: 10 % (ref 1–7)
MORPHOLOGY: NORMAL
NRBC AUTOMATED: 0 PER 100 WBC
PDW BLD-RTO: 12.2 % (ref 11.8–14.4)
PHOSPHORUS: 2.2 MG/DL (ref 2.6–4.5)
PHOSPHORUS: 2.4 MG/DL (ref 2.6–4.5)
PHOSPHORUS: 2.6 MG/DL (ref 2.6–4.5)
PHOSPHORUS: 2.7 MG/DL (ref 2.6–4.5)
PLATELET # BLD: 206 K/UL (ref 138–453)
PLATELET ESTIMATE: ABNORMAL
PMV BLD AUTO: 11.4 FL (ref 8.1–13.5)
POTASSIUM SERPL-SCNC: 3.6 MMOL/L (ref 3.7–5.3)
POTASSIUM SERPL-SCNC: 4.2 MMOL/L (ref 3.7–5.3)
RBC # BLD: 3.87 M/UL (ref 3.95–5.11)
RBC # BLD: ABNORMAL 10*6/UL
SEG NEUTROPHILS: 55 % (ref 36–66)
SEGMENTED NEUTROPHILS ABSOLUTE COUNT: 8.47 K/UL (ref 1.8–7.7)
SODIUM BLD-SCNC: 133 MMOL/L (ref 135–144)
SODIUM BLD-SCNC: 135 MMOL/L (ref 135–144)
SPECIMEN DESCRIPTION: NORMAL
WBC # BLD: 15.4 K/UL (ref 3.5–11.3)
WBC # BLD: ABNORMAL 10*3/UL

## 2021-05-09 PROCEDURE — 6370000000 HC RX 637 (ALT 250 FOR IP): Performed by: STUDENT IN AN ORGANIZED HEALTH CARE EDUCATION/TRAINING PROGRAM

## 2021-05-09 PROCEDURE — 84100 ASSAY OF PHOSPHORUS: CPT

## 2021-05-09 PROCEDURE — 2580000003 HC RX 258: Performed by: STUDENT IN AN ORGANIZED HEALTH CARE EDUCATION/TRAINING PROGRAM

## 2021-05-09 PROCEDURE — 83036 HEMOGLOBIN GLYCOSYLATED A1C: CPT

## 2021-05-09 PROCEDURE — 2060000000 HC ICU INTERMEDIATE R&B

## 2021-05-09 PROCEDURE — 99232 SBSQ HOSP IP/OBS MODERATE 35: CPT | Performed by: INTERNAL MEDICINE

## 2021-05-09 PROCEDURE — 74230 X-RAY XM SWLNG FUNCJ C+: CPT

## 2021-05-09 PROCEDURE — 83735 ASSAY OF MAGNESIUM: CPT

## 2021-05-09 PROCEDURE — 92611 MOTION FLUOROSCOPY/SWALLOW: CPT

## 2021-05-09 PROCEDURE — 6360000002 HC RX W HCPCS: Performed by: STUDENT IN AN ORGANIZED HEALTH CARE EDUCATION/TRAINING PROGRAM

## 2021-05-09 PROCEDURE — 36415 COLL VENOUS BLD VENIPUNCTURE: CPT

## 2021-05-09 PROCEDURE — 80048 BASIC METABOLIC PNL TOTAL CA: CPT

## 2021-05-09 PROCEDURE — 85025 COMPLETE CBC W/AUTO DIFF WBC: CPT

## 2021-05-09 RX ORDER — MAGNESIUM SULFATE 1 G/100ML
1000 INJECTION INTRAVENOUS ONCE
Status: DISCONTINUED | OUTPATIENT
Start: 2021-05-09 | End: 2021-05-09

## 2021-05-09 RX ORDER — INSULIN GLARGINE 100 [IU]/ML
40 INJECTION, SOLUTION SUBCUTANEOUS EVERY MORNING
Status: DISCONTINUED | OUTPATIENT
Start: 2021-05-09 | End: 2021-05-12

## 2021-05-09 RX ORDER — INSULIN GLARGINE 100 [IU]/ML
45 INJECTION, SOLUTION SUBCUTANEOUS NIGHTLY
Status: DISCONTINUED | OUTPATIENT
Start: 2021-05-09 | End: 2021-05-12

## 2021-05-09 RX ADMIN — GEMFIBROZIL 600 MG: 600 TABLET ORAL at 09:21

## 2021-05-09 RX ADMIN — INSULIN GLARGINE 40 UNITS: 100 INJECTION, SOLUTION SUBCUTANEOUS at 09:53

## 2021-05-09 RX ADMIN — SODIUM CHLORIDE: 9 INJECTION, SOLUTION INTRAVENOUS at 00:31

## 2021-05-09 RX ADMIN — ATORVASTATIN CALCIUM 10 MG: 10 TABLET, FILM COATED ORAL at 09:17

## 2021-05-09 RX ADMIN — INSULIN LISPRO 2 UNITS: 100 INJECTION, SOLUTION INTRAVENOUS; SUBCUTANEOUS at 23:59

## 2021-05-09 RX ADMIN — DONEPEZIL HYDROCHLORIDE 5 MG: 5 TABLET, FILM COATED ORAL at 09:17

## 2021-05-09 RX ADMIN — CEFEPIME HYDROCHLORIDE 2000 MG: 2 INJECTION, POWDER, FOR SOLUTION INTRAVENOUS at 06:29

## 2021-05-09 RX ADMIN — INSULIN GLARGINE 45 UNITS: 100 INJECTION, SOLUTION SUBCUTANEOUS at 22:42

## 2021-05-09 RX ADMIN — AMLODIPINE BESYLATE 10 MG: 10 TABLET ORAL at 09:17

## 2021-05-09 RX ADMIN — SERTRALINE 100 MG: 50 TABLET, FILM COATED ORAL at 09:22

## 2021-05-09 RX ADMIN — Medication 1250 MG: at 17:09

## 2021-05-09 RX ADMIN — INSULIN LISPRO 6 UNITS: 100 INJECTION, SOLUTION INTRAVENOUS; SUBCUTANEOUS at 17:09

## 2021-05-09 ASSESSMENT — PAIN - FUNCTIONAL ASSESSMENT: PAIN_FUNCTIONAL_ASSESSMENT: 0-10

## 2021-05-09 ASSESSMENT — ENCOUNTER SYMPTOMS: TACHYPNEA: 1

## 2021-05-09 NOTE — ED NOTES
When given patient meds she began choking. Patient vomited up some particles of meds. Internal resident to be notified.       Carolyn Carvalho, RN  05/09/21 [id number]

## 2021-05-09 NOTE — PROGRESS NOTES
[facility]  Internal Medicine Teaching Residency Program  Inpatient Daily Progress Note  ______________________________________________________________________________    Patient: Marnie Westbrook  YOB: 1955   ITC:[id number]    Acct: [de-identified]     Room: 27/27  Admit date: 5/8/2021  Today's date: 05/09/21  Number of days in the hospital: 1    SUBJECTIVE   Admitting Diagnosis: AMS (altered mental status)  CC: Altered mental status  Pt examined at bedside. Chart & results reviewed. No acute events overnight. Patient is awake and alert x2. Patient denies any history of chest pain, shortness of breath, abdominal pain, loose stools, fever or chills. Vitals stable with /61, HR 76. Morning blood sugar 237. Will resume her home dose of Lantus 40 in the morning and 45 in the night along with sliding scales. Concern of choking on food. Failed swallow study. Will get barium swallow study    ROS:  Constitutional:  negative for chills, fevers, sweats  Respiratory:  negative for cough, dyspnea on exertion, hemoptysis, shortness of breath, wheezing  Cardiovascular:  negative for chest pain, chest pressure/discomfort, lower extremity edema, palpitations  Gastrointestinal:  negative for abdominal pain, constipation, diarrhea, nausea, vomiting  Neurological:  negative for dizziness, headache  BRIEF HISTORY     63-year-old female with past medical history of hypertension, hyperlipidemia, asthma, clear-cell renal cell carcinoma s/p right nephrectomy, diabetes mellitus, cognitive impairment on donepezil presented via EMS due to altered mental status. Per EMS, patient was found facedown on the floor. She was covered in stool. Patient is a very poor historian. Per chart review. Patient was found unresponsive and had stool incontinence. Patient was confused and brought in by EMS found to have fever of 100.7.   Vitals show /95, heart rate 107 tachycardic. Work-up showed glucose 624, blood gases show pH 7.34, PCO2 43.2, HCO3 23.6, beta hydroxybutyrate 0.94, myoglobin 382, troponin 30> 33. Trauma work-up done in the ER including CT head without contrast and CT cervical spine showed no acute intracranial abnormality. No acute fracture or subluxation of the cervical spine. OBJECTIVE     Vital Signs:  BP (!) 160/78   Pulse 82   Temp 100.7 °F (38.2 °C) (Oral)   Resp 23   Wt 160 lb (72.6 kg)   LMP 2002   SpO2 98%   BMI 29.26 kg/m²     Temp (24hrs), Av.7 °F (38.2 °C), Min:100.7 °F (38.2 °C), Max:100.7 °F (38.2 °C)    In: 1100   Out: -     Physical Exam:  Constitutional: This is a well developed, well nourished, 25-29.9 - Overweight 63y.o. year old female who is alert, oriented, cooperative and in no apparent distress. Head:normocephalic and atraumatic. EENT:  PERRLA. No conjunctival injections. Dry mucosa  Neck: Supple without thyromegaly. No elevated JVP. Trachea was midline. Respiratory: Chest was symmetrical without dullness to percussion. Breath sounds bilaterally were clear to auscultation. Cardiovascular: Regular without murmur, clicks, gallops or rubs. Abdomen: Slightly rounded and soft without organomegaly. No rebound, rigidity or guarding was appreciated. Lymphatic: No lymphadenopathy. Musculoskeletal: Normal curvature of the spine. No gross muscle weakness. Extremities:  No lower extremity edema, ulcerations, tenderness, varicosities or erythema. Muscle size, tone and strength are normal.  No involuntary movements are noted. Skin: Very dry skin. Good color, turgor and pigmentation. No lesions or scars.     Neurological/Psychiatric: The patient's general behavior, level of consciousness, thought content and emotional status is normal.        Medications:  Scheduled Medications:    vancomycin (VANCOCIN) intermittent dosing (placeholder)   Other RX Placeholder    vancomycin  1,250 mg Intravenous Q24H    amLODIPine  10 mg Oral Daily    aspirin EC  81 mg Oral Daily    donepezil  5 mg Oral Nightly    gemfibrozil  600 mg Oral BID    atorvastatin  10 mg Oral Daily    sertraline  100 mg Oral Daily    enoxaparin  40 mg Subcutaneous Daily     Continuous Infusions:    sodium chloride 250 mL/hr at 05/09/21 0031    insulin       PRN Medicationsipratropium-albuterol, 1 ampule, Q4H PRN  potassium chloride, 10 mEq, PRN  magnesium sulfate, 1,000 mg, PRN  sodium phosphate IVPB, 10 mmol, PRN    Or  sodium phosphate IVPB, 15 mmol, PRN    Or  sodium phosphate IVPB, 20 mmol, PRN  labetalol, 20 mg, Q4H PRN        Diagnostic Labs:  CBC:   Recent Labs     05/08/21  1218 05/09/21  0321   WBC 16.3* 15.4*   RBC 4.49 3.87*   HGB 12.7 11.0*   HCT 39.6 35.2*   MCV 88.2 91.0   RDW 12.0 12.2    206     BMP:   Recent Labs     05/08/21  1218 05/08/21  2357 05/09/21  0321   * 133* 135   K 4.5 4.2 3.6*   CL 96* 100 105   CO2 20 22 19*   PHOS  --  2.7 2.2*   BUN 27* 20 19   CREATININE 1.15* 0.95* 1.07*     BNP: No results for input(s): BNP in the last 72 hours. PT/INR:   Recent Labs     05/08/21  1218   PROTIME 9.5   INR 0.9     APTT:   Recent Labs     05/08/21  1218   APTT 17.5*     CARDIAC ENZYMES: No results for input(s): CKMB, CKMBINDEX, TROPONINI in the last 72 hours. Invalid input(s): CKTOTAL;3  FASTING LIPID PANEL:  Lab Results   Component Value Date    CHOL 181 05/26/2020    HDL 46 05/26/2020    TRIG 230 (H) 05/26/2020     LIVER PROFILE:   Recent Labs     05/08/21  1218   AST 22   ALT 21   BILITOT 0.43   ALKPHOS 123*      MICROBIOLOGY:   Lab Results   Component Value Date/Time    CULTURE NO GROWTH 11 HOURS 05/08/2021 12:10 PM       Imaging:    Ct Head Wo Contrast    Result Date: 5/8/2021  No acute intracranial abnormality. Cerebral atrophy. No acute fracture or subluxation of the cervical spine. Moderate degenerative changes.      Ct Cervical Spine Wo Contrast    Result Date: 5/8/2021  No acute intracranial abnormality. Cerebral atrophy. No acute fracture or subluxation of the cervical spine. Moderate degenerative changes. Xr Chest Portable    Result Date: 5/8/2021  No acute cardiopulmonary disease       ASSESSMENT & PLAN     ASSESSMENT / PLAN:       Principal Problem:    AMS (altered mental status)  Active Problems:    HTN (hypertension)    Uncontrolled type 2 diabetes mellitus with complication, with long-term current use of insulin (HCC)    Hyperosmolar hyperglycemic state (HHS) ([address]    Acute metabolic encephalopathy  Resolved Problems:    * No resolved hospital problems. *    1. Acute metabolic encephalopathy:  Improving. Likely due to hyperglycemia, dehydration and electrolyte imbalance. 2. Diabetes mellitus type 2 insulin dependence:  Received subcu 10 units of insulin Monitor her glucose. Resume her home dose of Lantus 40 in a.m. and 45 in p.m. On medium dose sliding scale. On hypoglycemia protocol  3. Sepsis of unknown origin: On IV antibiotic and IV fluids. Follow-up on urine culture. Blood culture so far negative  4. Mildly elevated creatinine: Improving. likely due to dehydration. Continue IV hydration   5. Pseudohyponatremia:  Resolved due to hyperglycemia. On 0.9% NaCl. Monitor electrolytes  6. Hypokalemia: Replace per protocol  7. Lactic acidosis: Likely due to dehydration/hypoperfusion. On IV fluid. Improving  8. Reactive leukocytosis: Monitor daily blood counts. Trending down 16.3> 15.4  9. Cognitive impairments: On donepezil  10. Essential hypertension: We will resume her home medication. Norvasc 10 mg and labetalol as needed  11. History of COPD: On breathing treatment as needed  12. Mood disorder: On Zoloft      DVT ppx : On Lovenox  GI ppx: None needed    PT/OT: Consulted  Discharge Planning / SW: Nani Dyer MD  Internal Medicine Resident, PGY-1  [facility];  [place], [place]  5/9/2021, 4:57 AM   .Attending Physician Statement  I have discussed the care of Bianca Avila, including pertinent history and exam findings,  with the resident. I have seen and examined the patient and the key elements of all parts of the encounter have been performed by me. I agree with the assessment, plan and orders as documented by the resident with additions . Treatment plan Discussed with nursing staff in detail , all questions answered . Electronically signed by Almaz Jaquez MD on   5/9/21 at 1:09 PM EDT    Please note that this chart was generated using voice recognition Dragon dictation software. Although every effort was made to ensure the accuracy of this automated transcription, some errors in transcription may have occurred.

## 2021-05-09 NOTE — ED NOTES
Patient had large BM patient cleaned and dry with new adult brief applied. Tolerated. Call light in reach.       Carolyn Santoro 139, RN  05/09/21 [id number]

## 2021-05-09 NOTE — PROCEDURES
INSTRUMENTAL SWALLOW REPORT  MODIFIED BARIUM SWALLOW    NAME: Jennifer Davidson   : 1955  MRN: [id number]       Date of Eval: 2021              Referring Diagnosis(es):      Past Medical History:  has a past medical history of CEFERINO [facility]) [facility] Asthma, Carotid artery plaque, Clostridium difficile diarrhea, Dehydration, Depression, Fall, Fibromyalgia, Hiatal hernia, HTN (hypertension), Hyperlipidemia, Hypokalemia, gastrointestinal losses, Kidney stone, Obesity (BMI 30-39.9), Osteoarthritis, Osteoporosis, Renal cancer ([facility] [facility] Short of breath on exertion, Type II or unspecified type diabetes mellitus without mention of complication, not stated as uncontrolled, Unspecified sleep apnea, Urge urinary incontinence, Wears glasses, Wears glasses, and Zygomatic fracture, right side, initial encounter for closed fracture ([facility] Past Surgical History:  has a past surgical history that includes knee surgery (Bilateral); bladder suspension (2002); Nephrostomy (Right); total nephrectomy (Right, 2013); lumbar laminectomy (10/15/14); Bladder surgery (2014); Endometrial ablation; Cholecystectomy (10/10/2003); Breast surgery (Left, 2008); Breast biopsy (Left, 2008); pr perq vert agmntj cavity crtj uni/bi cannulj lmbr (N/A, 2018); Fixation Kyphoplasty (10/2018); Kyphosis surgery (N/A, 2019); Endoscopy, colon, diagnostic; and Colonoscopy. Current Diet Solid Consistency: Regular  Current Diet Liquid Consistency: Thin       Type of Study: Initial MBS       Recent CXR: (  21  )    Impression   No acute cardiopulmonary disease             Patient Complaints/Reason for Referral:  Jennifer Davidson was referred for a MBS to assess the efficiency of her swallow function, assess for aspiration, and to make recommendations regarding safe dietary consistencies, effective compensatory strategies, and safe eating environment.  Pt observed to have extreme difficulty with medications this AM, prompting MBSS order. Onset of problem: Per chart, 53-year-old female with past medical history of hypertension, hyperlipidemia, asthma, clear-cell renal cell carcinoma s/p right nephrectomy, diabetes mellitus, cognitive impairment on donepezil presented via EMS due to altered mental status. Per EMS, patient was found facedown on the floor. She was covered in stool. Patient is a very poor historian. Per chart review. Patient was found unresponsive and had stool incontinence. Patient was confused and brought in by EMS found to have fever of 100.7. Vitals show /95, heart rate 107 tachycardic. Work-up showed glucose 624, blood gases show pH 7.34, PCO2 43.2, HCO3 23.6, beta hydroxybutyrate 0.94, myoglobin 382, troponin 30> 33. Trauma work-up done in the ER including CT head without contrast and CT cervical spine showed no acute intracranial abnormality. No acute fracture or subluxation of the cervical spine. Sepsis work-up done including blood culture and urine culture. Blood culture showed no growth in 6 hours. Urinalysis show small ketones, few bacteria. Negative leukocyte esterase and nitrite. COVID-19 negative    Behavior/Cognition/Vision/Hearing:  Behavior/Cognition: Alert; Cooperative;Pleasant mood  Vision: Impaired  Vision Exceptions: Wears glasses at all times  Hearing: Within functional limits    Impressions:  Patient presents with a safe swallow for Regular diet with thin liquids and medications crushed in puree as evidenced by no penetration or aspiration of consistencies tested. Pt presented with +premature spill to the pyriform sinuses with soft solid, reg solid, nectar thick, and thin liquids. Pt attempted to swallow barium pill with thin liquid, but pt not able to move barium pill posteriorly in oral cavity, eventually expectorated pill from oral cavity.  Recommend medications crushed in puree, small sips and bites, only feed when alert and awake and upright at 90 degrees for all PO intake. Recommend continued close monitoring for overt/clinical s/s of aspiration and D/C PO intake and repeat Modified Barium Swallow Study should they occur. Results and recommendations reviewed with pt who verbalized understanding & reported to RN. Patient Position: Lateral and Patient Degrees: 90    Consistencies Administered: Dysphagia Soft and Bite-Sized (Dysphagia III); Reg solid; Dysphagia Pureed (Dysphagia I); Barium Pill;Nectar  teaspoon; Thin teaspoon; Thin cup    Dysphagia Outcome Severity Scale: Level 6: Within functional limits/Modified independence  Penetration-Aspiration Scale (PAS): 1 - Material does not enter the airway    Recommended Diet:  Solid consistency: Regular  Liquid consistency: Thin  Medication administration: Other(Medications CRUSHED in puree)    Safe Swallow Protocol:     Compensatory Swallowing Strategies: Eat/Feed slowly;Upright as possible for all oral intake;Small bites/sips    Recommendations/Treatment  Requires SLP Intervention: Yes        D/C Recommendations: No therapy recommended at discharge. Recommended Exercises:    Therapeutic Interventions: Diet tolerance monitoring;Patient/Family education    Education: Images and recommendations were reviewed with pt & RN following this exam.   Patient Education: yes  Patient Education Response: Verbalizes understanding    Prognosis  Prognosis for safe diet advancement: fair  Duration/Frequency of Treatment  Duration/Frequency of Treatment: 1-2x per week      Goals:    Long Term: To Maximize safety with intake, optimize nutrition/hydration and minimize risk for aspiration. Short Term:  Dysphagia Goals: The patient will tolerate recommended diet without observed clinical signs of aspiration; The patient/caregiver will demonstrate understanding of compensatory strategies for improved swallowing safety.       Oral Preparation / Oral Phase  Oral Phase: WFL   Barium pill with thin liquid: pt not able to move barium pill posteriorly despite multiple sips of thin liquid.  Eventually expectorated barium pill from oral cavity    Pharyngeal Phase  Pharyngeal Phase: WFL  Puree: no penetration or aspiration; WFL  Soft solid: no penetration or aspiration; +premature spill to pyriform sinuses  Reg solid: no penetration or aspiration; +premature spill to pyriform sinuses  Nectar thick: no penetration or aspiration; +premature spill to pyriform sinuses  Thin liquid: no penetration or aspiration; +premature spill to pyriform sinuses  Barium pill with thin liquid: no pharyngeal stage observed for barium pill, as pt expectorated    Esophageal Phase  Esophageal Screen: WFL    Pain   Patient Currently in Pain: No  Pain Level: 0      Therapy Time:   Individual Concurrent Group Co-treatment   Time In 1115         Time Out 1125         Minutes 10                   Parviz Hendrix M.SAme [id number] [facility]   5/9/2021, 12:27 PM

## 2021-05-09 NOTE — PROGRESS NOTES
Pharmacy Note     Renal Dose Adjustment    Mackenzie Boyce is a 63 y.o. female. Pharmacist assessment of renally cleared medications. Recent Labs     05/08/21  2357 05/09/21  0321   BUN 20 19       Recent Labs     05/08/21  2357 05/09/21  0321   CREATININE 0.95* 1.07*       Estimated Creatinine Clearance: 50 mL/min (A) (based on SCr of 1.07 mg/dL (H)). Estimated CrCl using Ideal Body Weight: 42.6 mL/min (based on IBW 50 kg)    Height:   Ht Readings from Last 1 Encounters:   05/09/21 5' 2\" (1.575 m)     Weight:  Wt Readings from Last 1 Encounters:   05/08/21 160 lb (72.6 kg)       The following medication dose has been adjusted based upon renal function per P&T Guidelines:             Cefepime 2000 mg IV every 8 hours changed to cefepime 2000 mg IV every 12 hours over 4 hours.

## 2021-05-09 NOTE — ED NOTES
Patient incontinent of stool and urine. Patient cleaned and new brief placed. Patient assisted up in bed. Tolerated. Call light in reach.       Carolyn Santoro 139, RN  05/09/21 1024

## 2021-05-09 NOTE — ED NOTES
Social Work    Met with patient at bedside to complete consult. Patient reported that she is staying with her mother in law who is in her 80s and her  is in rehab on [facility]. Patient does have her own home but stated her  destroyed it. No HH in place. Has a walker and wheelchair at home. She sleeps on a couch at her mother in laws. She stated she can go back there but does feel weak and thinks maybe she should go to where her  is. Patient would need PT/OT evals. Patient could not recall name of facility  is at. SW will look it up.

## 2021-05-09 NOTE — H&P
[address]     Department of Internal Medicine - Staff Internal Medicine Teaching Service          ADMISSION NOTE/HISTORY AND PHYSICAL EXAMINATION   Date: 5/8/2021  Patient Name: Ny Salinas  Date of admission: 5/8/2021 11:45 AM  YOB: 1955  PCP: Carmelina Huitron MD  History Obtained From:  patient    CHIEF COMPLAINT     Chief complaint: Altered mental status    HISTORY OF PRESENTING ILLNESS     62-year-old female with past medical history of hypertension, hyperlipidemia, asthma, clear-cell renal cell carcinoma s/p right nephrectomy, diabetes mellitus, cognitive impairment on donepezil presented via EMS due to altered mental status. Per EMS, patient was found facedown on the floor. She was covered in stool. Patient is a very poor historian. Per chart review. Patient was found unresponsive and had stool incontinence. Patient was confused and brought in by EMS found to have fever of 100.7. Vitals show /95, heart rate 107 tachycardic. Work-up showed glucose 624, blood gases show pH 7.34, PCO2 43.2, HCO3 23.6, beta hydroxybutyrate 0.94, myoglobin 382, troponin 30> 33. Trauma work-up done in the ER including CT head without contrast and CT cervical spine showed no acute intracranial abnormality. No acute fracture or subluxation of the cervical spine. Sepsis work-up done including blood culture and urine culture. Blood culture showed no growth in 6 hours. Urinalysis show small ketones, few bacteria. Negative leukocyte esterase and nitrite. COVID-19 negative    X-ray chest show no acute cardiopulmonary disease    Echo: 8/13/2020 LV normal.  Estimated LVEF greater than 55%. Negative bubble study.   Mild MR    Review of Systems:  Unable to obtain due to altered mentation    PAST MEDICAL HISTORY     Past Medical History:   Diagnosis Date    CEFERINO (acute kidney injury) ([facility] [address] 11/19/2014    Asthma     Carotid artery plaque 4/2/2013    Clostridium difficile diarrhea 11/19/2014    Dehydration 11/19/2014    Depression     Fall     history fell down stairs .  Fibromyalgia     Hiatal hernia     HTN (hypertension) 4/2/2013    Hyperlipidemia     Hypokalemia, gastrointestinal losses 11/21/2014    Kidney stone     b/l    Obesity (BMI 30-39. 9) 5/28/2013    Osteoarthritis     Osteoporosis     Renal cancer ([facility] [address] 8/7/2013    right    Short of breath on exertion     Type II or unspecified type diabetes mellitus without mention of complication, not stated as uncontrolled     Unspecified sleep apnea     does not use machine    Urge urinary incontinence     mid urethral sling in 2002    Wears glasses     Wears glasses     Zygomatic fracture, right side, initial encounter for closed fracture ([facility] [address] 5/26/2018       PAST SURGICAL HISTORY     Past Surgical History:   Procedure Laterality Date    BLADDER SURGERY  06/05/2014    Bladder Stimulator Implant    BLADDER SUSPENSION  08/09/2002    BREAST BIOPSY Left 03/03/2008    BREAST SURGERY Left 03/03/2008    lumpectomy    CHOLECYSTECTOMY  10/10/2003    COLONOSCOPY      x2    ENDOMETRIAL ABLATION      ENDOSCOPY, COLON, DIAGNOSTIC       egd x2    FIXATION KYPHOPLASTY  10/2018    KNEE SURGERY Bilateral     x 2 each side    KYPHOSIS SURGERY N/A 5/8/2019    KYPHOPLASTY L4 performed by Huma Barbosa MD at [address] [facility]  10/15/14    WITH FUSION POSTERIOR L4 L5 WITH SPINAL CORD MONITORING    NEPHROSTOMY Right     AL PERQ VERT [address] [facility] LMBR N/A 9/12/2018    KYPHOPLASTY T2 & L2 performed by Huma Barbosa MD at [address] Right 06/20/2013    right due to CA       ALLERGIES     Lac bovis, Nedocromil, Tramadol, Penicillins, and Tape [adhesive tape]    MEDICATIONS PRIOR TO ADMISSION     Prior to Admission medications    Medication Sig Start Date End Date Taking?  Authorizing Provider   donepezil (ARICEPT) 5 MG tablet Take 1 tablet by mouth nightly 9/1/20   Ludivina Dickson MD   ammonium lactate (AMLACTIN) 12 % cream Apply 1 Bottle topically as needed for Dry Skin Apply topically as needed. Historical Provider, MD   insulin glargine (LANTUS SOLOSTAR) 100 UNIT/ML injection pen Inject 45units nightly and 40units in the morning 5/12/20   Rosendo Fitch MD   insulin lispro (HUMALOG) 100 UNIT/ML injection vial Inject 10 Units into the skin 3 times daily (before meals) 10 units with Breakfast  + moderate correction scale  Lunch only moderate correction scale  6 units with dinner + moderate correction scale   Moderate correction scale : 200 - 250  = 3 units, 251- 300 = 6 units, 301 - 350 = 10 units, and over 350 = 16 units  Max dose per day 64 units  Per Dr [facility] - 4/8/2020    Historical Provider, MD   amLODIPine (NORVASC) 10 MG tablet Take 1 tablet by mouth daily 3/27/20   Yon Griffin MD   metFORMIN (GLUCOPHAGE-XR) 500 MG extended release tablet Take 1 tablet by mouth 2 times daily 3/27/20   Yon Griffin MD   ondansetron (ZOFRAN) 4 MG tablet Take 1 tablet by mouth every 8 hours as needed for Nausea or Vomiting 8/1/19   Chey Gabriel MD   vitamin D (ERGOCALCIFEROL) 98200 units CAPS capsule Take 1 capsule by mouth once a week for 8 doses 7/10/19 5/1/20  Davin Sloan MD   sertraline (ZOLOFT) 100 MG tablet TAKE ONE TABLET BY MOUTH DAILY 7/10/19   Davin Sloan MD   raloxifene (EVISTA) 60 MG tablet TAKE ONE TABLET BY MOUTH DAILY 7/10/19   Davin Sloan MD   Cholecalciferol (VITAMIN D3) 1000 units TABS Take 1 tablet by mouth daily 12/26/18   Dvain Sloan MD   aspirin EC 81 MG EC tablet Take 1 tablet by mouth daily 12/11/18   Davin Sloan MD   lovastatin (MEVACOR) 40 MG tablet TAKE ONE AND ONE-HALF (1  1/2) TABLET BY MOUTH NIGHTLY 12/11/18   Davin Sloan MD   gemfibrozil (LOPID) 600 MG tablet TAKE ONE TABLET BY MOUTH TWICE A DAY 12/11/18   Davin Sloan MD   zolpidem (AMBIEN) 10 MG tablet Take 10 mg by mouth nightly as needed for Sleep. Marilee Nunez Historical Provider, MD   fluticasone (ARNUITY ELLIPTA) 200 MCG/ACT AEPB Inhale 1 Inhaler into the lungs daily    Historical Provider, MD   BiPAP Machine MISC by Does not apply route    Historical Provider, MD   calcium carbonate (TUMS) 500 MG chewable tablet Take 1 tablet by mouth 3 times daily as needed for Heartburn 1/10/17   Janet Foote MD   Handicap Placard MISC by Does not apply route Duration: 1 year 16   Loretta Mast MD   acetaminophen (TYLENOL) 500 MG tablet Take 500 mg by mouth every 6 hours as needed. Historical Provider, MD   butalbital-acetaminophen-caffeine (FIORICET, ESGIC) per tablet Take 1 tablet by mouth every 8 hours as needed. Historical Provider, MD   montelukast (SINGULAIR) 10 MG tablet Take 10 mg by mouth nightly. Historical Provider, MD   albuterol (PROVENTIL HFA;VENTOLIN HFA) 108 (90 BASE) MCG/ACT inhaler Inhale 2 puffs into the lungs every 6 hours as needed. Historical Provider, MD       SOCIAL HISTORY     Tobacco: Unable to obtain  Alcohol: Unable to obtain  Illicits: Unable to obtain    FAMILY HISTORY     Family History   Problem Relation Age of Onset    Diabetes Mother     High Blood Pressure Mother     Pacemaker Mother     High Blood Pressure Father     Prostate Cancer Father     Breast Cancer Sister     Asthma Brother     Prostate Cancer Maternal Grandfather     High Blood Pressure Paternal Grandmother        PHYSICAL EXAM     Vitals: BP (!) 162/98   Pulse 97   Temp 100.7 °F (38.2 °C) (Oral)   Resp 16   Wt 160 lb (72.6 kg)   LMP 2002   SpO2 96%   BMI 29.26 kg/m²   Tmax: Temp (24hrs), Av.7 °F (38.2 °C), Min:100.7 °F (38.2 °C), Max:100.7 °F (38.2 °C)    Last Body weight:   Wt Readings from Last 3 Encounters:   21 160 lb (72.6 kg)   20 149 lb (67.6 kg)   20 175 lb (79.4 kg)     Body Mass Index : Body mass index is 29.26 kg/m².       PHYSICAL EXAMINATION:  Constitutional: This is a well developed, well nourished, 25-29.9 - Overweight 63y.o. year old female who is alert, oriented, cooperative and in no apparent distress. Head:normocephalic and atraumatic. EENT:  PERRLA. No conjunctival injections. Septum was midline, mucosa was without erythema, exudates or cobblestoning. No thrush was noted. Neck: Supple without thyromegaly. No elevated JVP. Trachea was midline. Respiratory: Chest was symmetrical without dullness to percussion. Breath sounds bilaterally were clear to auscultation. There were no wheezes, rhonchi or rales. There is no intercostal retraction or use of accessory muscles. No egophony noted. Cardiovascular: Regular without murmur, clicks, gallops or rubs. Abdomen: Slightly rounded and soft without organomegaly. No rebound, rigidity or guarding was appreciated. Lymphatic: No lymphadenopathy. Musculoskeletal: Normal curvature of the spine. No gross muscle weakness. Extremities:  No lower extremity edema, ulcerations, tenderness, varicosities or erythema. Muscle size, tone and strength are normal.  No involuntary movements are noted. Skin:  Warm and dry. Good color, turgor and pigmentation. No lesions or scars.   No cyanosis or clubbing  Neurological/Psychiatric: The patient's general behavior, level of consciousness, thought content and emotional status is normal.          INVESTIGATIONS     Laboratory Testing:     Recent Results (from the past 24 hour(s))   Venous Blood Gas, POC    Collection Time: 05/08/21 11:54 AM   Result Value Ref Range    pH, Nic 7.345 7.320 - 7.430    pCO2, Nic 43.2 41.0 - 51.0 mm Hg    pO2, Nic 28.0 (L) 30 - 50 mm Hg    HCO3, Venous 23.6 22.0 - 29.0 mmol/L    Total CO2, Venous NOT REPORTED 23.0 - 30.0 mmol/L    Negative Base Excess, Nic 2 0.0 - 2.0    Positive Base Excess, Nic NOT REPORTED 0.0 - 3.0    O2 Sat, Nic 49 (L) 60.0 - 85.0 %    O2 Device/Flow/% NOT REPORTED     Elia Test NOT REPORTED     Sample Site NOT REPORTED     Mode NOT REPORTED     FIO2 NOT REPORTED     Pt Temp NOT REPORTED     POC pH Temp NOT REPORTED     POC pCO2 Temp NOT REPORTED mm Hg    POC pO2 Temp NOT REPORTED mm Hg   ELECTROLYTES PLUS    Collection Time: 05/08/21 11:54 AM   Result Value Ref Range    POC Sodium 135 (L) 138 - 146 mmol/L    POC Potassium 4.8 (H) 3.5 - 4.5 mmol/L    POC Chloride 103 98 - 107 mmol/L    POC TCO2 24 22 - 30 mmol/L    Anion Gap 9 7 - 16 mmol/L   Hemoglobin and hematocrit, blood    Collection Time: 05/08/21 11:54 AM   Result Value Ref Range    POC Hemoglobin 15.0 12.0 - 16.0 g/dL    POC Hematocrit 44 36 - 46 %   Creatinine W/GFR Point of Care    Collection Time: 05/08/21 11:54 AM   Result Value Ref Range    POC Creatinine 1.37 (H) 0.51 - 1.19 mg/dL    GFR Comment 47 (L) >60 mL/min    GFR Non- 39 (L) >60 mL/min    GFR Comment         CALCIUM, IONIC (POC)    Collection Time: 05/08/21 11:54 AM   Result Value Ref Range    POC Ionized Calcium 1.25 1.15 - 1.33 mmol/L   POCT urea (BUN)    Collection Time: 05/08/21 11:54 AM   Result Value Ref Range    POC BUN 27 (H) 8 - 26 mg/dL   Lactic Acid, POC    Collection Time: 05/08/21 11:54 AM   Result Value Ref Range    POC Lactic Acid 2.55 (H) 0.56 - 1.39 mmol/L   POCT Glucose    Collection Time: 05/08/21 11:54 AM   Result Value Ref Range    POC Glucose 624 (HH) 74 - 100 mg/dL   Culture, Blood 1    Collection Time: 05/08/21 12:10 PM    Specimen: Blood   Result Value Ref Range    Specimen Description . BLOOD     Special Requests RT AC 10 ML     Culture NO GROWTH 6 HOURS    CBC Auto Differential    Collection Time: 05/08/21 12:18 PM   Result Value Ref Range    WBC 16.3 (H) 3.5 - 11.3 k/uL    RBC 4.49 3.95 - 5.11 m/uL    Hemoglobin 12.7 11.9 - 15.1 g/dL    Hematocrit 39.6 36.3 - 47.1 %    MCV 88.2 82.6 - 102.9 fL    MCH 28.3 25.2 - 33.5 pg    MCHC 32.1 28.4 - 34.8 g/dL    RDW 12.0 11.8 - 14.4 %    Platelets 180 936 - 376 k/uL    MPV 12.0 8.1 - 13.5 fL    NRBC Automated 0.0 0.0 per 100 WBC    Differential Type NOT REPORTED 05/08/21 12:18 PM   Result Value Ref Range    Acetaminophen Level <5 (L) 10 - 30 ug/mL    Ethanol <10 <10 mg/dL    Ethanol percent <3.718 <6.481 %    Salicylate Lvl 1 (L) 3 - 10 mg/dL    Toxic Tricyclic Sc,Blood NEGATIVE NEGATIVE   CK    Collection Time: 05/08/21 12:18 PM   Result Value Ref Range    Total  26 - 192 U/L   MYOGLOBIN, SERUM    Collection Time: 05/08/21 12:18 PM   Result Value Ref Range    Myoglobin 382 (H) 25 - 58 ng/mL   Magnesium    Collection Time: 05/08/21 12:18 PM   Result Value Ref Range    Magnesium 2.0 1.6 - 2.6 mg/dL   Troponin    Collection Time: 05/08/21 12:18 PM   Result Value Ref Range    Troponin, High Sensitivity 30 (H) 0 - 14 ng/L    Troponin T NOT REPORTED <0.03 ng/mL    Troponin Interp NOT REPORTED    TSH with Reflex    Collection Time: 05/08/21 12:18 PM   Result Value Ref Range    TSH 1.32 0.30 - 5.00 mIU/L   COVID-19, Rapid    Collection Time: 05/08/21 12:57 PM    Specimen: Nasopharyngeal Swab   Result Value Ref Range    Specimen Description . NASOPHARYNGEAL SWAB     SARS-CoV-2, Rapid Not Detected Not Detected   Urinalysis with Microscopic    Collection Time: 05/08/21  1:06 PM   Result Value Ref Range    Color, UA YELLOW YELLOW    Turbidity UA CLEAR CLEAR    Glucose, Ur 3+ (A) NEGATIVE    Bilirubin Urine NEGATIVE NEGATIVE    Ketones, Urine SMALL (A) NEGATIVE    Specific Gravity, UA 1.029 1.005 - 1.030    Urine Hgb MODERATE (A) NEGATIVE    pH, UA 6.0 5.0 - 8.0    Protein, UA 3+ (A) NEGATIVE    Urobilinogen, Urine Normal Normal    Nitrite, Urine NEGATIVE NEGATIVE    Leukocyte Esterase, Urine NEGATIVE NEGATIVE    -          WBC, UA 2 TO 5 0 - 5 /HPF    RBC, UA 2 TO 5 0 - 4 /HPF    Casts UA  0 - 8 /LPF     2 TO 5 HYALINE Reference range defined for non-centrifuged specimen.     Crystals, UA NOT REPORTED None /HPF    Epithelial Cells UA 2 TO 5 0 - 5 /HPF    Renal Epithelial, UA NOT REPORTED 0 /HPF    Bacteria, UA FEW (A) None    Mucus, UA NOT REPORTED None    Trichomonas, UA NOT REPORTED None    Amorphous, UA NOT REPORTED None    Other Observations UA NOT REPORTED NOT REQ. Yeast, UA NOT REPORTED None   Troponin    Collection Time: 05/08/21  2:22 PM   Result Value Ref Range    Troponin, High Sensitivity 33 (H) 0 - 14 ng/L    Troponin T NOT REPORTED <0.03 ng/mL    Troponin Interp NOT REPORTED    Lactate, Sepsis    Collection Time: 05/08/21  2:41 PM   Result Value Ref Range    Lactic Acid, Sepsis NOT REPORTED 0.5 - 1.9 mmol/L    Lactic Acid, Sepsis, Whole Blood 2.3 (H) 0.5 - 1.9 mmol/L   POC Glucose Fingerstick    Collection Time: 05/08/21  4:48 PM   Result Value Ref Range    POC Glucose 465 (HH) 65 - 105 mg/dL   POC Glucose Fingerstick    Collection Time: 05/08/21  9:00 PM   Result Value Ref Range    POC Glucose 422 (HH) 65 - 105 mg/dL       Imaging:   Ct Head Wo Contrast    Result Date: 5/8/2021  No acute intracranial abnormality. Cerebral atrophy. No acute fracture or subluxation of the cervical spine. Moderate degenerative changes. Ct Cervical Spine Wo Contrast    Result Date: 5/8/2021  No acute intracranial abnormality. Cerebral atrophy. No acute fracture or subluxation of the cervical spine. Moderate degenerative changes. Xr Chest Portable    Result Date: 5/8/2021  No acute cardiopulmonary disease       ASSESSMENT & PLAN     ASSESSMENT / PLAN:     IMPRESSION    Principal Problem:    AMS (altered mental status)  Active Problems:    HTN (hypertension)    Uncontrolled type 2 diabetes mellitus with complication, with long-term current use of insulin (HCC)    Hyperosmolar hyperglycemic state (HHS) ([facility] [address]    Metabolic encephalopathy  Resolved Problems:    * No resolved hospital problems. *    1. Acute metabolic encephalopathy: Likely due to hyperglycemia, dehydration and electrolyte imbalance. 2. Diabetes mellitus type 2 insulin dependence: We will start her on insulin GTT. Monitor her glucose. Monitor electrolytes every 4 hours.   3. Sepsis of unknown origin: On IV antibiotic and IV fluids. Follow-up on urine culture. Blood culture so far negative  4. Pseudohyponatremia: Due to hyperglycemia. On 0.9% NaCl. Monitor electrolytes  5. Lactic acidosis: Likely due to dehydration/hypoperfusion. On IV fluid. Improving  6. Reactive leukocytosis: Monitor daily blood counts. 7. Cognitive impairments: On donepezil  8. Essential hypertension: We will resume her home medication. Norvasc 10 mg and labetalol as needed  9. History of COPD: On breathing treatment as needed  10. Mood disorder: On Zoloft      DVT ppx: On Lovenox  GI ppx: None needed    PT/OT/SW: Consulted  Discharge Planning: Consulted  to assist with discharge plan    Litzy Murray MD  Internal Medicine Resident, PGY-1  [facility];  [place], [place]  5/8/2021, 10:17 PM

## 2021-05-09 NOTE — ED NOTES
Warm blanket provided. Call light in reach. Denies needs at this time.       Carolyn Santoro 139, RN  05/08/21 2019

## 2021-05-10 LAB
-: NORMAL
ABSOLUTE EOS #: 0.25 K/UL (ref 0–0.44)
ABSOLUTE IMMATURE GRANULOCYTE: 0.08 K/UL (ref 0–0.3)
ABSOLUTE LYMPH #: 4.24 K/UL (ref 1.1–3.7)
ABSOLUTE MONO #: 1.08 K/UL (ref 0.1–1.2)
ANION GAP SERPL CALCULATED.3IONS-SCNC: 10 MMOL/L (ref 9–17)
ANION GAP SERPL CALCULATED.3IONS-SCNC: 11 MMOL/L (ref 9–17)
ANION GAP SERPL CALCULATED.3IONS-SCNC: 11 MMOL/L (ref 9–17)
BASOPHILS # BLD: 0 % (ref 0–2)
BASOPHILS ABSOLUTE: 0.05 K/UL (ref 0–0.2)
BUN BLDV-MCNC: 19 MG/DL (ref 8–23)
BUN BLDV-MCNC: 20 MG/DL (ref 8–23)
BUN BLDV-MCNC: 21 MG/DL (ref 8–23)
BUN/CREAT BLD: ABNORMAL (ref 9–20)
CALCIUM SERPL-MCNC: 7.7 MG/DL (ref 8.6–10.4)
CALCIUM SERPL-MCNC: 8 MG/DL (ref 8.6–10.4)
CALCIUM SERPL-MCNC: 8.1 MG/DL (ref 8.6–10.4)
CHLORIDE BLD-SCNC: 104 MMOL/L (ref 98–107)
CHLORIDE BLD-SCNC: 104 MMOL/L (ref 98–107)
CHLORIDE BLD-SCNC: 105 MMOL/L (ref 98–107)
CO2: 16 MMOL/L (ref 20–31)
CO2: 20 MMOL/L (ref 20–31)
CO2: 20 MMOL/L (ref 20–31)
CREAT SERPL-MCNC: 0.94 MG/DL (ref 0.5–0.9)
CREAT SERPL-MCNC: 0.95 MG/DL (ref 0.5–0.9)
CREAT SERPL-MCNC: 0.99 MG/DL (ref 0.5–0.9)
DIFFERENTIAL TYPE: ABNORMAL
EKG ATRIAL RATE: 107 BPM
EKG P AXIS: 78 DEGREES
EKG P-R INTERVAL: 190 MS
EKG Q-T INTERVAL: 344 MS
EKG QRS DURATION: 74 MS
EKG QTC CALCULATION (BAZETT): 459 MS
EKG R AXIS: 31 DEGREES
EKG T AXIS: 31 DEGREES
EKG VENTRICULAR RATE: 107 BPM
EOSINOPHILS RELATIVE PERCENT: 2 % (ref 1–4)
GFR AFRICAN AMERICAN: >60 ML/MIN
GFR NON-AFRICAN AMERICAN: 57 ML/MIN
GFR NON-AFRICAN AMERICAN: 59 ML/MIN
GFR NON-AFRICAN AMERICAN: >60 ML/MIN
GFR SERPL CREATININE-BSD FRML MDRD: ABNORMAL ML/MIN/{1.73_M2}
GLUCOSE BLD-MCNC: 101 MG/DL (ref 65–105)
GLUCOSE BLD-MCNC: 108 MG/DL (ref 70–99)
GLUCOSE BLD-MCNC: 111 MG/DL (ref 65–105)
GLUCOSE BLD-MCNC: 115 MG/DL (ref 70–99)
GLUCOSE BLD-MCNC: 125 MG/DL (ref 70–99)
GLUCOSE BLD-MCNC: 158 MG/DL (ref 65–105)
GLUCOSE BLD-MCNC: 96 MG/DL (ref 65–105)
HCT VFR BLD CALC: 36.1 % (ref 36.3–47.1)
HEMOGLOBIN: 10.7 G/DL (ref 11.9–15.1)
IMMATURE GRANULOCYTES: 1 %
LYMPHOCYTES # BLD: 34 % (ref 24–43)
MAGNESIUM: 1.9 MG/DL (ref 1.6–2.6)
MAGNESIUM: 2 MG/DL (ref 1.6–2.6)
MCH RBC QN AUTO: 28.1 PG (ref 25.2–33.5)
MCHC RBC AUTO-ENTMCNC: 29.6 G/DL (ref 28.4–34.8)
MCV RBC AUTO: 94.8 FL (ref 82.6–102.9)
MONOCYTES # BLD: 9 % (ref 3–12)
NRBC AUTOMATED: 0 PER 100 WBC
PDW BLD-RTO: 12.3 % (ref 11.8–14.4)
PHOSPHORUS: 2.2 MG/DL (ref 2.6–4.5)
PHOSPHORUS: 2.4 MG/DL (ref 2.6–4.5)
PLATELET # BLD: 168 K/UL (ref 138–453)
PLATELET ESTIMATE: ABNORMAL
PMV BLD AUTO: 11.3 FL (ref 8.1–13.5)
POTASSIUM SERPL-SCNC: 3.3 MMOL/L (ref 3.7–5.3)
POTASSIUM SERPL-SCNC: 3.5 MMOL/L (ref 3.7–5.3)
POTASSIUM SERPL-SCNC: 4.4 MMOL/L (ref 3.7–5.3)
RBC # BLD: 3.81 M/UL (ref 3.95–5.11)
RBC # BLD: ABNORMAL 10*6/UL
REASON FOR REJECTION: NORMAL
SEG NEUTROPHILS: 54 % (ref 36–65)
SEGMENTED NEUTROPHILS ABSOLUTE COUNT: 6.67 K/UL (ref 1.5–8.1)
SODIUM BLD-SCNC: 130 MMOL/L (ref 135–144)
SODIUM BLD-SCNC: 135 MMOL/L (ref 135–144)
SODIUM BLD-SCNC: 136 MMOL/L (ref 135–144)
WBC # BLD: 12.4 K/UL (ref 3.5–11.3)
WBC # BLD: ABNORMAL 10*3/UL
ZZ NTE CLEAN UP: ORDERED TEST: NORMAL
ZZ NTE WITH NAME CLEAN UP: SPECIMEN SOURCE: NORMAL

## 2021-05-10 PROCEDURE — 93010 ELECTROCARDIOGRAM REPORT: CPT | Performed by: INTERNAL MEDICINE

## 2021-05-10 PROCEDURE — 97166 OT EVAL MOD COMPLEX 45 MIN: CPT

## 2021-05-10 PROCEDURE — 2580000003 HC RX 258: Performed by: STUDENT IN AN ORGANIZED HEALTH CARE EDUCATION/TRAINING PROGRAM

## 2021-05-10 PROCEDURE — 6360000002 HC RX W HCPCS: Performed by: STUDENT IN AN ORGANIZED HEALTH CARE EDUCATION/TRAINING PROGRAM

## 2021-05-10 PROCEDURE — 83735 ASSAY OF MAGNESIUM: CPT

## 2021-05-10 PROCEDURE — 2060000000 HC ICU INTERMEDIATE R&B

## 2021-05-10 PROCEDURE — 84100 ASSAY OF PHOSPHORUS: CPT

## 2021-05-10 PROCEDURE — 82947 ASSAY GLUCOSE BLOOD QUANT: CPT

## 2021-05-10 PROCEDURE — 97535 SELF CARE MNGMENT TRAINING: CPT

## 2021-05-10 PROCEDURE — 36415 COLL VENOUS BLD VENIPUNCTURE: CPT

## 2021-05-10 PROCEDURE — 99232 SBSQ HOSP IP/OBS MODERATE 35: CPT | Performed by: INTERNAL MEDICINE

## 2021-05-10 PROCEDURE — 2500000003 HC RX 250 WO HCPCS: Performed by: STUDENT IN AN ORGANIZED HEALTH CARE EDUCATION/TRAINING PROGRAM

## 2021-05-10 PROCEDURE — 85025 COMPLETE CBC W/AUTO DIFF WBC: CPT

## 2021-05-10 PROCEDURE — 80048 BASIC METABOLIC PNL TOTAL CA: CPT

## 2021-05-10 PROCEDURE — 6370000000 HC RX 637 (ALT 250 FOR IP): Performed by: STUDENT IN AN ORGANIZED HEALTH CARE EDUCATION/TRAINING PROGRAM

## 2021-05-10 PROCEDURE — 76937 US GUIDE VASCULAR ACCESS: CPT

## 2021-05-10 RX ADMIN — CEFEPIME HYDROCHLORIDE 2000 MG: 2 INJECTION, POWDER, FOR SOLUTION INTRAVENOUS at 05:44

## 2021-05-10 RX ADMIN — POTASSIUM CHLORIDE 10 MEQ: 7.46 INJECTION, SOLUTION INTRAVENOUS at 08:06

## 2021-05-10 RX ADMIN — INSULIN LISPRO 1 UNITS: 100 INJECTION, SOLUTION INTRAVENOUS; SUBCUTANEOUS at 11:40

## 2021-05-10 RX ADMIN — INSULIN GLARGINE 40 UNITS: 100 INJECTION, SOLUTION SUBCUTANEOUS at 08:56

## 2021-05-10 RX ADMIN — DONEPEZIL HYDROCHLORIDE 5 MG: 5 TABLET, FILM COATED ORAL at 21:29

## 2021-05-10 RX ADMIN — GEMFIBROZIL 600 MG: 600 TABLET ORAL at 21:29

## 2021-05-10 RX ADMIN — POTASSIUM CHLORIDE 10 MEQ: 7.46 INJECTION, SOLUTION INTRAVENOUS at 09:03

## 2021-05-10 RX ADMIN — POTASSIUM CHLORIDE 10 MEQ: 7.46 INJECTION, SOLUTION INTRAVENOUS at 12:27

## 2021-05-10 RX ADMIN — SERTRALINE 100 MG: 50 TABLET, FILM COATED ORAL at 08:57

## 2021-05-10 RX ADMIN — GEMFIBROZIL 600 MG: 600 TABLET ORAL at 08:57

## 2021-05-10 RX ADMIN — POTASSIUM CHLORIDE 10 MEQ: 7.46 INJECTION, SOLUTION INTRAVENOUS at 13:36

## 2021-05-10 RX ADMIN — ATORVASTATIN CALCIUM 10 MG: 10 TABLET, FILM COATED ORAL at 08:57

## 2021-05-10 RX ADMIN — SODIUM PHOSPHATE, MONOBASIC, MONOHYDRATE 15 MMOL: 276; 142 INJECTION, SOLUTION INTRAVENOUS at 15:46

## 2021-05-10 RX ADMIN — AMLODIPINE BESYLATE 10 MG: 10 TABLET ORAL at 08:57

## 2021-05-10 RX ADMIN — Medication 81 MG: at 08:57

## 2021-05-10 RX ADMIN — ENOXAPARIN SODIUM 40 MG: 40 INJECTION SUBCUTANEOUS at 08:57

## 2021-05-10 ASSESSMENT — PAIN DESCRIPTION - LOCATION: LOCATION: KNEE

## 2021-05-10 ASSESSMENT — PAIN SCALES - GENERAL
PAINLEVEL_OUTOF10: 0
PAINLEVEL_OUTOF10: 0
PAINLEVEL_OUTOF10: 7
PAINLEVEL_OUTOF10: 0
PAINLEVEL_OUTOF10: 0

## 2021-05-10 ASSESSMENT — PAIN DESCRIPTION - DESCRIPTORS: DESCRIPTORS: ACHING

## 2021-05-10 ASSESSMENT — PAIN DESCRIPTION - PAIN TYPE: TYPE: CHRONIC PAIN

## 2021-05-10 NOTE — PROGRESS NOTES
Physician Progress Note      PATIENT:               Hugh Farooq  CSN #:                  [id number]  :                       1955  ADMIT DATE:       2021 11:45 AM  DISCH DATE:  RESPONDING  PROVIDER #:        Edwar Ramirez MD          QUERY TEXT:    Patient admitted with altered mental status , Type II DM, hyperglycemia. Noted   documentation of sepsis in H&P and progress notes. In order to support the   diagnosis of sepsis, please include additional clinical indicators in your   documentation. Or please document if the diagnosis of sepsis has been ruled   out after further study    The medical record reflects the following:  Risk Factors: DM, Renal CA,  Clinical Indicators: WBC 16.3,UA with few leucocytes , CXR with no acute   findings ,  Blood cultures no growth , temp 38.2, per H&P and progress   notes , Sepsis of unknown source documented  Treatment: IV Vanc and Cefepime    Thank you please call If questions  Daniella Tyler RN, CDS  [phone]  Options provided:  -- Sepsis present as evidenced by, Please document evidence. -- Sepsis was ruled out after study  -- Other - I will add my own diagnosis  -- Disagree - Not applicable / Not valid  -- Disagree - Clinically unable to determine / Unknown  -- Refer to Clinical Documentation Reviewer    PROVIDER RESPONSE TEXT:    Sepsis was ruled out after study.     Query created by: Harini Mosley on 5/10/2021 9:49 AM      Electronically signed by:  Edwar Ramirez MD 5/10/2021 10:36 AM

## 2021-05-10 NOTE — PROGRESS NOTES
Occupational Therapy   Occupational Therapy Initial Assessment  Date: 5/10/2021   Patient Name: Bianca Avila  MRN: [id number]     : 1955     Copied from chart:  Bianca Avila is a 63 y.o. female who presents with complaints of altered mental status. Per EMS patient was found facedown on the floor. Unknown downtime, no family at bedside. Patient does not answer questions, does open eyes to voice, however history otherwise significantly limited. Patient was covered in stool. Date of Service: 5/10/2021    Discharge Recommendations:  Patient would benefit from continued therapy after discharge  OT Equipment Recommendations  Equipment Needed: Yes  Mobility Devices: Lena Breeze; ADL Assistive Devices  Walker: Rolling  ADL Assistive Devices: Transfer Tub Bench;Hand-held Shower;Grab Bars - toilet;Grab Bars - shower; Reacher;Long-handled Sponge;Dressing Stick   Equipment recommendations listed below are based on what the patient would need if they were able to return to prior living arrangements at the time of discharge. Assessment   Performance deficits / Impairments: Decreased functional mobility ; Decreased endurance;Decreased ADL status; Decreased balance;Decreased strength;Decreased safe awareness;Decreased high-level IADLs;Decreased cognition  Assessment: Pt was awake, alert, and agreeable to OT evaluation this date. Pts rousal improved throughout completion of evaluation. Pt bed mobility is SBA-mod I with from flat bed surface. Pts functional transfers and functional mobility are min A with appropriate use of RW. Pts UB ADLs are currently SBA for safety. Pts LB ADLs are currently CGA for clothing management and mod A for toileting. Pt would benefit from skilled occupational therapy services to address safe functional transfers, safe functional mobility, safe self-care, and DME training to increase independence and safety with ADL and IADL tasks upon discharge.   Prognosis: Good  Decision Making: Medium Complexity  Patient Education: OT role, OT POC, OT goals, safe bed mobility training, safe transfer training, safe functional mobility training, and general safety. Pt was receptive to education and verbalized understanding of training. Pt would benefit from follow-up education as cognition improves. REQUIRES OT FOLLOW UP: Yes  Activity Tolerance  Activity Tolerance: Patient Tolerated treatment well;Patient limited by fatigue  Safety Devices  Safety Devices in place: Yes  Type of devices: Left in bed;Bed alarm in place;Call light within reach;Nurse notified;Gait belt  Restraints  Initially in place: No           Patient Diagnosis(es): The encounter diagnosis was Altered mental status, unspecified altered mental status type. has a past medical history of CEFERINO (acute kidney injury) ([facility] [address] Asthma, Carotid artery plaque, Clostridium difficile diarrhea, Dehydration, Depression, Fall, Fibromyalgia, Hiatal hernia, HTN (hypertension), Hyperlipidemia, Hypokalemia, gastrointestinal losses, Kidney stone, Obesity (BMI 30-39.9), Osteoarthritis, Osteoporosis, Renal cancer ([facility] [address] Short of breath on exertion, Type II or unspecified type diabetes mellitus without mention of complication, not stated as uncontrolled, Unspecified sleep apnea, Urge urinary incontinence, Wears glasses, Wears glasses, and Zygomatic fracture, right side, initial encounter for closed fracture ([facility] [address] has a past surgical history that includes knee surgery (Bilateral); bladder suspension (08/09/2002); Nephrostomy (Right); total nephrectomy (Right, 06/20/2013); lumbar laminectomy (10/15/14); Bladder surgery (06/05/2014); Endometrial ablation; Cholecystectomy (10/10/2003); Breast surgery (Left, 03/03/2008); Breast biopsy (Left, 03/03/2008); pr perq vert agmntj cavity crtj uni/bi cannulj lmbr (N/A, 9/12/2018); Fixation Kyphoplasty (10/2018); Kyphosis surgery (N/A, 5/8/2019); Endoscopy, colon, diagnostic; and Colonoscopy.        Restrictions Restrictions/Precautions  Restrictions/Precautions: Fall Risk, General Precautions  Required Braces or Orthoses?: No    Subjective   General  Patient assessed for rehabilitation services?: Yes  Family / Caregiver Present: No  Diagnosis: altered mental status  General Comment  Comments: Pts primary RN Ok'd pt to be seen prior to entry into pts room. Pt was awake, alert, and agreeable to OT evaluation this date. Patient Currently in Pain: Yes  Pain Assessment  Pain Assessment: 0-10  Pain Level: 7  Pain Type: Chronic pain  Pain Location: Knee  Pain Orientation: Right;Left  Pain Descriptors: Aching  Non-Pharmaceutical Pain Intervention(s): Distraction;Repositioned; Therapeutic presence  Response to Pain Intervention: Patient Satisfied  O2 Device: None (Room air) (Pt reports no O2 use at home)    Social/Functional History  Social/Functional History  Lives With: (mother in law)  Type of Home: House  Home Layout: Two level, Able to Live on Main level with bedroom/bathroom  Home Access: Stairs to enter with rails  Entrance Stairs - Number of Steps: 3  Entrance Stairs - Rails: Left  Bathroom Shower/Tub: Tub/Shower unit, Doors  Bathroom Toilet: Standard  Bathroom Equipment: Shower chair  Home Equipment: 4 wheeled walker(only for long distance)  ADL Assistance: Independent  Homemaking Assistance: Independent  Homemaking Responsibilities: Yes  Ambulation Assistance: Independent(rollator for long distance)  Transfer Assistance: Independent  Active : No  Patient's  Info: Pt's sister-in-law  Mode of Transportation: Truck  Occupation: On disability  Leisure & Hobbies: watching car races  Additional Comments: pt reports 81 yo mother in law is primary source of care. pt reports use of cell phone for emergencies. Pt reports sister-in-law is supportive, lives nearby, however in poor health.        Objective   Vision: Impaired  Vision Exceptions: Wears glasses for reading(pt reports not wearing glasses although poor vision) assistance(for safety)  Comment: HOB in flat position  Transfers  Sit to stand: Minimal assistance  Stand to sit: Minimal assistance  Transfer Comments: Pt completed all functional transfers with use of RW. Cognition  Overall Cognitive Status: Exceptions  Arousal/Alertness: Delayed responses to stimuli  Following Commands: Follows all commands without difficulty  Attention Span: Attends with cues to redirect  Memory: Appears intact  Safety Judgement: Decreased awareness of need for assistance  Problem Solving: Assistance required to identify errors made;Assistance required to generate solutions;Assistance required to correct errors made;Decreased awareness of errors  Insights: Decreased awareness of deficits  Initiation: Requires cues for some  Sequencing: Requires cues for some        Sensation  Overall Sensation Status: (pt reports no numbness or tingling)        LUE AROM (degrees)  LUE AROM : WFL  Left Hand AROM (degrees)  Left Hand AROM: WFL  RUE AROM (degrees)  RUE AROM : WFL  Right Hand AROM (degrees)  Right Hand AROM: WFL  LUE Strength  Gross LUE Strength: WFL  L Hand General: 4/5  LUE Strength Comment: Grossly 4/5  RUE Strength  Gross RUE Strength: WFL  R Hand General: 4/5  RUE Strength Comment: Grossly 4/5      Plan   Plan  Times per week: 3-4 x/wk  Current Treatment Recommendations: Strengthening, Patient/Caregiver Education & Training, Home Management Training, Equipment Evaluation, Education, & procurement, Balance Training, Functional Mobility Training, Endurance Training, Safety Education & Training, Self-Care / ADL, Cognitive/Perceptual Training    AM-PAC Score        AM-[facility] Inpatient Daily Activity Raw Score: 19 (05/10/21 1627)  AM-PAC Inpatient ADL T-Scale Score : 40.22 (05/10/21 1627)  ADL Inpatient CMS 0-100% Score: 42.8 (05/10/21 1627)  ADL Inpatient CMS G-Code Modifier : CK (05/10/21 1627)    Goals  Short term goals  Time Frame for Short term goals: By discharge, pt will;   Short term goal 1: demo functional transfers SBA with use of LRD PRN to improve safety with ADLs  Short term goal 2: demo functional mobility CGA with use of LRD PRN to improve safety with home mobility  Short term goal 3: demo standing tolerance 8+ minutes SBA with use of LRD PRN during functional activities  Short term goal 4: demo UB ADLs and grooming tasks mod I  Short term goal 5: demo LB ADLs and toileting tasks CGA with use of AE and appropriate DME PRN  Short term goal 6: demo good safety during functional interventions with < 2 VC's.        Therapy Time   Individual Concurrent Group Co-treatment   Time In 1440         Time Out 1542         Minutes 62         Timed Code Treatment Minutes: [address], S/OT

## 2021-05-10 NOTE — PLAN OF CARE
being cared for will increase  Description: Verbalizations of feeling emotionally comfortable while being cared for will increase  Outcome: Ongoing     Problem: Psychomotor Activity - Altered:  Goal: Absence of psychomotor disturbance signs and symptoms  Description: Absence of psychomotor disturbance signs and symptoms  Outcome: Ongoing     Problem: Sensory Perception - Impaired:  Goal: Demonstrations of improved sensory functioning will increase  Description: Demonstrations of improved sensory functioning will increase  Outcome: Ongoing  Goal: Decrease in sensory misperception frequency  Description: Decrease in sensory misperception frequency  Outcome: Ongoing  Goal: Able to refrain from responding to false sensory perceptions  Description: Able to refrain from responding to false sensory perceptions  Outcome: Ongoing  Goal: Demonstrates accurate environmental perceptions  Description: Demonstrates accurate environmental perceptions  Outcome: Ongoing  Goal: Able to distinguish between reality-based and nonreality-based thinking  Description: Able to distinguish between reality-based and nonreality-based thinking  Outcome: Ongoing  Goal: Able to interrupt nonreality-based thinking  Description: Able to interrupt nonreality-based thinking  Outcome: Ongoing     Problem: Sleep Pattern Disturbance:  Goal: Appears well-rested  Description: Appears well-rested  Outcome: Ongoing

## 2021-05-10 NOTE — PLAN OF CARE
Problem: Skin Integrity:  Goal: Will show no infection signs and symptoms  Description: Will show no infection signs and symptoms  5/10/2021 1601 by Clarice Chavez RN  Outcome: Met This Shift  5/10/2021 0502 by Ofelia Pugh RN  Outcome: Ongoing  Goal: Absence of new skin breakdown  Description: Absence of new skin breakdown  5/10/2021 1601 by Clarice Chavez RN  Outcome: Met This Shift  5/10/2021 0502 by Ofelia Pugh RN  Outcome: Ongoing     Problem: Falls - Risk of:  Goal: Will remain free from falls  Description: Will remain free from falls  5/10/2021 1601 by Clarice Chavez RN  Outcome: Met This Shift  5/10/2021 0502 by Ofelia Pugh RN  Outcome: Ongoing  Goal: Absence of physical injury  Description: Absence of physical injury  5/10/2021 1601 by Clarice Chavez RN  Outcome: Met This Shift  5/10/2021 0502 by Ofelia Pugh RN  Outcome: Ongoing     Problem: Injury - Risk of, Physical Injury:  Goal: Will remain free from falls  Description: Will remain free from falls  5/10/2021 1601 by Clarice Chavez RN  Outcome: Met This Shift  5/10/2021 0502 by Ofelia Pugh RN  Outcome: Ongoing  Goal: Absence of physical injury  Description: Absence of physical injury  5/10/2021 1601 by Clarice Chavez RN  Outcome: Met This Shift  5/10/2021 0502 by Ofelia Pugh RN  Outcome: Ongoing     Problem: Mood - Altered:  Goal: Mood stable  Description: Mood stable  5/10/2021 1601 by Clarice Chavez RN  Outcome: Met This Shift  5/10/2021 0502 by Ofelia Pugh RN  Outcome: Ongoing  Goal: Absence of abusive behavior  Description: Absence of abusive behavior  5/10/2021 1601 by Clarice Chavez RN  Outcome: Met This Shift  5/10/2021 0502 by Ofelia Pugh RN  Outcome: Ongoing  Goal: Verbalizations of feeling emotionally comfortable while being cared for will increase  Description: Verbalizations of feeling emotionally comfortable while being cared for will increase  5/10/2021 1601 by Clarice Chavez RN Sleep Pattern Disturbance:  Goal: Appears well-rested  Description: Appears well-rested  5/10/2021 1601 by Thu Aguero RN  Outcome: Met This Shift  5/10/2021 0502 by Caren Taylor RN  Outcome: Ongoing

## 2021-05-10 NOTE — PROGRESS NOTES
[facility]  Internal Medicine Teaching Residency Program  Inpatient Daily Progress Note  ______________________________________________________________________________    Patient: Yadira Rothman  YOB: 1955   CTT:[id number]    Acct: [de-identified]     Room: [address] [place] [address]  Admit date: 5/8/2021  Today's date: 05/10/21  Number of days in the hospital: 2    SUBJECTIVE   Admitting Diagnosis: AMS (altered mental status)  CC: Altered mental status  Pt examined at bedside. Chart & results reviewed. No acute events overnight. Patient is awake and arousable. Was concern of choking on food. Speech eval was done. recommend she can have diet with thin liquid consistency. HbA1C 15.9%. concern of not taking home medication and living condition. SW consulted. Blood sugar this am 111. Resumed her home lantus. Morning dose 40U and night dose 45U. and medium dose     Will discontinue cefepime and vancomycin. Will de-escalate to ceftriaxone. Replace phosphorus and potassium      ROS:  Constitutional:  negative for chills, fevers, sweats  Respiratory:  negative for cough, dyspnea on exertion, hemoptysis, shortness of breath, wheezing  Cardiovascular:  negative for chest pain, chest pressure/discomfort, lower extremity edema, palpitations  Gastrointestinal:  negative for abdominal pain, constipation, diarrhea, nausea, vomiting  Neurological:  negative for dizziness, headache  BRIEF HISTORY     68-year-old female with past medical history of hypertension, hyperlipidemia, asthma, clear-cell renal cell carcinoma s/p right nephrectomy, diabetes mellitus, cognitive impairment on donepezil presented via EMS due to altered mental status. Per EMS, patient was found facedown on the floor. She was covered in stool. Patient is a very poor historian. Per chart review. Patient was found unresponsive and had stool incontinence.   Patient was confused and brought in by EMS found to have fever of 100.7. Vitals show /95, heart rate 107 tachycardic. Work-up showed glucose 624, blood gases show pH 7.34, PCO2 43.2, HCO3 23.6, beta hydroxybutyrate 0.94, myoglobin 382, troponin 30> 33. Trauma work-up done in the ER including CT head without contrast and CT cervical spine showed no acute intracranial abnormality. No acute fracture or subluxation of the cervical spine. OBJECTIVE     Vital Signs:  BP (!) 148/78   Pulse 75   Temp 98 °F (36.7 °C) (Temporal)   Resp 14   Ht 5' 2\" (1.575 m)   Wt 160 lb (72.6 kg)   LMP 2002   SpO2 97%   BMI 29.26 kg/m²     Temp (24hrs), Av °F (36.7 °C), Min:97.3 °F (36.3 °C), Max:98.7 °F (37.1 °C)    No intake/output data recorded. Physical Exam:  Constitutional: This is a well developed, well nourished, 25-29.9 - Overweight 63y.o. year old female who is alert, oriented, cooperative and in no apparent distress. Head:normocephalic and atraumatic. EENT:  PERRLA. No conjunctival injections. Dry mucosa  Neck: Supple without thyromegaly. No elevated JVP. Trachea was midline. Respiratory: Chest was symmetrical without dullness to percussion. Breath sounds bilaterally were clear to auscultation. Cardiovascular: Regular without murmur, clicks, gallops or rubs. Abdomen: Slightly rounded and soft without organomegaly. No rebound, rigidity or guarding was appreciated. Lymphatic: No lymphadenopathy. Musculoskeletal: Normal curvature of the spine. No gross muscle weakness. Extremities:  No lower extremity edema, ulcerations, tenderness, varicosities or erythema. Muscle size, tone and strength are normal.  No involuntary movements are noted. Skin: Very dry skin. Good color, turgor and pigmentation. No lesions or scars.     Neurological/Psychiatric: The patient's general behavior, level of consciousness, thought content and emotional status is normal.        Medications:  Scheduled Medications:    cefepime  2,000 mg Intravenous Q12H    insulin glargine  40 Units Subcutaneous QAM    insulin glargine  45 Units Subcutaneous Nightly    insulin lispro  0-12 Units Subcutaneous TID WC    insulin lispro  0-6 Units Subcutaneous Nightly    vancomycin (VANCOCIN) intermittent dosing (placeholder)   Other RX Placeholder    vancomycin  1,250 mg Intravenous Q24H    amLODIPine  10 mg Oral Daily    aspirin EC  81 mg Oral Daily    donepezil  5 mg Oral Nightly    gemfibrozil  600 mg Oral BID    atorvastatin  10 mg Oral Daily    sertraline  100 mg Oral Daily    enoxaparin  40 mg Subcutaneous Daily     Continuous Infusions:     PRN Medicationsipratropium-albuterol, 1 ampule, Q4H PRN  potassium chloride, 10 mEq, PRN  magnesium sulfate, 1,000 mg, PRN  sodium phosphate IVPB, 10 mmol, PRN    Or  sodium phosphate IVPB, 15 mmol, PRN    Or  sodium phosphate IVPB, 20 mmol, PRN  labetalol, 20 mg, Q4H PRN        Diagnostic Labs:  CBC:   Recent Labs     05/08/21  1218 05/09/21  0321 05/10/21  0349   WBC 16.3* 15.4* 12.4*   RBC 4.49 3.87* 3.81*   HGB 12.7 11.0* 10.7*   HCT 39.6 35.2* 36.1*   MCV 88.2 91.0 94.8   RDW 12.0 12.2 12.3    206 168     BMP:   Recent Labs     05/08/21  2357 05/09/21  0321 05/09/21  0820 05/09/21  1146 05/10/21  0349   * 135  --   --  130*   K 4.2 3.6*  --   --  3.3*    105  --   --  104   CO2 22 19*  --   --  16*   PHOS 2.7 2.2* 2.6 2.4* 2.4*   BUN 20 19  --   --  21   CREATININE 0.95* 1.07*  --   --  0.94*     BNP: No results for input(s): BNP in the last 72 hours. PT/INR:   Recent Labs     05/08/21  1218   PROTIME 9.5   INR 0.9     APTT:   Recent Labs     05/08/21  1218   APTT 17.5*     CARDIAC ENZYMES: No results for input(s): CKMB, CKMBINDEX, TROPONINI in the last 72 hours.     Invalid input(s): CKTOTAL;3  FASTING LIPID PANEL:  Lab Results   Component Value Date    CHOL 181 05/26/2020    HDL 46 05/26/2020    TRIG 230 (H) 05/26/2020     LIVER PROFILE:   Recent Labs     05/08/21  1218   AST 22 ALT 21   BILITOT 0.43   ALKPHOS 123*      MICROBIOLOGY:   Lab Results   Component Value Date/Time    CULTURE NO SIGNIFICANT GROWTH 05/08/2021 01:06 PM       Imaging:    Ct Head Wo Contrast    Result Date: 5/8/2021  No acute intracranial abnormality. Cerebral atrophy. No acute fracture or subluxation of the cervical spine. Moderate degenerative changes. Ct Cervical Spine Wo Contrast    Result Date: 5/8/2021  No acute intracranial abnormality. Cerebral atrophy. No acute fracture or subluxation of the cervical spine. Moderate degenerative changes. Xr Chest Portable    Result Date: 5/8/2021  No acute cardiopulmonary disease       ASSESSMENT & PLAN     ASSESSMENT / PLAN:       Principal Problem:    AMS (altered mental status)  Active Problems:    HTN (hypertension)    Uncontrolled type 2 diabetes mellitus with complication, with long-term current use of insulin ([facility])    Hyperosmolar hyperglycemic state (HHS) ([facility] [address]    Acute metabolic encephalopathy  Resolved Problems:    * No resolved hospital problems. *    1. Acute metabolic encephalopathy:  Improving. Likely due to hyperglycemia, dehydration and electrolyte imbalance. 2. Diabetes mellitus type 2 insulin dependence:  On Lantus 40 in a.m. and 45 in p.m. On medium dose sliding scale. On hypoglycemia protocol  3. Sepsis ruled out after negative blood and urine. De-escalate antibiotics  4. Mildly elevated creatinine: Improved. likely due to dehydration. 5. Pseudohyponatremia:  Resolved due to hyperglycemia. On 0.9% NaCl. Monitor electrolytes  6. Hypokalemia: Replace per protocol  7. Lactic acidosis: Likely due to dehydration/hypoperfusion. On IV fluid. Improving  8. Reactive leukocytosis: Monitor daily blood counts. Trending down 16.3> 15.4>12.4  9. Cognitive impairments: On donepezil  10. Essential hypertension: We will resume her home medication. Norvasc 10 mg and labetalol as needed  11. History of COPD: On breathing treatment as needed  12.  Mood disorder: On Zoloft      DVT ppx : On Lovenox  GI ppx: None needed    PT/OT: Consulted  Discharge Planning / SW: Padmini Cosby MD  Internal Medicine Resident, PGY-1  [facility]; [place], [place]  5/10/2021, 9:30 AM   Attending Physician Statement  I have discussed the care of Alva Gonzalez, including pertinent history and exam findings,  with the resident. I have seen and examined the patient and the key elements of all parts of the encounter have been performed by me. I agree with the assessment, plan and orders as documented by the resident with additions . Treatment plan Discussed with nursing staff in detail , all questions answered . Electronically signed by Sachi Verde MD on   5/10/21 at 12:04 PM EDT    Please note that this chart was generated using voice recognition Dragon dictation software. Although every effort was made to ensure the accuracy of this automated transcription, some errors in transcription may have occurred.

## 2021-05-11 LAB
ABSOLUTE EOS #: 0.08 K/UL (ref 0–0.44)
ABSOLUTE IMMATURE GRANULOCYTE: 0.07 K/UL (ref 0–0.3)
ABSOLUTE LYMPH #: 2.14 K/UL (ref 1.1–3.7)
ABSOLUTE MONO #: 0.98 K/UL (ref 0.1–1.2)
ANION GAP SERPL CALCULATED.3IONS-SCNC: 10 MMOL/L (ref 9–17)
ANION GAP SERPL CALCULATED.3IONS-SCNC: 13 MMOL/L (ref 9–17)
BASOPHILS # BLD: 0 % (ref 0–2)
BASOPHILS ABSOLUTE: 0.03 K/UL (ref 0–0.2)
BUN BLDV-MCNC: 17 MG/DL (ref 8–23)
BUN BLDV-MCNC: 19 MG/DL (ref 8–23)
BUN/CREAT BLD: ABNORMAL (ref 9–20)
BUN/CREAT BLD: ABNORMAL (ref 9–20)
CALCIUM SERPL-MCNC: 7.7 MG/DL (ref 8.6–10.4)
CALCIUM SERPL-MCNC: 8 MG/DL (ref 8.6–10.4)
CHLORIDE BLD-SCNC: 103 MMOL/L (ref 98–107)
CHLORIDE BLD-SCNC: 106 MMOL/L (ref 98–107)
CO2: 17 MMOL/L (ref 20–31)
CO2: 17 MMOL/L (ref 20–31)
CREAT SERPL-MCNC: 0.88 MG/DL (ref 0.5–0.9)
CREAT SERPL-MCNC: 0.91 MG/DL (ref 0.5–0.9)
DIFFERENTIAL TYPE: ABNORMAL
EOSINOPHILS RELATIVE PERCENT: 1 % (ref 1–4)
GFR AFRICAN AMERICAN: >60 ML/MIN
GFR AFRICAN AMERICAN: >60 ML/MIN
GFR NON-AFRICAN AMERICAN: >60 ML/MIN
GFR NON-AFRICAN AMERICAN: >60 ML/MIN
GFR SERPL CREATININE-BSD FRML MDRD: ABNORMAL ML/MIN/{1.73_M2}
GLUCOSE BLD-MCNC: 151 MG/DL (ref 65–105)
GLUCOSE BLD-MCNC: 158 MG/DL (ref 65–105)
GLUCOSE BLD-MCNC: 184 MG/DL (ref 65–105)
GLUCOSE BLD-MCNC: 184 MG/DL (ref 70–99)
GLUCOSE BLD-MCNC: 65 MG/DL (ref 70–99)
GLUCOSE BLD-MCNC: 79 MG/DL (ref 65–105)
HCT VFR BLD CALC: 35.9 % (ref 36.3–47.1)
HEMOGLOBIN: 11.3 G/DL (ref 11.9–15.1)
IMMATURE GRANULOCYTES: 1 %
LYMPHOCYTES # BLD: 20 % (ref 24–43)
MCH RBC QN AUTO: 28.3 PG (ref 25.2–33.5)
MCHC RBC AUTO-ENTMCNC: 31.5 G/DL (ref 28.4–34.8)
MCV RBC AUTO: 90 FL (ref 82.6–102.9)
MONOCYTES # BLD: 9 % (ref 3–12)
NRBC AUTOMATED: 0 PER 100 WBC
PDW BLD-RTO: 12 % (ref 11.8–14.4)
PLATELET # BLD: 203 K/UL (ref 138–453)
PLATELET ESTIMATE: ABNORMAL
PMV BLD AUTO: 11.2 FL (ref 8.1–13.5)
POTASSIUM SERPL-SCNC: 3.3 MMOL/L (ref 3.7–5.3)
POTASSIUM SERPL-SCNC: 3.8 MMOL/L (ref 3.7–5.3)
RBC # BLD: 3.99 M/UL (ref 3.95–5.11)
RBC # BLD: ABNORMAL 10*6/UL
SEG NEUTROPHILS: 69 % (ref 36–65)
SEGMENTED NEUTROPHILS ABSOLUTE COUNT: 7.53 K/UL (ref 1.5–8.1)
SODIUM BLD-SCNC: 133 MMOL/L (ref 135–144)
SODIUM BLD-SCNC: 133 MMOL/L (ref 135–144)
WBC # BLD: 10.8 K/UL (ref 3.5–11.3)
WBC # BLD: ABNORMAL 10*3/UL

## 2021-05-11 PROCEDURE — 6370000000 HC RX 637 (ALT 250 FOR IP): Performed by: STUDENT IN AN ORGANIZED HEALTH CARE EDUCATION/TRAINING PROGRAM

## 2021-05-11 PROCEDURE — 99232 SBSQ HOSP IP/OBS MODERATE 35: CPT | Performed by: INTERNAL MEDICINE

## 2021-05-11 PROCEDURE — 97530 THERAPEUTIC ACTIVITIES: CPT

## 2021-05-11 PROCEDURE — 82947 ASSAY GLUCOSE BLOOD QUANT: CPT

## 2021-05-11 PROCEDURE — 6360000002 HC RX W HCPCS: Performed by: STUDENT IN AN ORGANIZED HEALTH CARE EDUCATION/TRAINING PROGRAM

## 2021-05-11 PROCEDURE — 2580000003 HC RX 258: Performed by: STUDENT IN AN ORGANIZED HEALTH CARE EDUCATION/TRAINING PROGRAM

## 2021-05-11 PROCEDURE — 97162 PT EVAL MOD COMPLEX 30 MIN: CPT

## 2021-05-11 PROCEDURE — 2060000000 HC ICU INTERMEDIATE R&B

## 2021-05-11 PROCEDURE — 36415 COLL VENOUS BLD VENIPUNCTURE: CPT

## 2021-05-11 PROCEDURE — 85025 COMPLETE CBC W/AUTO DIFF WBC: CPT

## 2021-05-11 PROCEDURE — 80048 BASIC METABOLIC PNL TOTAL CA: CPT

## 2021-05-11 RX ORDER — LEVOFLOXACIN 750 MG/1
750 TABLET ORAL DAILY
Status: COMPLETED | OUTPATIENT
Start: 2021-05-11 | End: 2021-05-14

## 2021-05-11 RX ORDER — SODIUM CHLORIDE 9 MG/ML
INJECTION, SOLUTION INTRAVENOUS CONTINUOUS
Status: DISCONTINUED | OUTPATIENT
Start: 2021-05-11 | End: 2021-05-14

## 2021-05-11 RX ORDER — LEVOFLOXACIN 750 MG/1
750 TABLET ORAL DAILY
Status: DISCONTINUED | OUTPATIENT
Start: 2021-05-12 | End: 2021-05-11

## 2021-05-11 RX ADMIN — LEVOFLOXACIN 750 MG: 750 TABLET, FILM COATED ORAL at 15:23

## 2021-05-11 RX ADMIN — GEMFIBROZIL 600 MG: 600 TABLET ORAL at 21:02

## 2021-05-11 RX ADMIN — ENOXAPARIN SODIUM 40 MG: 40 INJECTION SUBCUTANEOUS at 09:04

## 2021-05-11 RX ADMIN — GEMFIBROZIL 600 MG: 600 TABLET ORAL at 09:03

## 2021-05-11 RX ADMIN — SERTRALINE 100 MG: 50 TABLET, FILM COATED ORAL at 09:04

## 2021-05-11 RX ADMIN — ATORVASTATIN CALCIUM 10 MG: 10 TABLET, FILM COATED ORAL at 09:04

## 2021-05-11 RX ADMIN — INSULIN LISPRO 1 UNITS: 100 INJECTION, SOLUTION INTRAVENOUS; SUBCUTANEOUS at 21:02

## 2021-05-11 RX ADMIN — SODIUM CHLORIDE: 9 INJECTION, SOLUTION INTRAVENOUS at 09:05

## 2021-05-11 RX ADMIN — Medication 81 MG: at 09:04

## 2021-05-11 RX ADMIN — DONEPEZIL HYDROCHLORIDE 5 MG: 5 TABLET, FILM COATED ORAL at 21:02

## 2021-05-11 RX ADMIN — AMLODIPINE BESYLATE 10 MG: 10 TABLET ORAL at 09:04

## 2021-05-11 RX ADMIN — INSULIN GLARGINE 45 UNITS: 100 INJECTION, SOLUTION SUBCUTANEOUS at 21:02

## 2021-05-11 RX ADMIN — INSULIN LISPRO 2 UNITS: 100 INJECTION, SOLUTION INTRAVENOUS; SUBCUTANEOUS at 17:08

## 2021-05-11 ASSESSMENT — PAIN SCALES - GENERAL: PAINLEVEL_OUTOF10: 0

## 2021-05-11 NOTE — CARE COORDINATION
Case Management Initial Discharge Plan  Alva Gonzalez,             Met with:patient to discuss discharge plans. Information verified: address, contacts, phone number, , insurance Yes    Emergency Contact/Next of Kin name & number:   Amy Zelaya (Mother-in-law) [phone]    PCP: Dat Goyal MD  Date of last visit: past year    Insurance Provider: [facility]     Discharge Planning    Living Arrangements:  Family Members   Support Systems:  Family Members    Home has 2 stories  6 stairs to climb to get into front door    Patient able to perform ADL's:Independent    Current Services (outpatient & in home)   DME equipment: RW, cane, glucometer. DME provider:     Receiving oral anticoagulation therapy? No    If indicated:   Physician managing anticoagulation treatment:   Where does patient obtain lab work for ATC treatment? Potential Assistance Needed:  N/A    Patient agreeable to home care: No  [employer] of choice provided:  n/a  Patient states she's tired of HC and does not want them. Notified her that she can [facility]. Patient declined HC needs. Prior SNF/Rehab Placement and Facility:    Agreeable to SNF/Rehab: No  [employer] of choice provided: n/a     Evaluation: yes    Expected Discharge date:  21    Patient expects to be discharged to:  home  Follow Up Appointment: Best Day/ Time: Wednesday AM    Transportation provider: Sister in law. Transportation arrangements needed for discharge: Yes    Readmission Risk              Risk of Unplanned Readmission:        20             Does patient have a readmission risk score greater than 14?: Yes  If yes, follow-up appointment must be made within 7 days of discharge. Goals of Care:       Discharge Plan: home goal with mother in law. Declines HC and SNF.            Electronically signed by Ernie Gagnon RN on 21 at 5:08 PM EDT

## 2021-05-11 NOTE — PROGRESS NOTES
Physical Therapy    Facility/Department: [facility] 4B STEPDOWN  Initial Assessment    NAME: Donny Ansari  : 1955  MRN: [id number]    Date of Service: 2021  Erin Peterson is a 63 y. o. female who presents with complaints of altered mental status.  Per EMS patient was found facedown on the floor.  Unknown downtime, no family at bedside. Keaton Donald does not answer questions, does open eyes to voice, however history otherwise significantly limited. \"  Discharge Recommendations:    Further therapy recommended at discharge. PT Equipment Recommendations  Equipment Needed: No(pt currently unsafe to perform functional mobility unassisted)    Assessment   Body structures, Functions, Activity limitations: Decreased functional mobility ; Decreased cognition;Decreased posture;Decreased endurance;Decreased ROM; Decreased strength;Decreased balance;Decreased safe awareness; Increased pain  Assessment: Pt ambulated 2ft w/RW CGA, pt requiring Yaima to perform all other functional mobility this date. Pt fatigues quickly and is currently unsafe to return to prior living arrangements due to confusion, endurance deficits and high fall risk. Recommending continued skilled physical therapy to address functional mobility and endurance deficits to return pt to prior level of function. Prognosis: Fair  Decision Making: Medium Complexity  PT Education: Goals;PT Role;Plan of Care;General Safety  REQUIRES PT FOLLOW UP: Yes  Activity Tolerance  Activity Tolerance: Patient limited by endurance; Patient limited by fatigue       Patient Diagnosis(es): The encounter diagnosis was Altered mental status, unspecified altered mental status type.      has a past medical history of CEFERINO (acute kidney injury) ([facility] [address] Asthma, Carotid artery plaque, Clostridium difficile diarrhea, Dehydration, Depression, Fall, Fibromyalgia, Hiatal hernia, HTN (hypertension), Hyperlipidemia, Hypokalemia, gastrointestinal losses, Kidney stone, Obesity Social/Functional History  Lives With: (mother in law)  Type of Home: House  Home Layout: Two level, Able to Live on Main level with bedroom/bathroom  Home Access: Stairs to enter with rails  Entrance Stairs - Number of Steps: 6  Entrance Stairs - Rails: Right  Bathroom Shower/Tub: Tub/Shower unit, Doors  Bathroom Toilet: Standard  Bathroom Equipment: Shower chair  Home Equipment: 4 wheeled walker(only for long distance)  ADL Assistance: Independent  Homemaking Assistance: Needs assistance(pt reports mother in law performs cooking, cleaning, and laundry)  Homemaking Responsibilities: Yes  Ambulation Assistance: Independent(rollator for long distance)  Transfer Assistance: Independent  Active : No  Patient's  Info: Pt's sister-in-law  Mode of Transportation: Truck  Occupation: On disability  Leisure & Hobbies: watching car races  Additional Comments: pt reports 81 yo mother in law is primary source of care. Above information differs from social/ functional obtained per OT evaluation yesterday. Cognition   Cognition  Overall Cognitive Status: Exceptions  Arousal/Alertness: Delayed responses to stimuli  Following Commands: Follows multistep commands with increased time  Attention Span: Attends with cues to redirect  Memory: Appears intact; Decreased short term memory;Decreased recall of recent events  Safety Judgement: Decreased awareness of need for assistance  Problem Solving: Assistance required to identify errors made;Assistance required to generate solutions;Assistance required to correct errors made;Decreased awareness of errors  Insights: Decreased awareness of deficits  Initiation: Requires cues for some  Sequencing: Requires cues for some    Objective  Joint Mobility  Spine: WFL  ROM RLE: WFL  ROM LLE: WFL  ROM RUE: WFL  ROM LUE: WFL  Strength RLE  Strength RLE: WFL  Strength LLE  Strength LLE: WFL  Strength RUE  Strength RUE: WFL  Strength LUE  Strength LUE: WFL     Sensation  Overall Sensation

## 2021-05-11 NOTE — PROGRESS NOTES
1 [facility]     Department of Internal Medicine - Staff Internal Medicine Service     DAILY PROGRESS NOTE       Patient:  Salvador Henderson  YOB: 1955  MRN: [id number]     Acct: [de-identified]     Admit date: 5/8/2021  Admitting Diagnosis: AMS (altered mental status)    Subjective:   Patient seen and examined at bedside    Qhs lantus held overnight as patient not eating. BG 96 when checked at that time. VSS this morning. BG 65 on AM BMP. Not eating (full) meals. Labs reviewed: BG per above, K-3.3, Na-133. Not on IVF. On electrolyte repletion protocols. Objective:   /67   Pulse 70   Temp 97.9 °F (36.6 °C) (Oral)   Resp 18   Ht 5' 2\" (1.575 m)   Wt 160 lb (72.6 kg)   LMP 01/01/2002   SpO2 95%   BMI 29.26 kg/m²       Constitutional: This is a well developed, well nourished, 25-29.9 - Overweight 63y.o. year old female who is alert, oriented, cooperative and in no apparent distress. Head:normocephalic and atraumatic. EENT:  PERRLA. No conjunctival injections. Dry mucosa  Neck: Supple without thyromegaly. No elevated JVP. Trachea was midline. Respiratory: Chest was symmetrical without dullness to percussion. Breath sounds bilaterally were clear to auscultation. Cardiovascular: Regular without murmur, clicks, gallops or rubs. Abdomen: Slightly rounded and soft without organomegaly. No rebound, rigidity or guarding was appreciated. Lymphatic: No lymphadenopathy. Musculoskeletal: Normal curvature of the spine. No gross muscle weakness. Extremities:  No lower extremity edema, ulcerations, tenderness, varicosities or erythema. Muscle size, tone and strength are normal.  No involuntary movements are noted. Skin: Very dry skin. Good color, turgor and pigmentation. No lesions or scars.     Neurological/Psychiatric: The patient's general behavior, level of consciousness, thought content and emotional status is normal.          Intake/Output Pseudohyponatremia:  Resolved due to hyperglycemia.  On 0.9% NaCl.  Monitor electrolytes  6. Hypokalemia: Replace per protocol  7. Lactic acidosis: Likely due to dehydration/hypoperfusion.  On IV Faunus[employer]Heroes2u.  Improving  8. Reactive leukocytosis: Monitor daily blood counts. Trending down 16.3> 15.4>12.4-->10.8. 9. Cognitive impairments: On donepezil  10. Essential hypertension: We will resume her home medication.  Norvasc 10 mg and labetalol as needed  11. History of COPD: On breathing treatment as needed  12. Mood disorder: On Zoloft        DVT ppx : On Lovenox  GI ppx: None needed     PT/OT: Consulted  Discharge Planning / SW: Consulted CM--->SNF placement. .. Nagi Kirk MD  PGY-2, Department of Internal Medicine  [address] [place] [address]  Attending Physician Statement  I have discussed the care of Chio Smith, including pertinent history and exam findings,  with the resident. I have seen and examined the patient and the key elements of all parts of the encounter have been performed by me. I agree with the assessment, plan and orders as documented by the resident with additions . Treatment plan Discussed with nursing staff in detail , all questions answered . Electronically signed by Trish Gowers, MD on   5/11/21 at 10:43 AM EDT    Please note that this chart was generated using voice recognition Dragon dictation software. Although every effort was made to ensure the accuracy of this automated transcription, some errors in transcription may have occurred.

## 2021-05-12 PROBLEM — R73.9 HYPERGLYCEMIA: Status: RESOLVED | Noted: 2019-05-08 | Resolved: 2021-05-12

## 2021-05-12 PROBLEM — E87.6 HYPOKALEMIA: Status: RESOLVED | Noted: 2018-05-26 | Resolved: 2021-05-12

## 2021-05-12 PROBLEM — S52.91XA RIGHT RADIAL FRACTURE: Status: RESOLVED | Noted: 2018-05-26 | Resolved: 2021-05-12

## 2021-05-12 PROBLEM — E83.42 HYPOMAGNESEMIA: Status: RESOLVED | Noted: 2018-05-26 | Resolved: 2021-05-12

## 2021-05-12 PROBLEM — S02.40EA ZYGOMATIC FRACTURE, RIGHT SIDE, INITIAL ENCOUNTER FOR CLOSED FRACTURE (HCC): Status: RESOLVED | Noted: 2018-05-26 | Resolved: 2021-05-12

## 2021-05-12 PROBLEM — D69.6 THROMBOCYTOPENIA (HCC): Status: RESOLVED | Noted: 2018-05-26 | Resolved: 2021-05-12

## 2021-05-12 PROBLEM — S01.81XA FACIAL LACERATION: Status: RESOLVED | Noted: 2018-05-26 | Resolved: 2021-05-12

## 2021-05-12 LAB
ABSOLUTE EOS #: 0.13 K/UL (ref 0–0.44)
ABSOLUTE IMMATURE GRANULOCYTE: 0.16 K/UL (ref 0–0.3)
ABSOLUTE LYMPH #: 1.9 K/UL (ref 1.1–3.7)
ABSOLUTE MONO #: 0.77 K/UL (ref 0.1–1.2)
ANION GAP SERPL CALCULATED.3IONS-SCNC: 11 MMOL/L (ref 9–17)
ANION GAP SERPL CALCULATED.3IONS-SCNC: 9 MMOL/L (ref 9–17)
BASOPHILS # BLD: 1 % (ref 0–2)
BASOPHILS ABSOLUTE: 0.06 K/UL (ref 0–0.2)
BUN BLDV-MCNC: 16 MG/DL (ref 8–23)
BUN BLDV-MCNC: 19 MG/DL (ref 8–23)
BUN/CREAT BLD: ABNORMAL (ref 9–20)
BUN/CREAT BLD: ABNORMAL (ref 9–20)
CALCIUM SERPL-MCNC: 7.4 MG/DL (ref 8.6–10.4)
CALCIUM SERPL-MCNC: 7.9 MG/DL (ref 8.6–10.4)
CHLORIDE BLD-SCNC: 106 MMOL/L (ref 98–107)
CHLORIDE BLD-SCNC: 108 MMOL/L (ref 98–107)
CO2: 16 MMOL/L (ref 20–31)
CO2: 16 MMOL/L (ref 20–31)
CREAT SERPL-MCNC: 0.88 MG/DL (ref 0.5–0.9)
CREAT SERPL-MCNC: 0.98 MG/DL (ref 0.5–0.9)
DIFFERENTIAL TYPE: ABNORMAL
EOSINOPHILS RELATIVE PERCENT: 1 % (ref 1–4)
GFR AFRICAN AMERICAN: >60 ML/MIN
GFR AFRICAN AMERICAN: >60 ML/MIN
GFR NON-AFRICAN AMERICAN: 57 ML/MIN
GFR NON-AFRICAN AMERICAN: >60 ML/MIN
GFR SERPL CREATININE-BSD FRML MDRD: ABNORMAL ML/MIN/{1.73_M2}
GLUCOSE BLD-MCNC: 101 MG/DL (ref 65–105)
GLUCOSE BLD-MCNC: 107 MG/DL (ref 65–105)
GLUCOSE BLD-MCNC: 108 MG/DL (ref 70–99)
GLUCOSE BLD-MCNC: 133 MG/DL (ref 65–105)
GLUCOSE BLD-MCNC: 145 MG/DL (ref 65–105)
GLUCOSE BLD-MCNC: 69 MG/DL (ref 70–99)
GLUCOSE BLD-MCNC: 73 MG/DL (ref 65–105)
GLUCOSE BLD-MCNC: 80 MG/DL (ref 65–105)
HCT VFR BLD CALC: 41.1 % (ref 36.3–47.1)
HEMOGLOBIN: 11.8 G/DL (ref 11.9–15.1)
IMMATURE GRANULOCYTES: 2 %
LYMPHOCYTES # BLD: 20 % (ref 24–43)
MCH RBC QN AUTO: 28.2 PG (ref 25.2–33.5)
MCHC RBC AUTO-ENTMCNC: 28.7 G/DL (ref 28.4–34.8)
MCV RBC AUTO: 98.3 FL (ref 82.6–102.9)
MONOCYTES # BLD: 8 % (ref 3–12)
NRBC AUTOMATED: 0 PER 100 WBC
PDW BLD-RTO: 12.1 % (ref 11.8–14.4)
PLATELET # BLD: 185 K/UL (ref 138–453)
PLATELET ESTIMATE: ABNORMAL
PMV BLD AUTO: 11.2 FL (ref 8.1–13.5)
POTASSIUM SERPL-SCNC: 3.5 MMOL/L (ref 3.7–5.3)
POTASSIUM SERPL-SCNC: 4.4 MMOL/L (ref 3.7–5.3)
RBC # BLD: 4.18 M/UL (ref 3.95–5.11)
RBC # BLD: ABNORMAL 10*6/UL
SEG NEUTROPHILS: 68 % (ref 36–65)
SEGMENTED NEUTROPHILS ABSOLUTE COUNT: 6.35 K/UL (ref 1.5–8.1)
SODIUM BLD-SCNC: 133 MMOL/L (ref 135–144)
SODIUM BLD-SCNC: 133 MMOL/L (ref 135–144)
WBC # BLD: 9.4 K/UL (ref 3.5–11.3)
WBC # BLD: ABNORMAL 10*3/UL

## 2021-05-12 PROCEDURE — 36415 COLL VENOUS BLD VENIPUNCTURE: CPT

## 2021-05-12 PROCEDURE — 99232 SBSQ HOSP IP/OBS MODERATE 35: CPT | Performed by: INTERNAL MEDICINE

## 2021-05-12 PROCEDURE — 97535 SELF CARE MNGMENT TRAINING: CPT

## 2021-05-12 PROCEDURE — 85025 COMPLETE CBC W/AUTO DIFF WBC: CPT

## 2021-05-12 PROCEDURE — 97530 THERAPEUTIC ACTIVITIES: CPT

## 2021-05-12 PROCEDURE — 6370000000 HC RX 637 (ALT 250 FOR IP): Performed by: STUDENT IN AN ORGANIZED HEALTH CARE EDUCATION/TRAINING PROGRAM

## 2021-05-12 PROCEDURE — 6360000002 HC RX W HCPCS: Performed by: STUDENT IN AN ORGANIZED HEALTH CARE EDUCATION/TRAINING PROGRAM

## 2021-05-12 PROCEDURE — 94761 N-INVAS EAR/PLS OXIMETRY MLT: CPT

## 2021-05-12 PROCEDURE — 82947 ASSAY GLUCOSE BLOOD QUANT: CPT

## 2021-05-12 PROCEDURE — 97110 THERAPEUTIC EXERCISES: CPT

## 2021-05-12 PROCEDURE — 80048 BASIC METABOLIC PNL TOTAL CA: CPT

## 2021-05-12 PROCEDURE — 97116 GAIT TRAINING THERAPY: CPT

## 2021-05-12 PROCEDURE — 2060000000 HC ICU INTERMEDIATE R&B

## 2021-05-12 PROCEDURE — 2580000003 HC RX 258: Performed by: STUDENT IN AN ORGANIZED HEALTH CARE EDUCATION/TRAINING PROGRAM

## 2021-05-12 RX ORDER — NICOTINE POLACRILEX 4 MG
15 LOZENGE BUCCAL PRN
Status: DISCONTINUED | OUTPATIENT
Start: 2021-05-12 | End: 2021-05-14 | Stop reason: HOSPADM

## 2021-05-12 RX ORDER — INSULIN GLARGINE 100 [IU]/ML
30 INJECTION, SOLUTION SUBCUTANEOUS 2 TIMES DAILY
Qty: 5 PEN | Refills: 3 | Status: CANCELLED | OUTPATIENT
Start: 2021-05-12

## 2021-05-12 RX ORDER — DEXTROSE AND SODIUM CHLORIDE 5; .45 G/100ML; G/100ML
INJECTION, SOLUTION INTRAVENOUS CONTINUOUS PRN
Status: DISCONTINUED | OUTPATIENT
Start: 2021-05-12 | End: 2021-05-12

## 2021-05-12 RX ORDER — DEXTROSE MONOHYDRATE 50 MG/ML
100 INJECTION, SOLUTION INTRAVENOUS PRN
Status: DISCONTINUED | OUTPATIENT
Start: 2021-05-12 | End: 2021-05-14 | Stop reason: HOSPADM

## 2021-05-12 RX ORDER — DEXTROSE MONOHYDRATE 25 G/50ML
12.5 INJECTION, SOLUTION INTRAVENOUS PRN
Status: DISCONTINUED | OUTPATIENT
Start: 2021-05-12 | End: 2021-05-14 | Stop reason: HOSPADM

## 2021-05-12 RX ORDER — INSULIN GLARGINE 100 [IU]/ML
30 INJECTION, SOLUTION SUBCUTANEOUS NIGHTLY
Status: DISCONTINUED | OUTPATIENT
Start: 2021-05-12 | End: 2021-05-14 | Stop reason: HOSPADM

## 2021-05-12 RX ORDER — POTASSIUM CHLORIDE 20 MEQ/1
40 TABLET, EXTENDED RELEASE ORAL ONCE
Status: COMPLETED | OUTPATIENT
Start: 2021-05-12 | End: 2021-05-12

## 2021-05-12 RX ORDER — INSULIN GLARGINE 100 [IU]/ML
30 INJECTION, SOLUTION SUBCUTANEOUS EVERY MORNING
Status: DISCONTINUED | OUTPATIENT
Start: 2021-05-13 | End: 2021-05-14 | Stop reason: HOSPADM

## 2021-05-12 RX ADMIN — POTASSIUM CHLORIDE 10 MEQ: 7.46 INJECTION, SOLUTION INTRAVENOUS at 04:00

## 2021-05-12 RX ADMIN — Medication 81 MG: at 08:44

## 2021-05-12 RX ADMIN — SODIUM CHLORIDE: 9 INJECTION, SOLUTION INTRAVENOUS at 13:11

## 2021-05-12 RX ADMIN — POTASSIUM CHLORIDE 40 MEQ: 1500 TABLET, EXTENDED RELEASE ORAL at 12:06

## 2021-05-12 RX ADMIN — GEMFIBROZIL 600 MG: 600 TABLET ORAL at 08:44

## 2021-05-12 RX ADMIN — LEVOFLOXACIN 750 MG: 750 TABLET, FILM COATED ORAL at 08:44

## 2021-05-12 RX ADMIN — SERTRALINE 100 MG: 50 TABLET, FILM COATED ORAL at 08:44

## 2021-05-12 RX ADMIN — POTASSIUM CHLORIDE 10 MEQ: 7.46 INJECTION, SOLUTION INTRAVENOUS at 03:03

## 2021-05-12 RX ADMIN — DONEPEZIL HYDROCHLORIDE 5 MG: 5 TABLET, FILM COATED ORAL at 20:37

## 2021-05-12 RX ADMIN — AMLODIPINE BESYLATE 10 MG: 10 TABLET ORAL at 08:44

## 2021-05-12 RX ADMIN — POTASSIUM CHLORIDE 10 MEQ: 7.46 INJECTION, SOLUTION INTRAVENOUS at 05:32

## 2021-05-12 RX ADMIN — GEMFIBROZIL 600 MG: 600 TABLET ORAL at 20:37

## 2021-05-12 RX ADMIN — ATORVASTATIN CALCIUM 10 MG: 10 TABLET, FILM COATED ORAL at 08:44

## 2021-05-12 RX ADMIN — ENOXAPARIN SODIUM 40 MG: 40 INJECTION SUBCUTANEOUS at 08:44

## 2021-05-12 NOTE — PLAN OF CARE
Problem: Musculor/Skeletal Functional Status  Goal: Highest potential functional level  Outcome: Ongoing  Goal: Absence of falls  Outcome: Ongoing     Problem: Skin Integrity:  Goal: Absence of new skin breakdown  Description: Absence of new skin breakdown  Outcome: Ongoing     Problem: Falls - Risk of:  Goal: Will remain free from falls  Description: Will remain free from falls  Outcome: Ongoing     Problem: Injury - Risk of, Physical Injury:  Goal: Will remain free from falls  Description: Will remain free from falls  Outcome: Ongoing

## 2021-05-12 NOTE — PROGRESS NOTES
[facility]  Internal Medicine Teaching Residency Program  Inpatient Daily Progress Note  ______________________________________________________________________________    Patient: Quinn Tatum  YOB: 1955   WRK:[id number]    Acct: [de-identified]     Room: [facility]9609-61  Admit date: 5/8/2021  Today's date: 05/12/21  Number of days in the hospital: 4    SUBJECTIVE   Admitting Diagnosis: AMS (altered mental status)  CC: Altered mental status  Pt examined at bedside. Chart & results reviewed. No acute events overnight. Patient is alert and oriented. Denies any CP, SOB, fever and chills. Vitals /74, HR 73, RR 16  On room air     Blood sugar this am 73. Will decrease her Lantus to 30U    Plan to discharge  Her home today. Patient declines HC and SNF. PT/OT consulted. ROS:  Constitutional:  negative for chills, fevers, sweats  Respiratory:  negative for cough, dyspnea on exertion, hemoptysis, shortness of breath, wheezing  Cardiovascular:  negative for chest pain, chest pressure/discomfort, lower extremity edema, palpitations  Gastrointestinal:  negative for abdominal pain, constipation, diarrhea, nausea, vomiting  Neurological:  negative for dizziness, headache  BRIEF HISTORY     59-year-old female with past medical history of hypertension, hyperlipidemia, asthma, clear-cell renal cell carcinoma s/p right nephrectomy, diabetes mellitus, cognitive impairment on donepezil presented via EMS due to altered mental status. Per EMS, patient was found facedown on the floor. She was covered in stool. Patient is a very poor historian. Per chart review. Patient was found unresponsive and had stool incontinence. Patient was confused and brought in by EMS found to have fever of 100.7. Vitals show /95, heart rate 107 tachycardic.   Work-up showed glucose 624, blood gases show pH 7.34, PCO2 43.2, HCO3 23.6, beta hydroxybutyrate 0.94, myoglobin 382, troponin 30> 33. Trauma work-up done in the ER including CT head without contrast and CT cervical spine showed no acute intracranial abnormality. No acute fracture or subluxation of the cervical spine. OBJECTIVE     Vital Signs:  /72   Pulse 70   Temp 97.7 °F (36.5 °C) (Temporal)   Resp 20   Ht 5' 2\" (1.575 m)   Wt 147 lb 11.3 oz (67 kg)   LMP 2002   SpO2 93%   BMI 27.02 kg/m²     Temp (24hrs), Av.7 °F (36.5 °C), Min:97.3 °F (36.3 °C), Max:98 °F (36.7 °C)    In: 3533.7   Out: 650 [Urine:650]    Physical Exam:  Constitutional: This is a well developed, well nourished, 25-29.9 - Overweight 63y.o. year old female who is alert, oriented, cooperative and in no apparent distress. Head:normocephalic and atraumatic. EENT:  PERRLA. No conjunctival injections. Dry mucosa  Neck: Supple without thyromegaly. No elevated JVP. Trachea was midline. Respiratory: Chest was symmetrical without dullness to percussion. Breath sounds bilaterally were clear to auscultation. Cardiovascular: Regular without murmur, clicks, gallops or rubs. Abdomen: Slightly rounded and soft without organomegaly. No rebound, rigidity or guarding was appreciated. Lymphatic: No lymphadenopathy. Musculoskeletal: Normal curvature of the spine. No gross muscle weakness. Extremities:  No lower extremity edema, ulcerations, tenderness, varicosities or erythema. Muscle size, tone and strength are normal.  No involuntary movements are noted. Skin: Very dry skin. Good color, turgor and pigmentation. No lesions or scars.     Neurological/Psychiatric: The patient's general behavior, level of consciousness, thought content and emotional status is normal.        Medications:  Scheduled Medications:    levoFLOXacin  750 mg Oral Daily    insulin glargine  40 Units Subcutaneous QAM    insulin glargine  45 Units Subcutaneous Nightly    insulin lispro  0-12 Units Subcutaneous TID WC    insulin lispro 0-6 Units Subcutaneous Nightly    amLODIPine  10 mg Oral Daily    aspirin EC  81 mg Oral Daily    donepezil  5 mg Oral Nightly    gemfibrozil  600 mg Oral BID    atorvastatin  10 mg Oral Daily    sertraline  100 mg Oral Daily    enoxaparin  40 mg Subcutaneous Daily     Continuous Infusions:    dextrose 5 % and 0.45 % NaCl      sodium chloride 100 mL/hr at 05/11/21 0905     PRN Medicationsdextrose, 12.5 g, PRN  dextrose 5 % and 0.45 % NaCl, , Continuous PRN  ipratropium-albuterol, 1 ampule, Q4H PRN  potassium chloride, 10 mEq, PRN  magnesium sulfate, 1,000 mg, PRN  sodium phosphate IVPB, 10 mmol, PRN    Or  sodium phosphate IVPB, 15 mmol, PRN    Or  sodium phosphate IVPB, 20 mmol, PRN  labetalol, 20 mg, Q4H PRN        Diagnostic Labs:  CBC:   Recent Labs     05/10/21  0349 05/11/21  0157 05/12/21  0610   WBC 12.4* 10.8 9.4   RBC 3.81* 3.99 4.18   HGB 10.7* 11.3* 11.8*   HCT 36.1* 35.9* 41.1   MCV 94.8 90.0 98.3   RDW 12.3 12.0 12.1    203 185     BMP:   Recent Labs     05/09/21  1146 05/09/21  1146 05/10/21  0349 05/10/21  1009 05/10/21  1009 05/11/21  0157 05/11/21  1452 05/12/21  0222   NA  --    < > 130* 135   < > 133* 133* 133*   K  --    < > 3.3* 3.5*   < > 3.3* 3.8 3.5*   CL  --    < > 104 104   < > 103 106 108*   CO2  --    < > 16* 20   < > 17* 17* 16*   PHOS 2.4*  --  2.4* 2.2*  --   --   --   --    BUN  --    < > 21 19   < > 19 17 19   CREATININE  --    < > 0.94* 0.99*   < > 0.91* 0.88 0.88    < > = values in this interval not displayed. BNP: No results for input(s): BNP in the last 72 hours. PT/INR:   No results for input(s): PROTIME, INR in the last 72 hours. APTT:   No results for input(s): APTT in the last 72 hours. CARDIAC ENZYMES: No results for input(s): CKMB, CKMBINDEX, TROPONINI in the last 72 hours.     Invalid input(s): CKTOTAL;3  FASTING LIPID PANEL:  Lab Results   Component Value Date    CHOL 181 05/26/2020    HDL 46 05/26/2020    TRIG 230 (H) 05/26/2020     LIVER PROFILE: No results for input(s): AST, ALT, ALB, BILIDIR, BILITOT, ALKPHOS in the last 72 hours. MICROBIOLOGY:   Lab Results   Component Value Date/Time    CULTURE NO SIGNIFICANT GROWTH 05/08/2021 01:06 PM       Imaging:    Ct Head Wo Contrast    Result Date: 5/8/2021  No acute intracranial abnormality. Cerebral atrophy. No acute fracture or subluxation of the cervical spine. Moderate degenerative changes. Ct Cervical Spine Wo Contrast    Result Date: 5/8/2021  No acute intracranial abnormality. Cerebral atrophy. No acute fracture or subluxation of the cervical spine. Moderate degenerative changes. Xr Chest Portable    Result Date: 5/8/2021  No acute cardiopulmonary disease       ASSESSMENT & PLAN     ASSESSMENT / PLAN:     1. Acute metabolic encephalopathy:  resolved. Likely due to hyperglycemia, dehydration and electrolyte imbalance. 2. Diabetes mellitus type 2 insulin dependence:  On Lantus 40 in a.m. and 45 in p.m. On medium dose sliding scale. On hypoglycemia protocol  3. Sepsis ruled out after negative blood and urine. De-escalate antibiotics  4. Mildly elevated creatinine: Improved. likely due to dehydration. 5. Pseudohyponatremia:  Resolved due to hyperglycemia. On 0.9% NaCl. Monitor electrolytes  6. Hypokalemia: Replace per protocol  7. Lactic acidosis: Likely due to dehydration/hypoperfusion. On IV fluid. Improving  8. Reactive leukocytosis: Monitor daily blood counts. Trending down 16.3> 15.4>12.4  9. Cognitive impairments: On donepezil  10. Essential hypertension: We will resume her home medication. Norvasc 10 mg and labetalol as needed  11. History of COPD: On breathing treatment as needed  12. Mood disorder: On Zoloft      DVT ppx : On Lovenox  GI ppx: None needed    PT/OT: Consulted  Discharge Planning / SW: Caren Webster MD  Internal Medicine Resident, PGY-1  [facility];  [place], [place]  5/12/2021, 8:22 AM   Attending Physician Statement  I have discussed the care of Guadalupe Araya, including pertinent history and exam findings,  with the resident. I have seen and examined the patient and the key elements of all parts of the encounter have been performed by me. I agree with the assessment, plan and orders as documented by the resident with additions . Pain is better,Can go home. Treatment plan Discussed with nursing staff in detail , all questions answered . Electronically signed by Valeriano Cox MD on   5/12/21 at 3:57 PM EDT    Please note that this chart was generated using voice recognition Dragon dictation software. Although every effort was made to ensure the accuracy of this automated transcription, some errors in transcription may have occurred.

## 2021-05-12 NOTE — CARE COORDINATION
Transitional Planning    Left VM for patient's sister in law to help with SNF choices. Patient confused and unable to make own decisions.

## 2021-05-12 NOTE — PROGRESS NOTES
Physical Therapy  Facility/Department: [address] STEPDOWN  Daily Treatment Note  NAME: Quinn Tatum  : 1955  MRN: [id number]    Date of Service: 2021    Discharge Recommendations:  Patient would benefit from continued therapy after discharge   PT Equipment Recommendations  Equipment Needed: No  Other: She says she has a rollator. Assessment   Body structures, Functions, Activity limitations: Decreased functional mobility ; Decreased cognition;Decreased posture;Decreased endurance;Decreased ROM; Decreased strength;Decreased balance;Decreased safe awareness; Increased pain  Assessment: Improved gait distance. Able to rise to standing with CGA. Patient is unable to care for herself on her own. Recommend further PT and 24 hour assistance. PT Education: Goals;PT Role;Plan of Care;General Safety;Precautions;Gait Training; Injury Prevention; Functional Mobility Training  REQUIRES PT FOLLOW UP: Yes  Activity Tolerance  Activity Tolerance: Patient limited by endurance; Patient limited by fatigue     Patient Diagnosis(es): The encounter diagnosis was Altered mental status, unspecified altered mental status type. has a past medical history of CEFERINO (acute kidney injury) ([facility] [address] Asthma, Carotid artery plaque, Clostridium difficile diarrhea, Dehydration, Depression, Fall, Fibromyalgia, Hiatal hernia, HTN (hypertension), Hyperlipidemia, Hypokalemia, gastrointestinal losses, Kidney stone, Obesity (BMI 30-39.9), Osteoarthritis, Osteoporosis, Renal cancer ([address] Short of breath on exertion, Type II or unspecified type diabetes mellitus without mention of complication, not stated as uncontrolled, Unspecified sleep apnea, Urge urinary incontinence, Wears glasses, Wears glasses, and Zygomatic fracture, right side, initial encounter for closed fracture ([address] has a past surgical history that includes knee surgery (Bilateral); bladder suspension (2002);  Nephrostomy (Right); total nephrectomy (Right,

## 2021-05-12 NOTE — PROGRESS NOTES
Occupational Therapy  Facility/Department: [facility] 4B STEPDOWN  Daily Treatment Note  NAME: Chio Smith  : 1955  MRN: [id number]    Date of Service: 2021    Discharge Recommendations:  Patient would benefit from continued therapy after discharge to increase pt func mobility, endurance, balance, and high-level IADLs  Assessment   Performance deficits / Impairments: Decreased functional mobility ; Decreased endurance;Decreased ADL status; Decreased balance;Decreased strength;Decreased safe awareness;Decreased high-level IADLs;Decreased cognition  Prognosis: Good  Patient Education: Pt educated on importance of therapy, safe transfer training, and general safety. Pt verbalized understanding  REQUIRES OT FOLLOW UP: Yes  Activity Tolerance  Activity Tolerance: Patient Tolerated treatment well;Patient limited by fatigue  Safety Devices  Safety Devices in place: Yes  Type of devices: Left in bed;Bed alarm in place;Call light within reach;Nurse notified;Gait belt  Restraints  Initially in place: No     Patient Diagnosis(es): The encounter diagnosis was Altered mental status, unspecified altered mental status type. has a past medical history of CEFERINO (acute kidney injury) ([facility] [address] Asthma, Carotid artery plaque, Clostridium difficile diarrhea, Dehydration, Depression, Fall, Fibromyalgia, Hiatal hernia, HTN (hypertension), Hyperlipidemia, Hypokalemia, gastrointestinal losses, Kidney stone, Obesity (BMI 30-39.9), Osteoarthritis, Osteoporosis, Renal cancer ([address] Short of breath on exertion, Type II or unspecified type diabetes mellitus without mention of complication, not stated as uncontrolled, Unspecified sleep apnea, Urge urinary incontinence, Wears glasses, Wears glasses, and Zygomatic fracture, right side, initial encounter for closed fracture ([facility] [address] has a past surgical history that includes knee surgery (Bilateral); bladder suspension (2002);  Nephrostomy (Right); total nephrectomy (Right, functional transfers with use of RW. Cognition  Overall Cognitive Status: Exceptions  Arousal/Alertness: Delayed responses to stimuli  Following Commands: Follows multistep commands with increased time  Attention Span: Attends with cues to redirect  Memory: Appears intact; Decreased short term memory;Decreased recall of recent events  Safety Judgement: Decreased awareness of need for assistance  Problem Solving: Assistance required to identify errors made;Assistance required to generate solutions;Assistance required to correct errors made;Decreased awareness of errors  Insights: Decreased awareness of deficits  Initiation: Requires cues for some  Sequencing: Requires cues for some  Plan   Plan  Times per week: 3-4 x/wk  Current Treatment Recommendations: Strengthening, Patient/Caregiver Education & Training, Home Management Training, Equipment Evaluation, Education, & procurement, Balance Training, Functional Mobility Training, Endurance Training, Safety Education & Training, Self-Care / ADL, Cognitive/Perceptual Training  Goals  Short term goals  Time Frame for Short term goals: By discharge, pt will; Short term goal 1: demo functional transfers SBA with use of LRD PRN to improve safety with ADLs  Short term goal 2: demo functional mobility CGA with use of LRD PRN to improve safety with home mobility  Short term goal 3: demo standing tolerance 8+ minutes SBA with use of LRD PRN during functional activities  Short term goal 4: demo UB ADLs and grooming tasks mod I  Short term goal 5: demo LB ADLs and toileting tasks CGA with use of AE and appropriate DME PRN  Short term goal 6: demo good safety during functional interventions with < 2 VC's. Therapy Time   Individual Concurrent Group Co-treatment   Time In 1535         Time Out 1552         Minutes 17         Timed Code Treatment Minutes: 17 Minutes   Pt in chair upon arrival, requesting to get back into bed, agreeable to therapy this date.  Pt retired to bed at end of session, call light within reach, bed alarm on, RN notified.      SONY Gambino/CAROLYN

## 2021-05-13 LAB
-: NORMAL
ABSOLUTE EOS #: 0.16 K/UL (ref 0–0.44)
ABSOLUTE IMMATURE GRANULOCYTE: 0.18 K/UL (ref 0–0.3)
ABSOLUTE LYMPH #: 2.64 K/UL (ref 1.1–3.7)
ABSOLUTE MONO #: 0.79 K/UL (ref 0.1–1.2)
ANION GAP SERPL CALCULATED.3IONS-SCNC: 12 MMOL/L (ref 9–17)
ANION GAP SERPL CALCULATED.3IONS-SCNC: 9 MMOL/L (ref 9–17)
BASOPHILS # BLD: 0 % (ref 0–2)
BASOPHILS ABSOLUTE: 0.04 K/UL (ref 0–0.2)
BUN BLDV-MCNC: 12 MG/DL (ref 8–23)
BUN BLDV-MCNC: 13 MG/DL (ref 8–23)
BUN/CREAT BLD: ABNORMAL (ref 9–20)
BUN/CREAT BLD: ABNORMAL (ref 9–20)
CALCIUM SERPL-MCNC: 7.9 MG/DL (ref 8.6–10.4)
CALCIUM SERPL-MCNC: 8 MG/DL (ref 8.6–10.4)
CHLORIDE BLD-SCNC: 108 MMOL/L (ref 98–107)
CHLORIDE BLD-SCNC: 111 MMOL/L (ref 98–107)
CO2: 15 MMOL/L (ref 20–31)
CO2: 16 MMOL/L (ref 20–31)
CREAT SERPL-MCNC: 0.82 MG/DL (ref 0.5–0.9)
CREAT SERPL-MCNC: 0.85 MG/DL (ref 0.5–0.9)
DIFFERENTIAL TYPE: ABNORMAL
EOSINOPHILS RELATIVE PERCENT: 2 % (ref 1–4)
GFR AFRICAN AMERICAN: >60 ML/MIN
GFR AFRICAN AMERICAN: >60 ML/MIN
GFR NON-AFRICAN AMERICAN: >60 ML/MIN
GFR NON-AFRICAN AMERICAN: >60 ML/MIN
GFR SERPL CREATININE-BSD FRML MDRD: ABNORMAL ML/MIN/{1.73_M2}
GLUCOSE BLD-MCNC: 106 MG/DL (ref 65–105)
GLUCOSE BLD-MCNC: 111 MG/DL (ref 65–105)
GLUCOSE BLD-MCNC: 112 MG/DL (ref 70–99)
GLUCOSE BLD-MCNC: 114 MG/DL (ref 65–105)
GLUCOSE BLD-MCNC: 120 MG/DL (ref 65–105)
GLUCOSE BLD-MCNC: 70 MG/DL (ref 65–105)
GLUCOSE BLD-MCNC: 81 MG/DL (ref 70–99)
HCT VFR BLD CALC: 37.7 % (ref 36.3–47.1)
HEMOGLOBIN: 11.6 G/DL (ref 11.9–15.1)
IMMATURE GRANULOCYTES: 2 %
LACTIC ACID, WHOLE BLOOD: 0.9 MMOL/L (ref 0.7–2.1)
LYMPHOCYTES # BLD: 28 % (ref 24–43)
MCH RBC QN AUTO: 28.3 PG (ref 25.2–33.5)
MCHC RBC AUTO-ENTMCNC: 30.8 G/DL (ref 28.4–34.8)
MCV RBC AUTO: 92 FL (ref 82.6–102.9)
MONOCYTES # BLD: 9 % (ref 3–12)
NRBC AUTOMATED: 0 PER 100 WBC
PDW BLD-RTO: 12.6 % (ref 11.8–14.4)
PLATELET # BLD: 202 K/UL (ref 138–453)
PLATELET ESTIMATE: ABNORMAL
PMV BLD AUTO: 10.6 FL (ref 8.1–13.5)
POTASSIUM SERPL-SCNC: 4.2 MMOL/L (ref 3.7–5.3)
POTASSIUM SERPL-SCNC: 4.3 MMOL/L (ref 3.7–5.3)
RBC # BLD: 4.1 M/UL (ref 3.95–5.11)
RBC # BLD: ABNORMAL 10*6/UL
REASON FOR REJECTION: NORMAL
SEG NEUTROPHILS: 59 % (ref 36–65)
SEGMENTED NEUTROPHILS ABSOLUTE COUNT: 5.51 K/UL (ref 1.5–8.1)
SODIUM BLD-SCNC: 135 MMOL/L (ref 135–144)
SODIUM BLD-SCNC: 136 MMOL/L (ref 135–144)
WBC # BLD: 9.3 K/UL (ref 3.5–11.3)
WBC # BLD: ABNORMAL 10*3/UL
ZZ NTE CLEAN UP: ORDERED TEST: NORMAL
ZZ NTE WITH NAME CLEAN UP: SPECIMEN SOURCE: NORMAL

## 2021-05-13 PROCEDURE — 85025 COMPLETE CBC W/AUTO DIFF WBC: CPT

## 2021-05-13 PROCEDURE — 82947 ASSAY GLUCOSE BLOOD QUANT: CPT

## 2021-05-13 PROCEDURE — 2580000003 HC RX 258: Performed by: STUDENT IN AN ORGANIZED HEALTH CARE EDUCATION/TRAINING PROGRAM

## 2021-05-13 PROCEDURE — 80048 BASIC METABOLIC PNL TOTAL CA: CPT

## 2021-05-13 PROCEDURE — 83605 ASSAY OF LACTIC ACID: CPT

## 2021-05-13 PROCEDURE — 2060000000 HC ICU INTERMEDIATE R&B

## 2021-05-13 PROCEDURE — 6360000002 HC RX W HCPCS: Performed by: STUDENT IN AN ORGANIZED HEALTH CARE EDUCATION/TRAINING PROGRAM

## 2021-05-13 PROCEDURE — 36415 COLL VENOUS BLD VENIPUNCTURE: CPT

## 2021-05-13 PROCEDURE — 6370000000 HC RX 637 (ALT 250 FOR IP): Performed by: STUDENT IN AN ORGANIZED HEALTH CARE EDUCATION/TRAINING PROGRAM

## 2021-05-13 RX ORDER — LACTOBACILLUS RHAMNOSUS GG 10B CELL
1 CAPSULE ORAL
Status: DISCONTINUED | OUTPATIENT
Start: 2021-05-13 | End: 2021-05-14 | Stop reason: HOSPADM

## 2021-05-13 RX ORDER — ONDANSETRON 2 MG/ML
4 INJECTION INTRAMUSCULAR; INTRAVENOUS EVERY 6 HOURS PRN
Status: DISCONTINUED | OUTPATIENT
Start: 2021-05-13 | End: 2021-05-14 | Stop reason: HOSPADM

## 2021-05-13 RX ADMIN — Medication 1 CAPSULE: at 10:23

## 2021-05-13 RX ADMIN — GEMFIBROZIL 600 MG: 600 TABLET ORAL at 08:23

## 2021-05-13 RX ADMIN — Medication 81 MG: at 08:23

## 2021-05-13 RX ADMIN — SERTRALINE 100 MG: 50 TABLET, FILM COATED ORAL at 08:23

## 2021-05-13 RX ADMIN — LEVOFLOXACIN 750 MG: 750 TABLET, FILM COATED ORAL at 08:23

## 2021-05-13 RX ADMIN — GEMFIBROZIL 600 MG: 600 TABLET ORAL at 21:00

## 2021-05-13 RX ADMIN — AMLODIPINE BESYLATE 10 MG: 10 TABLET ORAL at 08:23

## 2021-05-13 RX ADMIN — ENOXAPARIN SODIUM 40 MG: 40 INJECTION SUBCUTANEOUS at 08:30

## 2021-05-13 RX ADMIN — DONEPEZIL HYDROCHLORIDE 5 MG: 5 TABLET, FILM COATED ORAL at 21:00

## 2021-05-13 RX ADMIN — ATORVASTATIN CALCIUM 10 MG: 10 TABLET, FILM COATED ORAL at 08:23

## 2021-05-13 RX ADMIN — SODIUM CHLORIDE: 9 INJECTION, SOLUTION INTRAVENOUS at 09:47

## 2021-05-13 RX ADMIN — SODIUM CHLORIDE: 9 INJECTION, SOLUTION INTRAVENOUS at 18:36

## 2021-05-13 ASSESSMENT — PAIN SCALES - GENERAL: PAINLEVEL_OUTOF10: 0

## 2021-05-13 NOTE — PLAN OF CARE
Problem: Skin Integrity:  Goal: Will show no infection signs and symptoms  Description: Will show no infection signs and symptoms  Outcome: Ongoing  Goal: Absence of new skin breakdown  Description: Absence of new skin breakdown  5/13/2021 0150 by Natasha Sepulveda RN  Outcome: Ongoing  5/12/2021 1656 by Ricky Crystal RN  Outcome: Ongoing     Problem: Falls - Risk of:  Goal: Will remain free from falls  Description: Will remain free from falls  5/13/2021 0150 by Natasha Sepulveda RN  Outcome: Ongoing  5/12/2021 1656 by Ricky Crystal RN  Outcome: Ongoing  Goal: Absence of physical injury  Description: Absence of physical injury  Outcome: Ongoing     Problem: Confusion - Acute:  Goal: Absence of continued neurological deterioration signs and symptoms  Description: Absence of continued neurological deterioration signs and symptoms  Outcome: Ongoing  Goal: Mental status will be restored to baseline  Description: Mental status will be restored to baseline  Outcome: Ongoing     Problem: Discharge Planning:  Goal: Ability to perform activities of daily living will improve  Description: Ability to perform activities of daily living will improve  Outcome: Ongoing  Goal: Participates in care planning  Description: Participates in care planning  Outcome: Ongoing     Problem: Injury - Risk of, Physical Injury:  Goal: Will remain free from falls  Description: Will remain free from falls  5/13/2021 0150 by Natasha Sepulveda RN  Outcome: Ongoing  5/12/2021 1656 by Ricky Crystal RN  Outcome: Ongoing  Goal: Absence of physical injury  Description: Absence of physical injury  Outcome: Ongoing     Problem: Mood - Altered:  Goal: Mood stable  Description: Mood stable  Outcome: Ongoing  Goal: Absence of abusive behavior  Description: Absence of abusive behavior  Outcome: Ongoing  Goal: Verbalizations of feeling emotionally comfortable while being cared for will increase  Description: Verbalizations of feeling emotionally comfortable while being cared for will increase  Outcome: Ongoing     Problem: Psychomotor Activity - Altered:  Goal: Absence of psychomotor disturbance signs and symptoms  Description: Absence of psychomotor disturbance signs and symptoms  Outcome: Ongoing     Problem: Sensory Perception - Impaired:  Goal: Demonstrations of improved sensory functioning will increase  Description: Demonstrations of improved sensory functioning will increase  Outcome: Ongoing  Goal: Decrease in sensory misperception frequency  Description: Decrease in sensory misperception frequency  Outcome: Ongoing  Goal: Able to refrain from responding to false sensory perceptions  Description: Able to refrain from responding to false sensory perceptions  Outcome: Ongoing  Goal: Demonstrates accurate environmental perceptions  Description: Demonstrates accurate environmental perceptions  Outcome: Ongoing  Goal: Able to distinguish between reality-based and nonreality-based thinking  Description: Able to distinguish between reality-based and nonreality-based thinking  Outcome: Ongoing  Goal: Able to interrupt nonreality-based thinking  Description: Able to interrupt nonreality-based thinking  Outcome: Ongoing     Problem: Sleep Pattern Disturbance:  Goal: Appears well-rested  Description: Appears well-rested  Outcome: Ongoing     Problem: Pain:  Goal: Pain level will decrease  Description: Pain level will decrease  Outcome: Ongoing  Goal: Control of acute pain  Description: Control of acute pain  Outcome: Ongoing  Goal: Control of chronic pain  Description: Control of chronic pain  Outcome: Ongoing     Problem: Musculor/Skeletal Functional Status  Goal: Highest potential functional level  5/13/2021 0150 by Kaushik Martin RN  Outcome: Ongoing  5/12/2021 1656 by Winsome Greene RN  Outcome: Ongoing  Goal: Absence of falls  5/13/2021 0150 by Kaushik Martin RN  Outcome: Ongoing  5/12/2021 1656 by Winsome Greene RN  Outcome: Ongoing

## 2021-05-13 NOTE — DISCHARGE INSTR - COC
Continuity of Care Form    Patient Name: Yobani Sanchez   :  1955  MRN:  [id number]    Admit date:  2021  Discharge date:      Code Status Order: Full Code   Advance Directives:   [facility] Documentation       Date/Time Healthcare Directive Type of Healthcare Directive Copy in [address] Agent's Name Healthcare Agent's Phone Number    21 1429  No, patient does not have an advance directive for healthcare treatment -- -- -- -- --            Admitting Physician:  Halley Garibay MD  PCP: Lizzeth Flowers MD    Discharging Nurse: [facility] Unit/Room#: 5458/5790-12  Discharging Unit Phone Number: [phone]    Emergency Contact:   Extended Emergency Contact Information  Primary Emergency Contact: Sylvia Oleary [address] Phone: [phone]  Work Phone: [phone]  Mobile Phone: [phone]  Relation: Parent   needed? No  Secondary Emergency Contact: Mr. Mamta (unknown)  Address: Spouse lives at patients home. This pt lives with spouses Mother and sleeps on her couch.   Relation: Spouse    Past Surgical History:  Past Surgical History:   Procedure Laterality Date    BLADDER SURGERY  2014    Bladder Stimulator Implant    BLADDER SUSPENSION  2002    BREAST BIOPSY Left 2008    BREAST SURGERY Left 2008    lumpectomy    CHOLECYSTECTOMY  10/10/2003    COLONOSCOPY      x2    ENDOMETRIAL ABLATION      ENDOSCOPY, COLON, DIAGNOSTIC       egd x2    FIXATION KYPHOPLASTY  10/2018    KNEE SURGERY Bilateral     x 2 each side    KYPHOSIS SURGERY N/A 2019    KYPHOPLASTY L4 performed by Jared Hernandez MD at [facility] [address]  10/15/14    WITH FUSION POSTERIOR L4 L5 WITH SPINAL CORD MONITORING    NEPHROSTOMY Right     VT PERQ VERT AGMNTJ CAVITY CRTJ UNI/BI CANNULJ LMBR N/A 2018    KYPHOPLASTY T2 & L2 performed by Jared Hernandez MD at [facility] Right 2013    right E11.00, U23.58    Acute metabolic encephalopathy F34.22       Isolation/Infection:   Isolation            Contact          Patient Infection Status       Infection Onset Added Last Indicated Last Indicated By Review Planned Expiration Resolved Resolved By    VRE  12/02/14 12/02/14 Tayler Martinez RN        Urine 11/2014      Resolved    COVID-19 Rule Out 05/08/21 05/08/21 05/08/21 COVID-19, Rapid (Ordered)   05/08/21 Rule-Out Test Resulted    C-diff Rule Out 03/26/20 03/26/20 03/26/20 C DIFF TOXIN/ANTIGEN (Ordered)   03/27/20 Rule-Out Test Resulted            Nurse Assessment:  Last Vital Signs: BP (!) 151/80   Pulse 76   Temp 98.2 °F (36.8 °C) (Temporal)   Resp 17   Ht 5' 2\" (1.575 m)   Wt 147 lb 11.3 oz (67 kg)   LMP 01/01/2002   SpO2 99%   BMI 27.02 kg/m²     Last documented pain score (0-10 scale): Pain Level: 0  Last Weight:   Wt Readings from Last 1 Encounters:   05/12/21 147 lb 11.3 oz (67 kg)     Mental Status:  {IP PT MENTAL STATUS:20030:::0}    IV Access:  - None    Nursing Mobility/ADLs:  Walking   Assisted  Transfer  Assisted  Bathing  Assisted  Dressing  Assisted  Toileting  Assisted  Feeding  Assisted  Med Admin  Assisted  Med Delivery   prefers mixed with applesauce and ***    Wound Care Documentation and Therapy:  Incision 09/12/18 Back (Active)   Number of days: 973        Elimination:  Continence: Bowel: No  Bladder: No  Urinary Catheter: None   Colostomy/Ileostomy/Ileal Conduit: No       Date of Last BM: 5/14/21    Intake/Output Summary (Last 24 hours) at 5/13/2021 0931  Last data filed at 5/12/2021 1735  Gross per 24 hour   Intake 1243 ml   Output 400 ml   Net 843 ml     I/O last 3 completed shifts: In: 1243 [P.O.:100; I.V.:1143]  Out: 400 [Urine:400]    Safety Concerns: At Risk for Falls    Impairments/Disabilities:      None    Nutrition Therapy:  Current Nutrition Therapy:   - Oral Diet:  Carb Control 3 carbs/meal (1500kcals/day)    Routes of Feeding: Oral  Liquids:  Thin Liquids Daily Fluid Restriction: no  Last Modified Barium Swallow with Video (Video Swallowing Test): not done    Treatments at the Time of Hospital Discharge:   Respiratory Treatments: n/a  Oxygen Therapy:  is not on home oxygen therapy. Ventilator:    - No ventilator support    Rehab Therapies: Physical Therapy, Occupational Therapy, Speech Therapy  Weight Bearing Status/Restrictions: No weight bearing restirctions  Other Medical Equipment (for information only, NOT a DME order):  walker  Other Treatments: n/a    Patient's personal belongings (please select all that are sent with patient):  jaja    RN SIGNATURE:  Electronically signed by Miko José RN on 5/14/21 at 12:16 PM EDT    CASE MANAGEMENT/SOCIAL WORK SECTION    Inpatient Status Date: 5/8/2021    Readmission Risk Assessment Score:  Readmission Risk              Risk of Unplanned Readmission:        18           Discharging to Facility/ Agency   Name: [facility]  [facility] Details  FAX            [address], [address] [facility] [address]       Phone: [phone]          Address:  Phone:  Fax:    Dialysis Facility (if applicable)   Name:  Address:  Dialysis Schedule:  Phone:  Fax:    / signature: Electronically signed by Martina Rosales RN on 5/14/21 at 11:20 AM EDT    PHYSICIAN SECTION    Prognosis: Good    Condition at Discharge: Stable    Rehab Potential (if transferring to Rehab): Fair    Recommended Labs or Other Treatments After Discharge: none    Physician Certification: I certify the above information and transfer of Yobani Sanchez  is necessary for the continuing treatment of the diagnosis listed and that she requires [facility] for greater 30 days.      Update Admission H&P: No change in H&P    PHYSICIAN SIGNATURE:  Electronically signed by Halley Garibay MD on 5/13/21 at 12:22 PM EDT

## 2021-05-13 NOTE — CARE COORDINATION
Met with patient at bedside to discuss SNF placement and choices, she relates [facility] and in [facility] in the past, she is agreeable to both referrals, referrals sent    [phone] with Karol Hodgkins at [facility] they can accept patient, pawan started    9744 Call from Patients sister Suraj Marshall, unable to answer questions when she called due to my being off unit, I returned call and left a message that I had placement for pt already.

## 2021-05-13 NOTE — PLAN OF CARE
Problem: Musculor/Skeletal Functional Status  Goal: Highest potential functional level  Outcome: Ongoing     Problem: Skin Integrity:  Goal: Will show no infection signs and symptoms  Description: Will show no infection signs and symptoms  Outcome: Ongoing  Goal: Absence of new skin breakdown  Description: Absence of new skin breakdown  Outcome: Ongoing     Problem: Falls - Risk of:  Goal: Will remain free from falls  Description: Will remain free from falls  Outcome: Ongoing     Problem: Injury - Risk of, Physical Injury:  Goal: Will remain free from falls  Description: Will remain free from falls  Outcome: Ongoing

## 2021-05-13 NOTE — PROGRESS NOTES
[facility]  Internal Medicine Teaching Residency Program  Inpatient Daily Progress Note  ______________________________________________________________________________    Patient: Jean Casey  YOB: 1955   [id number]    Acct: [de-identified]     Room: [facility]7471-  Admit date: 5/8/2021  Today's date: 05/13/21  Number of days in the hospital: 5    SUBJECTIVE   Admitting Diagnosis: AMS (altered mental status)  CC: Altered mental status  Pt examined at bedside. Chart & results reviewed. No acute events overnight. Reports of nausea but didn't throw up. Also reports of loose stools. Denies any CP, shortness of breath, abdominal and chest pain     Vitals stable   /77, HR 76, RR 17    Lab reviewed. Glucose this am 81. Will hold her lantus. 30U in morning. Lactobacillus added. WBC 9.3    Awaiting placement       ROS:  Constitutional:  negative for chills, fevers, sweats  Respiratory:  negative for cough, dyspnea on exertion, hemoptysis, shortness of breath, wheezing  Cardiovascular:  negative for chest pain, chest pressure/discomfort, lower extremity edema, palpitations  Gastrointestinal:  Positive for diarrhea and nausea, negative for abdominal pain, constipation,   vomiting  Neurological:  negative for dizziness, headache  BRIEF HISTORY     59-year-old female with past medical history of hypertension, hyperlipidemia, asthma, clear-cell renal cell carcinoma s/p right nephrectomy, diabetes mellitus, cognitive impairment on donepezil presented via EMS due to altered mental status. Per EMS, patient was found facedown on the floor. She was covered in stool. Patient is a very poor historian. Per chart review. Patient was found unresponsive and had stool incontinence. Patient was confused and brought in by EMS found to have fever of 100.7. Vitals show /95, heart rate 107 tachycardic.   Work-up showed glucose 624, blood gases show pH lispro  0-12 Units Subcutaneous TID     insulin lispro  0-6 Units Subcutaneous Nightly    amLODIPine  10 mg Oral Daily    aspirin EC  81 mg Oral Daily    donepezil  5 mg Oral Nightly    gemfibrozil  600 mg Oral BID    atorvastatin  10 mg Oral Daily    sertraline  100 mg Oral Daily    enoxaparin  40 mg Subcutaneous Daily     Continuous Infusions:    dextrose      sodium chloride 100 mL/hr at 05/12/21 1311     PRN Medicationsdextrose, 12.5 g, PRN  glucose, 15 g, PRN  dextrose, 12.5 g, PRN  glucagon (rDNA), 1 mg, PRN  dextrose, 100 mL/hr, PRN  ipratropium-albuterol, 1 ampule, Q4H PRN  magnesium sulfate, 1,000 mg, PRN  labetalol, 20 mg, Q4H PRN        Diagnostic Labs:  CBC:   Recent Labs     05/11/21  0157 05/12/21  0610 05/13/21  0310   WBC 10.8 9.4 9.3   RBC 3.99 4.18 4.10   HGB 11.3* 11.8* 11.6*   HCT 35.9* 41.1 37.7   MCV 90.0 98.3 92.0   RDW 12.0 12.1 12.6    185 202     BMP:   Recent Labs     05/10/21  1009 05/10/21  1009 05/12/21  0222 05/12/21  1536 05/13/21  0310      < > 133* 133* 136   K 3.5*   < > 3.5* 4.4 4.2      < > 108* 106 111*   CO2 20   < > 16* 16* 16*   PHOS 2.2*  --   --   --   --    BUN 19   < > 19 16 13   CREATININE 0.99*   < > 0.88 0.98* 0.82    < > = values in this interval not displayed. BNP: No results for input(s): BNP in the last 72 hours. PT/INR:   No results for input(s): PROTIME, INR in the last 72 hours. APTT:   No results for input(s): APTT in the last 72 hours. CARDIAC ENZYMES: No results for input(s): CKMB, CKMBINDEX, TROPONINI in the last 72 hours. Invalid input(s): CKTOTAL;3  FASTING LIPID PANEL:  Lab Results   Component Value Date    CHOL 181 05/26/2020    HDL 46 05/26/2020    TRIG 230 (H) 05/26/2020     LIVER PROFILE:   No results for input(s): AST, ALT, ALB, BILIDIR, BILITOT, ALKPHOS in the last 72 hours.    MICROBIOLOGY:   Lab Results   Component Value Date/Time    CULTURE NO SIGNIFICANT GROWTH 05/08/2021 01:06 PM       Imaging:    Ct Head Wo Contrast    Result Date: 5/8/2021  No acute intracranial abnormality. Cerebral atrophy. No acute fracture or subluxation of the cervical spine. Moderate degenerative changes. Ct Cervical Spine Wo Contrast    Result Date: 5/8/2021  No acute intracranial abnormality. Cerebral atrophy. No acute fracture or subluxation of the cervical spine. Moderate degenerative changes. Xr Chest Portable    Result Date: 5/8/2021  No acute cardiopulmonary disease       ASSESSMENT & PLAN     ASSESSMENT / PLAN:     1. Acute metabolic encephalopathy:  resolved. Likely due to hyperglycemia, dehydration and electrolyte imbalance. 2. Diabetes mellitus type 2 insulin dependence: Will held her morning lantus dose. 3. Sepsis ruled out after negative blood and urine. De-escalate antibiotics  4. Mildly elevated creatinine: Improved. likely due to dehydration. 5. Pseudohyponatremia:  Resolved due to hyperglycemia. On 0.9% NaCl. Monitor electrolytes  6. Hypokalemia: Replace per protocol  7. Lactic acidosis: Likely due to dehydration/hypoperfusion. On IV fluid. Improving  8. Reactive leukocytosis: resolved   9. Cognitive impairments: On donepezil  10. Essential hypertension: We will resume her home medication. Norvasc 10 mg and labetalol as needed  11. History of COPD: On breathing treatment as needed  12. Mood disorder: On Zoloft      DVT ppx : On Lovenox  GI ppx: None needed    PT/OT: Consulted  Discharge Planning / SW: Zachery Manriquez MD  Internal Medicine Resident, PGY-1  [facility]; [place], [place]  5/13/2021, 6:47 AM   Attending Physician Statement  I have discussed the care of Austin Knox, including pertinent history and exam findings,  with the resident. I have seen and examined the patient and the key elements of all parts of the encounter have been performed by me. I agree with the assessment, plan and orders as documented by the resident with additions . Treatment plan Discussed with nursing staff in detail , all questions answered . Electronically signed by El Kenyon MD on   5/13/21 at 12:21 PM EDT    Please note that this chart was generated using voice recognition Dragon dictation software. Although every effort was made to ensure the accuracy of this automated transcription, some errors in transcription may have occurred.

## 2021-05-13 NOTE — PROGRESS NOTES
Physical Therapy    DATE: 2021    NAME: Franny Lowe  MRN: [id number]   : 1955      Patient not seen this date for Physical Therapy due to:    Patient Declined: Pt refused despite max encouragement due to nausea per the pt.        Electronically signed by Dank Marin PTA on 2021 at 9:08 AM

## 2021-05-14 VITALS
RESPIRATION RATE: 21 BRPM | HEART RATE: 79 BPM | BODY MASS INDEX: 27.18 KG/M2 | WEIGHT: 147.71 LBS | DIASTOLIC BLOOD PRESSURE: 66 MMHG | HEIGHT: 62 IN | SYSTOLIC BLOOD PRESSURE: 126 MMHG | OXYGEN SATURATION: 97 % | TEMPERATURE: 97.3 F

## 2021-05-14 LAB
ABSOLUTE EOS #: 0.19 K/UL (ref 0–0.44)
ABSOLUTE IMMATURE GRANULOCYTE: 0.27 K/UL (ref 0–0.3)
ABSOLUTE LYMPH #: 3.08 K/UL (ref 1.1–3.7)
ABSOLUTE MONO #: 0.79 K/UL (ref 0.1–1.2)
ANION GAP SERPL CALCULATED.3IONS-SCNC: 10 MMOL/L (ref 9–17)
ANION GAP SERPL CALCULATED.3IONS-SCNC: 9 MMOL/L (ref 9–17)
BASOPHILS # BLD: 1 % (ref 0–2)
BASOPHILS ABSOLUTE: 0.05 K/UL (ref 0–0.2)
BUN BLDV-MCNC: 11 MG/DL (ref 8–23)
BUN BLDV-MCNC: 12 MG/DL (ref 8–23)
BUN/CREAT BLD: ABNORMAL (ref 9–20)
BUN/CREAT BLD: ABNORMAL (ref 9–20)
CALCIUM SERPL-MCNC: 7.8 MG/DL (ref 8.6–10.4)
CALCIUM SERPL-MCNC: 8.2 MG/DL (ref 8.6–10.4)
CHLORIDE BLD-SCNC: 108 MMOL/L (ref 98–107)
CHLORIDE BLD-SCNC: 110 MMOL/L (ref 98–107)
CO2: 15 MMOL/L (ref 20–31)
CO2: 16 MMOL/L (ref 20–31)
CREAT SERPL-MCNC: 0.79 MG/DL (ref 0.5–0.9)
CREAT SERPL-MCNC: 0.81 MG/DL (ref 0.5–0.9)
CULTURE: NORMAL
DIFFERENTIAL TYPE: ABNORMAL
EOSINOPHILS RELATIVE PERCENT: 2 % (ref 1–4)
GFR AFRICAN AMERICAN: >60 ML/MIN
GFR AFRICAN AMERICAN: >60 ML/MIN
GFR NON-AFRICAN AMERICAN: >60 ML/MIN
GFR NON-AFRICAN AMERICAN: >60 ML/MIN
GFR SERPL CREATININE-BSD FRML MDRD: ABNORMAL ML/MIN/{1.73_M2}
GLUCOSE BLD-MCNC: 106 MG/DL (ref 65–105)
GLUCOSE BLD-MCNC: 123 MG/DL (ref 65–105)
GLUCOSE BLD-MCNC: 125 MG/DL (ref 65–105)
GLUCOSE BLD-MCNC: 127 MG/DL (ref 70–99)
GLUCOSE BLD-MCNC: 143 MG/DL (ref 65–105)
GLUCOSE BLD-MCNC: 156 MG/DL (ref 70–99)
HCT VFR BLD CALC: 36.9 % (ref 36.3–47.1)
HEMOGLOBIN: 11.2 G/DL (ref 11.9–15.1)
IMMATURE GRANULOCYTES: 3 %
LYMPHOCYTES # BLD: 31 % (ref 24–43)
Lab: NORMAL
MCH RBC QN AUTO: 28 PG (ref 25.2–33.5)
MCHC RBC AUTO-ENTMCNC: 30.4 G/DL (ref 28.4–34.8)
MCV RBC AUTO: 92.3 FL (ref 82.6–102.9)
MONOCYTES # BLD: 8 % (ref 3–12)
NRBC AUTOMATED: 0 PER 100 WBC
PDW BLD-RTO: 12.4 % (ref 11.8–14.4)
PLATELET # BLD: 208 K/UL (ref 138–453)
PLATELET ESTIMATE: ABNORMAL
PMV BLD AUTO: 10.5 FL (ref 8.1–13.5)
POTASSIUM SERPL-SCNC: 3.9 MMOL/L (ref 3.7–5.3)
POTASSIUM SERPL-SCNC: 3.9 MMOL/L (ref 3.7–5.3)
RBC # BLD: 4 M/UL (ref 3.95–5.11)
RBC # BLD: ABNORMAL 10*6/UL
SARS-COV-2, RAPID: NOT DETECTED
SEG NEUTROPHILS: 55 % (ref 36–65)
SEGMENTED NEUTROPHILS ABSOLUTE COUNT: 5.6 K/UL (ref 1.5–8.1)
SODIUM BLD-SCNC: 133 MMOL/L (ref 135–144)
SODIUM BLD-SCNC: 135 MMOL/L (ref 135–144)
SPECIMEN DESCRIPTION: NORMAL
SPECIMEN DESCRIPTION: NORMAL
WBC # BLD: 10 K/UL (ref 3.5–11.3)
WBC # BLD: ABNORMAL 10*3/UL

## 2021-05-14 PROCEDURE — 6370000000 HC RX 637 (ALT 250 FOR IP): Performed by: STUDENT IN AN ORGANIZED HEALTH CARE EDUCATION/TRAINING PROGRAM

## 2021-05-14 PROCEDURE — 2580000003 HC RX 258: Performed by: STUDENT IN AN ORGANIZED HEALTH CARE EDUCATION/TRAINING PROGRAM

## 2021-05-14 PROCEDURE — 85025 COMPLETE CBC W/AUTO DIFF WBC: CPT

## 2021-05-14 PROCEDURE — 6360000002 HC RX W HCPCS: Performed by: STUDENT IN AN ORGANIZED HEALTH CARE EDUCATION/TRAINING PROGRAM

## 2021-05-14 PROCEDURE — 99238 HOSP IP/OBS DSCHRG MGMT 30/<: CPT | Performed by: INTERNAL MEDICINE

## 2021-05-14 PROCEDURE — 92526 ORAL FUNCTION THERAPY: CPT

## 2021-05-14 PROCEDURE — 80048 BASIC METABOLIC PNL TOTAL CA: CPT

## 2021-05-14 PROCEDURE — 82947 ASSAY GLUCOSE BLOOD QUANT: CPT

## 2021-05-14 PROCEDURE — 87635 SARS-COV-2 COVID-19 AMP PRB: CPT

## 2021-05-14 PROCEDURE — 97535 SELF CARE MNGMENT TRAINING: CPT

## 2021-05-14 PROCEDURE — 36415 COLL VENOUS BLD VENIPUNCTURE: CPT

## 2021-05-14 RX ORDER — LACTOBACILLUS RHAMNOSUS GG 10B CELL
1 CAPSULE ORAL
Qty: 30 CAPSULE | Refills: 2 | Status: SHIPPED | OUTPATIENT
Start: 2021-05-15

## 2021-05-14 RX ORDER — INSULIN GLARGINE 100 [IU]/ML
INJECTION, SOLUTION SUBCUTANEOUS
Qty: 5 PEN | Refills: 3 | Status: SHIPPED | OUTPATIENT
Start: 2021-05-14

## 2021-05-14 RX ADMIN — Medication 81 MG: at 08:43

## 2021-05-14 RX ADMIN — GEMFIBROZIL 600 MG: 600 TABLET ORAL at 08:43

## 2021-05-14 RX ADMIN — ATORVASTATIN CALCIUM 10 MG: 10 TABLET, FILM COATED ORAL at 08:43

## 2021-05-14 RX ADMIN — ENOXAPARIN SODIUM 40 MG: 40 INJECTION SUBCUTANEOUS at 08:43

## 2021-05-14 RX ADMIN — SODIUM CHLORIDE: 9 INJECTION, SOLUTION INTRAVENOUS at 05:49

## 2021-05-14 RX ADMIN — INSULIN LISPRO 2 UNITS: 100 INJECTION, SOLUTION INTRAVENOUS; SUBCUTANEOUS at 11:43

## 2021-05-14 RX ADMIN — Medication 1 CAPSULE: at 08:42

## 2021-05-14 RX ADMIN — LEVOFLOXACIN 750 MG: 750 TABLET, FILM COATED ORAL at 08:43

## 2021-05-14 RX ADMIN — SERTRALINE 100 MG: 50 TABLET, FILM COATED ORAL at 08:43

## 2021-05-14 RX ADMIN — AMLODIPINE BESYLATE 10 MG: 10 TABLET ORAL at 08:43

## 2021-05-14 ASSESSMENT — PAIN SCALES - GENERAL: PAINLEVEL_OUTOF10: 0

## 2021-05-14 NOTE — FLOWSHEET NOTE
Assessment:  Patient is a 63year old female in room 418. Patient welcomed . Patient had just finished lunch stated it left her unfulfilled. Patient engaged in conversation and shared concerns about going to rehab facility. Patient is hopeful that she will get to go home at some point. Intervention:   listened and validated patient's feelings and concerns.  encouraged  patient to continue her positive attitude.  was ministry of presence. Outcome:  Patient expressed gratitude for visit and support. Plan:  Chaplains will remain available for spiritual and emotional support as needed. 05/14/21 1253   Encounter Summary   Services provided to: Patient   Referral/Consult From: [address] [facility] Parent; Family members   Continue Visiting   (5/14/2021)   Complexity of Encounter Moderate   Length of Encounter 15 minutes   Spiritual Assessment Completed Yes   Routine   Type Initial   Assessment Approachable; Hopeful   Intervention Active listening;Sustaining presence/ Ministry of presence   Outcome Expressed gratitude

## 2021-05-14 NOTE — PROGRESS NOTES
Occupational Therapy  Facility/Department: [facility] 4B STEPDOWN  Daily Treatment Note  NAME: Chio Smith  : 1955  MRN: [id number]    Date of Service: 2021    Discharge Recommendations:  Patient would benefit from continued therapy after discharge to increase pt func mobility, cognition, endurance, and high-level IADLs  Assessment   Performance deficits / Impairments: Decreased functional mobility ; Decreased endurance;Decreased ADL status; Decreased balance;Decreased strength;Decreased safe awareness;Decreased high-level IADLs;Decreased cognition  Prognosis: Good  Patient Education: Pt educated on importance of therapy, safe transfer training, and general safety. Pt verbalized understanding  REQUIRES OT FOLLOW UP: Yes  Activity Tolerance  Activity Tolerance: Patient Tolerated treatment well;Patient limited by fatigue  Safety Devices  Safety Devices in place: Yes  Type of devices: Left in bed;Bed alarm in place;Call light within reach;Nurse notified;Gait belt  Restraints  Initially in place: No     Patient Diagnosis(es): The encounter diagnosis was Altered mental status, unspecified altered mental status type. has a past medical history of CEFERINO (acute kidney injury) ([facility] [address] Asthma, Carotid artery plaque, Clostridium difficile diarrhea, Dehydration, Depression, Fall, Fibromyalgia, Hiatal hernia, HTN (hypertension), Hyperlipidemia, Hypokalemia, gastrointestinal losses, Kidney stone, Obesity (BMI 30-39.9), Osteoarthritis, Osteoporosis, Renal cancer ([address] Short of breath on exertion, Type II or unspecified type diabetes mellitus without mention of complication, not stated as uncontrolled, Unspecified sleep apnea, Urge urinary incontinence, Wears glasses, Wears glasses, and Zygomatic fracture, right side, initial encounter for closed fracture ([facility] [address] has a past surgical history that includes knee surgery (Bilateral); bladder suspension (2002);  Nephrostomy (Right); total nephrectomy (Right, 06/20/2013); lumbar laminectomy (10/15/14); Bladder surgery (06/05/2014); Endometrial ablation; Cholecystectomy (10/10/2003); Breast surgery (Left, 03/03/2008); Breast biopsy (Left, 03/03/2008); pr perq vert agmntj cavity crtj uni/bi cannulj lmbr (N/A, 9/12/2018); Fixation Kyphoplasty (10/2018); Kyphosis surgery (N/A, 5/8/2019); Endoscopy, colon, diagnostic; and Colonoscopy. Restrictions  Restrictions/Precautions  Restrictions/Precautions: Fall Risk, General Precautions  Required Braces or Orthoses?: No  Position Activity Restriction  Other position/activity restrictions: Up with assist  Subjective   General  Chart Reviewed: Yes  Patient assessed for rehabilitation services?: Yes  Family / Caregiver Present: No  Diagnosis: altered mental status  General Comment  Comments: Pts primary RN Ok'd pt to be seen prior to entry into pts room. Pt was awake, alert, and agreeable to OT tx this date. Vital Signs  Patient Currently in Pain: Denies   Orientation  Orientation  Overall Orientation Status: Impaired  Orientation Level: Oriented to person;Oriented to situation;Oriented to place; Disoriented to time(Pt able to state month, but not year)  Objective    ADL  UE Dressing: Stand by assistance;Setup; Increased time to complete(Don/doff gown seated EOB)  LE Dressing: Contact guard assistance;Setup; Increased time to complete(Pt don/doff socks seated EOB, utilizing 4 figure technique)  Balance  Sitting Balance: Stand by assistance(Seated EOB approx 10 min)  Standing Balance: Contact guard assistance  Standing Balance  Time: ~2-3 minutes  Activity: Static standing at bed to adjust brief, func mobility around room  Comment: Pt requires use of RW for BUE support in standing position. Good posture noted.   Functional Mobility  Functional - Mobility Device: Rolling Walker  Activity: Other  Assist Level: Contact guard assistance  Functional Mobility Comments: No LOB noted this date, pt displays good standing balance  Bed mobility Supine to Sit: Stand by assistance  Sit to Supine: Contact guard assistance  Scooting: Contact guard assistance  Transfers  Sit to stand: Contact guard assistance  Stand to sit: Contact guard assistance  Transfer Comments: Pt completed all functional transfers with use of RW. Cognition  Overall Cognitive Status: Exceptions  Arousal/Alertness: Appropriate responses to stimuli  Following Commands: Follows multistep commands with increased time  Attention Span: Attends with cues to redirect  Memory: Decreased short term memory;Decreased recall of recent events  Safety Judgement: Decreased awareness of need for assistance  Problem Solving: Assistance required to identify errors made;Assistance required to generate solutions;Assistance required to correct errors made;Decreased awareness of errors  Insights: Decreased awareness of deficits  Initiation: Requires cues for some  Sequencing: Requires cues for some  Plan   Plan  Times per week: 3-4 x/wk  Current Treatment Recommendations: Strengthening, Patient/Caregiver Education & Training, Home Management Training, Equipment Evaluation, Education, & procurement, Balance Training, Functional Mobility Training, Endurance Training, Safety Education & Training, Self-Care / ADL, Cognitive/Perceptual Training  Goals  Short term goals  Time Frame for Short term goals: By discharge, pt will; Short term goal 1: demo functional transfers SBA with use of LRD PRN to improve safety with ADLs  Short term goal 2: demo functional mobility CGA with use of LRD PRN to improve safety with home mobility  Short term goal 3: demo standing tolerance 8+ minutes SBA with use of LRD PRN during functional activities  Short term goal 4: demo UB ADLs and grooming tasks mod I  Short term goal 5: demo LB ADLs and toileting tasks CGA with use of AE and appropriate DME PRN  Short term goal 6: demo good safety during functional interventions with < 2 VC's.      Therapy Time   Individual Concurrent Group Co-treatment   Time In 1040         Time Out 1113         Minutes 33         Timed Code Treatment Minutes: 23 Minutes(10 min other medical staff)   Pt in bed upon arrival, pleasant and agreeable to therapy with Mod encouragement this date. Pt retired to bed at end of session, call light within reach, bed alarm on, RN notified.      KOSTAS Severino

## 2021-05-14 NOTE — PROGRESS NOTES
 [Held by provider] insulin glargine  30 Units Subcutaneous Nightly    [Held by provider] insulin glargine  30 Units Subcutaneous QAM    levoFLOXacin  750 mg Oral Daily    insulin lispro  0-12 Units Subcutaneous TID WC    insulin lispro  0-6 Units Subcutaneous Nightly    amLODIPine  10 mg Oral Daily    aspirin EC  81 mg Oral Daily    donepezil  5 mg Oral Nightly    gemfibrozil  600 mg Oral BID    atorvastatin  10 mg Oral Daily    sertraline  100 mg Oral Daily    enoxaparin  40 mg Subcutaneous Daily     Continuous Infusions:    dextrose      sodium chloride 100 mL/hr at 05/14/21 0549     PRN Medicationsondansetron, 4 mg, Q6H PRN  dextrose, 12.5 g, PRN  glucose, 15 g, PRN  dextrose, 12.5 g, PRN  glucagon (rDNA), 1 mg, PRN  dextrose, 100 mL/hr, PRN  ipratropium-albuterol, 1 ampule, Q4H PRN  magnesium sulfate, 1,000 mg, PRN  labetalol, 20 mg, Q4H PRN        Diagnostic Labs:  CBC:   Recent Labs     05/12/21  0610 05/13/21  0310 05/14/21  0544   WBC 9.4 9.3 10.0   RBC 4.18 4.10 4.00   HGB 11.8* 11.6* 11.2*   HCT 41.1 37.7 36.9   MCV 98.3 92.0 92.3   RDW 12.1 12.6 12.4    202 208     BMP:   Recent Labs     05/13/21  0310 05/13/21  1521 05/14/21  0544    135 135   K 4.2 4.3 3.9   * 108* 110*   CO2 16* 15* 15*   BUN 13 12 11   CREATININE 0.82 0.85 0.79     BNP: No results for input(s): BNP in the last 72 hours. PT/INR:   No results for input(s): PROTIME, INR in the last 72 hours. APTT:   No results for input(s): APTT in the last 72 hours. CARDIAC ENZYMES: No results for input(s): CKMB, CKMBINDEX, TROPONINI in the last 72 hours. Invalid input(s): CKTOTAL;3  FASTING LIPID PANEL:  Lab Results   Component Value Date    CHOL 181 05/26/2020    HDL 46 05/26/2020    TRIG 230 (H) 05/26/2020     LIVER PROFILE:   No results for input(s): AST, ALT, ALB, BILIDIR, BILITOT, ALKPHOS in the last 72 hours.    MICROBIOLOGY:   Lab Results   Component Value Date/Time    CULTURE NO SIGNIFICANT GROWTH 05/08/2021 01:06 PM       Imaging:    Ct Head Wo Contrast    Result Date: 5/8/2021  No acute intracranial abnormality. Cerebral atrophy. No acute fracture or subluxation of the cervical spine. Moderate degenerative changes. Ct Cervical Spine Wo Contrast    Result Date: 5/8/2021  No acute intracranial abnormality. Cerebral atrophy. No acute fracture or subluxation of the cervical spine. Moderate degenerative changes. Xr Chest Portable    Result Date: 5/8/2021  No acute cardiopulmonary disease       ASSESSMENT & PLAN     ASSESSMENT / PLAN:     1. Acute metabolic encephalopathy:  resolved. Likely due to hyperglycemia, dehydration and electrolyte imbalance. 2. Diabetes mellitus type 2 insulin dependence: Will held her  lantus dose. 3. Metabolic acidosis: likely due to GI loss. On IV fluids. 4. Sepsis ruled out after negative blood and urine. De-escalate antibiotics  5. Mildly elevated creatinine: resolved. 6. Lactic acidosis: Likely due to dehydration/hypoperfusion. resolved. 7. Reactive leukocytosis: resolved   8. Cognitive impairments: On donepezil  9. Essential hypertension: on Norvasc 10 mg and labetalol as needed  10. History of COPD: On breathing treatment as needed  11. Mood disorder: On Zoloft      DVT ppx : On Lovenox  GI ppx: None needed    PT/OT: Consulted  Discharge Planning / SW: Jenniffer Da Silva MD  Internal Medicine Resident, PGY-1  [facility]; [place], [place]  5/14/2021, 8:09 AM   Attending Physician Statement  I have discussed the care of Jose Ty, including pertinent history and exam findings,  with the resident. I have seen and examined the patient and the key elements of all parts of the encounter have been performed by me. I agree with the assessment, plan and orders as documented by the resident with additions . Treatment plan Discussed with nursing staff in detail , all questions answered .    Electronically signed by Becky Calvo MD on   5/13/21 at 12:21 PM EDT  Attending Physician Statement  I have discussed the care of Ny Salinas, including pertinent history and exam findings,  with the resident. I have seen and examined the patient and the key elements of all parts of the encounter have been performed by me. I agree with the assessment, plan and orders as documented by the resident with additions . Treatment plan Discussed with nursing staff in detail , all questions answered . Electronically signed by Becky Calvo MD on   5/14/21 at 3:23 PM EDT    Please note that this chart was generated using voice recognition Dragon dictation software. Although every effort was made to ensure the accuracy of this automated transcription, some errors in transcription may have occurred. Please note that this chart was generated using voice recognition Dragon dictation software. Although every effort was made to ensure the accuracy of this automated transcription, some errors in transcription may have occurred.

## 2021-05-14 NOTE — PROGRESS NOTES
Physical Therapy    DATE: 2021    NAME: Jennifer Davidson  MRN: [id number]   : 1955      Patient not seen this date for Physical Therapy due to:    Patient Declined: Pt refused despite max encouragement.  RN notified      Electronically signed by Katelyn Dolan PTA on 2021 at 1:26 PM

## 2021-05-14 NOTE — PLAN OF CARE
Problem: Skin Integrity:  Goal: Will show no infection signs and symptoms  Description: Will show no infection signs and symptoms  5/14/2021 0450 by Zane Holloway RN  Outcome: Ongoing  5/13/2021 1630 by Kasey Woods RN  Outcome: Ongoing  Goal: Absence of new skin breakdown  Description: Absence of new skin breakdown  5/14/2021 0450 by Zane Holloway RN  Outcome: Ongoing  5/13/2021 1630 by Kasey Woods RN  Outcome: Ongoing     Problem: Falls - Risk of:  Goal: Will remain free from falls  Description: Will remain free from falls  5/14/2021 0450 by Zane Holloway RN  Outcome: Ongoing  5/13/2021 1630 by Kasey Woods RN  Outcome: Ongoing  Goal: Absence of physical injury  Description: Absence of physical injury  Outcome: Ongoing     Problem: Confusion - Acute:  Goal: Absence of continued neurological deterioration signs and symptoms  Description: Absence of continued neurological deterioration signs and symptoms  Outcome: Ongoing  Goal: Mental status will be restored to baseline  Description: Mental status will be restored to baseline  Outcome: Ongoing     Problem: Discharge Planning:  Goal: Ability to perform activities of daily living will improve  Description: Ability to perform activities of daily living will improve  Outcome: Ongoing  Goal: Participates in care planning  Description: Participates in care planning  Outcome: Ongoing     Problem: Injury - Risk of, Physical Injury:  Goal: Will remain free from falls  Description: Will remain free from falls  5/14/2021 0450 by Zane Holloway RN  Outcome: Ongoing  5/13/2021 1630 by Kasey Woods RN  Outcome: Ongoing  Goal: Absence of physical injury  Description: Absence of physical injury  Outcome: Ongoing     Problem: Mood - Altered:  Goal: Mood stable  Description: Mood stable  Outcome: Ongoing  Goal: Absence of abusive behavior  Description: Absence of abusive behavior  Outcome: Ongoing  Goal: Verbalizations of feeling emotionally comfortable while being cared for will increase  Description: Verbalizations of feeling emotionally comfortable while being cared for will increase  Outcome: Ongoing     Problem: Psychomotor Activity - Altered:  Goal: Absence of psychomotor disturbance signs and symptoms  Description: Absence of psychomotor disturbance signs and symptoms  Outcome: Ongoing     Problem: Sensory Perception - Impaired:  Goal: Demonstrations of improved sensory functioning will increase  Description: Demonstrations of improved sensory functioning will increase  Outcome: Ongoing  Goal: Decrease in sensory misperception frequency  Description: Decrease in sensory misperception frequency  Outcome: Ongoing  Goal: Able to refrain from responding to false sensory perceptions  Description: Able to refrain from responding to false sensory perceptions  Outcome: Ongoing  Goal: Demonstrates accurate environmental perceptions  Description: Demonstrates accurate environmental perceptions  Outcome: Ongoing  Goal: Able to distinguish between reality-based and nonreality-based thinking  Description: Able to distinguish between reality-based and nonreality-based thinking  Outcome: Ongoing  Goal: Able to interrupt nonreality-based thinking  Description: Able to interrupt nonreality-based thinking  Outcome: Ongoing     Problem: Sleep Pattern Disturbance:  Goal: Appears well-rested  Description: Appears well-rested  Outcome: Ongoing     Problem: Pain:  Description: Pain management should include both nonpharmacologic and pharmacologic interventions.   Goal: Pain level will decrease  Description: Pain level will decrease  Outcome: Ongoing  Goal: Control of acute pain  Description: Control of acute pain  Outcome: Ongoing  Goal: Control of chronic pain  Description: Control of chronic pain  Outcome: Ongoing     Problem: Musculor/Skeletal Functional Status  Goal: Highest potential functional level  5/14/2021 0450 by Lidya Beverly RN  Outcome: Ongoing  5/13/2021 1630 by Ricky Crystal RN  Outcome: Ongoing  Goal: Absence of falls  Outcome: Ongoing

## 2021-05-14 NOTE — PROGRESS NOTES
Speech Language Pathology  Speech Language Pathology  [facility]    Dysphagia Treatment Note    Date: 5/14/2021  Patients Name: Zach Shook  MRN: [id number]  Diagnosis:   Patient Active Problem List   Diagnosis Code    Prolapsed intervertebral disk [id number]    Hip arthritis M16.10    Diabetic neuropathy ([facility] E11.40    Dermatomyositis ([facility] [address] M33.90    GERD (gastroesophageal reflux disease) K21.9    Moderate persistent asthma J45.40    HLD (hyperlipidemia) E78.5    HTN (hypertension) I10    Obesity (BMI 30-39. 9) E66.9    History of renal cell cancer Z85.528    S/p nephrectomy Z90.5    Diabetic autonomic neuropathy associated with type 2 diabetes mellitus ([facility] [address] E11.43    Cervical spondylosis with myelopathy M47.12    Cervicalgia M54.2    Spinal stenosis of lumbar region with neurogenic claudication M48.062    Acquired spondylolisthesis M43.10    DM (diabetes mellitus), type 2, uncontrolled with complications ([facility]) J04.8, E11.65    Colitis K52.9    Dizziness R42    Depression F32.9    Osteoporosis M81.0    Lumbar disc herniation M51.26    EJ on CPAP G47.33, Z99.89    Falls W19. Demetria Marking    Ambulatory dysfunction R26.2    Hypovitaminosis D E55.9    CKD (chronic kidney disease) stage 2, GFR 60-89 ml/min N18.2    Age-related osteoporosis with current pathological fracture with routine healing M80.00XD    Uncontrolled type 2 diabetes mellitus with complication, with long-term current use of insulin (HCC) E11.8, E11.65, Z79.4    AMS (altered mental status) R41.82    Hyperosmolar hyperglycemic state (HHS) ([facility]) E11.00, E11.65    Acute metabolic encephalopathy P89.84     Pain: 0    Dysphagia Treatment  Treatment time: 8:55-9:06    Subjective: [x] Alert [x] Cooperative     [] Confused     [] Agitated    [] Lethargic    Objective/Assessment:    Pt. Seen for diet tolerance monitoring.  Per MBSS on 5/9/2021, ST recommended Regular diet with thin liquids with

## 2021-05-14 NOTE — CARE COORDINATION
Transitional Planning    Call received from [employer] at [facility]. She relates that pre cert for this patient has been submitted. She will call when she hears anything. Patient will need a COVID test prior to transfer to SNF.    1103 Spoke to patient and she confirms plan is for her to go to [address] [place] [address] Rd.    1119 called Cyndi Hastings [phone], no answer and unable to leave message. 1441 Received call from [employer], she has received authorization from insurance. Covid already done. .Report number [phone], fax # [phone]. IBAN done. [phone] Patient told that insurance auth obtained. JAN working on transportation. Lucas Cui 96 to [facility] [phone] per patient request and told her that patient is expected to be discharged to [facility] today. [address] and told her  time is 7:15 pm tonight. IBAN done , AVS/SONYA faxed to [phone]. [address] [facility] and left VM that   time is 7:15 tonight for patient. [facility] patient that  time is 7:15 pm tonight to go to [facility]. She verbalized understanding CM told her VM left with [facility] about d/c time for today. [facility] called JAN and CM told her Mateo Human is being picked up at 7:15 tonight to go to [facility]. She verbalized understanding.

## 2021-05-17 NOTE — DISCHARGE SUMMARY
[address]     Department of Internal Medicine - Staff Internal Medicine Teaching Service    INPATIENT DISCHARGE SUMMARY      Patient Identification:  Socorro Paul is a 63 y.o. female. :  1955  MRN: [id number]     Acct: [de-identified]   PCP: Xavier Pascual MD  Admit Date:  2021  Discharge date and time: 2021  7:34 PM   Attending Provider: No att. providers found                                     [address] Problem Lists:  Principal Problem:    AMS (altered mental status)  Active Problems:    HTN (hypertension)    Obesity (BMI 30-39. 9)    Uncontrolled type 2 diabetes mellitus with complication, with long-term current use of insulin (HCC)    Hyperosmolar hyperglycemic state (HHS) ([facility] [address]    Acute metabolic encephalopathy  Resolved Problems:    Hypokalemia    Hyperglycemia      HOSPITAL STAY     Brief Inpatient course:     31-year-old female with past medical history of HTN, HLD, asthma, clear-cell RCC s/p right nephrectomy, DM, cognitive impairment on donepezil presented via EMS due to altered mental status. Per EMS, patient was found facedown on the floor. She was covered in stool. Patient is a very poor historian. Per chart review. Patient was found unresponsive and had stool incontinence. Patient was confused and brought in by EMS found to have fever of 100.7. Vitals show /95, heart rate 107 tachycardic. Work-up showed glucose 624, blood gases show pH 7.34, PCO2 43.2, HCO3 23.6, beta hydroxybutyrate 0.94, myoglobin 382, troponin 30> 33. Trauma work-up done in the ER including CT head without contrast and CT cervical spine showed no acute intracranial abnormality. No acute fracture or subluxation of the cervical spine.     Sepsis work-up done including blood culture and urine culture. Blood culture showed no growth in 6 hours. Urinalysis show small ketones, few bacteria. Negative leukocyte esterase and nitrite. COVID-19 negative. UTI was managed with ceftriaxone. On discharge she was alert oriented in time place. Plan to discharge to SNF to assist with medications and daily chores      significant therapeutic interventions done at the hospital:   1. Acute metabolic encephalopathy:  resolved. Likely due to hyperglycemia, dehydration and electrolyte imbalance. 2. Diabetes mellitus type 2 insulin dependence:   Will held her  lantus dose. 3. Metabolic acidosis: likely due to GI loss. On IV fluids. 4. Sepsis ruled out after negative blood and urine. De-escalate antibiotics  5. Mildly elevated creatinine: resolved. 6. Lactic acidosis: Likely due to dehydration/hypoperfusion.  resolved. 7. Reactive leukocytosis: resolved   8. Cognitive impairments: On donepezil  9. Essential hypertension: on Norvasc 10 mg and labetalol as needed  10. History of COPD: On breathing treatment as needed  11.  Mood disorder: On Zoloft      Procedures/ Significant Interventions:    None     Consults:     Consults:     Final Specialist Recommendations/Findings:   IP CONSULT TO INTERNAL MEDICINE  IP CONSULT TO SOCIAL WORK  IP CONSULT TO IV TEAM  IP CONSULT TO CASE MANAGEMENT      Any Hospital Acquired Infections: none    Discharge Functional Status:  stable    DISCHARGE PLAN     Disposition: SNF    Patient Instructions:   Discharge Medication List as of 5/14/2021  3:25 PM      START taking these medications    Details   lactobacillus (CULTURELLE) capsule Take 1 capsule by mouth daily (with breakfast), Disp-30 capsule, R-2Normal         CONTINUE these medications which have CHANGED    Details   insulin glargine (LANTUS SOLOSTAR) 100 UNIT/ML injection pen Inject 15 units nightly, Disp-5 pen, R-3Normal      insulin lispro (HUMALOG) 100 UNIT/ML injection vial Inject 10 Units into the skin 3 times daily (before meals) Glucose: Dose: If <139             No Insulin 140-199 2 Units 200-249 4 Units 250-299 6 Units 300-349 8 Units 350-400 10 Units Above 400       12 Units, Disp-1 vial, R-3Normal         CONTINUE these medications which have NOT CHANGED    Details   donepezil (ARICEPT) 5 MG tablet Take 1 tablet by mouth nightly, Disp-30 tablet,R-3Normal      ammonium lactate (AMLACTIN) 12 % cream Apply 1 Bottle topically as needed for Dry Skin Apply topically as needed., Topical, PRN, Historical Med      amLODIPine (NORVASC) 10 MG tablet Take 1 tablet by mouth daily, Disp-30 tablet, R-3Normal      metFORMIN (GLUCOPHAGE-XR) 500 MG extended release tablet Take 1 tablet by mouth 2 times daily, Disp-90 tablet, R-1Normal      ondansetron (ZOFRAN) 4 MG tablet Take 1 tablet by mouth every 8 hours as needed for Nausea or Vomiting, Disp-14 tablet, R-0Normal      sertraline (ZOLOFT) 100 MG tablet TAKE ONE TABLET BY MOUTH DAILY, Disp-30 tablet, R-0Normal      raloxifene (EVISTA) 60 MG tablet TAKE ONE TABLET BY MOUTH DAILY, Disp-30 tablet, R-0Normal      Cholecalciferol (VITAMIN D3) 1000 units TABS Take 1 tablet by mouth daily, Disp-90 tablet, R-1Normal      aspirin EC 81 MG EC tablet Take 1 tablet by mouth daily, Disp-30 tablet, R-5Normal      lovastatin (MEVACOR) 40 MG tablet TAKE ONE AND ONE-HALF (1  1/2) TABLET BY MOUTH NIGHTLY, Disp-30 tablet, R-2Normal      gemfibrozil (LOPID) 600 MG tablet TAKE ONE TABLET BY MOUTH TWICE A DAY, Disp-60 tablet, R-0Normal      fluticasone (ARNUITY ELLIPTA) 200 MCG/ACT AEPB Inhale 1 Inhaler into the lungs dailyHistorical Med      BiPAP Machine MISC Until Discontinued, Historical Med      calcium carbonate (TUMS) 500 MG chewable tablet Take 1 tablet by mouth 3 times daily as needed for Heartburn, Disp-180 tablet, R-5      Handicap Placard MISC Starting 4/28/2016, Until Discontinued, Disp-1 each, R-0, PrintDuration: 1 year      acetaminophen (TYLENOL) 500 MG tablet Take 500 mg by mouth every 6 hours as needed. butalbital-acetaminophen-caffeine (FIORICET, ESGIC) per tablet Take 1 tablet by mouth every 8 hours as needed.       montelukast (SINGULAIR) 10 MG tablet Take 10 mg by mouth nightly. albuterol (PROVENTIL HFA;VENTOLIN HFA) 108 (90 BASE) MCG/ACT inhaler Inhale 2 puffs into the lungs every 6 hours as needed. STOP taking these medications       vitamin D (ERGOCALCIFEROL) 96913 units CAPS capsule Comments:   Reason for Stopping:         zolpidem (AMBIEN) 10 MG tablet Comments:   Reason for Stopping:               Activity: activity as tolerated    Diet: diabetic diet    Follow-up:    Carla Fabian MD  [address] [address]  [phone]          Patient Instructions: Follow up with your PCP for hospital follow up. Follow up labs: none  Follow up imaging: none      Pam Rodgers MD, MD  Internal Medicine Resident, PGY-1  [facility];  [place], [place]  5/20/2021, 10:39 AM

## 2021-08-11 ENCOUNTER — APPOINTMENT (OUTPATIENT)
Dept: GENERAL RADIOLOGY | Age: 66
DRG: 045 | End: 2021-08-11
Payer: MEDICAID

## 2021-08-11 ENCOUNTER — APPOINTMENT (OUTPATIENT)
Dept: CT IMAGING | Age: 66
DRG: 045 | End: 2021-08-11
Payer: MEDICAID

## 2021-08-11 ENCOUNTER — HOSPITAL ENCOUNTER (INPATIENT)
Age: 66
LOS: 6 days | Discharge: NURSING, REHAB OR LONG-TERM CARE | DRG: 045 | End: 2021-08-17
Attending: EMERGENCY MEDICINE | Admitting: FAMILY MEDICINE
Payer: MEDICAID

## 2021-08-11 DIAGNOSIS — N30.00 ACUTE CYSTITIS WITHOUT HEMATURIA: ICD-10-CM

## 2021-08-11 DIAGNOSIS — Z85.528 HISTORY OF RENAL CELL CANCER: ICD-10-CM

## 2021-08-11 DIAGNOSIS — I63.9 CEREBRAL INFARCTION, UNSPECIFIED MECHANISM (HCC): ICD-10-CM

## 2021-08-11 DIAGNOSIS — A41.9 SEPTICEMIA (HCC): Primary | ICD-10-CM

## 2021-08-11 DIAGNOSIS — G93.9 BRAIN LESION: ICD-10-CM

## 2021-08-11 DIAGNOSIS — R77.8 ELEVATED TROPONIN: ICD-10-CM

## 2021-08-11 DIAGNOSIS — G93.6 VASOGENIC EDEMA (HCC): ICD-10-CM

## 2021-08-11 DIAGNOSIS — G93.41 ACUTE METABOLIC ENCEPHALOPATHY: ICD-10-CM

## 2021-08-11 DIAGNOSIS — R41.0 DISORIENTATION: ICD-10-CM

## 2021-08-11 PROBLEM — R41.82 ALTERED MENTAL STATUS: Status: ACTIVE | Noted: 2021-08-11

## 2021-08-11 PROBLEM — I24.89 DEMAND ISCHEMIA: Status: ACTIVE | Noted: 2021-08-11

## 2021-08-11 PROBLEM — I24.8 DEMAND ISCHEMIA (HCC): Status: ACTIVE | Noted: 2021-08-11

## 2021-08-11 PROBLEM — R79.89 ELEVATED TROPONIN: Status: ACTIVE | Noted: 2021-08-11

## 2021-08-11 LAB
-: ABNORMAL
ABSOLUTE EOS #: 0.18 K/UL (ref 0–0.44)
ABSOLUTE IMMATURE GRANULOCYTE: 0.07 K/UL (ref 0–0.3)
ABSOLUTE LYMPH #: 2.2 K/UL (ref 1.1–3.7)
ABSOLUTE MONO #: 0.66 K/UL (ref 0.1–1.2)
ALBUMIN SERPL-MCNC: 3.1 G/DL (ref 3.5–5.2)
ALBUMIN/GLOBULIN RATIO: 0.8 (ref 1–2.5)
ALP BLD-CCNC: 81 U/L (ref 35–104)
ALT SERPL-CCNC: 12 U/L (ref 5–33)
AMMONIA: 15 UMOL/L (ref 11–51)
AMORPHOUS: ABNORMAL
ANION GAP SERPL CALCULATED.3IONS-SCNC: 16 MMOL/L (ref 9–17)
AST SERPL-CCNC: 16 U/L
BACTERIA: ABNORMAL
BASOPHILS # BLD: 0 % (ref 0–2)
BASOPHILS ABSOLUTE: 0.03 K/UL (ref 0–0.2)
BILIRUB SERPL-MCNC: 0.42 MG/DL (ref 0.3–1.2)
BILIRUBIN URINE: NEGATIVE
BUN BLDV-MCNC: 16 MG/DL (ref 8–23)
BUN/CREAT BLD: ABNORMAL (ref 9–20)
CALCIUM SERPL-MCNC: 8.8 MG/DL (ref 8.6–10.4)
CASTS UA: ABNORMAL /LPF (ref 0–2)
CASTS UA: ABNORMAL /LPF (ref 0–2)
CHLORIDE BLD-SCNC: 104 MMOL/L (ref 98–107)
CO2: 15 MMOL/L (ref 20–31)
COLOR: YELLOW
CREAT SERPL-MCNC: 0.96 MG/DL (ref 0.5–0.9)
CRYSTALS, UA: ABNORMAL /HPF
DIFFERENTIAL TYPE: ABNORMAL
EOSINOPHILS RELATIVE PERCENT: 2 % (ref 1–4)
EPITHELIAL CELLS UA: ABNORMAL /HPF (ref 0–5)
GFR AFRICAN AMERICAN: >60 ML/MIN
GFR NON-AFRICAN AMERICAN: 59 ML/MIN
GFR SERPL CREATININE-BSD FRML MDRD: ABNORMAL ML/MIN/{1.73_M2}
GFR SERPL CREATININE-BSD FRML MDRD: ABNORMAL ML/MIN/{1.73_M2}
GLUCOSE BLD-MCNC: 134 MG/DL (ref 70–99)
GLUCOSE URINE: NEGATIVE
HCT VFR BLD CALC: 34.4 % (ref 36.3–47.1)
HEMOGLOBIN: 10.8 G/DL (ref 11.9–15.1)
IMMATURE GRANULOCYTES: 1 %
KETONES, URINE: NEGATIVE
LACTIC ACID, SEPSIS WHOLE BLOOD: 1.2 MMOL/L (ref 0.5–1.9)
LACTIC ACID, SEPSIS WHOLE BLOOD: 2 MMOL/L (ref 0.5–1.9)
LACTIC ACID, SEPSIS: ABNORMAL MMOL/L (ref 0.5–1.9)
LACTIC ACID, SEPSIS: NORMAL MMOL/L (ref 0.5–1.9)
LEUKOCYTE ESTERASE, URINE: ABNORMAL
LYMPHOCYTES # BLD: 22 % (ref 24–43)
MCH RBC QN AUTO: 27.9 PG (ref 25.2–33.5)
MCHC RBC AUTO-ENTMCNC: 31.4 G/DL (ref 28.4–34.8)
MCV RBC AUTO: 88.9 FL (ref 82.6–102.9)
MONOCYTES # BLD: 7 % (ref 3–12)
MUCUS: ABNORMAL
NITRITE, URINE: POSITIVE
NRBC AUTOMATED: 0 PER 100 WBC
OTHER OBSERVATIONS UA: ABNORMAL
PDW BLD-RTO: 13 % (ref 11.8–14.4)
PH UA: 7 (ref 5–8)
PLATELET # BLD: 223 K/UL (ref 138–453)
PLATELET ESTIMATE: ABNORMAL
PMV BLD AUTO: 10.9 FL (ref 8.1–13.5)
POTASSIUM SERPL-SCNC: 4.5 MMOL/L (ref 3.7–5.3)
PROTEIN UA: ABNORMAL
RBC # BLD: 3.87 M/UL (ref 3.95–5.11)
RBC # BLD: ABNORMAL 10*6/UL
RBC UA: ABNORMAL /HPF (ref 0–2)
RENAL EPITHELIAL, UA: ABNORMAL /HPF
SARS-COV-2, RAPID: NOT DETECTED
SEDIMENTATION RATE, ERYTHROCYTE: 29 MM (ref 0–30)
SEG NEUTROPHILS: 68 % (ref 36–65)
SEGMENTED NEUTROPHILS ABSOLUTE COUNT: 6.94 K/UL (ref 1.5–8.1)
SODIUM BLD-SCNC: 135 MMOL/L (ref 135–144)
SPECIFIC GRAVITY UA: 1.01 (ref 1–1.03)
SPECIMEN DESCRIPTION: NORMAL
TOTAL PROTEIN: 6.8 G/DL (ref 6.4–8.3)
TRICHOMONAS: ABNORMAL
TROPONIN INTERP: ABNORMAL
TROPONIN INTERP: ABNORMAL
TROPONIN T: ABNORMAL NG/ML
TROPONIN T: ABNORMAL NG/ML
TROPONIN, HIGH SENSITIVITY: 169 NG/L (ref 0–14)
TROPONIN, HIGH SENSITIVITY: 177 NG/L (ref 0–14)
TURBIDITY: ABNORMAL
URINE HGB: ABNORMAL
UROBILINOGEN, URINE: NORMAL
WBC # BLD: 10.1 K/UL (ref 3.5–11.3)
WBC # BLD: ABNORMAL 10*3/UL
WBC UA: ABNORMAL /HPF (ref 0–5)
YEAST: ABNORMAL

## 2021-08-11 PROCEDURE — 96365 THER/PROPH/DIAG IV INF INIT: CPT

## 2021-08-11 PROCEDURE — 83605 ASSAY OF LACTIC ACID: CPT

## 2021-08-11 PROCEDURE — 85025 COMPLETE CBC W/AUTO DIFF WBC: CPT

## 2021-08-11 PROCEDURE — 87635 SARS-COV-2 COVID-19 AMP PRB: CPT

## 2021-08-11 PROCEDURE — 84484 ASSAY OF TROPONIN QUANT: CPT

## 2021-08-11 PROCEDURE — 80053 COMPREHEN METABOLIC PANEL: CPT

## 2021-08-11 PROCEDURE — 82140 ASSAY OF AMMONIA: CPT

## 2021-08-11 PROCEDURE — 2060000000 HC ICU INTERMEDIATE R&B

## 2021-08-11 PROCEDURE — 87186 SC STD MICRODIL/AGAR DIL: CPT

## 2021-08-11 PROCEDURE — 85652 RBC SED RATE AUTOMATED: CPT

## 2021-08-11 PROCEDURE — 87088 URINE BACTERIA CULTURE: CPT

## 2021-08-11 PROCEDURE — 86403 PARTICLE AGGLUT ANTBDY SCRN: CPT

## 2021-08-11 PROCEDURE — 2580000003 HC RX 258: Performed by: STUDENT IN AN ORGANIZED HEALTH CARE EDUCATION/TRAINING PROGRAM

## 2021-08-11 PROCEDURE — 87086 URINE CULTURE/COLONY COUNT: CPT

## 2021-08-11 PROCEDURE — 99284 EMERGENCY DEPT VISIT MOD MDM: CPT

## 2021-08-11 PROCEDURE — 71045 X-RAY EXAM CHEST 1 VIEW: CPT

## 2021-08-11 PROCEDURE — 6360000002 HC RX W HCPCS: Performed by: STUDENT IN AN ORGANIZED HEALTH CARE EDUCATION/TRAINING PROGRAM

## 2021-08-11 PROCEDURE — 93005 ELECTROCARDIOGRAM TRACING: CPT | Performed by: STUDENT IN AN ORGANIZED HEALTH CARE EDUCATION/TRAINING PROGRAM

## 2021-08-11 PROCEDURE — 96375 TX/PRO/DX INJ NEW DRUG ADDON: CPT

## 2021-08-11 PROCEDURE — 36415 COLL VENOUS BLD VENIPUNCTURE: CPT

## 2021-08-11 PROCEDURE — 87040 BLOOD CULTURE FOR BACTERIA: CPT

## 2021-08-11 PROCEDURE — 81001 URINALYSIS AUTO W/SCOPE: CPT

## 2021-08-11 PROCEDURE — 70450 CT HEAD/BRAIN W/O DYE: CPT

## 2021-08-11 PROCEDURE — 86140 C-REACTIVE PROTEIN: CPT

## 2021-08-11 RX ORDER — SODIUM CHLORIDE 0.9 % (FLUSH) 0.9 %
5-40 SYRINGE (ML) INJECTION EVERY 12 HOURS SCHEDULED
Status: DISCONTINUED | OUTPATIENT
Start: 2021-08-11 | End: 2021-08-18 | Stop reason: HOSPADM

## 2021-08-11 RX ORDER — GEMFIBROZIL 600 MG/1
600 TABLET, FILM COATED ORAL 2 TIMES DAILY
Status: DISCONTINUED | OUTPATIENT
Start: 2021-08-11 | End: 2021-08-18 | Stop reason: HOSPADM

## 2021-08-11 RX ORDER — SODIUM CHLORIDE 0.9 % (FLUSH) 0.9 %
5-40 SYRINGE (ML) INJECTION PRN
Status: DISCONTINUED | OUTPATIENT
Start: 2021-08-11 | End: 2021-08-18 | Stop reason: HOSPADM

## 2021-08-11 RX ORDER — NICOTINE POLACRILEX 4 MG
15 LOZENGE BUCCAL PRN
Status: DISCONTINUED | OUTPATIENT
Start: 2021-08-11 | End: 2021-08-18 | Stop reason: HOSPADM

## 2021-08-11 RX ORDER — BUTALBITAL, ACETAMINOPHEN AND CAFFEINE 50; 325; 40 MG/1; MG/1; MG/1
1 TABLET ORAL EVERY 6 HOURS PRN
Status: DISCONTINUED | OUTPATIENT
Start: 2021-08-11 | End: 2021-08-18 | Stop reason: HOSPADM

## 2021-08-11 RX ORDER — DEXTROSE MONOHYDRATE 50 MG/ML
100 INJECTION, SOLUTION INTRAVENOUS PRN
Status: DISCONTINUED | OUTPATIENT
Start: 2021-08-11 | End: 2021-08-18 | Stop reason: HOSPADM

## 2021-08-11 RX ORDER — ACETAMINOPHEN 650 MG/1
650 SUPPOSITORY RECTAL EVERY 6 HOURS PRN
Status: DISCONTINUED | OUTPATIENT
Start: 2021-08-11 | End: 2021-08-18 | Stop reason: HOSPADM

## 2021-08-11 RX ORDER — POLYETHYLENE GLYCOL 3350 17 G/17G
17 POWDER, FOR SOLUTION ORAL DAILY PRN
Status: DISCONTINUED | OUTPATIENT
Start: 2021-08-11 | End: 2021-08-18 | Stop reason: HOSPADM

## 2021-08-11 RX ORDER — 0.9 % SODIUM CHLORIDE 0.9 %
30 INTRAVENOUS SOLUTION INTRAVENOUS ONCE
Status: COMPLETED | OUTPATIENT
Start: 2021-08-11 | End: 2021-08-11

## 2021-08-11 RX ORDER — VITAMIN B COMPLEX
1000 TABLET ORAL DAILY
Status: DISCONTINUED | OUTPATIENT
Start: 2021-08-12 | End: 2021-08-18 | Stop reason: HOSPADM

## 2021-08-11 RX ORDER — MONTELUKAST SODIUM 10 MG/1
10 TABLET ORAL NIGHTLY
Status: DISCONTINUED | OUTPATIENT
Start: 2021-08-11 | End: 2021-08-18 | Stop reason: HOSPADM

## 2021-08-11 RX ORDER — DONEPEZIL HYDROCHLORIDE 5 MG/1
5 TABLET, FILM COATED ORAL NIGHTLY
Status: DISCONTINUED | OUTPATIENT
Start: 2021-08-11 | End: 2021-08-18 | Stop reason: HOSPADM

## 2021-08-11 RX ORDER — VANCOMYCIN HYDROCHLORIDE 1 G/200ML
15 INJECTION, SOLUTION INTRAVENOUS ONCE
Status: COMPLETED | OUTPATIENT
Start: 2021-08-11 | End: 2021-08-11

## 2021-08-11 RX ORDER — FLUTICASONE PROPIONATE 110 UG/1
1 AEROSOL, METERED RESPIRATORY (INHALATION) 2 TIMES DAILY
Status: DISCONTINUED | OUTPATIENT
Start: 2021-08-11 | End: 2021-08-18 | Stop reason: HOSPADM

## 2021-08-11 RX ORDER — ALBUTEROL SULFATE 90 UG/1
2 AEROSOL, METERED RESPIRATORY (INHALATION) EVERY 6 HOURS PRN
Status: DISCONTINUED | OUTPATIENT
Start: 2021-08-11 | End: 2021-08-18 | Stop reason: HOSPADM

## 2021-08-11 RX ORDER — INSULIN GLARGINE 100 [IU]/ML
10 INJECTION, SOLUTION SUBCUTANEOUS NIGHTLY
Status: DISCONTINUED | OUTPATIENT
Start: 2021-08-11 | End: 2021-08-18 | Stop reason: HOSPADM

## 2021-08-11 RX ORDER — ONDANSETRON 4 MG/1
4 TABLET, ORALLY DISINTEGRATING ORAL EVERY 8 HOURS PRN
Status: DISCONTINUED | OUTPATIENT
Start: 2021-08-11 | End: 2021-08-18 | Stop reason: HOSPADM

## 2021-08-11 RX ORDER — ONDANSETRON 2 MG/ML
4 INJECTION INTRAMUSCULAR; INTRAVENOUS EVERY 6 HOURS PRN
Status: DISCONTINUED | OUTPATIENT
Start: 2021-08-11 | End: 2021-08-18 | Stop reason: HOSPADM

## 2021-08-11 RX ORDER — SODIUM CHLORIDE 9 MG/ML
INJECTION, SOLUTION INTRAVENOUS CONTINUOUS
Status: DISCONTINUED | OUTPATIENT
Start: 2021-08-11 | End: 2021-08-16

## 2021-08-11 RX ORDER — SODIUM CHLORIDE 9 MG/ML
25 INJECTION, SOLUTION INTRAVENOUS PRN
Status: DISCONTINUED | OUTPATIENT
Start: 2021-08-11 | End: 2021-08-18 | Stop reason: HOSPADM

## 2021-08-11 RX ORDER — RALOXIFENE HYDROCHLORIDE 60 MG/1
60 TABLET, FILM COATED ORAL DAILY
Status: DISCONTINUED | OUTPATIENT
Start: 2021-08-12 | End: 2021-08-16

## 2021-08-11 RX ORDER — DEXAMETHASONE SODIUM PHOSPHATE 4 MG/ML
4 INJECTION, SOLUTION INTRA-ARTICULAR; INTRALESIONAL; INTRAMUSCULAR; INTRAVENOUS; SOFT TISSUE EVERY 12 HOURS
Status: DISCONTINUED | OUTPATIENT
Start: 2021-08-11 | End: 2021-08-18 | Stop reason: HOSPADM

## 2021-08-11 RX ORDER — ACETAMINOPHEN 325 MG/1
650 TABLET ORAL EVERY 6 HOURS PRN
Status: DISCONTINUED | OUTPATIENT
Start: 2021-08-11 | End: 2021-08-18 | Stop reason: HOSPADM

## 2021-08-11 RX ORDER — DEXTROSE MONOHYDRATE 25 G/50ML
12.5 INJECTION, SOLUTION INTRAVENOUS PRN
Status: DISCONTINUED | OUTPATIENT
Start: 2021-08-11 | End: 2021-08-18 | Stop reason: HOSPADM

## 2021-08-11 RX ORDER — AMLODIPINE BESYLATE 10 MG/1
10 TABLET ORAL DAILY
Status: DISCONTINUED | OUTPATIENT
Start: 2021-08-12 | End: 2021-08-18 | Stop reason: HOSPADM

## 2021-08-11 RX ADMIN — VANCOMYCIN HYDROCHLORIDE 1000 MG: 1 INJECTION, SOLUTION INTRAVENOUS at 18:38

## 2021-08-11 RX ADMIN — SODIUM CHLORIDE 2001 ML: 9 INJECTION, SOLUTION INTRAVENOUS at 17:01

## 2021-08-11 RX ADMIN — PIPERACILLIN AND TAZOBACTAM 3375 MG: 3; .375 INJECTION, POWDER, FOR SOLUTION INTRAVENOUS at 18:02

## 2021-08-11 ASSESSMENT — ENCOUNTER SYMPTOMS
COUGH: 1
EYES NEGATIVE: 1
SHORTNESS OF BREATH: 0
GASTROINTESTINAL NEGATIVE: 1
CHEST TIGHTNESS: 0
NAUSEA: 0
CONSTIPATION: 0
COLOR CHANGE: 0
ABDOMINAL PAIN: 0
BACK PAIN: 0
RESPIRATORY NEGATIVE: 1
DIARRHEA: 0
VOMITING: 0

## 2021-08-11 ASSESSMENT — PAIN SCALES - GENERAL: PAINLEVEL_OUTOF10: 0

## 2021-08-11 NOTE — CONSULTS
[facility] Neurology   IN-PATIENT SERVICE      NEUROLOGY CONSULT  NOTE            Date:   8/11/2021  Patient name:  Randall Suresh  Date of admission:  8/11/2021  YOB: 1955      Chief Complaint:     Chief Complaint   Patient presents with    Altered Mental Status    Emesis       Reason for Consult: Altered mental status    History of Present Illness: The patient is a 61 y.o. female with past medical history of temporal artery plaque, hypertension, hyperlipidemia RCC, DM type II    who presented from nursing facility due to 1 day of altered mental status associated with nausea and vomiting. She is currently being treated with IV antibiotics that is unknown for UTI in the nursing facility. Reports that the patient has been acting differently. Per EMS, she was awake alert and oriented x3 follows commands. Patient did not complain of pain anywhere, no falling down, no trauma. Not on anticoagulation. No fever, no chills. Patient's vitals signs are within normal.     CT scan of the head without contrast showed edema within the left frontal of temporal lobes predominantly demonstrates a vasogenic pattern compatible with underlying mass or infectious process. There are areas of loss of cortical gray-white matter differentiation suggesting possibility of subacute ischemic infarct. Labs are unremarkable except for UTI on UA, patient received Zosyn and vancomycin, Internal medicine consulted for possible sepsis. Patient was seen previously at our neuro practice. Last visit was September/2020. Was seen for cognitive impairment and headaches. MRI brain on 8/13/2020 - for acute intracranial normality but showed sequelae of chronic microvascular ischemic changes. On my encounter, the patient denied any headaches, weakness or numbness of the upper or lower extremities or any speech or vision changes. She appeared drowsy. She was not oriented to time, place or date.  She was not in distress    Past Medical History:     Past Medical History:   Diagnosis Date    CEFERINO (acute kidney injury) ([facility] [address] 11/19/2014    Asthma     Carotid artery plaque 4/2/2013    Clostridium difficile diarrhea 11/19/2014    Dehydration 11/19/2014    Depression     Fall     history fell down stairs .  Fibromyalgia     Hiatal hernia     HTN (hypertension) 4/2/2013    Hyperlipidemia     Hypokalemia, gastrointestinal losses 11/21/2014    Kidney stone     b/l    Obesity (BMI 30-39. 9) 5/28/2013    Osteoarthritis     Osteoporosis     Renal cancer ([facility] [address] 8/7/2013    right    Short of breath on exertion     Type II or unspecified type diabetes mellitus without mention of complication, not stated as uncontrolled     Unspecified sleep apnea     does not use machine    Urge urinary incontinence     mid urethral sling in 2002    Wears glasses     Wears glasses     Zygomatic fracture, right side, initial encounter for closed fracture ([facility] [address] 5/26/2018        Past Surgical History:     Past Surgical History:   Procedure Laterality Date    BLADDER SURGERY  06/05/2014    Bladder Stimulator Implant    BLADDER SUSPENSION  08/09/2002    BREAST BIOPSY Left 03/03/2008    BREAST SURGERY Left 03/03/2008    lumpectomy    CHOLECYSTECTOMY  10/10/2003    COLONOSCOPY      x2    ENDOMETRIAL ABLATION      ENDOSCOPY, COLON, DIAGNOSTIC       egd x2    FIXATION KYPHOPLASTY  10/2018    KNEE SURGERY Bilateral     x 2 each side    KYPHOSIS SURGERY N/A 5/8/2019    KYPHOPLASTY L4 performed by Sabiha Ruth MD at [address] [facility]  10/15/14    WITH FUSION POSTERIOR L4 L5 WITH SPINAL CORD MONITORING    NEPHROSTOMY Right     OH PERQ VERT [address] UNI/BI [facility] LMBR N/A 9/12/2018    KYPHOPLASTY T2 & L2 performed by Sabiha Ruth MD at [address] [place] [address] Right 06/20/2013    right due to CA        Medications Prior to Admission:     Prior to Admission medications    Medication Sig Start (TUMS) 500 MG chewable tablet Take 1 tablet by mouth 3 times daily as needed for Heartburn 1/10/17   Minal Arias MD   Handicap Placard MISC by Does not apply route Duration: 1 year 4/28/16   Krista Khan MD   acetaminophen (TYLENOL) 500 MG tablet Take 500 mg by mouth every 6 hours as needed. Historical Provider, MD   butalbital-acetaminophen-caffeine (FIORICET, ESGIC) per tablet Take 1 tablet by mouth every 8 hours as needed. Historical Provider, MD   montelukast (SINGULAIR) 10 MG tablet Take 10 mg by mouth nightly. Historical Provider, MD   albuterol (PROVENTIL HFA;VENTOLIN HFA) 108 (90 BASE) MCG/ACT inhaler Inhale 2 puffs into the lungs every 6 hours as needed. Historical Provider, MD        Allergies:     Lac bovis, Nedocromil, Tramadol, Penicillins, and Tape [adhesive tape]    Social History:     Tobacco:    reports that she has never smoked. She has never used smokeless tobacco.  Alcohol:      reports no history of alcohol use. Drug Use:  reports no history of drug use.     Family History:     Family History   Problem Relation Age of Onset    Diabetes Mother     High Blood Pressure Mother     Pacemaker Mother     High Blood Pressure Father     Prostate Cancer Father     Breast Cancer Sister     Asthma Brother     Prostate Cancer Maternal Grandfather     High Blood Pressure Paternal Grandmother        Review of Systems:       Constitutional Negative for fever and chills   HEENT Negative for ear discharge, ear pain, nosebleed   Eyes Negative for photophobia, pain and discharge   Respiratory Negative for hemoptysis and sputum   Cardiovascular Negative for orthopnea, claudication and PND   Gastrointestinal Negative for abdominal pain, diarrhea, blood in stool   Musculoskeletal Negative for joint pain, negative for myalgia   Skin Negative for rash or itching   hematology Negative for ecchymosis, anemia   Psychiatric Negative for suicidal ideation, anxiety, depression, hallucinations Physical Exam:   BP (!) 156/67   Pulse 69   Temp 98.2 °F (36.8 °C)   Resp 12   Ht 5' 2\" (1.575 m)   Wt 147 lb (66.7 kg)   LMP 2002   SpO2 100%   BMI 26.89 kg/m²   Temp (24hrs), Av.2 °F (36.8 °C), Min:98.2 °F (36.8 °C), Max:98.2 °F (36.8 °C)        General examination:      General Appearance:  alert, well appearing, and in no acute distress, appear drowsy   HEENT: Normocephalic, atraumatic, moist mucus membranes  Neck: supple, no carotid bruits, (-) nuchal rigidity  Lungs:  Respirations unlabored, chest wall no deformity, BS normal  Cardiovascular: normal rate, regular rhythm  Abdomen: Soft, nontender, nondistended, normal bowel sounds  Skin: No gross lesions, rashes, bruising or bleeding on exposed skin area  Extremities:  peripheral pulses palpable, no cyanosis, clubbing or edema  Psych: normal affect      Neurological examination:      Mental status   Not alert to place and time. following all commands;   speech is fluent, no dysarthria, aphasia. Cranial nerves   II - visual fields intact to confrontation; pupils reactive  III, IV, VI  extraocular muscles intact; no CHERRY; no nystagmus; no ptosis   V - normal facial sensation                                                               VII - normal facial symmetry                                                             VIII - intact hearing                                                                             IX, X - symmetrical palate elevation                                               XI - symmetrical shoulder shrug                                                       XII - midline tongue without atrophy or fasciculation     Motor function  Strength:   5/5 RUE, 5/5 RLE  5/5 LUE, 5/5  LLE  Normal bulk and tone. Sensory function Intact to touch, pin, vibration, proprioception throughout     Cerebellar Intact finger-nose-finger testing. Intact heel-shin testing. No dysdiadochokinesia present.    No tremors Reflex function 2/4 symmetric throughout . Downgoing plantar response bilaterally. (-)Vu's sign bilaterally      Gait                  Normal station and gait           Diagnostics:      Laboratory Testing:  CBC:   Recent Labs     08/11/21  1647   WBC 10.1   HGB 10.8*        BMP:    Recent Labs     08/11/21  1647      K 4.5      CO2 15*   BUN 16   CREATININE 0.96*   GLUCOSE 134*         Lab Results   Component Value Date    CHOL 181 05/26/2020    LDLCHOLESTEROL 89 05/26/2020    HDL 46 05/26/2020    TRIG 230 (H) 05/26/2020    ALT 12 08/11/2021    AST 16 08/11/2021    TSH 1.32 05/08/2021    INR 0.9 05/08/2021    GLUF 110 (H) 05/24/2019    LABA1C 15.9 (H) 05/09/2021    LABMICR 1,118 (H) 05/26/2020    THSKRLOS71 724 05/02/2020       No results found for: PHENYTOIN, PHENYTOIN, VALPROATE, CBMZ      Imaging/Diagnostics:  CT HEAD WO CONTRAST    Result Date: 8/11/2021  EXAMINATION: CT OF THE HEAD WITHOUT CONTRAST  8/11/2021 5:43 pm TECHNIQUE: CT of the head was performed without the administration of intravenous contrast. Dose modulation, iterative reconstruction, and/or weight based adjustment of the mA/kV was utilized to reduce the radiation dose to as low as reasonably achievable. COMPARISON: May 8, 2021 HISTORY: ORDERING SYSTEM PROVIDED HISTORY: sepsis unknown source TECHNOLOGIST PROVIDED HISTORY: sepsis unknown source Decision Support Exception - unselect if not a suspected or confirmed emergency medical condition->Emergency Medical Condition (MA) Reason for Exam: sepsis unknown source Acuity: Acute FINDINGS: BRAIN/VENTRICLES: New area of edema involving the left frontal and temporal lobes which appears primarily vasogenic although there is focal disruption of the gray-white junction as well suggesting cytotoxic edema. There also appears to be a small area of encephalomalacia within the right parietal lobe.   Mild diffuse cortical volume loss and ventricular enlargement appears differential of subacute ischemic infarct.   Patient's troponin was noted to be elevated with suspicion of non-STEMI    Impression:  -Mass/mets with vasogenic edema  -subacute stroke    Plan:     - Brain MRI With and without contrast   -Neurosurgery consultation  -Decadron 4 mg IV every 12 hours   -NS okay with heparin drip preferably after obtaining MRI brain      Electronically signed by Geo Brewer MD on 8/11/2021 at 6:41 PM      Electronically signed by   Geo Brewer MD  8/11/2021  6:41 PM

## 2021-08-11 NOTE — ED PROVIDER NOTES
[facility]  Emergency Department  Faculty Attestation     I performed a history and physical examination of the patient and discussed management with the resident. I reviewed the residents note and agree with the documented findings and plan of care. Any areas of disagreement are noted on the chart. I was personally present for the key portions of any procedures. I have documented in the chart those procedures where I was not present during the key portions. I have reviewed the emergency nurses triage note. I agree with the chief complaint, past medical history, past surgical history, allergies, medications, social and family history as documented unless otherwise noted below. For Physician Assistant/ Nurse Practitioner cases/documentation I have personally evaluated this patient and have completed at least one if not all key elements of the E/M (history, physical exam, and MDM). Additional findings are as noted. Primary Care Physician:  Juliette William MD    Screenings:  [unfilled]    CHIEF COMPLAINT       Chief Complaint   Patient presents with    Altered Mental Status    Emesis       RECENT VITALS:   Temp: 98.2 °F (36.8 °C),  Pulse: 67, Resp: 12, BP: 124/68    LABS:  Labs Reviewed   CULTURE, BLOOD 1   CULTURE, BLOOD 1   COVID-19, RAPID   CULTURE, URINE   CBC WITH AUTO DIFFERENTIAL   COMPREHENSIVE METABOLIC PANEL   LACTATE, SEPSIS   LACTATE, SEPSIS   TROPONIN   TROPONIN   URINALYSIS WITH MICROSCOPIC   POCT GLUCOSE       Radiology  XR CHEST PORTABLE    (Results Pending)       EKG:   EKG Interpretation    Interpreted by me    Rhythm: normal sinus   Rate: normal  Axis: normal  Ectopy: none  Conduction: Long QTc  ST Segments: no acute change  T Waves: Diffuse flattening, inversion V2  Q Waves: none    Clinical Impression: Nonspecific T wave change in long QTC    Attending Physician Additional  Notes    Sent from [facility] for altered mentation. On antibiotics for UTI. Patient is without complaints. She denies headache abdominal pain vomiting fevers. She has mild cough but no sputum. No skin changes or sores that she is aware of. No new medications that she is aware of. On exam she is sleepy appearing, vital signs however are normal.  No respiratory distress. Skin is warm with normal capillary refill. She has intermittent dry cough but lungs are clear. No retractions or accessory muscle use. No wheezing or rales noted. Abdomen is slightly distended but soft and nontender. She has an erythematous rash in the neck crease that has appearance of yeast.  No skin sores on her heels sacrum or back. No edema cords Homans or calf tenderness. Neck is supple. Mouth is without lesion and but slightly dry. Neck is supple. Impression is altered mentation, consider hypoglycemia, infection, UTI, metabolic disorder or other cause. Plan is IV access, labs, imaging, reassess. Birdyony Villalobos.  Terance Gilford, MD, [address] [facility]  Attending Emergency  Physician               Elin Bell MD  08/11/21 1700

## 2021-08-11 NOTE — ED PROVIDER NOTES
plaque, Clostridium difficile diarrhea, Dehydration, Depression, Fall, Fibromyalgia, Hiatal hernia, HTN (hypertension), Hyperlipidemia, Hypokalemia, gastrointestinal losses, Kidney stone, Obesity (BMI 30-39.9), Osteoarthritis, Osteoporosis, Renal cancer ([facility] Short of breath on exertion, Type II or unspecified type diabetes mellitus without mention of complication, not stated as uncontrolled, Unspecified sleep apnea, Urge urinary incontinence, Wears glasses, Wears glasses, and Zygomatic fracture, right side, initial encounter for closed fracture ([facility] has a past surgical history that includes knee surgery (Bilateral); bladder suspension (08/09/2002); Nephrostomy (Right); total nephrectomy (Right, 06/20/2013); lumbar laminectomy (10/15/14); Bladder surgery (06/05/2014); Endometrial ablation; Cholecystectomy (10/10/2003); Breast surgery (Left, 03/03/2008); Breast biopsy (Left, 03/03/2008); pr perq vert agmntj cavity crtj uni/bi cannulj lmbr (N/A, 9/12/2018); Fixation Kyphoplasty (10/2018); Kyphosis surgery (N/A, 5/8/2019); Endoscopy, colon, diagnostic; and Colonoscopy.       Social History     Socioeconomic History    Marital status:      Spouse name: Not on file    Number of children: Not on file    Years of education: 15    Highest education level: Not on file   Occupational History    Occupation: disability     Employer: N/A   Tobacco Use    Smoking status: Never Smoker    Smokeless tobacco: Never Used   Vaping Use    Vaping Use: Never used   Substance and Sexual Activity    Alcohol use: No    Drug use: No    Sexual activity: Not on file   Other Topics Concern    Not on file   Social History Narrative    Not on file     Social Determinants of Health     Financial Resource Strain:     Difficulty of Paying Living Expenses:    Food Insecurity:     Worried About [address] in the Last Year:     [address] in the Last Year:    Transportation Needs:     Lack of Transportation (Medical):  Lack of Transportation (Non-Medical):    Physical Activity:     Days of Exercise per Week:     Minutes of Exercise per Session:    Stress:     Feeling of Stress :    Social Connections:     Frequency of Communication with Friends and Family:     Frequency of Social Gatherings with Friends and Family:     Attends [employer] Services:     Active Member of Clubs or Organizations:     Attends Club or Organization Meetings:     Marital Status:    Intimate Partner Violence:     Fear of Current or Ex-Partner:     Emotionally Abused:     Physically Abused:     Sexually Abused:        Family History   Problem Relation Age of Onset    Diabetes Mother     High Blood Pressure Mother     Pacemaker Mother     High Blood Pressure Father     Prostate Cancer Father     Breast Cancer Sister     Asthma Brother     Prostate Cancer Maternal Grandfather     High Blood Pressure Paternal Grandmother        Allergies:  Lac bovis, Nedocromil, Tramadol, Penicillins, and Tape [adhesive tape]    Home Medications:  Prior to Admission medications    Medication Sig Start Date End Date Taking? Authorizing Provider   lactobacillus (CULTURELLE) capsule Take 1 capsule by mouth daily (with breakfast) 5/15/21   Luis Frey MD   insulin glargine (LANTUS SOLOSTAR) 100 UNIT/ML injection pen Inject 15 units nightly 5/14/21   Luis Frey MD   insulin lispro (HUMALOG) 100 UNIT/ML injection vial Inject 10 Units into the skin 3 times daily (before meals) Glucose: Dose: If <139             No Insulin 140-199 2 Units 200-249 4 Units 250-299 6 Units 300-349 8 Units 350-400 10 Units Above 400       12 Units 5/14/21   Luis Frey MD   donepezil (ARICEPT) 5 MG tablet Take 1 tablet by mouth nightly 9/1/20   Claudio Hilton MD   ammonium lactate (AMLACTIN) 12 % cream Apply 1 Bottle topically as needed for Dry Skin Apply topically as needed.     Historical Provider, MD   amLODIPine (NORVASC) 10 MG tablet Take 1 tablet by mouth daily 3/27/20   Bull Dimas MD   metFORMIN (GLUCOPHAGE-XR) 500 MG extended release tablet Take 1 tablet by mouth 2 times daily 3/27/20   Bull Dimas MD   ondansetron [facility]) 4 MG tablet Take 1 tablet by mouth every 8 hours as needed for Nausea or Vomiting 8/1/19   Enid Maldonado MD   sertraline (ZOLOFT) 100 MG tablet TAKE ONE TABLET BY MOUTH DAILY 7/10/19   Tiffanie Hancock MD   raloxifene (EVISTA) 60 MG tablet TAKE ONE TABLET BY MOUTH DAILY 7/10/19   Tiffanie Hancock MD   Cholecalciferol (VITAMIN D3) 1000 units TABS Take 1 tablet by mouth daily 12/26/18   Tiffanie Hancock MD   aspirin EC 81 MG EC tablet Take 1 tablet by mouth daily 12/11/18   Tiffanie Hancock MD   lovastatin (MEVACOR) 40 MG tablet TAKE ONE AND ONE-HALF (1  1/2) TABLET BY MOUTH NIGHTLY 12/11/18   Tiffanie Hancock MD   gemfibrozil (LOPID) 600 MG tablet TAKE ONE TABLET BY MOUTH TWICE A DAY 12/11/18   Tiffanie Hancock MD   fluticasone (ARNUITY ELLIPTA) 200 MCG/ACT AEPB Inhale 1 Inhaler into the lungs daily    Historical Provider, MD   BiPAP Machine MISC by Does not apply route    Historical Provider, MD   calcium carbonate (TUMS) 500 MG chewable tablet Take 1 tablet by mouth 3 times daily as needed for Heartburn 1/10/17   Alisa Barger MD   Handicap Placard MISC by Does not apply route Duration: 1 year 4/28/16   Ana Rosales MD   acetaminophen (TYLENOL) 500 MG tablet Take 500 mg by mouth every 6 hours as needed. Historical Provider, MD   butalbital-acetaminophen-caffeine (FIORICET, ESGIC) per tablet Take 1 tablet by mouth every 8 hours as needed. Historical Provider, MD   montelukast (SINGULAIR) 10 MG tablet Take 10 mg by mouth nightly. Historical Provider, MD   albuterol (PROVENTIL HFA;VENTOLIN HFA) 108 (90 BASE) MCG/ACT inhaler Inhale 2 puffs into the lungs every 6 hours as needed. Historical Provider, MD       REVIEW OF SYSTEMS    (2-9 systems for level 4, 10 or more for level 5)      Review of Systems   Constitutional: Negative.     HENT: Negative. Eyes: Negative. Respiratory: Negative. Cardiovascular: Negative. Gastrointestinal: Negative. Genitourinary: Negative. Musculoskeletal: Negative. Skin: Negative. Neurological: Negative. Psychiatric/Behavioral: Positive for confusion. Negative for agitation, behavioral problems, decreased concentration, dysphoric mood, hallucinations, self-injury, sleep disturbance and suicidal ideas. The patient is not nervous/anxious and is not hyperactive. PHYSICAL EXAM   (up to 7 for level 4, 8 or more for level 5)      INITIAL VITALS:   BP (!) 156/67   Pulse 69   Temp 98.2 °F (36.8 °C)   Resp 12   Ht 5' 2\" (1.575 m)   Wt 147 lb (66.7 kg)   LMP 01/01/2002   SpO2 100%   BMI 26.89 kg/m²   I have reviewed the triage vital signs. Const: Well nourished, well developed, appears stated age. Patient is pale. Eyes: PERRL, no conjunctival injection  HENT: NCAT, Neck supple without meningismus   CV: RRR, Warm, well-perfused extremities  RESP: CTAB, Unlabored respiratory effort  GI: soft, non-tender, non-distended, no masses  MSK: No gross deformities appreciated  Skin: Warm, dry. No rashes  Neuro: Alert, CNs II-XII grossly intact. Sensation and motor function of extremities grossly intact. Psych: Appropriate mood and affect.       DIFFERENTIAL  DIAGNOSIS     PLAN (LABS / IMAGING / EKG):  Orders Placed This Encounter   Procedures    Culture, Blood 1    Culture, Blood 1    COVID-19, Rapid    Culture, Urine    XR CHEST PORTABLE    CT HEAD WO CONTRAST    CBC Auto Differential    Comprehensive Metabolic Panel    Lactate, Sepsis    Troponin    Urinalysis with Microscopic    Troponin    AMMONIA    Inpatient consult to Internal Medicine    Inpatient consult to Neurology    Inpatient consult to [facility]    POCT Glucose    POC Blood Gas and Chemistry    EKG 12 Lead    Insert peripheral IV    PATIENT STATUS (FROM ED OR OR/PROCEDURAL) Inpatient       MEDICATIONS ORDERED:  Orders Placed This Encounter   Medications    0.9 % sodium chloride bolus    vancomycin (VANCOCIN) 1000 mg in dextrose 5% 200 mL IVPB     Order Specific Question:   Antimicrobial Indications     Answer:   Urinary Tract Infection    piperacillin-tazobactam (ZOSYN) 3,375 mg in dextrose 5 % 50 mL IVPB (mini-bag)     Order Specific Question:   Antimicrobial Indications     Answer:   Urinary Tract Infection       DDX:   Abscess, stroke, ACS/MI, UTI     DIAGNOSTIC RESULTS / EMERGENCY DEPARTMENT COURSE / MDM   LAB RESULTS:  Results for orders placed or performed during the hospital encounter of 08/11/21   COVID-19, Rapid    Specimen: Nasopharyngeal Swab   Result Value Ref Range    Specimen Description . NASOPHARYNGEAL SWAB     SARS-CoV-2, Rapid Not Detected Not Detected   CBC Auto Differential   Result Value Ref Range    WBC 10.1 3.5 - 11.3 k/uL    RBC 3.87 (L) 3.95 - 5.11 m/uL    Hemoglobin 10.8 (L) 11.9 - 15.1 g/dL    Hematocrit 34.4 (L) 36.3 - 47.1 %    MCV 88.9 82.6 - 102.9 fL    MCH 27.9 25.2 - 33.5 pg    MCHC 31.4 28.4 - 34.8 g/dL    RDW 13.0 11.8 - 14.4 %    Platelets 375 161 - 107 k/uL    MPV 10.9 8.1 - 13.5 fL    NRBC Automated 0.0 0.0 per 100 WBC    Differential Type NOT REPORTED     Seg Neutrophils 68 (H) 36 - 65 %    Lymphocytes 22 (L) 24 - 43 %    Monocytes 7 3 - 12 %    Eosinophils % 2 1 - 4 %    Basophils 0 0 - 2 %    Immature Granulocytes 1 (H) 0 %    Segs Absolute 6.94 1.50 - 8.10 k/uL    Absolute Lymph # 2.20 1.10 - 3.70 k/uL    Absolute Mono # 0.66 0.10 - 1.20 k/uL    Absolute Eos # 0.18 0.00 - 0.44 k/uL    Basophils Absolute 0.03 0.00 - 0.20 k/uL    Absolute Immature Granulocyte 0.07 0.00 - 0.30 k/uL    WBC Morphology NOT REPORTED     RBC Morphology NOT REPORTED     Platelet Estimate NOT REPORTED    Comprehensive Metabolic Panel   Result Value Ref Range    Glucose 134 (H) 70 - 99 mg/dL    BUN 16 8 - 23 mg/dL    CREATININE 0.96 (H) 0.50 - 0.90 mg/dL    Bun/Cre Ratio NOT REPORTED 9 - 20 Calcium 8.8 8.6 - 10.4 mg/dL    Sodium 135 135 - 144 mmol/L    Potassium 4.5 3.7 - 5.3 mmol/L    Chloride 104 98 - 107 mmol/L    CO2 15 (L) 20 - 31 mmol/L    Anion Gap 16 9 - 17 mmol/L    Alkaline Phosphatase 81 35 - 104 U/L    ALT 12 5 - 33 U/L    AST 16 <32 U/L    Total Bilirubin 0.42 0.3 - 1.2 mg/dL    Total Protein 6.8 6.4 - 8.3 g/dL    Albumin 3.1 (L) 3.5 - 5.2 g/dL    Albumin/Globulin Ratio 0.8 (L) 1.0 - 2.5    GFR Non- 59 (L) >60 mL/min    GFR African American >60 >60 mL/min    GFR Comment          GFR Staging NOT REPORTED    Lactate, Sepsis   Result Value Ref Range    Lactic Acid, Sepsis NOT REPORTED 0.5 - 1.9 mmol/L    Lactic Acid, Sepsis, Whole Blood 2.0 (H) 0.5 - 1.9 mmol/L   Lactate, Sepsis   Result Value Ref Range    Lactic Acid, Sepsis NOT REPORTED 0.5 - 1.9 mmol/L    Lactic Acid, Sepsis, Whole Blood 1.2 0.5 - 1.9 mmol/L   Troponin   Result Value Ref Range    Troponin, High Sensitivity 169 (HH) 0 - 14 ng/L    Troponin T NOT REPORTED <0.03 ng/mL    Troponin Interp NOT REPORTED    Urinalysis with Microscopic   Result Value Ref Range    Color, UA YELLOW YELLOW    Turbidity UA TURBID (A) CLEAR    Glucose, Ur NEGATIVE NEGATIVE    Bilirubin Urine NEGATIVE NEGATIVE    Ketones, Urine NEGATIVE NEGATIVE    Specific Gravity, UA 1.011 1.005 - 1.030    Urine Hgb MODERATE (A) NEGATIVE    pH, UA 7.0 5.0 - 8.0    Protein, UA 2+ (A) NEGATIVE    Urobilinogen, Urine Normal Normal    Nitrite, Urine POSITIVE (A) NEGATIVE    Leukocyte Esterase, Urine LARGE (A) NEGATIVE    -          WBC, UA TOO NUMEROUS TO COUNT 0 - 5 /HPF    RBC, UA 2 TO 5 0 - 2 /HPF    Casts UA 5 TO 10 0 - 2 /LPF    Casts UA HYALINE 0 - 2 /LPF    Crystals, UA NOT REPORTED None /HPF    Epithelial Cells UA 5 TO 10 0 - 5 /HPF    Renal Epithelial, UA NOT REPORTED 0 /HPF    Bacteria, UA MANY (A) None    Mucus, UA 2+ (A) None    Trichomonas, UA NOT REPORTED None    Amorphous, UA NOT REPORTED None    Other Observations UA NOT REPORTED NOT REQ. Yeast, UA NOT REPORTED None   Troponin   Result Value Ref Range    Troponin, High Sensitivity 177 (HH) 0 - 14 ng/L    Troponin T NOT REPORTED <0.03 ng/mL    Troponin Interp NOT REPORTED    AMMONIA   Result Value Ref Range    Ammonia 15 11 - 51 umol/L     Negative Covid. Initial lactic 2.0, second lactic 1.2 in the setting of infection. Initial troponin I 77, second troponin I 69, in the setting of sepsis. No EKG changes, no chest pain. Positive nitrites and large leukocyte esterase indicating a UTI, in the setting of sepsis. The likely source of sepsis. RADIOLOGY:  CT HEAD WO CONTRAST    Result Date: 8/11/2021  EXAMINATION: CT OF THE HEAD WITHOUT CONTRAST  8/11/2021 5:43 pm TECHNIQUE: CT of the head was performed without the administration of intravenous contrast. Dose modulation, iterative reconstruction, and/or weight based adjustment of the mA/kV was utilized to reduce the radiation dose to as low as reasonably achievable. COMPARISON: May 8, 2021 HISTORY: ORDERING SYSTEM PROVIDED HISTORY: sepsis unknown source TECHNOLOGIST PROVIDED HISTORY: sepsis unknown source Decision Support Exception - unselect if not a suspected or confirmed emergency medical condition->Emergency Medical Condition (MA) Reason for Exam: sepsis unknown source Acuity: Acute FINDINGS: BRAIN/VENTRICLES: New area of edema involving the left frontal and temporal lobes which appears primarily vasogenic although there is focal disruption of the gray-white junction as well suggesting cytotoxic edema. There also appears to be a small area of encephalomalacia within the right parietal lobe. Mild diffuse cortical volume loss and ventricular enlargement appears unchanged. Periventricular white matter hypoattenuation bilaterally compatible chronic microvascular ischemic changes. No acute intracranial hemorrhage. No significant mass effect is appreciated despite the above findings.  ORBITS: The visualized portion of the orbits demonstrate no acute abnormality. SINUSES: The visualized paranasal sinuses and mastoid air cells demonstrate no acute abnormality. SOFT TISSUES/SKULL:  No acute abnormality of the visualized skull or soft tissues. Area of edema within the left frontal and temporal lobes predominantly demonstrates a vasogenic pattern compatible with underlying mass or infectious process. However, there are areas of loss of the cortical gray-white differentiation suggesting the possibility of a subacute ischemic infarct. MRI with and without contrast recommended for further evaluation. Findings were discussed with Dr. Harrie Goodpasture at 6:14 pm on 8/11/2021 by Dr. Summer Ramirez. XR CHEST PORTABLE    Result Date: 8/11/2021  EXAMINATION: ONE XRAY VIEW OF THE CHEST 8/11/2021 6:09 pm COMPARISON: May 8, 2021 HISTORY: ORDERING SYSTEM PROVIDED HISTORY: septic workup TECHNOLOGIST PROVIDED HISTORY: septic workup FINDINGS: Right upper extremity PICC terminates near the cavoatrial junction in satisfactory position. Cardiomegaly appears unchanged. Cardiomediastinal silhouette otherwise within normal limits. Lungs and costophrenic sulci are clear. No pneumothorax or subdiaphragmatic free air. No acute osseous abnormality. Upper thoracic vertebral augmentation redemonstrated. No radiographic evidence of acute cardiopulmonary disease. EKG  Rhythm: normal sinus   Rate: normal  Axis: normal  Ectopy: none  Conduction: normal  ST Segments: no acute change  T Waves: no acute change  Q Waves: none    EKG  Impression: no acute changes and non-specific EKG     All EKG's are interpreted by the Emergency Department Physician who either signs or Co-signs this chart in the absence of a cardiologist.        IMPRESSION: Sepsis. Patient comes in with known UTI and altered mental status. No focal neurologic deficits.       EMERGENCY DEPARTMENT COURSE:  ED Course as of Aug 11 2007   Wed Aug 11, 2021   1716 Lactic Acid, Sepsis, Whole Blood(!): 2.0 [AR] 1729 Troponin, High Sensitivity(!!): 177 [AR]   1735 Nitrite, Urine(!): POSITIVE [AR]   1735 Leukocyte Esterase, Urine(!): LARGE [AR]   1750 Lactic Acid, Sepsis, Whole Blood: 1.2 [AR]   1750 Patient resting comfortably in bed. No acute distress. Mentation is still not appropriate. Confused. Denies all pain. Just wants to go home.    [AR]   [facility] [AR]   3904 Radiology called this physician and described area of edema in the left frontoparietal region possibly due to ischemia/infection/tumor. Neurology consult    [AR]      ED Course User Index  [AR] Woody Schulz DO     Neurology evaluated patient and recommends MRI for further evaluation of vasogenic edema. Family medicine consulted and will admit patient. PROCEDURES:  None    CONSULTS:  IP CONSULT TO INTERNAL MEDICINE  IP CONSULT TO NEUROLOGY  IP CONSULT TO FAMILY MEDICINE  IP CONSULT TO SOCIAL WORK  IP CONSULT TO CARDIOLOGY    CRITICAL CARE:  Please see Attending Note    FINAL IMPRESSION      1. Septicemia ([address]    2. Vasogenic edema (HCC)    3. Elevated troponin    4. Acute cystitis without hematuria    5. Disorientation          DISPOSITION / PLAN     DISPOSITION Admitted 08/11/2021 08:03:18 PM    PATIENT REFERRED TO:  No follow-up provider specified.     DISCHARGE MEDICATIONS:  New Prescriptions    No medications on file       Woody Schulz DO  Emergency Medicine Resident    (Please note that portions of thisnote were completed with a voice recognition program.  Efforts were made to edit the dictations but occasionally words are mis-transcribed.)       Woody Schulz DO  Resident  08/13/21 6085

## 2021-08-11 NOTE — Clinical Note
Patient Class: Inpatient [101]   REQUIRED: Diagnosis: Altered mental status [780.97. ICD-9-CM]   Estimated Length of Stay: Estimated stay of more than 2 midnights   Admitting Provider: Fitz Israel [[id number]]   Preferred Department: PCU   Telemetry/Cardiac Monitoring Required?: No

## 2021-08-12 ENCOUNTER — APPOINTMENT (OUTPATIENT)
Dept: MRI IMAGING | Age: 66
DRG: 045 | End: 2021-08-12
Payer: MEDICAID

## 2021-08-12 LAB
ABSOLUTE EOS #: <0.03 K/UL (ref 0–0.44)
ABSOLUTE IMMATURE GRANULOCYTE: 0.08 K/UL (ref 0–0.3)
ABSOLUTE LYMPH #: 1.24 K/UL (ref 1.1–3.7)
ABSOLUTE MONO #: 0.3 K/UL (ref 0.1–1.2)
ANION GAP SERPL CALCULATED.3IONS-SCNC: 15 MMOL/L (ref 9–17)
BASOPHILS # BLD: 0 % (ref 0–2)
BASOPHILS ABSOLUTE: <0.03 K/UL (ref 0–0.2)
BUN BLDV-MCNC: 13 MG/DL (ref 8–23)
BUN/CREAT BLD: ABNORMAL (ref 9–20)
C-REACTIVE PROTEIN: <3 MG/L (ref 0–5)
CALCIUM SERPL-MCNC: 8.2 MG/DL (ref 8.6–10.4)
CHLORIDE BLD-SCNC: 106 MMOL/L (ref 98–107)
CO2: 15 MMOL/L (ref 20–31)
CREAT SERPL-MCNC: 0.92 MG/DL (ref 0.5–0.9)
DIFFERENTIAL TYPE: ABNORMAL
EKG ATRIAL RATE: 70 BPM
EKG Q-T INTERVAL: 456 MS
EKG QRS DURATION: 80 MS
EKG QTC CALCULATION (BAZETT): 481 MS
EKG R AXIS: 17 DEGREES
EKG T AXIS: 35 DEGREES
EKG VENTRICULAR RATE: 67 BPM
EOSINOPHILS RELATIVE PERCENT: 0 % (ref 1–4)
GFR AFRICAN AMERICAN: >60 ML/MIN
GFR NON-AFRICAN AMERICAN: >60 ML/MIN
GFR SERPL CREATININE-BSD FRML MDRD: ABNORMAL ML/MIN/{1.73_M2}
GFR SERPL CREATININE-BSD FRML MDRD: ABNORMAL ML/MIN/{1.73_M2}
GLUCOSE BLD-MCNC: 105 MG/DL (ref 65–105)
GLUCOSE BLD-MCNC: 134 MG/DL (ref 65–105)
GLUCOSE BLD-MCNC: 147 MG/DL (ref 65–105)
GLUCOSE BLD-MCNC: 161 MG/DL (ref 70–99)
GLUCOSE BLD-MCNC: 182 MG/DL (ref 65–105)
GLUCOSE BLD-MCNC: 199 MG/DL (ref 65–105)
HCT VFR BLD CALC: 35.6 % (ref 36.3–47.1)
HEMOGLOBIN: 10.8 G/DL (ref 11.9–15.1)
IMMATURE GRANULOCYTES: 1 %
LYMPHOCYTES # BLD: 14 % (ref 24–43)
MCH RBC QN AUTO: 27.6 PG (ref 25.2–33.5)
MCHC RBC AUTO-ENTMCNC: 30.3 G/DL (ref 28.4–34.8)
MCV RBC AUTO: 90.8 FL (ref 82.6–102.9)
MONOCYTES # BLD: 3 % (ref 3–12)
NRBC AUTOMATED: 0 PER 100 WBC
PDW BLD-RTO: 12.9 % (ref 11.8–14.4)
PLATELET # BLD: 197 K/UL (ref 138–453)
PLATELET ESTIMATE: ABNORMAL
PMV BLD AUTO: 11.2 FL (ref 8.1–13.5)
POTASSIUM SERPL-SCNC: 4 MMOL/L (ref 3.7–5.3)
RBC # BLD: 3.92 M/UL (ref 3.95–5.11)
RBC # BLD: ABNORMAL 10*6/UL
SEG NEUTROPHILS: 82 % (ref 36–65)
SEGMENTED NEUTROPHILS ABSOLUTE COUNT: 7.5 K/UL (ref 1.5–8.1)
SODIUM BLD-SCNC: 136 MMOL/L (ref 135–144)
TROPONIN INTERP: ABNORMAL
TROPONIN INTERP: ABNORMAL
TROPONIN T: ABNORMAL NG/ML
TROPONIN T: ABNORMAL NG/ML
TROPONIN, HIGH SENSITIVITY: 159 NG/L (ref 0–14)
TROPONIN, HIGH SENSITIVITY: 165 NG/L (ref 0–14)
WBC # BLD: 9.2 K/UL (ref 3.5–11.3)
WBC # BLD: ABNORMAL 10*3/UL

## 2021-08-12 PROCEDURE — 70553 MRI BRAIN STEM W/O & W/DYE: CPT

## 2021-08-12 PROCEDURE — 6360000002 HC RX W HCPCS: Performed by: STUDENT IN AN ORGANIZED HEALTH CARE EDUCATION/TRAINING PROGRAM

## 2021-08-12 PROCEDURE — 2500000003 HC RX 250 WO HCPCS: Performed by: STUDENT IN AN ORGANIZED HEALTH CARE EDUCATION/TRAINING PROGRAM

## 2021-08-12 PROCEDURE — 99222 1ST HOSP IP/OBS MODERATE 55: CPT | Performed by: STUDENT IN AN ORGANIZED HEALTH CARE EDUCATION/TRAINING PROGRAM

## 2021-08-12 PROCEDURE — A9576 INJ PROHANCE MULTIPACK: HCPCS | Performed by: STUDENT IN AN ORGANIZED HEALTH CARE EDUCATION/TRAINING PROGRAM

## 2021-08-12 PROCEDURE — 2580000003 HC RX 258: Performed by: STUDENT IN AN ORGANIZED HEALTH CARE EDUCATION/TRAINING PROGRAM

## 2021-08-12 PROCEDURE — 93005 ELECTROCARDIOGRAM TRACING: CPT | Performed by: STUDENT IN AN ORGANIZED HEALTH CARE EDUCATION/TRAINING PROGRAM

## 2021-08-12 PROCEDURE — 6360000004 HC RX CONTRAST MEDICATION: Performed by: STUDENT IN AN ORGANIZED HEALTH CARE EDUCATION/TRAINING PROGRAM

## 2021-08-12 PROCEDURE — 82947 ASSAY GLUCOSE BLOOD QUANT: CPT

## 2021-08-12 PROCEDURE — 80048 BASIC METABOLIC PNL TOTAL CA: CPT

## 2021-08-12 PROCEDURE — 85025 COMPLETE CBC W/AUTO DIFF WBC: CPT

## 2021-08-12 PROCEDURE — 6370000000 HC RX 637 (ALT 250 FOR IP): Performed by: STUDENT IN AN ORGANIZED HEALTH CARE EDUCATION/TRAINING PROGRAM

## 2021-08-12 PROCEDURE — 2060000000 HC ICU INTERMEDIATE R&B

## 2021-08-12 PROCEDURE — 84484 ASSAY OF TROPONIN QUANT: CPT

## 2021-08-12 PROCEDURE — APPSS15 APP SPLIT SHARED TIME 0-15 MINUTES: Performed by: NURSE PRACTITIONER

## 2021-08-12 PROCEDURE — 36415 COLL VENOUS BLD VENIPUNCTURE: CPT

## 2021-08-12 PROCEDURE — 99222 1ST HOSP IP/OBS MODERATE 55: CPT | Performed by: NEUROLOGICAL SURGERY

## 2021-08-12 RX ORDER — SODIUM CHLORIDE 0.9 % (FLUSH) 0.9 %
10 SYRINGE (ML) INJECTION PRN
Status: DISCONTINUED | OUTPATIENT
Start: 2021-08-12 | End: 2021-08-18 | Stop reason: HOSPADM

## 2021-08-12 RX ORDER — ASPIRIN 81 MG/1
81 TABLET, CHEWABLE ORAL DAILY
Status: DISCONTINUED | OUTPATIENT
Start: 2021-08-12 | End: 2021-08-13

## 2021-08-12 RX ADMIN — MONTELUKAST SODIUM 10 MG: 10 TABLET, FILM COATED ORAL at 20:14

## 2021-08-12 RX ADMIN — GEMFIBROZIL 600 MG: 600 TABLET ORAL at 08:05

## 2021-08-12 RX ADMIN — PIPERACILLIN AND TAZOBACTAM 3375 MG: 3; .375 INJECTION, POWDER, FOR SOLUTION INTRAVENOUS at 10:26

## 2021-08-12 RX ADMIN — DONEPEZIL HYDROCHLORIDE 5 MG: 5 TABLET, FILM COATED ORAL at 20:14

## 2021-08-12 RX ADMIN — INSULIN LISPRO 2 UNITS: 100 INJECTION, SOLUTION INTRAVENOUS; SUBCUTANEOUS at 08:04

## 2021-08-12 RX ADMIN — INSULIN LISPRO 1 UNITS: 100 INJECTION, SOLUTION INTRAVENOUS; SUBCUTANEOUS at 20:15

## 2021-08-12 RX ADMIN — SERTRALINE 100 MG: 50 TABLET, FILM COATED ORAL at 09:59

## 2021-08-12 RX ADMIN — SODIUM CHLORIDE: 9 INJECTION, SOLUTION INTRAVENOUS at 00:40

## 2021-08-12 RX ADMIN — INSULIN GLARGINE 10 UNITS: 100 INJECTION, SOLUTION SUBCUTANEOUS at 20:15

## 2021-08-12 RX ADMIN — SODIUM CHLORIDE, PRESERVATIVE FREE 10 ML: 5 INJECTION INTRAVENOUS at 10:01

## 2021-08-12 RX ADMIN — GEMFIBROZIL 600 MG: 600 TABLET ORAL at 20:14

## 2021-08-12 RX ADMIN — DEXAMETHASONE SODIUM PHOSPHATE 4 MG: 4 INJECTION, SOLUTION INTRAMUSCULAR; INTRAVENOUS at 00:41

## 2021-08-12 RX ADMIN — VANCOMYCIN HYDROCHLORIDE 1250 MG: 10 INJECTION, POWDER, LYOPHILIZED, FOR SOLUTION INTRAVENOUS at 17:47

## 2021-08-12 RX ADMIN — AMLODIPINE BESYLATE 10 MG: 10 TABLET ORAL at 08:05

## 2021-08-12 RX ADMIN — ASPIRIN 81 MG: 81 TABLET, CHEWABLE ORAL at 08:05

## 2021-08-12 RX ADMIN — DEXAMETHASONE SODIUM PHOSPHATE 4 MG: 4 INJECTION, SOLUTION INTRAMUSCULAR; INTRAVENOUS at 23:15

## 2021-08-12 RX ADMIN — ENOXAPARIN SODIUM 40 MG: 40 INJECTION SUBCUTANEOUS at 09:59

## 2021-08-12 RX ADMIN — GADOTERIDOL 13 ML: 279.3 INJECTION, SOLUTION INTRAVENOUS at 17:04

## 2021-08-12 RX ADMIN — DEXAMETHASONE SODIUM PHOSPHATE 4 MG: 4 INJECTION, SOLUTION INTRAMUSCULAR; INTRAVENOUS at 10:26

## 2021-08-12 RX ADMIN — PIPERACILLIN AND TAZOBACTAM 3375 MG: 3; .375 INJECTION, POWDER, FOR SOLUTION INTRAVENOUS at 02:43

## 2021-08-12 RX ADMIN — Medication 1000 UNITS: at 08:05

## 2021-08-12 RX ADMIN — INSULIN LISPRO 2 UNITS: 100 INJECTION, SOLUTION INTRAVENOUS; SUBCUTANEOUS at 13:06

## 2021-08-12 RX ADMIN — FAMOTIDINE 20 MG: 10 INJECTION INTRAVENOUS at 11:03

## 2021-08-12 ASSESSMENT — PAIN SCALES - GENERAL
PAINLEVEL_OUTOF10: 0
PAINLEVEL_OUTOF10: 0

## 2021-08-12 NOTE — PROGRESS NOTES
Occupational [address]  Occupational Therapy Not Seen Note    DATE: 2021  Name: Wanda Deal  : 1955  MRN: [id number]    Patient not available for Occupational Therapy due to:    [x] Testing: Pt fatigued and following minimal commands at this time, awaiting Neurosx POC as pt unable to receive MRI d/t metal device in body, hold OT eval.      Next Scheduled Treatment: Attempt on  as appropriate.     Jaqueline Mathews OT OTR/L

## 2021-08-12 NOTE — PROGRESS NOTES
Pharmacy Note  Vancomycin Consult    Radha Palencia is a 61 y.o. female started on Vancomycin for UTI; consult received from Dr. Susi Nolan to manage therapy. Also receiving the following antibiotics: Zosyn. Patient Active Problem List   Diagnosis    Prolapsed intervertebral disk    Hip arthritis    Diabetic neuropathy (HCC)    Dermatomyositis (HCC)    GERD (gastroesophageal reflux disease)    Moderate persistent asthma    HLD (hyperlipidemia)    HTN (hypertension)    Obesity (BMI 30-39. 9)    History of renal cell cancer    S/p nephrectomy    Diabetic autonomic neuropathy associated with type 2 diabetes mellitus ([facility]    Cervical spondylosis with myelopathy    Cervicalgia    Spinal stenosis of lumbar region with neurogenic claudication    Acquired spondylolisthesis    DM (diabetes mellitus), type 2, uncontrolled with complications (HCC)    Colitis    Dizziness    Depression    Osteoporosis    Lumbar disc herniation    EJ on CPAP    Falls    Ambulatory dysfunction    Hypovitaminosis D    CKD (chronic kidney disease) stage 2, GFR 60-89 ml/min    Age-related osteoporosis with current pathological fracture with routine healing    Uncontrolled type 2 diabetes mellitus with complication, with long-term current use of insulin (HCC)    AMS (altered mental status)    Hyperosmolar hyperglycemic state (HHS) (HCC)    Acute metabolic encephalopathy    Altered mental status    Elevated troponin    Brain lesion    Septicemia (HCC)    Vasogenic edema (HCC)     Allergies:  Lac bovis, Nedocromil, Tramadol, Penicillins, and Tape [adhesive tape]     Temp max: 98.2    Recent Labs     08/11/21  1647 08/12/21  0531   BUN 16 13   CREATININE 0.96* 0.92*   WBC 10.1 9.2       Intake/Output Summary (Last 24 hours) at 8/12/2021 1500  Last data filed at 8/12/2021 0521  Gross per 24 hour   Intake 174.82 ml   Output --   Net 174.82 ml     Culture Date      Source                       Results  8/11/21                Urine S. Aureus    Ht Readings from Last 1 Encounters:   08/11/21 5' 2\" (1.575 m)        Wt Readings from Last 1 Encounters:   08/11/21 147 lb (66.7 kg)       Body mass index is 26.89 kg/m². Estimated Creatinine Clearance: 55 mL/min (A) (based on SCr of 0.92 mg/dL (H)). Goal Trough Level: 10-20 mcg/mL    Assessment/Plan:  Patient received a one time dose of 1000 mg on 8/11. Will initiate Vancomycin 1250 mg IV every 24 hours with an expected AUC of 436 and expected trough of 12.1 mcg/mL. Timing of trough level will be determined based on culture results, renal function, and clinical response. Thank you for the consult. Will continue to follow.    Carolina Cope PharmD 8/12/2021 3:11 PM

## 2021-08-12 NOTE — CARE COORDINATION
Transitional Planning:    Attempted to meet pt for intake assessment, however, pt not in room. Will attempt later as time allows. Case Management Initial Discharge Plan  Oumar Scott with:family member mother to discuss discharge plans. Information verified: address, contacts, phone number, , insurance Yes  Insurance Provider: [facility]    Emergency Contact/Next of Merlyn Garrison name & number:   Tamir Limon, Mother, [phone]    Who are involved in patient's support system? mother    PCP: Emily Longoria MD  Date of last visit:       Discharge Planning    Living Arrangements:  Other (Comment) (ECF)     Pt is currently a resident at [facility]. Patient able to perform ADL's:Assisted    Current Services (outpatient & in home) DME  DME equipment: RW, cane, glucometer  DME provider:     Is patient receiving oral anticoagulation therapy? No    If indicated:   Physician managing anticoagulation treatment:   Where does patient obtain lab work for ATC treatment? Potential Assistance Needed:  N/A    Patient agreeable to home care: No  [place] of choice provided:  n/a    Prior SNF/Rehab Placement and Facility: [address] [place] [address] Rd to SNF/Rehab: Yes  [place] of choice provided: yes     Evaluation: n/a    Expected Discharge date:  21    Patient expects to be discharged to: If home: is the family and/or caregiver wiling & able to provide support at home? Who will be providing this support? Follow Up Appointment: Best Day/ Time: Monday AM    Transportation provider: will need  Transportation arrangements needed for discharge: Yes    Readmission Risk              Risk of Unplanned Readmission:  23             Does patient have a readmission risk score greater than 14?: Yes  If yes, follow-up appointment must be made within 7 days of discharge.      Goals of Care:       Educated patient's mother on transitional options, provided freedom of choice and are agreeable with plan      Discharge Plan: [facility] (referral sent), will need transportation    [phone] called [address] with [facility] to ensure that the pt is able to return. No answer; LM req call back. 0- received call from [address] with [facility], she stated that the pt is NOT a bed hold and they will need precert in order for pt to return. She stated that she can start precert once pt is closer to being ready for discharge.       Electronically signed by Minoo Fiore RN on 8/12/21 at 5:14 PM EDT

## 2021-08-12 NOTE — CONSULTS
Nephrostomy (Right); total nephrectomy (Right, 06/20/2013); lumbar laminectomy (10/15/14); Bladder surgery (06/05/2014); Endometrial ablation; Cholecystectomy (10/10/2003); Breast surgery (Left, 03/03/2008); Breast biopsy (Left, 03/03/2008); pr perq vert agmntj cavity crtj uni/bi cannulj lmbr (N/A, 9/12/2018); Fixation Kyphoplasty (10/2018); Kyphosis surgery (N/A, 5/8/2019); Endoscopy, colon, diagnostic; and Colonoscopy. Home Medications:    Prior to Admission medications    Medication Sig Start Date End Date Taking? Authorizing Provider   lactobacillus (CULTURELLE) capsule Take 1 capsule by mouth daily (with breakfast) 5/15/21   Kennedy Andrews MD   insulin glargine (LANTUS SOLOSTAR) 100 UNIT/ML injection pen Inject 15 units nightly 5/14/21   Kennedy Andrews MD   insulin lispro (HUMALOG) 100 UNIT/ML injection vial Inject 10 Units into the skin 3 times daily (before meals) Glucose: Dose: If <139             No Insulin 140-199 2 Units 200-249 4 Units 250-299 6 Units 300-349 8 Units 350-400 10 Units Above 400       12 Units 5/14/21   Kennedy Andrews MD   donepezil (ARICEPT) 5 MG tablet Take 1 tablet by mouth nightly 9/1/20   Isak Ibarra MD   ammonium lactate (AMLACTIN) 12 % cream Apply 1 Bottle topically as needed for Dry Skin Apply topically as needed.     Historical Provider, MD   amLODIPine (NORVASC) 10 MG tablet Take 1 tablet by mouth daily 3/27/20   Naheed Gordon MD   metFORMIN (GLUCOPHAGE-XR) 500 MG extended release tablet Take 1 tablet by mouth 2 times daily 3/27/20   Naheed Gordon MD   ondansetron [facility]) 4 MG tablet Take 1 tablet by mouth every 8 hours as needed for Nausea or Vomiting 8/1/19   Bela Roland MD   sertraline (ZOLOFT) 100 MG tablet TAKE ONE TABLET BY MOUTH DAILY 7/10/19   Nicole Silva MD   raloxifene (EVISTA) 60 MG tablet TAKE ONE TABLET BY MOUTH DAILY 7/10/19   Nicole Silva MD   Cholecalciferol (VITAMIN D3) 1000 units TABS Take 1 tablet by mouth daily 12/26/18   Nicole Silva MD   aspirin EC 81 MG EC tablet Take 1 tablet by mouth daily 12/11/18   Lucy Pamla MD   lovastatin (MEVACOR) 40 MG tablet TAKE ONE AND ONE-HALF (1  1/2) TABLET BY MOUTH NIGHTLY 12/11/18   Lucy Palma MD   gemfibrozil (LOPID) 600 MG tablet TAKE ONE TABLET BY MOUTH TWICE A DAY 12/11/18   Lucy Palma MD   fluticasone (ARNUITY ELLIPTA) 200 MCG/ACT AEPB Inhale 1 Inhaler into the lungs daily    Historical Provider, MD   BiPAP Machine MISC by Does not apply route    Historical Provider, MD   calcium carbonate (TUMS) 500 MG chewable tablet Take 1 tablet by mouth 3 times daily as needed for Heartburn 1/10/17   Aubree Rodriguez MD   Handicap Placard MISC by Does not apply route Duration: 1 year 4/28/16   Johnny Sandoval MD   acetaminophen (TYLENOL) 500 MG tablet Take 500 mg by mouth every 6 hours as needed. Historical Provider, MD   butalbital-acetaminophen-caffeine (FIORICET, ESGIC) per tablet Take 1 tablet by mouth every 8 hours as needed. Historical Provider, MD   montelukast (SINGULAIR) 10 MG tablet Take 10 mg by mouth nightly. Historical Provider, MD   albuterol (PROVENTIL HFA;VENTOLIN HFA) 108 (90 BASE) MCG/ACT inhaler Inhale 2 puffs into the lungs every 6 hours as needed.       Historical Provider, MD        Current Facility-Administered Medications: aspirin chewable tablet 81 mg, 81 mg, Oral, Daily  albuterol sulfate  (90 Base) MCG/ACT inhaler 2 puff, 2 puff, Inhalation, Q6H PRN  amLODIPine (NORVASC) tablet 10 mg, 10 mg, Oral, Daily  butalbital-acetaminophen-caffeine (FIORICET, ESGIC) per tablet 1 tablet, 1 tablet, Oral, Q6H PRN  Vitamin D (CHOLECALCIFEROL) tablet 1,000 Units, 1,000 Units, Oral, Daily  donepezil (ARICEPT) tablet 5 mg, 5 mg, Oral, Nightly  fluticasone (FLOVENT HFA) 110 MCG/ACT inhaler 1 puff, 1 puff, Inhalation, BID  gemfibrozil (LOPID) tablet 600 mg, 600 mg, Oral, BID  insulin glargine (LANTUS) injection vial 10 Units, 10 Units, Subcutaneous, Nightly  raloxifene (EVISTA) tablet 60 mg, 60 mg, Oral, Daily  montelukast (SINGULAIR) tablet 10 mg, 10 mg, Oral, Nightly  sertraline (ZOLOFT) tablet 100 mg, 100 mg, Oral, Daily  sodium chloride flush 0.9 % injection 5-40 mL, 5-40 mL, Intravenous, 2 times per day  sodium chloride flush 0.9 % injection 5-40 mL, 5-40 mL, Intravenous, PRN  0.9 % sodium chloride infusion, 25 mL, Intravenous, PRN  enoxaparin (LOVENOX) injection 40 mg, 40 mg, Subcutaneous, Daily  ondansetron (ZOFRAN-ODT) disintegrating tablet 4 mg, 4 mg, Oral, Q8H PRN **OR** ondansetron (ZOFRAN) injection 4 mg, 4 mg, Intravenous, Q6H PRN  polyethylene glycol (GLYCOLAX) packet 17 g, 17 g, Oral, Daily PRN  acetaminophen (TYLENOL) tablet 650 mg, 650 mg, Oral, Q6H PRN **OR** acetaminophen (TYLENOL) suppository 650 mg, 650 mg, Rectal, Q6H PRN  0.9 % sodium chloride infusion, , Intravenous, Continuous  famotidine (PEPCID) injection 20 mg, 20 mg, Intravenous, Daily  piperacillin-tazobactam (ZOSYN) 3,375 mg in dextrose 5 % 50 mL IVPB extended infusion (mini-bag), 3,375 mg, Intravenous, Q8H  insulin lispro (HUMALOG) injection vial 0-12 Units, 0-12 Units, Subcutaneous, TID WC  insulin lispro (HUMALOG) injection vial 0-6 Units, 0-6 Units, Subcutaneous, Nightly  glucose (GLUTOSE) 40 % oral gel 15 g, 15 g, Oral, PRN  dextrose 50 % IV solution, 12.5 g, Intravenous, PRN  glucagon (rDNA) injection 1 mg, 1 mg, Intramuscular, PRN  dextrose 5 % solution, 100 mL/hr, Intravenous, PRN  dexamethasone (DECADRON) injection 4 mg, 4 mg, Intravenous, Q12H    Allergies:  Lac bovis, Nedocromil, Tramadol, Penicillins, and Tape [adhesive tape]    Social History:   reports that she has never smoked. She has never used smokeless tobacco. She reports that she does not drink alcohol and does not use drugs.      Family History: family history includes Asthma in her brother; Breast Cancer in her sister; Diabetes in her mother; High Blood Pressure in her father, mother, and paternal grandmother; Pacemaker in her mother; Prostate Cancer in her father and maternal grandfather. REVIEW OF SYSTEMS:      Constitutional: there has been no unanticipated weight loss. Eyes: No visual changes or diplopia. ENT: No Headaches  Cardiovascular:  Remaining as above  Respiratory: No cough  Gastrointestinal: No abdominal pain. No change in bowel or bladder habits. Genitourinary: No dysuria, trouble voiding, or hematuria. Musculoskeletal: No joint complaints. Neurological: No headache  Hematologic/Lymphatic: No abnormal bruising or bleeding      PHYSICAL EXAM:      /71   Pulse 73   Temp 98.6 °F (37 °C) (Oral)   Resp 14   Ht 5' 2\" (1.575 m)   Wt 147 lb (66.7 kg)   LMP 01/01/2002   SpO2 97%   BMI 26.89 kg/m²      Intake/Output Summary (Last 24 hours) at 8/12/2021 0930  Last data filed at 8/12/2021 1126  Gross per 24 hour   Intake 174.82 ml   Output    Net 174.82 ml         Constitutional and General Appearance:   no distress and appears stated age  Respiratory:  No for increased work of breathing. On auscultation: clear to auscultation bilaterally  Cardiovascular:  Regular S1 and S2. No murmurs  Abdomen:   No masses or tenderness  Bowel sounds present  Extremities:   No Cyanosis or Clubbing   Lower extremity edema: No  Neurological:  Not performed    DATA:    Diagnostics:    EKG:   SR    ECHO:    Left ventricle is normal in size. Moderate left ventricular hypertrophy. Global left ventricular systolic function is normal. Estimated LV EF >55 %. Evidence of mild (grade I) diastolic dysfunction. Both atria are normal in size. Negative bubble study, no shunt noted. Normal right ventricular size and function. Mild mitral regurgitation.     Labs:     CBC:   Recent Labs     08/11/21  1647 08/12/21  0531   WBC 10.1 9.2   HGB 10.8* 10.8*   HCT 34.4* 35.6*    197     BMP:   Recent Labs     08/11/21  1647 08/12/21  0531    136   K 4.5 4.0   CO2 15* 15*   BUN 16 13   CREATININE 0.96* 0.92*   LABGLOM 59* >60   GLUCOSE 134* 161*     Pro-BNP: No results for input(s): PROBNP in the last 72 hours. BNP: No results for input(s): BNP in the last 72 hours. PT/INR: No results for input(s): PROTIME, INR in the last 72 hours. APTT:No results for input(s): APTT in the last 72 hours. CARDIAC ENZYMES:No results for input(s): CKTOTAL, CKMB, CKMBINDEX, TROPONINI in the last 72 hours. Invalid input(s):  [address]   Recent Labs     08/11/21  1737 08/11/21  2257 08/12/21  0531   TROPONINT NOT REPORTED NOT REPORTED NOT REPORTED       FASTING LIPID PANEL:  Lab Results   Component Value Date    HDL 46 05/26/2020    LDLDIRECT 155 10/13/2015    TRIG 230 05/26/2020     LIVER PROFILE:  Recent Labs     08/11/21  1647   AST 16   ALT 12   LABALBU 3.1*         Patient's Active Problem List  Principal Problem:    Altered mental status  Active Problems:    HTN (hypertension)    DM (diabetes mellitus), type 2, uncontrolled with complications (HCC)    CKD (chronic kidney disease) stage 2, GFR 60-89 ml/min    Elevated troponin    Brain lesion  Resolved Problems:    * No resolved hospital problems. *        IMPRESSION:    Elevated troponin type I vs type II  HTN  T2DM  CKD stage 2  Urosepsis  AMS with CTH showing edema and possible mass/subacute stroke    RECOMMENDATIONS:  Trend HsTrop  Continue ASA  Continue amlodipine 10 mg daily  Continue antibiotics per primary team  2D ECHO ordered. Follow up on final read  Follow up on Neurology recommendations  Once acute issues resolve will determine further ischemic work up.  K>4, Mg>2    Thank you for allowing us to participate in the care of Herminio Parra. If you have any questions or concerns, please do not hesitate to contact us. Discussed with patient and Nurse. Melanie Ocampo MD, M.D. Fellow, [address] [place] [address]        Please note that part of this chart were generated using voice recognition  dictation software.   Although every effort was made to ensure the accuracy of this

## 2021-08-12 NOTE — CONSULTS
Department of Neurosurgery                                       Resident Consult Note      Reason for Consult:  Left frontotemporal vasogenic brain lesion   Requesting Physician:  Neurology team   Neurosurgeon:   [x]Dr. Iraj Bucio    History Obtained From:  Patient, ER staff, Medical Records    CHIEF COMPLAINT:         AMS     HISTORY OF PRESENT ILLNESS:       The patient is a 61 y.o. female who presents with altered mental status from nursing home. She was being treated at a nursing home for UTI with Zyvox and there was concern that she may presenting with urosepsis. When the patient was found to be SIRS negative in the ER a CT head was not obtained to look for other reasons for altered mental status. It was at this time that they found the edematous lesion in the brain. Neurosurgery team was consulted on the case. We will not order mental status is at baseline, she is confused. She is AO x1. She does not answer questions appropriately. She tells me that she has chronic neuropathy in both feet from diabetes. She also tells me the right lower extremity is chronically weak. She is unable to articulate any other complaints at this time. Patient is significant past medical history of hypertension, obesity, and renal cancer with a right total nephrectomy in 2013. PAST MEDICAL HISTORY :       Past Medical History:        Diagnosis Date    CEFERINO (acute kidney injury) ([facility] 11/19/2014    Asthma     Carotid artery plaque 4/2/2013    Clostridium difficile diarrhea 11/19/2014    Dehydration 11/19/2014    Depression     Fall     history fell down stairs .  Fibromyalgia     Hiatal hernia     HTN (hypertension) 4/2/2013    Hyperlipidemia     Hypokalemia, gastrointestinal losses 11/21/2014    Kidney stone     b/l    Obesity (BMI 30-39. 9) 5/28/2013    Osteoarthritis     Osteoporosis     Renal cancer ([facility] 8/7/2013    right    Short of breath on exertion     Type II or unspecified type diabetes mellitus without mention of complication, not stated as uncontrolled     Unspecified sleep apnea     does not use machine    Urge urinary incontinence     mid urethral sling in 2002    Wears glasses     Wears glasses     Zygomatic fracture, right side, initial encounter for closed fracture ([address] 5/26/2018       Past Surgical History:        Procedure Laterality Date    BLADDER SURGERY  06/05/2014    Bladder Stimulator Implant    BLADDER SUSPENSION  08/09/2002    BREAST BIOPSY Left 03/03/2008    BREAST SURGERY Left 03/03/2008    lumpectomy    CHOLECYSTECTOMY  10/10/2003    COLONOSCOPY      x2    ENDOMETRIAL ABLATION      ENDOSCOPY, COLON, DIAGNOSTIC       egd x2    FIXATION KYPHOPLASTY  10/2018    KNEE SURGERY Bilateral     x 2 each side    KYPHOSIS SURGERY N/A 5/8/2019    KYPHOPLASTY L4 performed by Candace Stringer MD at [address] [facility]  10/15/14    WITH FUSION POSTERIOR L4 L5 WITH SPINAL CORD MONITORING    NEPHROSTOMY Right     SD PERQ VERT [address] UNI/BI [facility] LMBR N/A 9/12/2018    KYPHOPLASTY T2 & L2 performed by Candace Stringer MD at [address] Right 06/20/2013    right due to [place]       Social History:   Social History     Socioeconomic History    Marital status:      Spouse name: Not on file    Number of children: Not on file    Years of education: 15    Highest education level: Not on file   Occupational History    Occupation: disability     Employer: N/A   Tobacco Use    Smoking status: Never Smoker    Smokeless tobacco: Never Used   Vaping Use    Vaping Use: Never used   Substance and Sexual Activity    Alcohol use: No    Drug use: No    Sexual activity: Not on file   Other Topics Concern    Not on file   Social History Narrative    Not on file     Social Determinants of Health     Financial Resource Strain:     Difficulty of Paying Living Expenses:    Food Insecurity:     Worried About Running Out of Food in the topically as needed. Historical Provider, MD   amLODIPine (NORVASC) 10 MG tablet Take 1 tablet by mouth daily 3/27/20   Lucy Bradford MD   metFORMIN (GLUCOPHAGE-XR) 500 MG extended release tablet Take 1 tablet by mouth 2 times daily 3/27/20   Lucy Bradford MD   ondansetron [facility]) 4 MG tablet Take 1 tablet by mouth every 8 hours as needed for Nausea or Vomiting 8/1/19   Fermin Delvalle MD   sertraline (ZOLOFT) 100 MG tablet TAKE ONE TABLET BY MOUTH DAILY 7/10/19   Shandra Genao MD   raloxifene (EVISTA) 60 MG tablet TAKE ONE TABLET BY MOUTH DAILY 7/10/19   Shandra Genao MD   Cholecalciferol (VITAMIN D3) 1000 units TABS Take 1 tablet by mouth daily 12/26/18   Shandra Genao MD   aspirin EC 81 MG EC tablet Take 1 tablet by mouth daily 12/11/18   Shandra Genao MD   lovastatin (MEVACOR) 40 MG tablet TAKE ONE AND ONE-HALF (1  1/2) TABLET BY MOUTH NIGHTLY 12/11/18   Shandra Genao MD   gemfibrozil (LOPID) 600 MG tablet TAKE ONE TABLET BY MOUTH TWICE A DAY 12/11/18   Shandra Genao MD   fluticasone (ARNUITY ELLIPTA) 200 MCG/ACT AEPB Inhale 1 Inhaler into the lungs daily    Historical Provider, MD   BiPAP Machine MISC by Does not apply route    Historical Provider, MD   calcium carbonate (TUMS) 500 MG chewable tablet Take 1 tablet by mouth 3 times daily as needed for Heartburn 1/10/17   Charu Leal MD   Handicap Placard MISC by Does not apply route Duration: 1 year 4/28/16   Celeste Aviles MD   acetaminophen (TYLENOL) 500 MG tablet Take 500 mg by mouth every 6 hours as needed. Historical Provider, MD   butalbital-acetaminophen-caffeine (FIORICET, ESGIC) per tablet Take 1 tablet by mouth every 8 hours as needed. Historical Provider, MD   montelukast (SINGULAIR) 10 MG tablet Take 10 mg by mouth nightly. Historical Provider, MD   albuterol (PROVENTIL HFA;VENTOLIN HFA) 108 (90 BASE) MCG/ACT inhaler Inhale 2 puffs into the lungs every 6 hours as needed.       Historical Provider, MD       Current Medications: No current facility-administered medications for this encounter. REVIEW OF SYSTEMS:       Unable to complete secondary to patient's mental status.     PHYSICAL EXAM:       BP (!) 144/83   Pulse 70   Temp 98.2 °F (36.8 °C)   Resp 15   Ht 5' 2\" (1.575 m)   Wt 147 lb (66.7 kg)   LMP 01/01/2002   SpO2 100%   BMI 26.89 kg/m²       CONSTITUTIONAL:   Disheveled, confused, toxic appearing   HEAD:  normocephalic, atraumatic    EYES:  PERRLA, EOMI.   ENT:  moist mucous membranes   NECK:  supple, symmetric, no midline tenderness to palpation    BACK:  without midline tenderness, step-offs or deformities    LUNGS:  Equal air entry bilaterally   CARDIOVASCULAR:  normal s1 / s2   ABDOMEN:  Soft, no rigidity   NEUROLOGIC:  EYE OPENING     Spontaneous - 4 [x]       To voice - 3 []       To pain - 2 []       None - 1 []    VERBAL RESPONSE     Appropriate, oriented - 5 []       Dazed or confused - 4 [x]       Syllables, expletives - 3 []       Grunts - 2 []       None - 1 []    MOTOR RESPONSE     Spontaneous, command - 6 [x]       Localizes pain - 5 []       Withdraws pain - 4 []       Abnormal flexion - 3 []       Abnormal extension - 2 []       None - 1 []            Total GCS: 14    Mental Status:  Not oriented to date and Not oriented to place, awake and confused           Cranial Nerves:    cranial nerves II-XII are grossly intact    Motor Exam:    Drift: Present in right lower extremity only  Tone:  normal    Motor exam is 5 out of 5 all extremities with the exception of right lower extremity being 3 out of 5    Sensory:    Touch:    Right Upper Extremity:  normal  Left Upper Extremity:  normal  Right Lower Extremity:  abnormal -significantly decreased light touch sensation  Left Lower Extremity:  abnormal -significantly decreased light touch sensation    Deep Tendon Reflexes:    Right Knee:  2+  Left Knee:  2+    Plantar Response:    Right:  downgoing  Left:  downgoing    Clonus: absent    Coordination/Dysmetria:  Finger to Nose: Cannot participate in exam           LABS AND IMAGING:     CBC with Differential:    Lab Results   Component Value Date    WBC 10.1 08/11/2021    RBC 3.87 08/11/2021    HGB 10.8 08/11/2021    HCT 34.4 08/11/2021     08/11/2021    MCV 88.9 08/11/2021    MCH 27.9 08/11/2021    MCHC 31.4 08/11/2021    RDW 13.0 08/11/2021    LYMPHOPCT 22 08/11/2021    MONOPCT 7 08/11/2021    BASOPCT 0 08/11/2021    MONOSABS 0.66 08/11/2021    LYMPHSABS 2.20 08/11/2021    EOSABS 0.18 08/11/2021    BASOSABS 0.03 08/11/2021    DIFFTYPE NOT REPORTED 08/11/2021     BMP:    Lab Results   Component Value Date     08/11/2021    K 4.5 08/11/2021     08/11/2021    CO2 15 08/11/2021    BUN 16 08/11/2021    LABALBU 3.1 08/11/2021    CREATININE 0.96 08/11/2021    CALCIUM 8.8 08/11/2021    GFRAA >60 08/11/2021    LABGLOM 59 08/11/2021    GLUCOSE 134 08/11/2021    GLUCOSE 366 04/19/2012       Radiology Review:    CT head 8/11/2021  Impression   Area of edema within the left frontal and temporal lobes predominantly   demonstrates a vasogenic pattern compatible with underlying mass or   infectious process.  However, there are areas of loss of the cortical   gray-white differentiation suggesting the possibility of a subacute ischemic   infarct.  MRI with and without contrast recommended for further evaluation. CT head 5/8/2021:  Impression   No acute intracranial abnormality.  Cerebral atrophy.       No acute fracture or subluxation of the cervical spine.  Moderate   degenerative changes. ASSESSMENT AND PLAN:       Patient Active Problem List   Diagnosis    Prolapsed intervertebral disk    Hip arthritis    Diabetic neuropathy ([address]    Dermatomyositis ([address]    GERD (gastroesophageal reflux disease)    Moderate persistent asthma    HLD (hyperlipidemia)    HTN (hypertension)    Obesity (BMI 30-39. 9)    History of renal cell cancer    S/p nephrectomy    Diabetic autonomic neuropathy associated with type 2 diabetes mellitus ([facility]    Cervical spondylosis with myelopathy    Cervicalgia    Spinal stenosis of lumbar region with neurogenic claudication    Acquired spondylolisthesis    DM (diabetes mellitus), type 2, uncontrolled with complications (HCC)    Colitis    Dizziness    Depression    Osteoporosis    Lumbar disc herniation    EJ on CPAP    Falls    Ambulatory dysfunction    Hypovitaminosis D    CKD (chronic kidney disease) stage 2, GFR 60-89 ml/min    Age-related osteoporosis with current pathological fracture with routine healing    Uncontrolled type 2 diabetes mellitus with complication, with long-term current use of insulin (HCC)    AMS (altered mental status)    Hyperosmolar hyperglycemic state (HHS) (HCC)    Acute metabolic encephalopathy    Altered mental status    Elevated troponin         A/P:  This is a 61 y.o. female with altered mental status had a CT that showed area of edema within the left frontal and temporal lobes with [facility] [address] demonstrating a vasogenic pattern compatible with underlying mass or infectious process. There are also areas of cortical gray-white differentiation loss additionally suggested potential ischemic process. Patient is altered she is AO x1 she has got chronic right lower extremity weakness, chronic bilateral neuropathy from diabetes and generally weak/toxic appearing. Plan for this patient is obtain an MRI brain STAT to better characterize lesion. At this time we are unable to obtain MRI STAT as the patient was found to have a InterStim bladder stimulator. Leisure review does show that recently FDA has approved newer models of the InterStim for MRI head only. The patient had her InterStim placed in 2014. I am unable to find an op note from urology in 2014. I have reached out for urology team here to see if they can find any information if they can shed any light on this particular situation.   The MRI

## 2021-08-12 NOTE — ED NOTES
neurosurg resident at bedside      [facility]ble[facility]  08/11/21 [id number]

## 2021-08-12 NOTE — ED NOTES
Report called to nurse on 4B, nurse denies any further questions      Herbert Gamble RN  08/11/21 1704

## 2021-08-12 NOTE — ED PROVIDER NOTES
[facility] [facility]  Emergency Department  Emergency Medicine Resident Sign-out     Care of Lito Cain was assumed from Dr. Prasanna Villareal and is being seen for Altered Mental Status and Emesis  . The patient's initial evaluation and plan have been discussed with the prior provider who initially evaluated the patient.      EMERGENCY DEPARTMENT COURSE / MEDICAL DECISION MAKING:       MEDICATIONS GIVEN:  Orders Placed This Encounter   Medications    0.9 % sodium chloride bolus    vancomycin (VANCOCIN) 1000 mg in dextrose 5% 200 mL IVPB     Order Specific Question:   Antimicrobial Indications     Answer:   Urinary Tract Infection    piperacillin-tazobactam (ZOSYN) 3,375 mg in dextrose 5 % 50 mL IVPB (mini-bag)     Order Specific Question:   Antimicrobial Indications     Answer:   Urinary Tract Infection       LABS / RADIOLOGY:     Labs Reviewed   CBC WITH AUTO DIFFERENTIAL - Abnormal; Notable for the following components:       Result Value    RBC 3.87 (*)     Hemoglobin 10.8 (*)     Hematocrit 34.4 (*)     Seg Neutrophils 68 (*)     Lymphocytes 22 (*)     Immature Granulocytes 1 (*)     All other components within normal limits   COMPREHENSIVE METABOLIC PANEL - Abnormal; Notable for the following components:    Glucose 134 (*)     CREATININE 0.96 (*)     CO2 15 (*)     Albumin 3.1 (*)     Albumin/Globulin Ratio 0.8 (*)     GFR Non- 59 (*)     All other components within normal limits   LACTATE, SEPSIS - Abnormal; Notable for the following components:    Lactic Acid, Sepsis, Whole Blood 2.0 (*)     All other components within normal limits   TROPONIN - Abnormal; Notable for the following components:    Troponin, High Sensitivity 169 (*)     All other components within normal limits   URINALYSIS WITH MICROSCOPIC - Abnormal; Notable for the following components:    Turbidity UA TURBID (*)     Urine Hgb MODERATE (*)     Protein, UA 2+ (*)     Nitrite, Urine POSITIVE (*) Leukocyte Esterase, Urine LARGE (*)     Bacteria, UA MANY (*)     Mucus, UA 2+ (*)     All other components within normal limits   TROPONIN - Abnormal; Notable for the following components:    Troponin, High Sensitivity 177 (*)     All other components within normal limits   COVID-19, RAPID   CULTURE, BLOOD 1   CULTURE, BLOOD 1   CULTURE, URINE   LACTATE, SEPSIS   AMMONIA   POCT GLUCOSE   POC BLOOD GAS AND CHEMISTRY       CT HEAD WO CONTRAST    Result Date: 8/11/2021  EXAMINATION: CT OF THE HEAD WITHOUT CONTRAST  8/11/2021 5:43 pm TECHNIQUE: CT of the head was performed without the administration of intravenous contrast. Dose modulation, iterative reconstruction, and/or weight based adjustment of the mA/kV was utilized to reduce the radiation dose to as low as reasonably achievable. COMPARISON: May 8, 2021 HISTORY: ORDERING SYSTEM PROVIDED HISTORY: sepsis unknown source TECHNOLOGIST PROVIDED HISTORY: sepsis unknown source Decision Support Exception - unselect if not a suspected or confirmed emergency medical condition->Emergency Medical Condition (MA) Reason for Exam: sepsis unknown source Acuity: Acute FINDINGS: BRAIN/VENTRICLES: New area of edema involving the left frontal and temporal lobes which appears primarily vasogenic although there is focal disruption of the gray-white junction as well suggesting cytotoxic edema. There also appears to be a small area of encephalomalacia within the right parietal lobe. Mild diffuse cortical volume loss and ventricular enlargement appears unchanged. Periventricular white matter hypoattenuation bilaterally compatible chronic microvascular ischemic changes. No acute intracranial hemorrhage. No significant mass effect is appreciated despite the above findings. ORBITS: The visualized portion of the orbits demonstrate no acute abnormality. SINUSES: The visualized paranasal sinuses and mastoid air cells demonstrate no acute abnormality.  SOFT TISSUES/SKULL:  No acute abnormality of the visualized skull or soft tissues. Area of edema within the left frontal and temporal lobes predominantly demonstrates a vasogenic pattern compatible with underlying mass or infectious process. However, there are areas of loss of the cortical gray-white differentiation suggesting the possibility of a subacute ischemic infarct. MRI with and without contrast recommended for further evaluation. Findings were discussed with Dr. Radha Martinez at 6:14 pm on 8/11/2021 by Dr. Simin Douglass. XR CHEST PORTABLE    Result Date: 8/11/2021  EXAMINATION: ONE XRAY VIEW OF THE CHEST 8/11/2021 6:09 pm COMPARISON: May 8, 2021 HISTORY: ORDERING SYSTEM PROVIDED HISTORY: septic workup TECHNOLOGIST PROVIDED HISTORY: septic workup FINDINGS: Right upper extremity PICC terminates near the cavoatrial junction in satisfactory position. Cardiomegaly appears unchanged. Cardiomediastinal silhouette otherwise within normal limits. Lungs and costophrenic sulci are clear. No pneumothorax or subdiaphragmatic free air. No acute osseous abnormality. Upper thoracic vertebral augmentation redemonstrated. No radiographic evidence of acute cardiopulmonary disease. RECENT VITALS:     Temp: 98.2 °F (36.8 °C),  Pulse: 72, Resp: 16, BP: (!) 143/78, SpO2: 100 %    This patient is a 61 y.o. Female with cystitis, was being treated outpatient at her skilled nursing facility for urinary tract infection. Work-up was needed for can for evaded lactic, urinalysis positive, troponins elevated, any within normal limits. Noncon CT head showing vasogenic edema. Ischemia versus tumor versus infection. Patient being admitted to family medicine, patient boarding in the emergency department. OUTSTANDING TASKS / RECOMMENDATIONS:    1. Awaiting bed placement      FINAL IMPRESSION:     1. Septicemia ([address]    2. Vasogenic edema (HCC)    3. Elevated troponin    4. Acute cystitis without hematuria    5.  Disorientation DISPOSITION:         DISPOSITION:  []  Discharge   []  Transfer -    [x]  Admission -  Family Medicine   []  Against Medical Advice   []  Eloped   FOLLOW-UP: No follow-up provider specified.    DISCHARGE MEDICATIONS: New Prescriptions    No medications on file           Edilson Gallegos DO  Emergency Medicine Resident  [id number]       Edilson Gallegos, [address] [place] [address]  Resident  08/12/21 Lazaro Tran

## 2021-08-12 NOTE — PROGRESS NOTES
[address] [facility]  Progress Note    Date:   8/12/2021  Patient name:  Eitan De Jesus  Date of admission:  8/11/2021  4:28 PM  MRN:   [id number]  YOB: 1955    SUBJECTIVE/Last 24 hours update:     Patient seen and examined at the bed side , no new acute events overnight. Today, Pt were awake and answers with only yes or no, non oriented x3. Pt answered NO when she asked if she currently has any chest pain, SOB, abdominal pain, or dysuria. Pt answered YES when she asked about what is her name and if she knows where she is. Notes from nursing staff and Consults had been reviewed, and the overnight progress had been checked with the nursing staff as well. HPI:      The patient is a 61 y.o.  female with history of HTN, DM, CHF, Asthma who presents from [facility] with AMS for 1 day duration. Patient was oriented to place and person. Patient was being currently treated with antibiotics for UTI.she complains of nonproductive cough. Denies fever, chills, shortness of breath, chest pain, abdominal pain, dysuria, diarrhea or constipation. no trauma or falls. patient is not currently on anticoagulation.        At ER, AO x2, blood pressure 143/78. UA: Positive for nitrites and leukocyte esterase   CT head: Subacute ischemicinfarct  Patient was given 2 L bolus NS and Vanco and Zosyn. Neurology consulted  Elevated Trop.      REVIEW OF SYSTEMS:      CONSTITUTIONAL:  no fevers, no headcahes  EYES: negative for blury vision  HEENT: No headaches, No nasal congestion, no difficulty swallowing  RESPIRATORY:negative for dyspnea, no wheezing, no Cough  CARDIOVASCULAR: negative for chest pain, no palpitations  GASTROINTESTINAL: no nausea, no vomiting, no change in bowel habits, no abdominal pain   GENITOURINARY: negative for dysuria, no hematuria   MUSCULOSKELETAL: no joint pains, no muscle aches, no swelling of joints or extremities  NEUROLOGICAL: No Weakness or numbness, altered mental status      PAST MEDICAL HISTORY:      has a past medical history of CEFERINO (acute kidney injury) ([facility] [address] Asthma, Carotid artery plaque, Clostridium difficile diarrhea, Dehydration, Depression, Fall, Fibromyalgia, Hiatal hernia, HTN (hypertension), Hyperlipidemia, Hypokalemia, gastrointestinal losses, Kidney stone, Obesity (BMI 30-39.9), Osteoarthritis, Osteoporosis, Renal cancer ([facility] [address] Short of breath on exertion, Type II or unspecified type diabetes mellitus without mention of complication, not stated as uncontrolled, Unspecified sleep apnea, Urge urinary incontinence, Wears glasses, Wears glasses, and Zygomatic fracture, right side, initial encounter for closed fracture ([facility] [address] PAST SURGICAL HISTORY:      has a past surgical history that includes knee surgery (Bilateral); bladder suspension (08/09/2002); Nephrostomy (Right); total nephrectomy (Right, 06/20/2013); lumbar laminectomy (10/15/14); Bladder surgery (06/05/2014); Endometrial ablation; Cholecystectomy (10/10/2003); Breast surgery (Left, 03/03/2008); Breast biopsy (Left, 03/03/2008); pr perq vert agmntj cavity crtj uni/bi cannulj lmbr (N/A, 9/12/2018); Fixation Kyphoplasty (10/2018); Kyphosis surgery (N/A, 5/8/2019); Endoscopy, colon, diagnostic; and Colonoscopy. SOCIAL HISTORY:      reports that she has never smoked. She has never used smokeless tobacco. She reports that she does not drink alcohol and does not use drugs. FAMILY HISTORY:     family history includes Asthma in her brother; Breast Cancer in her sister; Diabetes in her mother; High Blood Pressure in her father, mother, and paternal grandmother; Pacemaker in her mother; Prostate Cancer in her father and maternal grandfather. HOME MEDICATIONS:      Prior to Admission medications    Medication Sig Start Date End Date Taking?  Authorizing Provider   lactobacillus (CULTURELLE) capsule Take 1 capsule by mouth daily (with breakfast) 5/15/21   Mi Ann Godwin MD   insulin glargine (LANTUS SOLOSTAR) 100 UNIT/ML injection pen Inject 15 units nightly 5/14/21   Starr Sol MD   insulin lispro (HUMALOG) 100 UNIT/ML injection vial Inject 10 Units into the skin 3 times daily (before meals) Glucose: Dose: If <139             No Insulin 140-199 2 Units 200-249 4 Units 250-299 6 Units 300-349 8 Units 350-400 10 Units Above 400       12 Units 5/14/21   Starr Sol MD   donepezil (ARICEPT) 5 MG tablet Take 1 tablet by mouth nightly 9/1/20   Kathia Saunders MD   ammonium lactate (AMLACTIN) 12 % cream Apply 1 Bottle topically as needed for Dry Skin Apply topically as needed.     Historical Provider, MD   amLODIPine (NORVASC) 10 MG tablet Take 1 tablet by mouth daily 3/27/20   Dario Franks MD   metFORMIN (GLUCOPHAGE-XR) 500 MG extended release tablet Take 1 tablet by mouth 2 times daily 3/27/20   Dario Franks MD   ondansetron [facility]) 4 MG tablet Take 1 tablet by mouth every 8 hours as needed for Nausea or Vomiting 8/1/19   Fermin Delvalle MD   sertraline (ZOLOFT) 100 MG tablet TAKE ONE TABLET BY MOUTH DAILY 7/10/19   Shandra Genao MD   raloxifene (EVISTA) 60 MG tablet TAKE ONE TABLET BY MOUTH DAILY 7/10/19   Shandra Genao MD   Cholecalciferol (VITAMIN D3) 1000 units TABS Take 1 tablet by mouth daily 12/26/18   Shandra Genao MD   aspirin EC 81 MG EC tablet Take 1 tablet by mouth daily 12/11/18   Shandra Genao MD   lovastatin (MEVACOR) 40 MG tablet TAKE ONE AND ONE-HALF (1  1/2) TABLET BY MOUTH NIGHTLY 12/11/18   Shandra Genao MD   gemfibrozil (LOPID) 600 MG tablet TAKE ONE TABLET BY MOUTH TWICE A DAY 12/11/18   Shandra Genao MD   fluticasone (ARNUITY ELLIPTA) 200 MCG/ACT AEPB Inhale 1 Inhaler into the lungs daily    Historical Provider, MD   BiPAP Machine MISC by Does not apply route    Historical Provider, MD   calcium carbonate (TUMS) 500 MG chewable tablet Take 1 tablet by mouth 3 times daily as needed for Heartburn 1/10/17   [facility] by Does not apply route Duration: 1 year 4/28/16   Cait Argueta MD   acetaminophen (TYLENOL) 500 MG tablet Take 500 mg by mouth every 6 hours as needed. Historical Provider, MD   butalbital-acetaminophen-caffeine (FIORICET, ESGIC) per tablet Take 1 tablet by mouth every 8 hours as needed. Historical Provider, MD   montelukast (SINGULAIR) 10 MG tablet Take 10 mg by mouth nightly. Historical Provider, MD   albuterol (PROVENTIL HFA;VENTOLIN HFA) 108 (90 BASE) MCG/ACT inhaler Inhale 2 puffs into the lungs every 6 hours as needed. Historical Provider, MD       ALLERGIES:     Lac bovis, Nedocromil, Tramadol, Penicillins, and Tape [adhesive tape]      OBJECTIVE:       Vitals:    08/12/21 0030 08/12/21 0156 08/12/21 0400 08/12/21 0800   BP: (!) 150/74  (!) 154/69 120/71   Pulse: 63 68 73 73   Resp: 22  14    Temp: 97.5 °F (36.4 °C)  98.6 °F (37 °C)    TempSrc: Oral  Oral    SpO2: 100%  97%    Weight:       Height:             Intake/Output Summary (Last 24 hours) at 8/12/2021 1008  Last data filed at 8/12/2021 0521  Gross per 24 hour   Intake 174.82 ml   Output    Net 174.82 ml       PHYSICAL EXAM:  General Appearance  Alert , awake , orientedx0  HEENT - Head is normocephalic, atraumatic. Lungs - Bilateral equal air entry , no wheezes, rales or rhonchi, aeration good  Cardiovascular - Heart sounds are normal.  Regular rhythm, normal rate without murmur, gallop or rub.   Abdomen - Soft, nontender, nondistended, no masses or organomegaly  Neurologic - There are no new focal motor or sensory deficits  Skin - No bruising or bleeding on exposed skin area  Extremities - No cyanosis, clubbing or edema      DIAGNOSTICS:     Laboratory Testing:    Recent Results (from the past 24 hour(s))   EKG 12 Lead    Collection Time: 08/11/21  4:35 PM   Result Value Ref Range    Ventricular Rate 67 BPM    Atrial Rate 70 BPM    QRS Duration 80 ms    Q-T Interval 456 ms    QTc Calculation (Bazett) 481 ms    R Axis 17 degrees    T Axis 35 degrees   CBC Auto Differential    Collection Time: 08/11/21  4:47 PM   Result Value Ref Range    WBC 10.1 3.5 - 11.3 k/uL    RBC 3.87 (L) 3.95 - 5.11 m/uL    Hemoglobin 10.8 (L) 11.9 - 15.1 g/dL    Hematocrit 34.4 (L) 36.3 - 47.1 %    MCV 88.9 82.6 - 102.9 fL    MCH 27.9 25.2 - 33.5 pg    MCHC 31.4 28.4 - 34.8 g/dL    RDW 13.0 11.8 - 14.4 %    Platelets 570 235 - 384 k/uL    MPV 10.9 8.1 - 13.5 fL    NRBC Automated 0.0 0.0 per 100 WBC    Differential Type NOT REPORTED     Seg Neutrophils 68 (H) 36 - 65 %    Lymphocytes 22 (L) 24 - 43 %    Monocytes 7 3 - 12 %    Eosinophils % 2 1 - 4 %    Basophils 0 0 - 2 %    Immature Granulocytes 1 (H) 0 %    Segs Absolute 6.94 1.50 - 8.10 k/uL    Absolute Lymph # 2.20 1.10 - 3.70 k/uL    Absolute Mono # 0.66 0.10 - 1.20 k/uL    Absolute Eos # 0.18 0.00 - 0.44 k/uL    Basophils Absolute 0.03 0.00 - 0.20 k/uL    Absolute Immature Granulocyte 0.07 0.00 - 0.30 k/uL    WBC Morphology NOT REPORTED     RBC Morphology NOT REPORTED     Platelet Estimate NOT REPORTED    Comprehensive Metabolic Panel    Collection Time: 08/11/21  4:47 PM   Result Value Ref Range    Glucose 134 (H) 70 - 99 mg/dL    BUN 16 8 - 23 mg/dL    CREATININE 0.96 (H) 0.50 - 0.90 mg/dL    Bun/Cre Ratio NOT REPORTED 9 - 20    Calcium 8.8 8.6 - 10.4 mg/dL    Sodium 135 135 - 144 mmol/L    Potassium 4.5 3.7 - 5.3 mmol/L    Chloride 104 98 - 107 mmol/L    CO2 15 (L) 20 - 31 mmol/L    Anion Gap 16 9 - 17 mmol/L    Alkaline Phosphatase 81 35 - 104 U/L    ALT 12 5 - 33 U/L    AST 16 <32 U/L    Total Bilirubin 0.42 0.3 - 1.2 mg/dL    Total Protein 6.8 6.4 - 8.3 g/dL    Albumin 3.1 (L) 3.5 - 5.2 g/dL    Albumin/Globulin Ratio 0.8 (L) 1.0 - 2.5    GFR Non- 59 (L) >60 mL/min    GFR African American >60 >60 mL/min    GFR Comment          GFR Staging NOT REPORTED    Troponin    Collection Time: 08/11/21  4:47 PM   Result Value Ref Range    Troponin, High Sensitivity 177 (HH) 0 - 14 ng/L    Troponin T NOT REPORTED <0.03 ng/mL    Troponin Interp NOT REPORTED    Lactate, Sepsis    Collection Time: 08/11/21  4:48 PM   Result Value Ref Range    Lactic Acid, Sepsis NOT REPORTED 0.5 - 1.9 mmol/L    Lactic Acid, Sepsis, Whole Blood 2.0 (H) 0.5 - 1.9 mmol/L   AMMONIA    Collection Time: 08/11/21  5:06 PM   Result Value Ref Range    Ammonia 15 11 - 51 umol/L   COVID-19, Rapid    Collection Time: 08/11/21  5:08 PM    Specimen: Nasopharyngeal Swab   Result Value Ref Range    Specimen Description . NASOPHARYNGEAL SWAB     SARS-CoV-2, Rapid Not Detected Not Detected   Culture, Blood 1    Collection Time: 08/11/21  5:17 PM    Specimen: Blood   Result Value Ref Range    Specimen Description . BLOOD     Special Requests  PICC 10 ML     Culture NO GROWTH 13 HOURS    Urinalysis with Microscopic    Collection Time: 08/11/21  5:19 PM   Result Value Ref Range    Color, UA YELLOW YELLOW    Turbidity UA TURBID (A) CLEAR    Glucose, Ur NEGATIVE NEGATIVE    Bilirubin Urine NEGATIVE NEGATIVE    Ketones, Urine NEGATIVE NEGATIVE    Specific Gravity, UA 1.011 1.005 - 1.030    Urine Hgb MODERATE (A) NEGATIVE    pH, UA 7.0 5.0 - 8.0    Protein, UA 2+ (A) NEGATIVE    Urobilinogen, Urine Normal Normal    Nitrite, Urine POSITIVE (A) NEGATIVE    Leukocyte Esterase, Urine LARGE (A) NEGATIVE    -          WBC, UA TOO NUMEROUS TO COUNT 0 - 5 /HPF    RBC, UA 2 TO 5 0 - 2 /HPF    Casts UA 5 TO 10 0 - 2 /LPF    Casts UA HYALINE 0 - 2 /LPF    Crystals, UA NOT REPORTED None /HPF    Epithelial Cells UA 5 TO 10 0 - 5 /HPF    Renal Epithelial, UA NOT REPORTED 0 /HPF    Bacteria, UA MANY (A) None    Mucus, UA 2+ (A) None    Trichomonas, UA NOT REPORTED None    Amorphous, UA NOT REPORTED None    Other Observations UA NOT REPORTED NOT REQ. Yeast, UA NOT REPORTED None   Culture, Blood 1    Collection Time: 08/11/21  5:26 PM    Specimen: Blood   Result Value Ref Range    Specimen Description . BLOOD     Special Requests  L FA 7 ML     Culture NO GROWTH 13 HOURS Lactate, Sepsis    Collection Time: 08/11/21  5:37 PM   Result Value Ref Range    Lactic Acid, Sepsis NOT REPORTED 0.5 - 1.9 mmol/L    Lactic Acid, Sepsis, Whole Blood 1.2 0.5 - 1.9 mmol/L   Troponin    Collection Time: 08/11/21  5:37 PM   Result Value Ref Range    Troponin, High Sensitivity 169 (HH) 0 - 14 ng/L    Troponin T NOT REPORTED <0.03 ng/mL    Troponin Interp NOT REPORTED    Sedimentation Rate    Collection Time: 08/11/21 10:57 PM   Result Value Ref Range    Sed Rate 29 0 - 30 mm   C-REACTIVE PROTEIN    Collection Time: 08/11/21 10:57 PM   Result Value Ref Range    CRP <3.0 0.0 - 5.0 mg/L   Troponin    Collection Time: 08/11/21 10:57 PM   Result Value Ref Range    Troponin, High Sensitivity 165 (HH) 0 - 14 ng/L    Troponin T NOT REPORTED <0.03 ng/mL    Troponin Interp NOT REPORTED    POC Glucose Fingerstick    Collection Time: 08/12/21 12:54 AM   Result Value Ref Range    POC Glucose 105 65 - 105 mg/dL   Basic Metabolic Panel w/ Reflex to MG    Collection Time: 08/12/21  5:31 AM   Result Value Ref Range    Glucose 161 (H) 70 - 99 mg/dL    BUN 13 8 - 23 mg/dL    CREATININE 0.92 (H) 0.50 - 0.90 mg/dL    Bun/Cre Ratio NOT REPORTED 9 - 20    Calcium 8.2 (L) 8.6 - 10.4 mg/dL    Sodium 136 135 - 144 mmol/L    Potassium 4.0 3.7 - 5.3 mmol/L    Chloride 106 98 - 107 mmol/L    CO2 15 (L) 20 - 31 mmol/L    Anion Gap 15 9 - 17 mmol/L    GFR Non-African American >60 >60 mL/min    GFR African American >60 >60 mL/min    GFR Comment          GFR Staging NOT REPORTED    CBC auto differential    Collection Time: 08/12/21  5:31 AM   Result Value Ref Range    WBC 9.2 3.5 - 11.3 k/uL    RBC 3.92 (L) 3.95 - 5.11 m/uL    Hemoglobin 10.8 (L) 11.9 - 15.1 g/dL    Hematocrit 35.6 (L) 36.3 - 47.1 %    MCV 90.8 82.6 - 102.9 fL    MCH 27.6 25.2 - 33.5 pg    MCHC 30.3 28.4 - 34.8 g/dL    RDW 12.9 11.8 - 14.4 %    Platelets 392 736 - 783 k/uL    MPV 11.2 8.1 - 13.5 fL    NRBC Automated 0.0 0.0 per 100 WBC    Differential Type NOT REPORTED     WBC Morphology NOT REPORTED     RBC Morphology NOT REPORTED     Platelet Estimate NOT REPORTED     Seg Neutrophils 82 (H) 36 - 65 %    Lymphocytes 14 (L) 24 - 43 %    Monocytes 3 3 - 12 %    Eosinophils % 0 (L) 1 - 4 %    Basophils 0 0 - 2 %    Immature Granulocytes 1 (H) 0 %    Segs Absolute 7.50 1.50 - 8.10 k/uL    Absolute Lymph # 1.24 1.10 - 3.70 k/uL    Absolute Mono # 0.30 0.10 - 1.20 k/uL    Absolute Eos # <0.03 0.00 - 0.44 k/uL    Basophils Absolute <0.03 0.00 - 0.20 k/uL    Absolute Immature Granulocyte 0.08 0.00 - 0.30 k/uL   Troponin    Collection Time: 08/12/21  5:31 AM   Result Value Ref Range    Troponin, High Sensitivity 159 (HH) 0 - 14 ng/L    Troponin T NOT REPORTED <0.03 ng/mL    Troponin Interp NOT REPORTED    POC Glucose Fingerstick    Collection Time: 08/12/21  7:12 AM   Result Value Ref Range    POC Glucose 182 (H) 65 - 105 mg/dL             Current Facility-Administered Medications   Medication Dose Route Frequency Provider Last Rate Last Admin    aspirin chewable tablet 81 mg  81 mg Oral Daily Mo'Men Jovon Nguyen MD   81 mg at 08/12/21 0805    albuterol sulfate  (90 Base) MCG/ACT inhaler 2 puff  2 puff Inhalation Q6H PRN Ariel Cardenas MD        amLODIPine (NORVASC) tablet 10 mg  10 mg Oral Daily Ariel Cardenas MD   10 mg at 08/12/21 0805    butalbital-acetaminophen-caffeine (FIORICET, ESGIC) per tablet 1 tablet  1 tablet Oral Q6H PRN Ariel Cardenas MD        Vitamin D (CHOLECALCIFEROL) tablet 1,000 Units  1,000 Units Oral Daily Ariel Cardenas MD   1,000 Units at 08/12/21 0805    donepezil (ARICEPT) tablet 5 mg  5 mg Oral Nightly Ariel Cadrenas MD        fluticasone (FLOVENT HFA) 110 MCG/ACT inhaler 1 puff  1 puff Inhalation BID Ariel Cardenas MD        gemfibrozil (LOPID) tablet 600 mg  600 mg Oral BID Ariel Cardenas MD   600 mg at 08/12/21 0805    insulin glargine (LANTUS) injection vial 10 Units  10 Units Subcutaneous Nightly Ariel Cardenas MD        raloxifene (EVISTA) tablet 60 mg  60 mg Oral Daily Lucillie Simmonds, MD        montelukast (SINGULAIR) tablet 10 mg  10 mg Oral Nightly Lucillie Simmonds, MD        sertraline (ZOLOFT) tablet 100 mg  100 mg Oral Daily Lucillie Simmonds, MD   100 mg at 08/12/21 0959    sodium chloride flush 0.9 % injection 5-40 mL  5-40 mL Intravenous 2 times per day Lucillie Simmonds, MD   10 mL at 08/12/21 1001    sodium chloride flush 0.9 % injection 5-40 mL  5-40 mL Intravenous PRN Lucillie Simmonds, MD        0.9 % sodium chloride infusion  25 mL Intravenous PRN Lucillie Simmonds, MD        enoxaparin (LOVENOX) injection 40 mg  40 mg Subcutaneous Daily Lucillie Simmonds, MD   40 mg at 08/12/21 0959    ondansetron (ZOFRAN-ODT) disintegrating tablet 4 mg  4 mg Oral Q8H PRN Lucillie Simmonds, MD        Or    ondansetron (ZOFRAN) injection 4 mg  4 mg Intravenous Q6H PRN Lucillie Simmonds, MD        polyethylene glycol (GLYCOLAX) packet 17 g  17 g Oral Daily PRN Lucillie Simmonds, MD        acetaminophen (TYLENOL) tablet 650 mg  650 mg Oral Q6H PRN Lucillie Simmonds, MD        Or    acetaminophen (TYLENOL) suppository 650 mg  650 mg Rectal Q6H PRN Lucillie Simmonds, MD        0.9 % sodium chloride infusion   Intravenous Continuous Lucillie Simmonds, MD 75 mL/hr at 08/12/21 0040 New Bag at 08/12/21 0040    famotidine (PEPCID) injection 20 mg  20 mg Intravenous Daily Lucillie Simmonds, MD        piperacillin-tazobactam (ZOSYN) 3,375 mg in dextrose 5 % 50 mL IVPB extended infusion (mini-bag)  3,375 mg Intravenous Q8H Lucillie Simmonds, MD   Stopped at 08/12/21 0643    insulin lispro (HUMALOG) injection vial 0-12 Units  0-12 Units Subcutaneous TID WC Lucillie Simmonds, MD   2 Units at 08/12/21 0804    insulin lispro (HUMALOG) injection vial 0-6 Units  0-6 Units Subcutaneous Nightly Lucillie Simmonds, MD        glucose (GLUTOSE) 40 % oral gel 15 g  15 g Oral PRN Lucillie Simmonds, MD        dextrose 50 % IV solution  12.5 g Intravenous PRN Lucillie Simmonds, MD        glucagon (rDNA) injection 1 mg  1 mg Intramuscular PRN NEUROLOGY  IP CONSULT TO FAMILY MEDICINE  IP CONSULT TO SOCIAL WORK  IP CONSULT TO CARDIOLOGY  PT/OT     Plan will be discussed with the attending,        Mary Milligan MD  Family Medicine Resident  8/12/2021 10:08 AM            Please note that this chart was generated using voice recognition Dragon dictation software.   Although every effort was made to ensure the accuracy of this automated transcription, some errors in transcription may have occurred

## 2021-08-12 NOTE — ED NOTES
Pt brief changed. Pt denies any needs at this time. Pt updated on POC.  Will continue to monitor      Luisa Caballero RN  08/11/21 [id number]

## 2021-08-12 NOTE — PROGRESS NOTES
Neurosurgery FINESSE/Resident    Daily Progress Note   CC:  Chief Complaint   Patient presents with    Altered Mental Status    Emesis     8/12/2021  9:46 AM    Chart reviewed. New admit over night from nursing facility with altered mental status. She was recently diagnosed with  UTI and started on Zyvox. She was given dose of Vancomycin and started on Zosyn. She is on 81mg aspirin    She was unable to state why she was in the nursing facility  She reports that she walks      Vitals:    08/12/21 0030 08/12/21 0156 08/12/21 0400 08/12/21 0800   BP: (!) 150/74  (!) 154/69 120/71   Pulse: 63 68 73 73   Resp: 22  14    Temp: 97.5 °F (36.4 °C)  98.6 °F (37 °C)    TempSrc: Oral  Oral    SpO2: 100%  97%    Weight:       Height:           PE:   A little drowsy this morning and oriented to only, was not really answering questions for me this morning  No facial symmetry noted   Reports sensation intact bilateral upper and lower extremities   4-/5 bilateral lower extremities     Lab Results   Component Value Date    WBC 9.2 08/12/2021    HGB 10.8 (L) 08/12/2021    HCT 35.6 (L) 08/12/2021     08/12/2021    CHOL 181 05/26/2020    TRIG 230 (H) 05/26/2020    HDL 46 05/26/2020    LDLDIRECT 155 (H) 10/13/2015    ALT 12 08/11/2021    AST 16 08/11/2021     08/12/2021    K 4.0 08/12/2021     08/12/2021    CREATININE 0.92 (H) 08/12/2021    BUN 13 08/12/2021    CO2 15 (L) 08/12/2021    TSH 1.32 05/08/2021    INR 0.9 05/08/2021    GLUF 110 (H) 05/24/2019    LABA1C 15.9 (H) 05/09/2021    LABMICR 1,118 (H) 05/26/2020    CRP <3.0 08/11/2021       Radiology   CT HEAD WO CONTRAST    Result Date: 8/11/2021  EXAMINATION: CT OF THE HEAD WITHOUT CONTRAST  8/11/2021 5:43 pm TECHNIQUE: CT of the head was performed without the administration of intravenous contrast. Dose modulation, iterative reconstruction, and/or weight based adjustment of the mA/kV was utilized to reduce the radiation dose to as low as reasonably achievable. COMPARISON: May 8, 2021 HISTORY: ORDERING SYSTEM PROVIDED HISTORY: sepsis unknown source TECHNOLOGIST PROVIDED HISTORY: sepsis unknown source Decision Support Exception - unselect if not a suspected or confirmed emergency medical condition->Emergency Medical Condition (MA) Reason for Exam: sepsis unknown source Acuity: Acute FINDINGS: BRAIN/VENTRICLES: New area of edema involving the left frontal and temporal lobes which appears primarily vasogenic although there is focal disruption of the gray-white junction as well suggesting cytotoxic edema. There also appears to be a small area of encephalomalacia within the right parietal lobe. Mild diffuse cortical volume loss and ventricular enlargement appears unchanged. Periventricular white matter hypoattenuation bilaterally compatible chronic microvascular ischemic changes. No acute intracranial hemorrhage. No significant mass effect is appreciated despite the above findings. ORBITS: The visualized portion of the orbits demonstrate no acute abnormality. SINUSES: The visualized paranasal sinuses and mastoid air cells demonstrate no acute abnormality. SOFT TISSUES/SKULL:  No acute abnormality of the visualized skull or soft tissues. Area of edema within the left frontal and temporal lobes predominantly demonstrates a vasogenic pattern compatible with underlying mass or infectious process. However, there are areas of loss of the cortical gray-white differentiation suggesting the possibility of a subacute ischemic infarct. MRI with and without contrast recommended for further evaluation. Findings were discussed with Dr. Harvey Chávez at 6:14 pm on 8/11/2021 by Dr. Madelyn Eldridge.      XR CHEST PORTABLE    Result Date: 8/11/2021  EXAMINATION: ONE XRAY VIEW OF THE CHEST 8/11/2021 6:09 pm COMPARISON: May 8, 2021 HISTORY: ORDERING SYSTEM PROVIDED HISTORY: septic workup TECHNOLOGIST PROVIDED HISTORY: septic workup FINDINGS: Right upper extremity PICC terminates near the

## 2021-08-12 NOTE — PROGRESS NOTES
Physical Therapy        Physical Therapy Cancel Note      DATE: 2021    NAME: Marily Goncalves  MRN: [id number]   : 1955      Patient not seen this date for Physical Therapy due to: Other: pt awaiting further recommendations on POC from NS and cardiology, following minimal commands this AM with increased confusion. PT will check back for evaluation appropriateness 21.        Electronically signed by Juan Manuel Major PT on 2021 at 10:46 AM

## 2021-08-12 NOTE — H&P
[facility]  History & Physical Examination Note              Date:   8/11/2021  Patient name:  Wanda Deal  Date of admission:  8/11/2021  4:28 PM  MRN:   [id number]  YOB: 1955    CHIEF COMPLAINT:     Chief Complaint   Patient presents with    Altered Mental Status    Emesis       History Obtained From:  Patient and chart review. HPI:     The patient is a 61 y.o.  female with history of HTN, DM, CHF, Asthma who presents from [facility] with AMS for 1 day duration. Patient was oriented to place and person. Patient was being currently treated with antibiotics for UTI.she complains of nonproductive cough. Denies fever, chills, shortness of breath, chest pain, abdominal pain, dysuria, diarrhea or constipation. no trauma or falls. patient is not currently on anticoagulation. At ER, AO x2, blood pressure 143/78. UA: Positive for nitrites and leukocyte esterase   CT head: Subacute ischemicinfarct  Patient was given 2 L bolus NS and Vanco and Zosyn. Neurology consulted  Elevated Trop. PAST MEDICAL HISTORY:      has a past medical history of CEFERINO (acute kidney injury) ([address] Asthma, Carotid artery plaque, Clostridium difficile diarrhea, Dehydration, Depression, Fall, Fibromyalgia, Hiatal hernia, HTN (hypertension), Hyperlipidemia, Hypokalemia, gastrointestinal losses, Kidney stone, Obesity (BMI 30-39.9), Osteoarthritis, Osteoporosis, Renal cancer ([address] Short of breath on exertion, Type II or unspecified type diabetes mellitus without mention of complication, not stated as uncontrolled, Unspecified sleep apnea, Urge urinary incontinence, Wears glasses, Wears glasses, and Zygomatic fracture, right side, initial encounter for closed fracture ([address] PAST SURGICAL HISTORY:      has a past surgical history that includes knee surgery (Bilateral); bladder suspension (08/09/2002);  Nephrostomy (Right); total nephrectomy (Right, 06/20/2013); lumbar laminectomy (10/15/14); Bladder surgery (06/05/2014); Endometrial ablation; Cholecystectomy (10/10/2003); Breast surgery (Left, 03/03/2008); Breast biopsy (Left, 03/03/2008); pr perq vert agmntj cavity crtj uni/bi cannulj lmbr (N/A, 9/12/2018); Fixation Kyphoplasty (10/2018); Kyphosis surgery (N/A, 5/8/2019); Endoscopy, colon, diagnostic; and Colonoscopy. FAMILY HISTORY:     family history includes Asthma in her brother; Breast Cancer in her sister; Diabetes in her mother; High Blood Pressure in her father, mother, and paternal grandmother; Pacemaker in her mother; Prostate Cancer in her father and maternal grandfather. HOME MEDICATIONS:     Prior to Admission medications    Medication Sig Start Date End Date Taking? Authorizing Provider   lactobacillus (CULTURELLE) capsule Take 1 capsule by mouth daily (with breakfast) 5/15/21   Jm Cyr MD   insulin glargine (LANTUS SOLOSTAR) 100 UNIT/ML injection pen Inject 15 units nightly 5/14/21   Jm Cyr MD   insulin lispro (HUMALOG) 100 UNIT/ML injection vial Inject 10 Units into the skin 3 times daily (before meals) Glucose: Dose: If <139             No Insulin 140-199 2 Units 200-249 4 Units 250-299 6 Units 300-349 8 Units 350-400 10 Units Above 400       12 Units 5/14/21   Jm Cyr MD   donepezil (ARICEPT) 5 MG tablet Take 1 tablet by mouth nightly 9/1/20   Antwan Davis MD   ammonium lactate (AMLACTIN) 12 % cream Apply 1 Bottle topically as needed for Dry Skin Apply topically as needed.     Historical Provider, MD   amLODIPine (NORVASC) 10 MG tablet Take 1 tablet by mouth daily 3/27/20   Marvel Dancer, MD   metFORMIN (GLUCOPHAGE-XR) 500 MG extended release tablet Take 1 tablet by mouth 2 times daily 3/27/20   Marvel Dancer, MD   ondansetron [facility]) 4 MG tablet Take 1 tablet by mouth every 8 hours as needed for Nausea or Vomiting 8/1/19   Abiel Cabrera MD   sertraline (ZOLOFT) 100 MG tablet TAKE ONE TABLET BY MOUTH DAILY 7/10/19 Lucy Palma MD   raloxifene (EVISTA) 60 MG tablet TAKE ONE TABLET BY MOUTH DAILY 7/10/19   Lucy Palma MD   Cholecalciferol (VITAMIN D3) 1000 units TABS Take 1 tablet by mouth daily 12/26/18   Lucy Palma MD   aspirin EC 81 MG EC tablet Take 1 tablet by mouth daily 12/11/18   Lucy Palma MD   lovastatin (MEVACOR) 40 MG tablet TAKE ONE AND ONE-HALF (1  1/2) TABLET BY MOUTH NIGHTLY 12/11/18   Lucy Palma MD   gemfibrozil (LOPID) 600 MG tablet TAKE ONE TABLET BY MOUTH TWICE A DAY 12/11/18   Lucy Palma MD   fluticasone (ARNUITY ELLIPTA) 200 MCG/ACT AEPB Inhale 1 Inhaler into the lungs daily    Historical Provider, MD   BiPAP Machine MISC by Does not apply route    Historical Provider, MD   calcium carbonate (TUMS) 500 MG chewable tablet Take 1 tablet by mouth 3 times daily as needed for Heartburn 1/10/17   Aubree Rodriguez MD   Handicap Placard MISC by Does not apply route Duration: 1 year 4/28/16   Johnny Sandoval MD   acetaminophen (TYLENOL) 500 MG tablet Take 500 mg by mouth every 6 hours as needed. Historical Provider, MD   butalbital-acetaminophen-caffeine (FIORICET, ESGIC) per tablet Take 1 tablet by mouth every 8 hours as needed. Historical Provider, MD   montelukast (SINGULAIR) 10 MG tablet Take 10 mg by mouth nightly. Historical Provider, MD   albuterol (PROVENTIL HFA;VENTOLIN HFA) 108 (90 BASE) MCG/ACT inhaler Inhale 2 puffs into the lungs every 6 hours as needed. Historical Provider, MD       ALLERGIES:      Lac bovis, Nedocromil, Tramadol, Penicillins, and Tape [adhesive tape]    SOCIAL HISTORY:      reports that she has never smoked. She has never used smokeless tobacco. She reports that she does not drink alcohol and does not use drugs. REVIEW OF SYSTEMS:     Review of Systems   Constitutional: Negative for chills and fever. HENT: Negative. Respiratory: Positive for cough. Negative for chest tightness and shortness of breath.     Cardiovascular: Negative for chest pain, palpitations and leg swelling. Gastrointestinal: Negative for abdominal pain, constipation, diarrhea, nausea and vomiting. Genitourinary: Negative for decreased urine volume, difficulty urinating, dysuria, frequency and hematuria. Musculoskeletal: Negative for arthralgias, back pain and neck pain. Skin: Negative for color change. Neurological: Negative for dizziness, weakness, light-headedness, numbness and headaches. PHYSICAL EXAM:     Vitals:    08/11/21 1639 08/11/21 1801   BP: 124/68 (!) 156/67   Pulse: 67 69   Resp: 12 12   Temp: 98.2 °F (36.8 °C)    SpO2: 100% 100%   Weight: 147 lb (66.7 kg)    Height: 5' 2\" (1.575 m)        No intake or output data in the 24 hours ending 08/11/21 2010    Physical Exam  Vitals and nursing note reviewed. Constitutional:       General: She is not in acute distress. Appearance: Normal appearance. She is not ill-appearing. HENT:      Head: Normocephalic and atraumatic. Mouth/Throat:      Pharynx: Oropharynx is clear. Cardiovascular:      Rate and Rhythm: Normal rate and regular rhythm. Pulses: Normal pulses. Heart sounds: No murmur heard. Pulmonary:      Effort: Pulmonary effort is normal.      Breath sounds: Normal breath sounds. Abdominal:      Palpations: Abdomen is soft. There is no mass. Tenderness: There is no abdominal tenderness. Musculoskeletal:         General: No swelling or tenderness. Normal range of motion. Cervical back: Normal range of motion and neck supple. Neurological:      General: No focal deficit present. Mental Status: She is alert. She is disoriented. Cranial Nerves: No cranial nerve deficit. Sensory: No sensory deficit. Motor: No weakness.          DIAGNOSTICS:      Imaging/Diagonstics:  CT HEAD WO CONTRAST    Result Date: 8/11/2021  EXAMINATION: CT OF THE HEAD WITHOUT CONTRAST  8/11/2021 5:43 pm TECHNIQUE: CT of the head was performed without the administration of intravenous contrast. Dose modulation, iterative reconstruction, and/or weight based adjustment of the mA/kV was utilized to reduce the radiation dose to as low as reasonably achievable. COMPARISON: May 8, 2021 HISTORY: ORDERING SYSTEM PROVIDED HISTORY: sepsis unknown source TECHNOLOGIST PROVIDED HISTORY: sepsis unknown source Decision Support Exception - unselect if not a suspected or confirmed emergency medical condition->Emergency Medical Condition (MA) Reason for Exam: sepsis unknown source Acuity: Acute FINDINGS: BRAIN/VENTRICLES: New area of edema involving the left frontal and temporal lobes which appears primarily vasogenic although there is focal disruption of the gray-white junction as well suggesting cytotoxic edema. There also appears to be a small area of encephalomalacia within the right parietal lobe. Mild diffuse cortical volume loss and ventricular enlargement appears unchanged. Periventricular white matter hypoattenuation bilaterally compatible chronic microvascular ischemic changes. No acute intracranial hemorrhage. No significant mass effect is appreciated despite the above findings. ORBITS: The visualized portion of the orbits demonstrate no acute abnormality. SINUSES: The visualized paranasal sinuses and mastoid air cells demonstrate no acute abnormality. SOFT TISSUES/SKULL:  No acute abnormality of the visualized skull or soft tissues. Area of edema within the left frontal and temporal lobes predominantly demonstrates a vasogenic pattern compatible with underlying mass or infectious process. However, there are areas of loss of the cortical gray-white differentiation suggesting the possibility of a subacute ischemic infarct. MRI with and without contrast recommended for further evaluation. Findings were discussed with Dr. Darlene Gandhi at 6:14 pm on 8/11/2021 by Dr. Wilfredo Rodriguez.      XR CHEST PORTABLE    Result Date: 8/11/2021  EXAMINATION: ONE XRAY VIEW OF THE CHEST 8/11/2021 6:09 pm COMPARISON: May 8, 2021 HISTORY: ORDERING SYSTEM PROVIDED HISTORY: septic workup TECHNOLOGIST PROVIDED HISTORY: septic workup FINDINGS: Right upper extremity PICC terminates near the cavoatrial junction in satisfactory position. Cardiomegaly appears unchanged. Cardiomediastinal silhouette otherwise within normal limits. Lungs and costophrenic sulci are clear. No pneumothorax or subdiaphragmatic free air. No acute osseous abnormality. Upper thoracic vertebral augmentation redemonstrated. No radiographic evidence of acute cardiopulmonary disease. ASSESSMENT:       Principal Problem:    Altered mental status  Active Problems:    HTN (hypertension)    DM (diabetes mellitus), type 2, uncontrolled with complications (HCC)    CKD (chronic kidney disease) stage 2, GFR 60-89 ml/min    Elevated troponin    Brain lesion  Resolved Problems:    * No resolved hospital problems. *      PLAN:     1. AMS  Likely 2/2 urosepsis  Lactic acid 2.0- 1.2 -resolved after 2L IVF boluses  UA: positive for nitrite and leukocyte esterase  CT head: possible subacute ischemic infarct. Needs correlation with MRI  CXR: negative      -S/P vanc and zosyn, 2L NS boluses in ED  - Start Zosyn  -F/U urine and blood cultures  -IVF NS@ 75ml/hr  - F/U brain MRI  -Neurology on board      2. Elevated Troponin  177-169  EKG: NSR  Likely demand ischemia  History of grade I diastolic CHF, EF 62% (august 2020)    -trend trops  -AM EKG  -Telemetry monitoring  -Cardiology on board  -Hold anticoagulation in the setting of risk of brain bleed    3. Hypertension  Continue home  Meds, amlodipine 10    4. DM  Home meds: lantus 15 units, lispro 10 units premeals    -Continue lantus 10 units  -medium dose ISS    5. Brain lesion  Brain mass/ vasogenic edema on CT head.     -F/U MRI brain  -Neurology on board  -Neurosurgery on board  -Continue home meds donepezil    DVT prophylaxis:reason for no prophylaxis: ischemic brain infact  GI prophylaxis: Famotidine 20 mg BID      Consultations:   Consults: IP CONSULT TO INTERNAL MEDICINE  IP CONSULT TO NEUROLOGY  IP CONSULT TO FAMILY MEDICINE  IP CONSULT TO SOCIAL WORK  IP CONSULT TO CARDIOLOGY  PT/OT    Plan will be discussed with the attending,       Wade Tate MD  Family Medicine Resident  8/11/2021 8:10 PM

## 2021-08-13 ENCOUNTER — APPOINTMENT (OUTPATIENT)
Dept: CT IMAGING | Age: 66
DRG: 045 | End: 2021-08-13
Payer: MEDICAID

## 2021-08-13 LAB
ABSOLUTE EOS #: <0.03 K/UL (ref 0–0.44)
ABSOLUTE IMMATURE GRANULOCYTE: 0.05 K/UL (ref 0–0.3)
ABSOLUTE LYMPH #: 1.9 K/UL (ref 1.1–3.7)
ABSOLUTE MONO #: 0.55 K/UL (ref 0.1–1.2)
ANION GAP SERPL CALCULATED.3IONS-SCNC: 17 MMOL/L (ref 9–17)
BASOPHILS # BLD: 0 % (ref 0–2)
BASOPHILS ABSOLUTE: <0.03 K/UL (ref 0–0.2)
BUN BLDV-MCNC: 10 MG/DL (ref 8–23)
BUN/CREAT BLD: ABNORMAL (ref 9–20)
CALCIUM SERPL-MCNC: 8.4 MG/DL (ref 8.6–10.4)
CHLORIDE BLD-SCNC: 105 MMOL/L (ref 98–107)
CO2: 15 MMOL/L (ref 20–31)
CREAT SERPL-MCNC: 0.81 MG/DL (ref 0.5–0.9)
CULTURE: ABNORMAL
DIFFERENTIAL TYPE: ABNORMAL
EKG ATRIAL RATE: 67 BPM
EKG P AXIS: 77 DEGREES
EKG P-R INTERVAL: 170 MS
EKG Q-T INTERVAL: 468 MS
EKG QRS DURATION: 84 MS
EKG QTC CALCULATION (BAZETT): 494 MS
EKG T AXIS: 25 DEGREES
EKG VENTRICULAR RATE: 67 BPM
EOSINOPHILS RELATIVE PERCENT: 0 % (ref 1–4)
GFR AFRICAN AMERICAN: >60 ML/MIN
GFR NON-AFRICAN AMERICAN: >60 ML/MIN
GFR SERPL CREATININE-BSD FRML MDRD: ABNORMAL ML/MIN/{1.73_M2}
GFR SERPL CREATININE-BSD FRML MDRD: ABNORMAL ML/MIN/{1.73_M2}
GLUCOSE BLD-MCNC: 112 MG/DL (ref 65–105)
GLUCOSE BLD-MCNC: 154 MG/DL (ref 70–99)
GLUCOSE BLD-MCNC: 206 MG/DL (ref 65–105)
HCT VFR BLD CALC: 34.3 % (ref 36.3–47.1)
HEMOGLOBIN: 10.8 G/DL (ref 11.9–15.1)
IMMATURE GRANULOCYTES: 1 %
LV EF: 47 %
LVEF MODALITY: NORMAL
LYMPHOCYTES # BLD: 18 % (ref 24–43)
Lab: ABNORMAL
MCH RBC QN AUTO: 28.1 PG (ref 25.2–33.5)
MCHC RBC AUTO-ENTMCNC: 31.5 G/DL (ref 28.4–34.8)
MCV RBC AUTO: 89.1 FL (ref 82.6–102.9)
MONOCYTES # BLD: 5 % (ref 3–12)
NRBC AUTOMATED: 0 PER 100 WBC
PDW BLD-RTO: 12.9 % (ref 11.8–14.4)
PLATELET # BLD: 216 K/UL (ref 138–453)
PLATELET ESTIMATE: ABNORMAL
PMV BLD AUTO: 10.6 FL (ref 8.1–13.5)
POTASSIUM SERPL-SCNC: 3.9 MMOL/L (ref 3.7–5.3)
RBC # BLD: 3.85 M/UL (ref 3.95–5.11)
RBC # BLD: ABNORMAL 10*6/UL
SEG NEUTROPHILS: 76 % (ref 36–65)
SEGMENTED NEUTROPHILS ABSOLUTE COUNT: 8.23 K/UL (ref 1.5–8.1)
SODIUM BLD-SCNC: 137 MMOL/L (ref 135–144)
SPECIMEN DESCRIPTION: ABNORMAL
TROPONIN INTERP: ABNORMAL
TROPONIN T: ABNORMAL NG/ML
TROPONIN, HIGH SENSITIVITY: 111 NG/L (ref 0–14)
VANCOMYCIN TROUGH DATE LAST DOSE: NORMAL
VANCOMYCIN TROUGH DOSE AMOUNT: NORMAL
VANCOMYCIN TROUGH TIME LAST DOSE: NORMAL
VANCOMYCIN TROUGH: 19.4 UG/ML (ref 10–20)
WBC # BLD: 10.8 K/UL (ref 3.5–11.3)
WBC # BLD: ABNORMAL 10*3/UL

## 2021-08-13 PROCEDURE — 6370000000 HC RX 637 (ALT 250 FOR IP): Performed by: STUDENT IN AN ORGANIZED HEALTH CARE EDUCATION/TRAINING PROGRAM

## 2021-08-13 PROCEDURE — 36415 COLL VENOUS BLD VENIPUNCTURE: CPT

## 2021-08-13 PROCEDURE — 71260 CT THORAX DX C+: CPT

## 2021-08-13 PROCEDURE — 2580000003 HC RX 258: Performed by: STUDENT IN AN ORGANIZED HEALTH CARE EDUCATION/TRAINING PROGRAM

## 2021-08-13 PROCEDURE — 82947 ASSAY GLUCOSE BLOOD QUANT: CPT

## 2021-08-13 PROCEDURE — 80202 ASSAY OF VANCOMYCIN: CPT

## 2021-08-13 PROCEDURE — 2500000003 HC RX 250 WO HCPCS: Performed by: STUDENT IN AN ORGANIZED HEALTH CARE EDUCATION/TRAINING PROGRAM

## 2021-08-13 PROCEDURE — 97166 OT EVAL MOD COMPLEX 45 MIN: CPT

## 2021-08-13 PROCEDURE — 99232 SBSQ HOSP IP/OBS MODERATE 35: CPT | Performed by: PSYCHIATRY & NEUROLOGY

## 2021-08-13 PROCEDURE — 94640 AIRWAY INHALATION TREATMENT: CPT

## 2021-08-13 PROCEDURE — 93356 MYOCRD STRAIN IMG SPCKL TRCK: CPT

## 2021-08-13 PROCEDURE — 80048 BASIC METABOLIC PNL TOTAL CA: CPT

## 2021-08-13 PROCEDURE — 84484 ASSAY OF TROPONIN QUANT: CPT

## 2021-08-13 PROCEDURE — APPSS30 APP SPLIT SHARED TIME 16-30 MINUTES: Performed by: NURSE PRACTITIONER

## 2021-08-13 PROCEDURE — 85025 COMPLETE CBC W/AUTO DIFF WBC: CPT

## 2021-08-13 PROCEDURE — 6360000002 HC RX W HCPCS: Performed by: STUDENT IN AN ORGANIZED HEALTH CARE EDUCATION/TRAINING PROGRAM

## 2021-08-13 PROCEDURE — 93010 ELECTROCARDIOGRAM REPORT: CPT | Performed by: INTERNAL MEDICINE

## 2021-08-13 PROCEDURE — 94761 N-INVAS EAR/PLS OXIMETRY MLT: CPT

## 2021-08-13 PROCEDURE — 2060000000 HC ICU INTERMEDIATE R&B

## 2021-08-13 PROCEDURE — 97535 SELF CARE MNGMENT TRAINING: CPT

## 2021-08-13 PROCEDURE — 6360000004 HC RX CONTRAST MEDICATION: Performed by: NURSE PRACTITIONER

## 2021-08-13 PROCEDURE — 93306 TTE W/DOPPLER COMPLETE: CPT

## 2021-08-13 PROCEDURE — 95816 EEG AWAKE AND DROWSY: CPT

## 2021-08-13 RX ORDER — VANCOMYCIN HYDROCHLORIDE 1 G/200ML
1000 INJECTION, SOLUTION INTRAVENOUS EVERY 24 HOURS
Status: DISCONTINUED | OUTPATIENT
Start: 2021-08-13 | End: 2021-08-18 | Stop reason: HOSPADM

## 2021-08-13 RX ADMIN — Medication 1000 UNITS: at 09:55

## 2021-08-13 RX ADMIN — SERTRALINE 100 MG: 50 TABLET, FILM COATED ORAL at 09:55

## 2021-08-13 RX ADMIN — DEXAMETHASONE SODIUM PHOSPHATE 4 MG: 4 INJECTION, SOLUTION INTRAMUSCULAR; INTRAVENOUS at 23:06

## 2021-08-13 RX ADMIN — MONTELUKAST SODIUM 10 MG: 10 TABLET, FILM COATED ORAL at 20:56

## 2021-08-13 RX ADMIN — ASPIRIN 81 MG: 81 TABLET, CHEWABLE ORAL at 09:56

## 2021-08-13 RX ADMIN — VANCOMYCIN HYDROCHLORIDE 1000 MG: 1 INJECTION, SOLUTION INTRAVENOUS at 18:59

## 2021-08-13 RX ADMIN — AMLODIPINE BESYLATE 10 MG: 10 TABLET ORAL at 09:56

## 2021-08-13 RX ADMIN — DEXAMETHASONE SODIUM PHOSPHATE 4 MG: 4 INJECTION, SOLUTION INTRAMUSCULAR; INTRAVENOUS at 09:56

## 2021-08-13 RX ADMIN — SODIUM CHLORIDE: 9 INJECTION, SOLUTION INTRAVENOUS at 20:56

## 2021-08-13 RX ADMIN — INSULIN LISPRO 2 UNITS: 100 INJECTION, SOLUTION INTRAVENOUS; SUBCUTANEOUS at 09:57

## 2021-08-13 RX ADMIN — DONEPEZIL HYDROCHLORIDE 5 MG: 5 TABLET, FILM COATED ORAL at 20:56

## 2021-08-13 RX ADMIN — GEMFIBROZIL 600 MG: 600 TABLET ORAL at 20:56

## 2021-08-13 RX ADMIN — INSULIN LISPRO 2 UNITS: 100 INJECTION, SOLUTION INTRAVENOUS; SUBCUTANEOUS at 20:56

## 2021-08-13 RX ADMIN — ENOXAPARIN SODIUM 40 MG: 40 INJECTION SUBCUTANEOUS at 09:56

## 2021-08-13 RX ADMIN — FLUTICASONE PROPIONATE 1 PUFF: 110 AEROSOL, METERED RESPIRATORY (INHALATION) at 19:35

## 2021-08-13 RX ADMIN — FAMOTIDINE 20 MG: 10 INJECTION INTRAVENOUS at 09:56

## 2021-08-13 RX ADMIN — INSULIN GLARGINE 10 UNITS: 100 INJECTION, SOLUTION SUBCUTANEOUS at 20:56

## 2021-08-13 RX ADMIN — IOPAMIDOL 100 ML: 755 INJECTION, SOLUTION INTRAVENOUS at 15:39

## 2021-08-13 RX ADMIN — SODIUM CHLORIDE, PRESERVATIVE FREE 10 ML: 5 INJECTION INTRAVENOUS at 09:56

## 2021-08-13 RX ADMIN — GEMFIBROZIL 600 MG: 600 TABLET ORAL at 09:56

## 2021-08-13 ASSESSMENT — PAIN SCALES - GENERAL
PAINLEVEL_OUTOF10: 0

## 2021-08-13 NOTE — PLAN OF CARE
Problem: Skin Integrity:  Goal: Will show no infection signs and symptoms  Outcome: Ongoing     Problem: Skin Integrity:  Goal: Risk for impaired skin integrity will decrease  Outcome: Ongoing     Problem: Falls - Risk of:  Goal: Will remain free from falls  Outcome: Ongoing

## 2021-08-13 NOTE — PLAN OF CARE
Problem: Falls - Risk of:  Goal: Will remain free from falls  Description: Will remain free from falls  8/13/2021 0048 by Harsh Guidry RN  Outcome: Met This Shift     Problem: Skin Integrity:  Goal: Will show no infection signs and symptoms  Description: Will show no infection signs and symptoms  8/13/2021 0048 by Harsh Guidry RN  Outcome: Ongoing   Electronically signed by Harsh Guidry RN on 8/13/2021 at 12:48 AM

## 2021-08-13 NOTE — PROGRESS NOTES
Physical Therapy    Facility/Department: [address] STEPDOWN  Initial Assessment    NAME: Jaylen Vargas  : 1955  MRN: [id number]    Date of Service: 2021    Discharge Recommendations: Further therapy recommended at discharge. Chief Complaint   Patient presents with    Altered Mental Status    Emesis     The patient is a 62 y. o.  female with history of HTN, DM, CHF, Asthma who presents from [facility] with AMS for 1 day duration.  Patient was oriented to place and person.  Patient was being currently treated with antibiotics for UTI.she complains of nonproductive cough.  Denies fever, chills, shortness of breath, chest pain, abdominal pain, dysuria, diarrhea or constipation. no trauma or falls. patient is not currently on anticoagulation.              Assessment   Body structures, Functions, Activity limitations: Decreased functional mobility ; Decreased safe awareness;Decreased balance;Decreased ADL status; Decreased cognition;Decreased endurance;Decreased ROM; Decreased high-level IADLs;Decreased strength  Assessment: Pt completes bed mobility with mod A x2, attempts transfer with RW and max x2 x3 attempts, unable to complete d/t LE weakness. Pt currently requires 24 hr support for functional mobility. Pt would benefit from continued therapy to promote endurance, balance, and strengthening. PT Education: Goals;PT Role;Plan of Care  Activity Tolerance  Activity Tolerance: Patient limited by fatigue;Patient limited by endurance       Patient Diagnosis(es): The primary encounter diagnosis was Septicemia ([address] Diagnoses of Vasogenic edema ([address] Elevated troponin, Acute cystitis without hematuria, and Disorientation were also pertinent to this visit.      has a past medical history of CEFERINO (acute kidney injury) ([address] Asthma, Carotid artery plaque, Clostridium difficile diarrhea, Dehydration, Depression, Fall, Fibromyalgia, Hiatal hernia, HTN (hypertension), Hyperlipidemia, Hypokalemia, gastrointestinal losses, Kidney stone, Obesity (BMI 30-39.9), Osteoarthritis, Osteoporosis, [address]al cancer ([facility] [address] Short of breath on exertion, Type II or unspecified type diabetes mellitus without mention of complication, not stated as uncontrolled, Unspecified sleep apnea, Urge urinary incontinence, Wears glasses, Wears glasses, and Zygomatic fracture, right side, initial encounter for closed fracture ([facility] has a past surgical history that includes knee surgery (Bilateral); bladder suspension (08/09/2002); Nephrostomy (Right); total nephrectomy (Right, 06/20/2013); lumbar laminectomy (10/15/2014); Bladder surgery (06/05/2014); Endometrial ablation; Cholecystectomy (10/10/2003); Breast surgery (Left, 03/03/2008); Breast biopsy (Left, 03/03/2008); pr perq vert agmntj cavity crtj uni/bi cannulj lmbr (N/A, 09/12/2018); Fixation Kyphoplasty (10/2018); Kyphosis surgery (N/A, 05/08/2019); Endoscopy, colon, diagnostic; and Colonoscopy. Restrictions  Restrictions/Precautions  Required Braces or Orthoses?: No  Position Activity Restriction  Other position/activity restrictions: up with assistance  Vision/Hearing  Vision:  (NIYA d/t cognition)  Hearing: Within functional limits     Subjective  General  Patient assessed for rehabilitation services?: Yes  Response To Previous Treatment: Not applicable  Family / Caregiver Present: No  Follows Commands: Impaired  Other (Comment): follows commands with increased time and repetition  Subjective  Subjective: RN and pt agreeable to PT. pt agreeable and pleasant. pt supine in bed at start of session. Pain Screening  Patient Currently in Pain: Denies  Pain Assessment  Pain Assessment: 0-10  Pain Level: 0  Vital Signs  Patient Currently in Pain: Denies       Orientation  Orientation  Overall Orientation Status: Impaired  Orientation Level: Disoriented to place; Disoriented to situation;Oriented to person;Disoriented to time  Social/Functional History  Social/Functional History  Type of Home: Facility  Home Layout: One level  Home Access: Level entry  Additional Comments: NIYA home information d/t patient being AxOx1; not a reliable historian at this time and no family present  Cognition   Cognition  Overall Cognitive Status: Exceptions  Following Commands: Follows one step commands with repetition; Follows one step commands with increased time  Attention Span: Attends with cues to redirect  Memory: Decreased short term memory;Decreased recall of recent events  Safety Judgement: Decreased awareness of need for assistance;Decreased awareness of need for safety  Problem Solving: Assistance required to generate solutions;Assistance required to identify errors made;Decreased awareness of errors  Insights: Decreased awareness of deficits  Initiation: Requires cues for all  Sequencing: Requires cues for all    Objective          Joint Mobility  Spine: WFL  ROM RLE: Decreased AROM R knee extension, lacking 30 deg AROM knee extension, PROM knee extension WFL, otherwise RLE WFL  ROM LLE: WFL  ROM RUE: WFL  ROM LUE: WFL  Strength RLE  Strength RLE: Exception  R Hip Flexion: 2+/5  R Knee Flexion: 3+/5  R Knee Extension: 2+/5  R Ankle Dorsiflexion: 2+/5  R Ankle Plantar flexion: 3/5  Strength LLE  Strength LLE: Exception  L Hip Flexion: 2+/5  L Knee Flexion: 3+/5  L Knee Extension: 3+/5  L Ankle Dorsiflexion: 3+/5  L Ankle Plantar Flexion: 3/5  Strength RUE  Strength RUE: Exception  Comment: 4/5 shoulder, 4-/5 elbow  Strength LUE  Strength LUE: Exception  Comment: 4/5 shoulder, 4-/5 elbow  Tone RLE  RLE Tone: Hypertonic  Tone LLE  LLE Tone: Hypertonic  Motor Control  Gross Motor?: WFL  Sensation  Overall Sensation Status: WFL (pt denies n/t)  Bed mobility  Rolling to Left: Moderate assistance  Supine to Sit: Moderate assistance;2 Person assistance  Sit to Supine: Moderate assistance;2 Person assistance  Scooting: Moderate assistance  Comment: HOB elevated, mod A for trunk and LE.  Pt requires maximal verbal and tactile cues to progress task with fair return  Transfers  Sit to Stand: 2 Person Assistance;Maximum Assistance  Stand to sit: 2 Person Assistance;Maximum Assistance  Comment: Pt attempts sit to stand with max A x2 and RW. Cues for hand placement with poor return, therapists blocking both knees, pt pulls with B hands from RW. Pt attempts sit to stand x3, unable to complete stand d/t LE weakness  Ambulation  Ambulation?: No (Pt cannot complete transfer, NIYA)  Stairs/Curb  Stairs?: No     Balance  Posture: Fair  Sitting - Static: Poor  Sitting - Dynamic: Poor  Comments: Pt sits at EOB with mod A for R lean. Requires verbal and tactile cues to improve R lean. [facility]  Times per week: 5-6x  Current Treatment Recommendations: Strengthening, Transfer Training, Endurance Training, Neuromuscular Re-education, Patient/Caregiver Education & Training, ADL/Self-care Training, Equipment Evaluation, Education, & procurement, Balance Training, IADL Training, Gait Training, Home Exercise Program, Functional Mobility Training, Safety Education & Training  Safety Devices  Type of devices:  All fall risk precautions in place, Bed alarm in place, Call light within reach, Gait belt, Left in bed, Nurse notified    G-Code       OutComes Score                                                  AM-PAC Score  AM-PAC Inpatient Mobility Raw Score : 9 (08/13/21 1208)  AM-PAC Inpatient T-Scale Score : 30.55 (08/13/21 1208)  Mobility Inpatient CMS 0-100% Score: 81.38 (08/13/21 1208)  Mobility Inpatient CMS G-Code Modifier : CM (08/13/21 1208)          Goals  Short term goals  Time Frame for Short term goals: 14 visits  Short term goal 1: Complete bed mobility with min A  Short term goal 2: Complete transfers with mod A and appropriate device  Short term goal 3: Complete 20 ft of gait with mod A and appropriate device  Short term goal 4: Participate in 30 minutes of therapy to promote endurance       Therapy Time

## 2021-08-13 NOTE — PROGRESS NOTES
Occupational Therapy   Occupational Therapy Initial Assessment  Date: 2021   Patient Name: Saba Jones  MRN: [id number]     : 1955    Date of Service: 2021  Obtained from medical chart:     \" The patient is J 34 y. o.  female with history of HTN, DM, CHF, Asthma who presents from [facility] with AMS for 1 day duration.  Patient was oriented to place and person.  Patient was being currently treated with antibiotics for UTI.she complains of nonproductive cough.  Denies fever, chills, shortness of breath, chest pain, abdominal pain, dysuria, diarrhea or constipation. no trauma or falls. patient is not currently on anticoagulation. \"    Discharge Recommendations:  Patient would benefit from continued therapy after discharge     Assessment   Performance deficits / Impairments: Decreased functional mobility ; Decreased ADL status; Decreased endurance;Decreased ROM; Decreased strength;Decreased high-level IADLs;Decreased coordination;Decreased balance;Decreased safe awareness;Decreased posture;Decreased cognition  Assessment: Patient presents with decreased cognition impacting communication and reliability of baseline function. Pt required increased time for all responses and multiple cues to engage in functional tasks. Pt completed bed mobility at Mod A x2 to sit EOB at Mod A d/t R lateral lean d/t increased tone in the L UE. Pt attempted x3 functional transfers at max A x2 using RW with liftoff for x1 transfer. Pt returned to supine and engaged in grooming task, requiring multiple VCs for attention to task. Patient would benefit from continued acute OT services to address functional deficits through skilled intervention of ADL and IADL compensatory training, balance, safety and transfer training, education of EC/WS techs and implementation, use of AE/DME to increase independence, and strengthening activities to improve function for ADLs to promote functional outcomes.    Prognosis: Good  Decision Making: Medium Complexity  Patient Education: OT role, OT POC, purpose of evaluation, importance of OOB activity, sequencing for bed mobility, reorientation, purpose of wedges, use of hand rails for balance hand placement for transfers, strategies to improve balance- fair/poor return  Barriers to Learning: cognition  REQUIRES OT FOLLOW UP: Yes  Activity Tolerance  Activity Tolerance: Treatment limited secondary to decreased cognition;Patient limited by fatigue  Safety Devices  Safety Devices in place: Yes  Type of devices: Gait belt;Call light within reach;Nurse notified; Left in bed;Bed alarm in place  Restraints  Initially in place: No         Patient Diagnosis(es): The primary encounter diagnosis was Septicemia ([facility] [address] Diagnoses of Vasogenic edema ([facility] [address] Elevated troponin, Acute cystitis without hematuria, and Disorientation were also pertinent to this visit. has a past medical history of CEFERINO (acute kidney injury) ([facility] [address] Asthma, Carotid artery plaque, Clostridium difficile diarrhea, Dehydration, Depression, Fall, Fibromyalgia, Hiatal hernia, HTN (hypertension), Hyperlipidemia, Hypokalemia, gastrointestinal losses, Kidney stone, Obesity (BMI 30-39.9), Osteoarthritis, Osteoporosis, Renal cancer ([facility] [address] Short of breath on exertion, Type II or unspecified type diabetes mellitus without mention of complication, not stated as uncontrolled, Unspecified sleep apnea, Urge urinary incontinence, Wears glasses, Wears glasses, and Zygomatic fracture, right side, initial encounter for closed fracture ([facility] [address] has a past surgical history that includes knee surgery (Bilateral); bladder suspension (08/09/2002); Nephrostomy (Right); total nephrectomy (Right, 06/20/2013); lumbar laminectomy (10/15/2014); Bladder surgery (06/05/2014); Endometrial ablation; Cholecystectomy (10/10/2003); Breast surgery (Left, 03/03/2008); Breast biopsy (Left, 03/03/2008); pr perq vert agmntj cavity crtj uni/bi cannulj lmbr (N/A, 09/12/2018);  Fixation Kyphoplasty (10/2018); Kyphosis surgery (N/A, 05/08/2019); Endoscopy, colon, diagnostic; and Colonoscopy. Restrictions  Restrictions/Precautions  Required Braces or Orthoses?: No  Position Activity Restriction  Other position/activity restrictions: up with assistance    Subjective   General  Patient assessed for rehabilitation services?: Yes  Family / Caregiver Present: No  General Comment  Comments: RN ok'd patient for OT/PT evaluation. Pt pleasant and cooperative throughout evaluation. Patient Currently in Pain: Denies  Pain Assessment  Pain Assessment: 0-10  Pain Level: 0  Vital Signs  Pulse: 67  Heart Rate Source: Monitor  Resp: 12  BP: (!) 149/63  BP Location: Left upper arm  MAP (mmHg): 89  Patient Position: Semi fowlers  Level of Consciousness: Alert (0)  MEWS Score: 0  Patient Currently in Pain: Denies  Oxygen Therapy  SpO2: 100 %  Pulse Oximeter Device Mode: Continuous  O2 Device: None (Room air)     Social/Functional History  Social/Functional History  Type of Home: Facility  Home Layout: One level  Home Access: Level entry  Additional Comments: [facility] home information d/t patient being AxOx1; not a reliable historian at this time and no family present     Objective   Vision:  (NIYA d/t cognition)  Hearing: Within functional limits    Orientation  Overall Orientation Status: Impaired  Orientation Level: Oriented to person;Disoriented to time;Disoriented to situation;Disoriented to place     Balance  Sitting Balance: Moderate assistance (EOB for ~8-9 min; pt noted to have increased tone on the L increasing R lateral lean; able to alleviate with verbal and tactile cues)  Standing Balance:  (unable to achieve full stand this date)  ADL  Feeding: Stand by assistance; Increased time to complete;Setup  Grooming: Stand by assistance;Verbal cueing; Increased time to complete;Setup (OT facilitated washing face requiring VCs for intiation and continuation of task)  UE Bathing: Setup;Verbal cueing; Increased time to complete; Moderate assistance  LE Bathing: Increased time to complete;Verbal cueing;Setup;Maximum assistance  UE Dressing: Setup;Verbal cueing; Increased time to complete; Moderate assistance  LE Dressing: Increased time to complete;Setup;Verbal cueing;Maximum assistance  Toileting: Increased time to complete;Setup;Maximum assistance  Tone RUE  RUE Tone: Normotonic  Tone LUE  LUE Tone: Hypertonic  Coordination  Movements Are Fluid And Coordinated: Yes     Bed mobility  Supine to Sit: 2 Person assistance; Moderate assistance  Sit to Supine: 2 Person assistance; Moderate assistance  Scooting: Moderate assistance  Transfers  Sit to stand: Maximum assistance;2 Person assistance  Stand to sit: 2 Person assistance;Maximum assistance  Transfer Comments: unable to achieve full stand this date despite x3 attempts     Cognition  Overall Cognitive Status: Exceptions  Following Commands: Follows one step commands with repetition; Follows one step commands with increased time  Attention Span: Attends with cues to redirect  Memory: Decreased short term memory;Decreased recall of recent events  Safety Judgement: Decreased awareness of need for assistance;Decreased awareness of need for safety  Problem Solving: Assistance required to generate solutions;Assistance required to identify errors made;Decreased awareness of errors  Insights: Decreased awareness of deficits  Initiation: Requires cues for all  Sequencing: Requires cues for all        Sensation  Overall Sensation Status: WFL (pt denies n/t)      LUE AROM : WFL  Left Hand AROM: WFL  RUE AROM : WFL  Right Hand AROM: WFL  LUE Strength  Gross LUE Strength: Exceptions to [facility]  L Shoulder Flex: 4/5  L Elbow Flex: 4/5  L Elbow Ext: 4-/5  L Hand General: 4/5  RUE Strength  Gross RUE Strength: Exceptions to [facility]  R Shoulder Flex: 4/5  R Elbow Flex: 4/5  R Elbow Ext: 4-/5  R Hand General: 4/5              Plan   Plan  Times per week: 3-4x/wk  Current Treatment Recommendations: Strengthening, Endurance Training, Patient/Caregiver Education & Training, Equipment Evaluation, Education, & procurement, Self-Care / ADL, Safety Education & Training, Functional Mobility Training, Balance Training, Positioning, Cognitive/Perceptual Training    AM-PAC Score        AM-PAC Inpatient Daily Activity Raw Score: 14 (08/13/21 1216)  AM-PAC Inpatient ADL T-Scale Score : 33.39 (08/13/21 1216)  ADL Inpatient CMS 0-100% Score: 59.67 (08/13/21 1216)  ADL Inpatient CMS G-Code Modifier : CK (08/13/21 1216)    Goals  Short term goals  Time Frame for Short term goals: Patient will, by discharge  Short term goal 1: demo 3+ min of attention to functional task to improve performance  Short term goal 2: demo UB ADLs at [facility] term goal 3: demo functional transfers at [place] [address] to engage in functional tasks  Short term goal 4: demo 8+ min of dynamic sitting balance with intermittent unilateral hand release at [facility] to engage in ADLs  Short term goal 5: demo grooming/self-feeding task at [address] [place] [address] Drive   Time In 1113         Time Out 1135         Minutes 22         Timed Code Treatment Minutes: [place], OTR/L

## 2021-08-13 NOTE — PROGRESS NOTES
Neurology Nurse Practitioner Progress Note      INTERVAL HISTORY: This is a 61 y.o.  female admitted 8/11/2021 for AMS. This is a follow-up neurology progress note. The patient was examined and the chart was reviewed. Discussed with the pt & RN. There were no acute events overnight. No new motor, sensory, visual or bulbar symptoms. HPI: Alexandre Martinez is a 61 y.o. female with H/O cognitive decline, bladder stimulator implant (2014), HTN, HLD, DM, CKD, chronic headaches, asthma, R nephrectomy due to renal cell CA (2013), L4-5 laminectomy with fusion (2014), kyphoplasty (2019), who was admitted 8/11/2021 for AMS. As per medical records, patient was sent from [facility] for \"acting differently\" associated with nausea and vomiting for the past 1 day. Reportedly she was taking antibiotics for UTI. At arrival, patient was alert and oriented x2, blood pressure 143/78. Lab work showed UTI; patient was started on vancomycin and Zosyn. CT head showed left frontotemporal edema versus subacute ischemic infarct. Neurosurgery and neurology were consulted. Patient is known to our service; she followed up with Dr. Bernhard Oppenheim on 9/1/2020 due to cognitive decline; takes Aricept 5 mg nightly. Patient has history of right nephrectomy due to renal cell carcinoma.      aspirin  81 mg Oral Daily    vancomycin (VANCOCIN) intermittent dosing (placeholder)   Other RX Placeholder    vancomycin  1,250 mg Intravenous Q24H    amLODIPine  10 mg Oral Daily    Vitamin D  1,000 Units Oral Daily    donepezil  5 mg Oral Nightly    fluticasone  1 puff Inhalation BID    gemfibrozil  600 mg Oral BID    insulin glargine  10 Units Subcutaneous Nightly    raloxifene  60 mg Oral Daily    montelukast  10 mg Oral Nightly    sertraline  100 mg Oral Daily    sodium chloride flush  5-40 mL Intravenous 2 times per day    enoxaparin  40 mg Subcutaneous Daily    famotidine (PEPCID) injection  20 mg Intravenous Daily    insulin lispro 0-12 Units Subcutaneous TID WC    insulin lispro  0-6 Units Subcutaneous Nightly    dexamethasone  4 mg Intravenous Q12H       Past Medical History:   Diagnosis Date    CEFERINO (acute kidney injury) ([facility] [address] 11/19/2014    Asthma     Carotid artery plaque 4/2/2013    Clostridium difficile diarrhea 11/19/2014    Dehydration 11/19/2014    Depression     Fall     history fell down stairs .  Fibromyalgia     Hiatal hernia     HTN (hypertension) 4/2/2013    Hyperlipidemia     Hypokalemia, gastrointestinal losses 11/21/2014    Kidney stone     b/l    Obesity (BMI 30-39. 9) 5/28/2013    Osteoarthritis     Osteoporosis     Renal cancer ([facility] [address] 8/7/2013    right    Short of breath on exertion     Type II or unspecified type diabetes mellitus without mention of complication, not stated as uncontrolled     Unspecified sleep apnea     does not use machine    Urge urinary incontinence     mid urethral sling in 2002    Wears glasses     Wears glasses     Zygomatic fracture, right side, initial encounter for closed fracture ([facility] [address] 5/26/2018       Past Surgical History:   Procedure Laterality Date    BLADDER SURGERY  06/05/2014    Bladder Stimulator Implant. Model 3058 Leads [id number]. MRI conditional-TR coil, 1.5T and head only. Bladder Stim has to be turned off.  Get device from l    BLADDER SUSPENSION  08/09/2002    BREAST BIOPSY Left 03/03/2008    BREAST SURGERY Left 03/03/2008    lumpectomy    CHOLECYSTECTOMY  10/10/2003    COLONOSCOPY      x2    ENDOMETRIAL ABLATION      ENDOSCOPY, COLON, DIAGNOSTIC       egd x2    FIXATION KYPHOPLASTY  10/2018    KNEE SURGERY Bilateral     x 2 each side    KYPHOSIS SURGERY N/A 05/08/2019    KYPHOPLASTY L4 performed by Sabiha Ruth MD at [address]  10/15/2014    WITH FUSION POSTERIOR L4 L5 WITH SPINAL CORD MONITORING    NEPHROSTOMY Right     KY PERQ VERT AGMNTJ CAVITY CRTJ UNI/BI CANNULJ LMBR N/A 09/12/2018    KYPHOPLASTY T2 & L2 performed by Maribel Whitehead MD at [address] Right 06/20/2013    right due to CA       PHYSICAL EXAM:      Blood pressure 111/71, pulse 68, temperature 97.8 °F (36.6 °C), temperature source Temporal, resp. rate 20, height 5' 2\" (1.575 m), weight 147 lb (66.7 kg), last menstrual period 01/01/2002, SpO2 98 %.       Neurological Examination:   Patient was sleeping but arousable   Lethargic with poor eye contact before drifting back to sleep  A&Ox0  Tangential and intermittent unintelligible speech  Did not follow any commands  Was able to keep her upper extremities briefly off the bed with help; RUE>LUE  Screamed when patellar reflexes and plantars were being checked; refused further examination        DATA:  Lab Results   Component Value Date    WBC 10.8 08/13/2021    HGB 10.8 (L) 08/13/2021    HCT 34.3 (L) 08/13/2021     08/13/2021    ALT 12 08/11/2021    AST 16 08/11/2021     08/13/2021    K 3.9 08/13/2021     08/13/2021    AMMONIA 15 08/11/2021    CREATININE 0.81 08/13/2021    BUN 10 08/13/2021    CO2 15 (L) 08/13/2021    TSH 1.32 05/08/2021    INR 0.9 05/08/2021    [id number] 724 05/02/2020    FOLATE 11.1 05/02/2020    GLUF 110 (H) 05/24/2019    LABA1C 15.9 (H) 05/09/2021    LABMICR 1,118 (H) 05/26/2020     Lab Results   Component Value Date    CHOL 181 05/26/2020    CHOL 167 10/30/2017    CHOL 197 09/02/2016     Lab Results   Component Value Date    TRIG 230 (H) 05/26/2020    TRIG 261 (H) 10/30/2017    TRIG 357 (H) 09/02/2016     Lab Results   Component Value Date    HDL 46 05/26/2020    HDL 37 (L) 05/24/2019    HDL 34 (L) 10/30/2017     Lab Results   Component Value Date    LDLCHOLESTEROL 89 05/26/2020    LDLCHOLESTEROL 70 05/24/2019    LDLCHOLESTEROL 81 10/30/2017     Lab Results   Component Value Date    VLDL NOT REPORTED (H) 05/26/2020    VLDL NOT REPORTED 05/24/2019    VLDL NOT REPORTED 10/30/2017     Lab Results   Component Value Date    BLAKE 3.9 05/26/2020    BLAKE 3.6 05/24/2019    CHOLHDLRATIO 4.9 10/30/2017     CRP    <3.0  ESR      29        DIAGNOSTIC DATA:  CT HEAD (8/11/2021):   Area of edema within L frontotemporal lobes predominantly vasogenic pattern   compatible with underlying mass or infection. There are areas of loss of the cortical gray-white differentiation, subacute ischemic infarct? MRI BRAIN W/WO (8/12/2021):   Subacute L temporoparietal infarct with petechial microhemorrhage. Acute small L cerebellar infarct. CTA HEAD & NECK:    CT CHEST ABDOMEN PELVIS W (8/13/2021): No evidence of recurrent or metastatic disease     ECHO (8/13/2021): EF 47%. Mild LVH with mild ventricular diastolic dysfunction. Mild MR. Trivial AI & pericardial effusion    EEG (8/13/2021):          PRIOR EEG (8/13/2020): FIRDA pattern with diffusely slow background; intermittent frontal, intermittent rhythmic delta pattern indicating mild encephalopathy. This study did not demonstrate any ongoing electrographic seizure activity. No epileptiform discharge is noted                          IMPRESSION: 61 y.o.  female admitted with  Acute encephalopathy in the setting of UTI superimposed on baseline cognitive decline;     MRI brain - acute L cerebellar infarct & subacute L temporoparietal infarct with petechial microhemorrhage; ASA D/C'd    UTI; is on vancomycin    H/O R nephrectomy due to renal cell CA (2013).  CT chest, abdomen, pelvis with IV contrast - no mets    QTc 494 (8/13)    Cognitive decline; is on Aricept 5 mg HS    Uncontrolled DM; A1c 15.9    Comorbid conditions - bladder stimulator implant (2014), HTN, HLD, CKD, chronic headaches, asthma, sleep apnea, depression, renal calculi, osteoporosis, L4-5 laminectomy with fusion (2014), kyphoplasty (2019), urge urinary incontinence          PLAN:  EEG - result awaited    CTA head and neck - ordered    Continue Lopid 600 mg BID    Continue PT/OT    Will follow    Please note that this note was generated using a voice recognition dictation software. Although every effort was made to ensure the accuracy of this automated transcription, some errors in transcription may have occurred.

## 2021-08-13 NOTE — PROGRESS NOTES
[address] [facility]  Progress Note    Date:   8/13/2021  Patient name:  Nidhi Menjivar  Date of admission:  8/11/2021  4:28 PM  MRN:   [id number]  YOB: 1955    SUBJECTIVE/Last 24 hours update:     Patient seen and examined at the bed side , no new acute events overnight. Today, patient was awake, alert and oriented X1. Patient reported no new complaint. She talks minimally and answers most of the questions with yes or no only. Denied chest pain, shortness of breath, abdominal pain, changes in urinary or bowel habitus. Troponin trending down    Notes from nursing staff and Consults had been reviewed, and the overnight progress had been checked with the nursing staff as well. HPI:   The patient is a 62 y. o.  female with history of HTN, DM, CHF, Asthma who presents from Counts include [facility] beds at the [facility] with AMS for 1 day duration.  Patient was oriented to place and person.  Patient was being currently treated with antibiotics for UTI.she complains of nonproductive cough.  Denies fever, chills, shortness of breath, chest pain, abdominal pain, dysuria, diarrhea or constipation. no trauma or falls. patient is not currently on anticoagulation.        At ER, AO x2, blood pressure 143/78.   UA: Positive for nitrites and leukocyte esterase   CT head: Subacute ischemicinfarct  Patient was given 2 L bolus NS and Vanco and Zosyn.  Neurology consulted  Elevated Trop    REVIEW OF SYSTEMS:      CONSTITUTIONAL:  no fevers, no headcahes  EYES: negative for blury vision  HEENT: No headaches, No nasal congestion, no difficulty swallowing  RESPIRATORY:negative for dyspnea, no wheezing, no Cough  CARDIOVASCULAR: negative for chest pain, no palpitations  GASTROINTESTINAL: no nausea, no vomiting, no change in bowel habits, no abdominal pain   GENITOURINARY: negative for dysuria, no hematuria   MUSCULOSKELETAL: no joint pains, no muscle aches, no swelling of joints or extremities  NEUROLOGICAL: No  Weakness or numbness      PAST MEDICAL HISTORY:      has a past medical history of CEFERINO (acute kidney injury) ([facility] [address] Asthma, Carotid artery plaque, Clostridium difficile diarrhea, Dehydration, Depression, Fall, Fibromyalgia, Hiatal hernia, HTN (hypertension), Hyperlipidemia, Hypokalemia, gastrointestinal losses, Kidney stone, Obesity (BMI 30-39.9), Osteoarthritis, Osteoporosis, Renal cancer ([facility] [address] Short of breath on exertion, Type II or unspecified type diabetes mellitus without mention of complication, not stated as uncontrolled, Unspecified sleep apnea, Urge urinary incontinence, Wears glasses, Wears glasses, and Zygomatic fracture, right side, initial encounter for closed fracture ([facility] [address] PAST SURGICAL HISTORY:      has a past surgical history that includes knee surgery (Bilateral); bladder suspension (08/09/2002); Nephrostomy (Right); total nephrectomy (Right, 06/20/2013); lumbar laminectomy (10/15/2014); Bladder surgery (06/05/2014); Endometrial ablation; Cholecystectomy (10/10/2003); Breast surgery (Left, 03/03/2008); Breast biopsy (Left, 03/03/2008); pr perq vert agmntj cavity crtj uni/bi cannulj lmbr (N/A, 09/12/2018); Fixation Kyphoplasty (10/2018); Kyphosis surgery (N/A, 05/08/2019); Endoscopy, colon, diagnostic; and Colonoscopy. SOCIAL HISTORY:      reports that she has never smoked. She has never used smokeless tobacco. She reports that she does not drink alcohol and does not use drugs. FAMILY HISTORY:     family history includes Asthma in her brother; Breast Cancer in her sister; Diabetes in her mother; High Blood Pressure in her father, mother, and paternal grandmother; Pacemaker in her mother; Prostate Cancer in her father and maternal grandfather. HOME MEDICATIONS:      Prior to Admission medications    Medication Sig Start Date End Date Taking?  Authorizing Provider   lactobacillus (CULTURELLE) capsule Take 1 capsule by mouth daily (with breakfast) 5/15/21 Neyda Florentino MD   insulin glargine (LANTUS SOLOSTAR) 100 UNIT/ML injection pen Inject 15 units nightly 5/14/21   Neyda Florentino MD   insulin lispro (HUMALOG) 100 UNIT/ML injection vial Inject 10 Units into the skin 3 times daily (before meals) Glucose: Dose: If <139             No Insulin 140-199 2 Units 200-249 4 Units 250-299 6 Units 300-349 8 Units 350-400 10 Units Above 400       12 Units 5/14/21   Neyda Florentino MD   donepezil (ARICEPT) 5 MG tablet Take 1 tablet by mouth nightly 9/1/20   Becky Harrington MD   ammonium lactate (AMLACTIN) 12 % cream Apply 1 Bottle topically as needed for Dry Skin Apply topically as needed.     Historical Provider, MD   amLODIPine (NORVASC) 10 MG tablet Take 1 tablet by mouth daily 3/27/20   Kristin Arredondo MD   metFORMIN (GLUCOPHAGE-XR) 500 MG extended release tablet Take 1 tablet by mouth 2 times daily 3/27/20   Kristin Arredondo MD   ondansetron [facility]) 4 MG tablet Take 1 tablet by mouth every 8 hours as needed for Nausea or Vomiting 8/1/19   Anisa Skinner MD   sertraline (ZOLOFT) 100 MG tablet TAKE ONE TABLET BY MOUTH DAILY 7/10/19   Aldair Oreilly MD   raloxifene (EVISTA) 60 MG tablet TAKE ONE TABLET BY MOUTH DAILY 7/10/19   Aldair Oreilly MD   Cholecalciferol (VITAMIN D3) 1000 units TABS Take 1 tablet by mouth daily 12/26/18   Aldair Oreilly MD   aspirin EC 81 MG EC tablet Take 1 tablet by mouth daily 12/11/18   Aldair Oreilly MD   lovastatin (MEVACOR) 40 MG tablet TAKE ONE AND ONE-HALF (1  1/2) TABLET BY MOUTH NIGHTLY 12/11/18   Aldair Oreilly MD   gemfibrozil (LOPID) 600 MG tablet TAKE ONE TABLET BY MOUTH TWICE A DAY 12/11/18   Aldair Oreilly MD   fluticasone (ARNUITY ELLIPTA) 200 MCG/ACT AEPB Inhale 1 Inhaler into the lungs daily    Historical Provider, MD   BiPAP Machine MISC by Does not apply route    Historical Provider, MD   calcium carbonate (TUMS) 500 MG chewable tablet Take 1 tablet by mouth 3 times daily as needed for Heartburn 1/10/17   Angeles Bhagat MD   Handicap Jerri MISC by Does not apply route Duration: 1 year 4/28/16   Martha Hanks MD   acetaminophen (TYLENOL) 500 MG tablet Take 500 mg by mouth every 6 hours as needed. Historical Provider, MD   butalbital-acetaminophen-caffeine (FIORICET, ESGIC) per tablet Take 1 tablet by mouth every 8 hours as needed. Historical Provider, MD   montelukast (SINGULAIR) 10 MG tablet Take 10 mg by mouth nightly. Historical Provider, MD   albuterol (PROVENTIL HFA;VENTOLIN HFA) 108 (90 BASE) MCG/ACT inhaler Inhale 2 puffs into the lungs every 6 hours as needed. Historical Provider, MD       ALLERGIES:     Lac bovis, Nedocromil, Tramadol, Penicillins, and Tape [adhesive tape]      OBJECTIVE:       Vitals:    08/12/21 2317 08/13/21 0433 08/13/21 0745 08/13/21 1151   BP: 133/66 111/71 120/75 (!) 149/63   Pulse: 73 68 70 67   Resp: 17 20 16 12   Temp: 97.9 °F (36.6 °C) 97.8 °F (36.6 °C) 97.9 °F (36.6 °C) 96.7 °F (35.9 °C)   TempSrc: Temporal Temporal Cerebral Cerebral   SpO2: 100% 98% 98% 100%   Weight:       Height:             Intake/Output Summary (Last 24 hours) at 8/13/2021 1306  Last data filed at 8/13/2021 0640  Gross per 24 hour   Intake 1570 ml   Output 1050 ml   Net 520 ml       PHYSICAL EXAM:  General Appearance  Alert , awake , oriented X1  HEENT - Head is normocephalic, atraumatic. Lungs - Bilateral equal air entry , no wheezes, rales or rhonchi, aeration good  Cardiovascular - Heart sounds are normal.  Regular rhythm, normal rate without murmur, gallop or rub.   Abdomen - Soft, nontender, nondistended, no masses or organomegaly  Neurologic - There are no new focal motor or sensory deficits  Skin - No bruising or bleeding on exposed skin area  Extremities - No cyanosis, clubbing or edema      DIAGNOSTICS:     Laboratory Testing:    Recent Results (from the past 24 hour(s))   POC Glucose Fingerstick    Collection Time: 08/12/21  5:27 PM   Result Value Ref Range    POC Glucose 134 (H) 65 - 105 mg/dL   EKG 12 Lead Collection Time: 08/12/21  6:18 PM   Result Value Ref Range    Ventricular Rate 67 BPM    Atrial Rate 67 BPM    P-R Interval 170 ms    QRS Duration 84 ms    Q-T Interval 468 ms    QTc Calculation (Bazett) 494 ms    P Axis 77 degrees    T Axis 25 degrees   POC Glucose Fingerstick    Collection Time: 08/12/21  8:14 PM   Result Value Ref Range    POC Glucose 199 (H) 65 - 105 mg/dL   Basic Metabolic Panel w/ Reflex to MG    Collection Time: 08/13/21  7:29 AM   Result Value Ref Range    Glucose 154 (H) 70 - 99 mg/dL    BUN 10 8 - 23 mg/dL    CREATININE 0.81 0.50 - 0.90 mg/dL    Bun/Cre Ratio NOT REPORTED 9 - 20    Calcium 8.4 (L) 8.6 - 10.4 mg/dL    Sodium 137 135 - 144 mmol/L    Potassium 3.9 3.7 - 5.3 mmol/L    Chloride 105 98 - 107 mmol/L    CO2 15 (L) 20 - 31 mmol/L    Anion Gap 17 9 - 17 mmol/L    GFR Non-African American >60 >60 mL/min    GFR African American >60 >60 mL/min    GFR Comment          GFR Staging NOT REPORTED    CBC auto differential    Collection Time: 08/13/21  7:29 AM   Result Value Ref Range    WBC 10.8 3.5 - 11.3 k/uL    RBC 3.85 (L) 3.95 - 5.11 m/uL    Hemoglobin 10.8 (L) 11.9 - 15.1 g/dL    Hematocrit 34.3 (L) 36.3 - 47.1 %    MCV 89.1 82.6 - 102.9 fL    MCH 28.1 25.2 - 33.5 pg    MCHC 31.5 28.4 - 34.8 g/dL    RDW 12.9 11.8 - 14.4 %    Platelets 115 302 - 064 k/uL    MPV 10.6 8.1 - 13.5 fL    NRBC Automated 0.0 0.0 per 100 WBC    Differential Type NOT REPORTED     Seg Neutrophils 76 (H) 36 - 65 %    Lymphocytes 18 (L) 24 - 43 %    Monocytes 5 3 - 12 %    Eosinophils % 0 (L) 1 - 4 %    Basophils 0 0 - 2 %    Immature Granulocytes 1 (H) 0 %    Segs Absolute 8.23 (H) 1.50 - 8.10 k/uL    Absolute Lymph # 1.90 1.10 - 3.70 k/uL    Absolute Mono # 0.55 0.10 - 1.20 k/uL    Absolute Eos # <0.03 0.00 - 0.44 k/uL    Basophils Absolute <0.03 0.00 - 0.20 k/uL    Absolute Immature Granulocyte 0.05 0.00 - 0.30 k/uL    WBC Morphology NOT REPORTED     RBC Morphology NOT REPORTED     Platelet Estimate NOT REPORTED    Troponin    Collection Time: 08/13/21  7:29 AM   Result Value Ref Range    Troponin, High Sensitivity 111 (HH) 0 - 14 ng/L    Troponin T NOT REPORTED <0.03 ng/mL    Troponin Interp NOT REPORTED    POC Glucose Fingerstick    Collection Time: 08/13/21 11:49 AM   Result Value Ref Range    POC Glucose 112 (H) 65 - 105 mg/dL           Current Facility-Administered Medications   Medication Dose Route Frequency Provider Last Rate Last Admin    aspirin chewable tablet 81 mg  81 mg Oral Daily Mo'Roland Lorenza Mantilla MD   81 mg at 08/13/21 0956    vancomycin (VANCOCIN) intermittent dosing (placeholder)   Other RX Placeholder Kaia Alvares MD        vancomycin (VANCOCIN) 1250 mg in dextrose 5 % 250 mL IVPB  1,250 mg Intravenous Q24H Kaia Alvares MD   Stopped at 08/12/21 1917    sodium chloride flush 0.9 % injection 10 mL  10 mL Intravenous PRN Arnold Coronado MD        albuterol sulfate  (90 Base) MCG/ACT inhaler 2 puff  2 puff Inhalation Q6H PRN Ananya Lagunas MD        amLODIPine (NORVASC) tablet 10 mg  10 mg Oral Daily Ananya Lagunas MD   10 mg at 08/13/21 0956    butalbital-acetaminophen-caffeine (FIORICET, ESGIC) per tablet 1 tablet  1 tablet Oral Q6H PRN Ananya Lagunas MD        Vitamin D (CHOLECALCIFEROL) tablet 1,000 Units  1,000 Units Oral Daily Ananya Lagunas MD   1,000 Units at 08/13/21 0955    donepezil (ARICEPT) tablet 5 mg  5 mg Oral Nightly Ananya Lagunas MD   5 mg at 08/12/21 2014    fluticasone (FLOVENT HFA) 110 MCG/ACT inhaler 1 puff  1 puff Inhalation BID Ananya Lagunas MD        gemfibrozil (LOPID) tablet 600 mg  600 mg Oral BID Ananya Lagunas MD   600 mg at 08/13/21 0956    insulin glargine (LANTUS) injection vial 10 Units  10 Units Subcutaneous Nightly Ananya Lagunas MD   10 Units at 08/12/21 2015    raloxifene (EVISTA) tablet 60 mg  60 mg Oral Daily Ananya Lagunas MD        montelukast (SINGULAIR) tablet 10 mg  10 mg Oral Nightly Ananya Lagunas MD   10 mg at 08/12/21 2014    sertraline (ZOLOFT) tablet 100 mg  100 mg Oral Daily Meghana Miller MD   100 mg at 08/13/21 0955    sodium chloride flush 0.9 % injection 5-40 mL  5-40 mL Intravenous 2 times per day Meghana Miller MD   10 mL at 08/13/21 0956    sodium chloride flush 0.9 % injection 5-40 mL  5-40 mL Intravenous PRN Meghana Miller MD        0.9 % sodium chloride infusion  25 mL Intravenous PRN Meghana Miller MD        enoxaparin (LOVENOX) injection 40 mg  40 mg Subcutaneous Daily Meghana Miller MD   40 mg at 08/13/21 0956    ondansetron (ZOFRAN-ODT) disintegrating tablet 4 mg  4 mg Oral Q8H PRN Meghana Miller MD        Or    ondansetron (ZOFRAN) injection 4 mg  4 mg Intravenous Q6H PRN Meghana Miller MD        polyethylene glycol (GLYCOLAX) packet 17 g  17 g Oral Daily PRN Meghana Miller MD        acetaminophen (TYLENOL) tablet 650 mg  650 mg Oral Q6H PRN Meghana Miller MD        Or    acetaminophen (TYLENOL) suppository 650 mg  650 mg Rectal Q6H PRN Meghana Miller MD        0.9 % sodium chloride infusion   Intravenous Continuous Meghana Miller MD 75 mL/hr at 08/12/21 0040 New Bag at 08/12/21 0040    famotidine (PEPCID) injection 20 mg  20 mg Intravenous Daily Meghana Miller MD   20 mg at 08/13/21 0956    insulin lispro (HUMALOG) injection vial 0-12 Units  0-12 Units Subcutaneous TID WC Meghana Miller MD   2 Units at 08/13/21 0957    insulin lispro (HUMALOG) injection vial 0-6 Units  0-6 Units Subcutaneous Nightly Meghana Miller MD   1 Units at 08/12/21 2015    glucose (GLUTOSE) 40 % oral gel 15 g  15 g Oral PRN Meghana Miller MD        dextrose 50 % IV solution  12.5 g Intravenous PRN Meghana Miller MD        glucagon (rDNA) injection 1 mg  1 mg Intramuscular PRN Meghana Miller MD        dextrose 5 % solution  100 mL/hr Intravenous PRN Meghana Miller MD        dexamethasone (DECADRON) injection 4 mg  4 mg Intravenous Q12H Harika Handy MD   4 mg at 08/13/21 3657       ASSESSMENT:     Principal Problem:    Altered mental status  Active Problems:    HTN (hypertension)    DM (diabetes mellitus), type 2, uncontrolled with complications (HCC)    CKD (chronic kidney disease) stage 2, GFR 60-89 ml/min    Elevated troponin    Brain lesion    Septicemia (HCC)    Vasogenic edema (HCC)    Cerebral infarction ([facility] [address]  Resolved Problems:    * No resolved hospital problems. *      PLAN:     1. AMS 2/2 UTI/subacute ischemic infract  -UA: positive for nitrite and leukocyte esterase on admission  - CT head: possible subacute ischemic infarct. -MRI brain with and without contrast shows evidence of subacute infarct left temporoparietal as well as left cerebellar infarct.      Plan:  - On home meds donepezil  - On Decardon 4MG iv q12h  -IVF NS@ 75ml/hr  - Neurology and neurosurgery following:     -On Lovenox 40 mg subcutaneous daily    - On Lopid 600 mg BID      -Awaiting neurology recommendation regarding further management.   -Urine culture showed Staph aureus growth, for which she is currently on vancomycin 1250 mg in dextrose 5% 250 ML         2. Elevated Troponin  - 177>111  -EKG: NSR    - Likely demand ischemia  - ECHO : LVEF 47% (8/13/21)     -Hx of grade I diastolic CHF, EF 44% (august 2020)  - on Telemetry monitoring  -Cardiology following:    - On Amlodipine 10mg QD    - On aspirin 81 mg QD          3. Hypertension  Continue home  Meds, amlodipine 10     4. DM  -  -Lantus 10 units  -FRANCISCO          DVT prophylaxis: On Lovenox 40 mg daily  GI prophylaxis: Famotidine 20 mg BID        Consultations:   Consults: IP CONSULT TO INTERNAL MEDICINE  IP CONSULT TO NEUROLOGY  IP CONSULT TO FAMILY MEDICINE  IP CONSULT TO SOCIAL WORK  IP CONSULT TO CARDIOLOGY  PT/OT     Plan will be discussed with the attending,                Harjinder Salinas MD  Family Medicine Resident  8/13/2021 1:06 PM            Please note that this chart was generated using voice recognition Dragon dictation software.   Although every effort was made to ensure the accuracy of this automated transcription, some errors in transcription may have occurred

## 2021-08-13 NOTE — CARE COORDINATION
Clarified with [facility]- Per their sowmya team IBRAHIMA Cooper signed a new contract effective 6/1/2021 that includes a bed hold benefit. In their system the member admitted to [facility] facility on 5/14/2021 and then transitioned to snf on 6/12/2021. There has not been any breaks in care that have been reported to them, therefore this member would have 30 bed hold days available for the rest of this year.

## 2021-08-13 NOTE — PROGRESS NOTES
Physician Progress Note      PATIENT:               Avani Sandy  CSN #:                  [id number]  :                       1955  ADMIT DATE:       2021 4:28 PM  [address] [place] DATE:  RESPONDING  PROVIDER #:        Isabel Kevin          QUERY TEXT:    Pt admitted with AMS. Pt noted to have AMS Likely 2/2 urosepsis on H&P. If   possible, please document in the progress notes and discharge summary if you   are evaluating and / or treating any of the following: The medical record reflects the following:  Risk Factors:UTI,HTN,DM CKD, left frontal lobe and temporal love edema with   possible underlying brain mass  Clinical Indicators: CT Head showing edema and possible mass/subacute stroke,   possible subacute ischemic infarct. WBC 10.1,  Lactic Acid,2.0, UA Leuckocyte   Esterase LARGE, UA Mucus Strands2+,  Nitrite,Ur - POSITIVE,  Urine WBC's-TOO   NUMEROUS TO COUNT,  trop 980>099>302, Resp:12>22  Treatment: 2L IVF boluses, IV Zosyn, CT MRI-pending, monitoring    Thank you, Please call if questions  Carly Quintero RN [facility]  [phone]  Options provided:  -- Encephalopathy due to possible subacute ischemic infarct  -- Encephalopathy due to Brain Mass  -- Metabolic encephalopathy  -- Septic encephalopathy  -- Toxic metabolic encephalopathy  -- Other - I will add my own diagnosis  -- Disagree - Not applicable / Not valid  -- Disagree - Clinically unable to determine / Unknown  -- Refer to Clinical Documentation Reviewer    PROVIDER RESPONSE TEXT:    This patient has encephalopathy due to possible subacute ischemic infarct    Query created by: Lisa Lima on 2021 11:17 AM      QUERY TEXT:    Patient admitted with Altered mental status, noted to have evidence of   subacute infarct left temporoparietal as well as left cerebellar infarct on   MRI.  If possible, please document in progress notes and discharge summary if   you are evaluating and/or treating any of the following:  Rashid infarct left temporoparietal as well as left cerebellar infarct not   clinically significant [de-identified] subacute infarct left temporoparietal as well as   left cerebellar infarct is not clinically significant.]]    The medical record reflects the following:  Risk Factors: , carotid artery plaque ,HTN,  Hyperlipidemia,  Clinical Indicators: MRI states Subacute left temporoparietal infarct with   petechial microhemorrhage. Acute small left cerebellar infarct., AMS  Treatment: PT , OT , Monitoring labs and vitals CT, MRI    Thank you, Please call if questions  Carly Dyer RN [facility]  [phone]  Options provided:  -- subacute infarct left temporoparietal as well as left cerebellar infarct  -- Other - I will add my own diagnosis  -- Disagree - Not applicable / Not valid  -- Disagree - Clinically unable to determine / Unknown  -- Refer to Clinical Documentation Reviewer    PROVIDER RESPONSE TEXT:    This patient has subacute infarct left temporoparietal as well as left   cerebellar infarct.     Query created by: Cary Oleary on 8/13/2021 11:32 AM      Electronically signed by:  Swathi Zamarripa 8/13/2021 11:51 AM

## 2021-08-13 NOTE — PROGRESS NOTES
[facility]   Progress Note                   Date:   8/13/2021  Patient name: Stephanie Parr  Date of admission:  8/11/2021  4:28 PM  MRN:   [id number]  YOB: 1955  PCP: Wanda Billings MD    Reason for Admission: Altered mental status [R41.82]  Septicemia ([address] [A41.9]  Disorientation [R41.0]  Elevated troponin [R77.8]  Acute cystitis without hematuria [N30.00]  Vasogenic edema ([address] [G93.6]    Subjective:       Clinical Changes / Abnormalities: Patient seen and examined at the bed side after discussion with RN. No new acute events overnight. Patient is doing well, has no complaints. Denies chest pain or SOB. Appeared to answers questions appropriately. Reviewed vitals, labs, tele, & previous testing. SR HR 73 on tele.       Medications:   Scheduled Meds:   aspirin  81 mg Oral Daily    vancomycin (VANCOCIN) intermittent dosing (placeholder)   Other RX Placeholder    vancomycin  1,250 mg Intravenous Q24H    amLODIPine  10 mg Oral Daily    Vitamin D  1,000 Units Oral Daily    donepezil  5 mg Oral Nightly    fluticasone  1 puff Inhalation BID    gemfibrozil  600 mg Oral BID    insulin glargine  10 Units Subcutaneous Nightly    raloxifene  60 mg Oral Daily    montelukast  10 mg Oral Nightly    sertraline  100 mg Oral Daily    sodium chloride flush  5-40 mL Intravenous 2 times per day    enoxaparin  40 mg Subcutaneous Daily    famotidine (PEPCID) injection  20 mg Intravenous Daily    insulin lispro  0-12 Units Subcutaneous TID     insulin lispro  0-6 Units Subcutaneous Nightly    dexamethasone  4 mg Intravenous Q12H     Continuous Infusions:   sodium chloride      sodium chloride 75 mL/hr at 08/12/21 0040    dextrose       CBC:   Recent Labs     08/11/21 1647 08/12/21  0531 08/13/21  0729   WBC 10.1 9.2 10.8   HGB 10.8* 10.8* 10.8*    197 216     BMP:    Recent Labs     08/11/21  1647 08/12/21  0531 08/13/21  0729    136 137   K 4.5 4.0 3.9   CL 104 106 105   CO2 15* 15* 15*   BUN 16 13 10   CREATININE 0.96* 0.92* 0.81   GLUCOSE 134* 161* 154*     Hepatic:   Recent Labs     08/11/21  1647   AST 16   ALT 12   BILITOT 0.42   ALKPHOS 81     Troponin:   Recent Labs     08/11/21  2257 08/12/21  0531 08/13/21  0729   TROPHS 165* 159* 111*     BNP: No results for input(s): BNP in the last 72 hours. Lipids: No results for input(s): CHOL, HDL in the last 72 hours. Invalid input(s): LDLCALCU  INR: No results for input(s): INR in the last 72 hours. Objective:   Vitals: /71   Pulse 68   Temp 97.8 °F (36.6 °C) (Temporal)   Resp 20   Ht 5' 2\" (1.575 m)   Wt 147 lb (66.7 kg)   LMP 01/01/2002   SpO2 98%   BMI 26.89 kg/m²   General appearance: alert and cooperative with exam  HEENT: Head: Normocephalic, no lesions, without obvious abnormality. Neck: no JVD, trachea midline, no adenopathy  Lungs: Clear with diminished breath sounds in lower lobes with auscultation  Heart: Regular rate and rhythm, s1/s2 auscultated, no murmurs. SR  Abdomen: soft, non-tender, bowel sounds active  Extremities: no edema  Neurologic: not done      Echo 8/13/2021  Summary  Left ventricle is normal in size. Global left ventricular systolic function appears low normal to mildly  reduced. Calculated EF via Sanabria's method 47 % with mildly abnormal global L.  strain of -12.9 %. Mild left ventricular hypertrophy. Grade I (mild) left ventricular diastolic dysfunction. Trivial aortic insufficiency. Mild mitral regurgitation. Trivial pericardial effusion. Previous ECHO 8/13/2020  Summary  Left ventricle is normal in size. Moderate left ventricular hypertrophy. Global left ventricular systolic function is normal. Estimated LV EF >55 %. Evidence of mild (grade I) diastolic dysfunction. Both atria are normal in size. Negative bubble study, no shunt noted. Normal right ventricular size and function. Mild mitral regurgitation.     Assessment / Acute Cardiac Problems: 1. Elevated troponin type I vs type II  2. HTN  3. T2DM  4. CKD stage 2  5. Urosepsis  6. AMS with CTH showing edema and possible mass/subacute stroke     Patient Active Problem List:     Prolapsed intervertebral disk     Hip arthritis     Diabetic neuropathy (HCC)     Dermatomyositis (HCC)     GERD (gastroesophageal reflux disease)     Moderate persistent asthma     HLD (hyperlipidemia)     HTN (hypertension)     Obesity (BMI 30-39. 9)     History of renal cell cancer     S/p nephrectomy     Diabetic autonomic neuropathy associated with type 2 diabetes mellitus ([facility]     Cervical spondylosis with myelopathy     Cervicalgia     Spinal stenosis of lumbar region with neurogenic claudication     Acquired spondylolisthesis     DM (diabetes mellitus), type 2, uncontrolled with complications (HCC)     Colitis     Dizziness     Depression     Osteoporosis     Lumbar disc herniation     EJ on CPAP     Falls     Ambulatory dysfunction     Hypovitaminosis D     CKD (chronic kidney disease) stage 2, GFR 60-89 ml/min     Age-related osteoporosis with current pathological fracture with routine healing     Uncontrolled type 2 diabetes mellitus with complication, with long-term current use of insulin (HCC)     AMS (altered mental status)     Hyperosmolar hyperglycemic state (HHS) (HCC)     Acute metabolic encephalopathy     Altered mental status     Elevated troponin     Brain lesion     Septicemia (HCC)     Vasogenic edema (HCC)     Cerebral infarction ([facility]      Plan of Treatment:   1. Troponin elevation with downward trend as of this morning. (177, 169, 165, 159, 111). Likely demand ischemia. AMS, Possible mass per head CT. Continue aspirin. Not on heparin d/t bleed risk. Neurology on board  2. 2D echo completed as noted above. Slightly reduced LVEF 47% compared to previous LVEF 55% (2020). 3. Will consider ischemic work up once acute issues resolve. Possibly as out patient as patient denies Chest pain or SOB. 4. HTN. Stable Continue amlodipine. 5. Continue antibiotics per primary. 6. Recommend Keep K+ > 4.0, and Mg+ > 2.0  7.  Rest of care managed per primary service.         Electronically signed by SACHIN Garcia CNP on 8/13/2021 at 11:04 [facility]      [phone]

## 2021-08-13 NOTE — CARE COORDINATION
Informed by Ashley Shrestha that patient is a bed hold and does not require precert. Chuckie [address] with Torsten Soliz. Updated her on patient condition and plan. BODØ states she will be on call this weekend.  Her number is in Teams as [phone]

## 2021-08-14 ENCOUNTER — APPOINTMENT (OUTPATIENT)
Dept: CT IMAGING | Age: 66
DRG: 045 | End: 2021-08-14
Payer: MEDICAID

## 2021-08-14 PROBLEM — I21.4 NSTEMI (NON-ST ELEVATED MYOCARDIAL INFARCTION) (HCC): Status: ACTIVE | Noted: 2021-08-14

## 2021-08-14 PROBLEM — N30.00 ACUTE CYSTITIS: Status: ACTIVE | Noted: 2021-08-14

## 2021-08-14 LAB
ABSOLUTE EOS #: <0.03 K/UL (ref 0–0.44)
ABSOLUTE IMMATURE GRANULOCYTE: 0.1 K/UL (ref 0–0.3)
ABSOLUTE LYMPH #: 1.71 K/UL (ref 1.1–3.7)
ABSOLUTE MONO #: 0.54 K/UL (ref 0.1–1.2)
ANION GAP SERPL CALCULATED.3IONS-SCNC: 15 MMOL/L (ref 9–17)
BASOPHILS # BLD: 0 % (ref 0–2)
BASOPHILS ABSOLUTE: <0.03 K/UL (ref 0–0.2)
BUN BLDV-MCNC: 12 MG/DL (ref 8–23)
BUN/CREAT BLD: ABNORMAL (ref 9–20)
CALCIUM SERPL-MCNC: 8.3 MG/DL (ref 8.6–10.4)
CHLORIDE BLD-SCNC: 105 MMOL/L (ref 98–107)
CO2: 15 MMOL/L (ref 20–31)
CREAT SERPL-MCNC: 0.78 MG/DL (ref 0.5–0.9)
DIFFERENTIAL TYPE: ABNORMAL
EOSINOPHILS RELATIVE PERCENT: 0 % (ref 1–4)
GFR AFRICAN AMERICAN: >60 ML/MIN
GFR NON-AFRICAN AMERICAN: >60 ML/MIN
GFR SERPL CREATININE-BSD FRML MDRD: ABNORMAL ML/MIN/{1.73_M2}
GFR SERPL CREATININE-BSD FRML MDRD: ABNORMAL ML/MIN/{1.73_M2}
GLUCOSE BLD-MCNC: 131 MG/DL (ref 65–105)
GLUCOSE BLD-MCNC: 166 MG/DL (ref 65–105)
GLUCOSE BLD-MCNC: 175 MG/DL (ref 65–105)
GLUCOSE BLD-MCNC: 182 MG/DL (ref 70–99)
GLUCOSE BLD-MCNC: 226 MG/DL (ref 65–105)
HCT VFR BLD CALC: 34.7 % (ref 36.3–47.1)
HEMOGLOBIN: 11 G/DL (ref 11.9–15.1)
IMMATURE GRANULOCYTES: 1 %
LYMPHOCYTES # BLD: 14 % (ref 24–43)
MAGNESIUM: 1.5 MG/DL (ref 1.6–2.6)
MCH RBC QN AUTO: 28 PG (ref 25.2–33.5)
MCHC RBC AUTO-ENTMCNC: 31.7 G/DL (ref 28.4–34.8)
MCV RBC AUTO: 88.3 FL (ref 82.6–102.9)
MONOCYTES # BLD: 4 % (ref 3–12)
NRBC AUTOMATED: 0 PER 100 WBC
PDW BLD-RTO: 13 % (ref 11.8–14.4)
PLATELET # BLD: 220 K/UL (ref 138–453)
PLATELET ESTIMATE: ABNORMAL
PMV BLD AUTO: 10.4 FL (ref 8.1–13.5)
POTASSIUM SERPL-SCNC: 3.2 MMOL/L (ref 3.7–5.3)
RBC # BLD: 3.93 M/UL (ref 3.95–5.11)
RBC # BLD: ABNORMAL 10*6/UL
SEG NEUTROPHILS: 81 % (ref 36–65)
SEGMENTED NEUTROPHILS ABSOLUTE COUNT: 9.77 K/UL (ref 1.5–8.1)
SODIUM BLD-SCNC: 135 MMOL/L (ref 135–144)
TROPONIN INTERP: ABNORMAL
TROPONIN T: ABNORMAL NG/ML
TROPONIN, HIGH SENSITIVITY: 95 NG/L (ref 0–14)
WBC # BLD: 12.2 K/UL (ref 3.5–11.3)
WBC # BLD: ABNORMAL 10*3/UL

## 2021-08-14 PROCEDURE — 85025 COMPLETE CBC W/AUTO DIFF WBC: CPT

## 2021-08-14 PROCEDURE — 80048 BASIC METABOLIC PNL TOTAL CA: CPT

## 2021-08-14 PROCEDURE — 6360000004 HC RX CONTRAST MEDICATION: Performed by: NURSE PRACTITIONER

## 2021-08-14 PROCEDURE — 99233 SBSQ HOSP IP/OBS HIGH 50: CPT | Performed by: STUDENT IN AN ORGANIZED HEALTH CARE EDUCATION/TRAINING PROGRAM

## 2021-08-14 PROCEDURE — 83735 ASSAY OF MAGNESIUM: CPT

## 2021-08-14 PROCEDURE — 6360000002 HC RX W HCPCS: Performed by: STUDENT IN AN ORGANIZED HEALTH CARE EDUCATION/TRAINING PROGRAM

## 2021-08-14 PROCEDURE — 94640 AIRWAY INHALATION TREATMENT: CPT

## 2021-08-14 PROCEDURE — 6370000000 HC RX 637 (ALT 250 FOR IP): Performed by: STUDENT IN AN ORGANIZED HEALTH CARE EDUCATION/TRAINING PROGRAM

## 2021-08-14 PROCEDURE — 84484 ASSAY OF TROPONIN QUANT: CPT

## 2021-08-14 PROCEDURE — 2500000003 HC RX 250 WO HCPCS: Performed by: STUDENT IN AN ORGANIZED HEALTH CARE EDUCATION/TRAINING PROGRAM

## 2021-08-14 PROCEDURE — 99232 SBSQ HOSP IP/OBS MODERATE 35: CPT | Performed by: PSYCHIATRY & NEUROLOGY

## 2021-08-14 PROCEDURE — 70498 CT ANGIOGRAPHY NECK: CPT

## 2021-08-14 PROCEDURE — 82947 ASSAY GLUCOSE BLOOD QUANT: CPT

## 2021-08-14 PROCEDURE — 2060000000 HC ICU INTERMEDIATE R&B

## 2021-08-14 PROCEDURE — APPSS30 APP SPLIT SHARED TIME 16-30 MINUTES: Performed by: NURSE PRACTITIONER

## 2021-08-14 PROCEDURE — 36415 COLL VENOUS BLD VENIPUNCTURE: CPT

## 2021-08-14 PROCEDURE — 2580000003 HC RX 258: Performed by: STUDENT IN AN ORGANIZED HEALTH CARE EDUCATION/TRAINING PROGRAM

## 2021-08-14 RX ORDER — POTASSIUM CHLORIDE 7.45 MG/ML
10 INJECTION INTRAVENOUS PRN
Status: DISCONTINUED | OUTPATIENT
Start: 2021-08-14 | End: 2021-08-18 | Stop reason: HOSPADM

## 2021-08-14 RX ORDER — POTASSIUM CHLORIDE 20 MEQ/1
40 TABLET, EXTENDED RELEASE ORAL PRN
Status: DISCONTINUED | OUTPATIENT
Start: 2021-08-14 | End: 2021-08-18 | Stop reason: HOSPADM

## 2021-08-14 RX ORDER — POTASSIUM CHLORIDE 20 MEQ/1
40 TABLET, EXTENDED RELEASE ORAL EVERY 6 HOURS
Status: ACTIVE | OUTPATIENT
Start: 2021-08-14 | End: 2021-08-15

## 2021-08-14 RX ORDER — MAGNESIUM SULFATE 1 G/100ML
1000 INJECTION INTRAVENOUS PRN
Status: DISCONTINUED | OUTPATIENT
Start: 2021-08-14 | End: 2021-08-18 | Stop reason: HOSPADM

## 2021-08-14 RX ADMIN — SODIUM CHLORIDE, PRESERVATIVE FREE 10 ML: 5 INJECTION INTRAVENOUS at 09:58

## 2021-08-14 RX ADMIN — GEMFIBROZIL 600 MG: 600 TABLET ORAL at 21:07

## 2021-08-14 RX ADMIN — AMLODIPINE BESYLATE 10 MG: 10 TABLET ORAL at 10:01

## 2021-08-14 RX ADMIN — VANCOMYCIN HYDROCHLORIDE 1000 MG: 1 INJECTION, SOLUTION INTRAVENOUS at 16:13

## 2021-08-14 RX ADMIN — INSULIN GLARGINE 10 UNITS: 100 INJECTION, SOLUTION SUBCUTANEOUS at 21:08

## 2021-08-14 RX ADMIN — INSULIN LISPRO 1 UNITS: 100 INJECTION, SOLUTION INTRAVENOUS; SUBCUTANEOUS at 21:08

## 2021-08-14 RX ADMIN — MAGNESIUM GLUCONATE 500 MG ORAL TABLET 400 MG: 500 TABLET ORAL at 13:26

## 2021-08-14 RX ADMIN — FAMOTIDINE 20 MG: 10 INJECTION INTRAVENOUS at 10:02

## 2021-08-14 RX ADMIN — POTASSIUM BICARBONATE 40 MEQ: 782 TABLET, EFFERVESCENT ORAL at 21:16

## 2021-08-14 RX ADMIN — MONTELUKAST SODIUM 10 MG: 10 TABLET, FILM COATED ORAL at 21:07

## 2021-08-14 RX ADMIN — POTASSIUM CHLORIDE 40 MEQ: 1500 TABLET, EXTENDED RELEASE ORAL at 16:12

## 2021-08-14 RX ADMIN — DEXAMETHASONE SODIUM PHOSPHATE 4 MG: 4 INJECTION, SOLUTION INTRAMUSCULAR; INTRAVENOUS at 23:27

## 2021-08-14 RX ADMIN — GEMFIBROZIL 600 MG: 600 TABLET ORAL at 09:59

## 2021-08-14 RX ADMIN — ENOXAPARIN SODIUM 40 MG: 40 INJECTION SUBCUTANEOUS at 09:59

## 2021-08-14 RX ADMIN — FLUTICASONE PROPIONATE 1 PUFF: 110 AEROSOL, METERED RESPIRATORY (INHALATION) at 08:34

## 2021-08-14 RX ADMIN — IOPAMIDOL 90 ML: 755 INJECTION, SOLUTION INTRAVENOUS at 12:25

## 2021-08-14 RX ADMIN — SERTRALINE 100 MG: 50 TABLET, FILM COATED ORAL at 09:58

## 2021-08-14 RX ADMIN — INSULIN LISPRO 2 UNITS: 100 INJECTION, SOLUTION INTRAVENOUS; SUBCUTANEOUS at 10:02

## 2021-08-14 RX ADMIN — MAGNESIUM GLUCONATE 500 MG ORAL TABLET 400 MG: 500 TABLET ORAL at 21:16

## 2021-08-14 RX ADMIN — DEXAMETHASONE SODIUM PHOSPHATE 4 MG: 4 INJECTION, SOLUTION INTRAMUSCULAR; INTRAVENOUS at 09:59

## 2021-08-14 RX ADMIN — DONEPEZIL HYDROCHLORIDE 5 MG: 5 TABLET, FILM COATED ORAL at 21:08

## 2021-08-14 RX ADMIN — Medication 1000 UNITS: at 09:58

## 2021-08-14 ASSESSMENT — PAIN SCALES - GENERAL
PAINLEVEL_OUTOF10: 0

## 2021-08-14 NOTE — PROGRESS NOTES
Neurology Nurse Practitioner Progress Note      INTERVAL HISTORY: This is a 61 y.o.  female admitted 8/11/2021 for AMS. This is a follow-up neurology progress note. The patient was examined and the chart was reviewed. Discussed with the RN. There were no acute events overnight. No new motor, sensory, visual or bulbar symptoms. Patient was more alert today, thought she was in a hospital in [place], intermittent tangential speech with dysnomia and inability to repeat sentences. HPI: Nidhi Menjivar is a 61 y.o. female with H/O cognitive decline, bladder stimulator implant (2014), HTN, HLD, DM, CKD, chronic headaches, asthma, R nephrectomy due to renal cell CA (2013), L4-5 laminectomy with fusion (2014), kyphoplasty (2019), who was admitted 8/11/2021 for AMS. As per medical records, patient was sent from [facility] for \"acting differently\" associated with nausea and vomiting for the past 1 day. Reportedly she was taking antibiotics for UTI. HOSPITAL COURSE: At arrival, patient was alert and oriented x2, blood pressure 143/78. Lab work showed UTI; patient was started on vancomycin and Zosyn. CT head showed left frontotemporal edema versus subacute ischemic infarct. Neurosurgery and neurology were consulted. Patient is known to our service; she followed up with Dr. Gisele Patel on 9/1/2020 due to cognitive decline; takes Aricept 5 mg nightly. Patient has history of right nephrectomy due to renal cell carcinoma.      vancomycin  1,000 mg Intravenous Q24H    vancomycin (VANCOCIN) intermittent dosing (placeholder)   Other RX Placeholder    amLODIPine  10 mg Oral Daily    Vitamin D  1,000 Units Oral Daily    donepezil  5 mg Oral Nightly    fluticasone  1 puff Inhalation BID    gemfibrozil  600 mg Oral BID    insulin glargine  10 Units Subcutaneous Nightly    raloxifene  60 mg Oral Daily    montelukast  10 mg Oral Nightly    sertraline  100 mg Oral Daily    sodium chloride flush  5-40 mL Intravenous 2 times per day    enoxaparin  40 mg Subcutaneous Daily    famotidine (PEPCID) injection  20 mg Intravenous Daily    insulin lispro  0-12 Units Subcutaneous TID WC    insulin lispro  0-6 Units Subcutaneous Nightly    dexamethasone  4 mg Intravenous Q12H       Past Medical History:   Diagnosis Date    CEFERINO (acute kidney injury) ([facility] [facility] 11/19/2014    Asthma     Carotid artery plaque 4/2/2013    Clostridium difficile diarrhea 11/19/2014    Dehydration 11/19/2014    Depression     Fall     history fell down stairs .  Fibromyalgia     Hiatal hernia     HTN (hypertension) 4/2/2013    Hyperlipidemia     Hypokalemia, gastrointestinal losses 11/21/2014    Kidney stone     b/l    Obesity (BMI 30-39. 9) 5/28/2013    Osteoarthritis     Osteoporosis     Renal cancer ([facility] 8/7/2013    right    Short of breath on exertion     Type II or unspecified type diabetes mellitus without mention of complication, not stated as uncontrolled     Unspecified sleep apnea     does not use machine    Urge urinary incontinence     mid urethral sling in 2002    Wears glasses     Wears glasses     Zygomatic fracture, right side, initial encounter for closed fracture ([facility] 5/26/2018       Past Surgical History:   Procedure Laterality Date    BLADDER SURGERY  06/05/2014    Bladder Stimulator Implant. Model 3058 Leads [id number]. MRI conditional-TR coil, 1.5T and head only. Bladder Stim has to be turned off.  Get device from l    BLADDER SUSPENSION  08/09/2002    BREAST BIOPSY Left 03/03/2008    BREAST SURGERY Left 03/03/2008    lumpectomy    CHOLECYSTECTOMY  10/10/2003    COLONOSCOPY      x2    ENDOMETRIAL ABLATION      ENDOSCOPY, COLON, DIAGNOSTIC       egd x2    FIXATION KYPHOPLASTY  10/2018    KNEE SURGERY Bilateral     x 2 each side    KYPHOSIS SURGERY N/A 05/08/2019    KYPHOPLASTY L4 performed by Pacheco Frost MD at [address]  10/15/2014    WITH FUSION POSTERIOR L4 L5 WITH SPINAL CORD MONITORING  NEPHROSTOMY Right     KS PERQ VERT AGMNTJ CAVITY CRTJ UNI/BI CANNULJ LMBR N/A 09/12/2018    KYPHOPLASTY T2 & L2 performed by Sabiha Ruth MD at [address] Right 06/20/2013    right due to CA       PHYSICAL EXAM:      Blood pressure 137/71, pulse 73, temperature 97.1 °F (36.2 °C), temperature source Oral, resp. rate 17, height 5' 2\" (1.575 m), weight 147 lb (66.7 kg), last menstrual period 01/01/2002, SpO2 97 %.       Neurological Examination:   Patient was more alert today; slow response  Was able to tell her last name with some difficulty   When asked if she was in a hospital? She replied \"sort of\"  Knew that it was [place]; unaware of the situation  Intermittent tangential speech, mixed with unintelligible words; dysnomia & inability to repeat sentences  Followed simple commands  Was able to raise UEs > LEs        DATA:  Lab Results   Component Value Date    WBC 12.2 (H) 08/14/2021    HGB 11.0 (L) 08/14/2021    HCT 34.7 (L) 08/14/2021     08/14/2021    ALT 12 08/11/2021    AST 16 08/11/2021     08/14/2021    K 3.2 (L) 08/14/2021     08/14/2021    AMMONIA 15 08/11/2021    CREATININE 0.78 08/14/2021    BUN 12 08/14/2021    CO2 15 (L) 08/14/2021    TSH 1.32 05/08/2021    INR 0.9 05/08/2021    RVMSCEJD28 724 05/02/2020    FOLATE 11.1 05/02/2020    GLUF 110 (H) 05/24/2019    LABA1C 15.9 (H) 05/09/2021    LABMICR 1,118 (H) 05/26/2020     Lab Results   Component Value Date    CHOL 181 05/26/2020    CHOL 167 10/30/2017    CHOL 197 09/02/2016     Lab Results   Component Value Date    TRIG 230 (H) 05/26/2020    TRIG 261 (H) 10/30/2017    TRIG 357 (H) 09/02/2016     Lab Results   Component Value Date    HDL 46 05/26/2020    HDL 37 (L) 05/24/2019    HDL 34 (L) 10/30/2017     Lab Results   Component Value Date    LDLCHOLESTEROL 89 05/26/2020    LDLCHOLESTEROL 70 05/24/2019    LDLCHOLESTEROL 81 10/30/2017     Lab Results   Component Value Date    VLDL NOT REPORTED (H) 05/26/2020 VLDL NOT REPORTED 05/24/2019    VLDL NOT REPORTED 10/30/2017     Lab Results   Component Value Date    CHOLHDLRATIO 3.9 05/26/2020    CHOLHDLRATIO 3.6 05/24/2019    CHOLHDLRATIO 4.9 10/30/2017     CRP    <3.0  ESR      29        DIAGNOSTIC DATA:  CT HEAD (8/11/2021):   Area of edema within L frontotemporal lobes predominantly vasogenic pattern   compatible with underlying mass or infection. There are areas of loss of the cortical gray-white differentiation, subacute ischemic infarct? MRI BRAIN W/WO (8/12/2021):   Subacute L temporoparietal infarct with petechial microhemorrhage. Acute small L cerebellar infarct. CTA HEAD & NECK (8/14/2021):   L vertebral artery: moderate stenosis  R vertebral artery: mild stenosis  L subclavian artery: 25% stenosis    CT CHEST ABDOMEN PELVIS W (8/13/2021): No evidence of recurrent or metastatic disease     ECHO (8/13/2021): EF 47%. Mild LVH with mild ventricular diastolic dysfunction. Mild MR. Trivial AI & pericardial effusion    EEG (8/13/2021): Diffuse slowing, L>R side with no evidence of seizures; official report to follow          PRIOR EEG (8/13/2020): FIRDA pattern with diffusely slow background; intermittent frontal, intermittent rhythmic delta pattern indicating mild encephalopathy. This study did not demonstrate any ongoing electrographic seizure activity. No epileptiform discharge is noted                          IMPRESSION: 61 y.o.  female admitted with  Acute encephalopathy in the setting of UTI superimposed on baseline cognitive decline. Patient was more alert today, thought she was in a hospital in [place], intermittent tangential speech with dysnomia and inability to repeat sentences    MRI brain - acute L cerebellar infarct & subacute L temporoparietal infarct with petechial microhemorrhage; ASA D/C'd    UTI; is on vancomycin    H/O R nephrectomy due to renal cell CA (2013).  CT chest, abdomen, pelvis - no mets    QTc 494 (8/13)    Cognitive decline; is on Aricept 5 mg HS    Uncontrolled DM; A1c 15.9    Comorbid conditions - VRE, MRSA, bladder stimulator implant (2014), HTN, HLD, CKD, chronic headaches, asthma, sleep apnea, depression, renal calculi, osteoporosis, L4-5 laminectomy with fusion (2014), kyphoplasty (2019), urge urinary incontinence          PLAN:  Continue Lopid 600 mg BID    Continue PT/OT    Pt needs to follow-up with neurology after repeat MRI brain within 4 to 6 weeks    We recommend event monitor at the time of discharge    No further neurological testing is required at this time. We will sign off. Please, call with any questions or concerns. Thank you    Please note that this note was generated using a voice recognition dictation software. Although every effort was made to ensure the accuracy of this automated transcription, some errors in transcription may have occurred.

## 2021-08-14 NOTE — PLAN OF CARE
Problem: Skin Integrity:  Goal: Will show no infection signs and symptoms  Description: Will show no infection signs and symptoms  8/14/2021 0107 by Hayley Banks RN  Outcome: Met This Shift     Problem: Falls - Risk of:  Goal: Will remain free from falls  Description: Will remain free from falls  8/14/2021 0107 by Hayley Banks RN  Outcome: Met This Shift

## 2021-08-14 NOTE — PLAN OF CARE
Problem: Physical Regulation:  Goal: Complications related to the disease process, condition or treatment will be avoided or minimized  Outcome: Ongoing     Problem: Physical Regulation:  Goal: Diagnostic test results will improve  Outcome: Ongoing     Problem: Tissue Perfusion:  Goal: Adequacy of tissue perfusion will improve  Outcome: Ongoing

## 2021-08-14 NOTE — PROGRESS NOTES
[facility]  Progress Note    Date:   8/14/2021  Patient name:  Luis Ceja  Date of admission:  8/11/2021  4:28 PM  MRN:   [id number]  YOB: 1955    SUBJECTIVE/Last 24 hours update:     Patient seen and examined at the bedside  Alert and oriented x1  Minimally responsive but expresses concerns if any  Denies any active issues  On vancomycin for UTI  Neuro following for acutesubacute ischemic infarcts  Trops trending down    HPI:   The patient is a 66 y. o.  female with history of HTN, DM, CHF, Asthma who presents from [facility] with AMS for 1 day duration.  Patient was oriented to place and person.  Patient was being currently treated with antibiotics for UTI.she complains of nonproductive cough.  Denies fever, chills, shortness of breath, chest pain, abdominal pain, dysuria, diarrhea or constipation. no trauma or falls. patient is not currently on anticoagulation.        At ER, AO x2, blood pressure 143/78.   UA: Positive for nitrites and leukocyte esterase   CT head: Subacute ischemicinfarct  Patient was given 2 L bolus NS and Vanco and Zosyn.  Neurology consulted  Elevated Trop    REVIEW OF SYSTEMS:      CONSTITUTIONAL:  no fevers, no headcahes  EYES: negative for blury vision  HEENT: No headaches, No nasal congestion, no difficulty swallowing  RESPIRATORY:negative for dyspnea, no wheezing, no Cough  CARDIOVASCULAR: negative for chest pain, no palpitations  GASTROINTESTINAL: no nausea, no vomiting, no change in bowel habits, no abdominal pain   GENITOURINARY: negative for dysuria, no hematuria   MUSCULOSKELETAL: no joint pains, no muscle aches, no swelling of joints or extremities  NEUROLOGICAL: No  Weakness or numbness      PAST MEDICAL HISTORY:      has a past medical history of CEFERINO (acute kidney injury) ([facility] [address] Asthma, Carotid artery plaque, Clostridium difficile diarrhea, Dehydration, Depression, Fall, Fibromyalgia, Hiatal hernia, HTN (hypertension), Hyperlipidemia, Hypokalemia, gastrointestinal losses, Kidney stone, Obesity (BMI 30-39.9), Osteoarthritis, Osteoporosis, Renal cancer ([facility] [address] Short of breath on exertion, Type II or unspecified type diabetes mellitus without mention of complication, not stated as uncontrolled, Unspecified sleep apnea, Urge urinary incontinence, Wears glasses, Wears glasses, and Zygomatic fracture, right side, initial encounter for closed fracture ([facility] [address] PAST SURGICAL HISTORY:      has a past surgical history that includes knee surgery (Bilateral); bladder suspension (08/09/2002); Nephrostomy (Right); total nephrectomy (Right, 06/20/2013); lumbar laminectomy (10/15/2014); Bladder surgery (06/05/2014); Endometrial ablation; Cholecystectomy (10/10/2003); Breast surgery (Left, 03/03/2008); Breast biopsy (Left, 03/03/2008); pr perq vert agmntj cavity crtj uni/bi cannulj lmbr (N/A, 09/12/2018); Fixation Kyphoplasty (10/2018); Kyphosis surgery (N/A, 05/08/2019); Endoscopy, colon, diagnostic; and Colonoscopy. SOCIAL HISTORY:      reports that she has never smoked. She has never used smokeless tobacco. She reports that she does not drink alcohol and does not use drugs. FAMILY HISTORY:     family history includes Asthma in her brother; Breast Cancer in her sister; Diabetes in her mother; High Blood Pressure in her father, mother, and paternal grandmother; Pacemaker in her mother; Prostate Cancer in her father and maternal grandfather. HOME MEDICATIONS:      Prior to Admission medications    Medication Sig Start Date End Date Taking?  Authorizing Provider   lactobacillus (CULTURELLE) capsule Take 1 capsule by mouth daily (with breakfast) 5/15/21   Starr Sol MD   insulin glargine (LANTUS SOLOSTAR) 100 UNIT/ML injection pen Inject 15 units nightly 5/14/21   Starr Sol MD   insulin lispro (HUMALOG) 100 UNIT/ML injection vial Inject 10 Units into the skin 3 times daily (before meals) Glucose: Dose: If <139 No Insulin 140-199 2 Units 200-249 4 Units 250-299 6 Units 300-349 8 Units 350-400 10 Units Above 400       12 Units 5/14/21   Mellissa Sánchez MD   donepezil (ARICEPT) 5 MG tablet Take 1 tablet by mouth nightly 9/1/20   Salinas Jones MD   ammonium lactate (AMLACTIN) 12 % cream Apply 1 Bottle topically as needed for Dry Skin Apply topically as needed. Historical Provider, MD   amLODIPine (NORVASC) 10 MG tablet Take 1 tablet by mouth daily 3/27/20   Enrique Portillo MD   metFORMIN (GLUCOPHAGE-XR) 500 MG extended release tablet Take 1 tablet by mouth 2 times daily 3/27/20   Enrique Portillo MD   ondansetron [facility]) 4 MG tablet Take 1 tablet by mouth every 8 hours as needed for Nausea or Vomiting 8/1/19   Zakia Botello MD   sertraline (ZOLOFT) 100 MG tablet TAKE ONE TABLET BY MOUTH DAILY 7/10/19   Moe Pink MD   raloxifene (EVISTA) 60 MG tablet TAKE ONE TABLET BY MOUTH DAILY 7/10/19   Moe Pink MD   Cholecalciferol (VITAMIN D3) 1000 units TABS Take 1 tablet by mouth daily 12/26/18   Moe Pink MD   aspirin EC 81 MG EC tablet Take 1 tablet by mouth daily 12/11/18   Moe Pink MD   lovastatin (MEVACOR) 40 MG tablet TAKE ONE AND ONE-HALF (1  1/2) TABLET BY MOUTH NIGHTLY 12/11/18   Moe Pink MD   gemfibrozil (LOPID) 600 MG tablet TAKE ONE TABLET BY MOUTH TWICE A DAY 12/11/18   Moe Pink MD   fluticasone (ARNUITY ELLIPTA) 200 MCG/ACT AEPB Inhale 1 Inhaler into the lungs daily    Historical Provider, MD   BiPAP Machine MISC by Does not apply route    Historical Provider, MD   calcium carbonate (TUMS) 500 MG chewable tablet Take 1 tablet by mouth 3 times daily as needed for Heartburn 1/10/17   Sherri Del Rio MD   Handicap Placard MISC by Does not apply route Duration: 1 year 4/28/16   Robert Blanchard MD   acetaminophen (TYLENOL) 500 MG tablet Take 500 mg by mouth every 6 hours as needed.     Historical Provider, MD   butalbital-acetaminophen-caffeine (FIORICET, ESGIC) per tablet Take 1 tablet by mouth every 8 hours as needed. Historical Provider, MD   montelukast (SINGULAIR) 10 MG tablet Take 10 mg by mouth nightly. Historical Provider, MD   albuterol (PROVENTIL HFA;VENTOLIN HFA) 108 (90 BASE) MCG/ACT inhaler Inhale 2 puffs into the lungs every 6 hours as needed. Historical Provider, MD       ALLERGIES:     Lac bovis, Nedocromil, Tramadol, Penicillins, and Tape [adhesive tape]      OBJECTIVE:       Vitals:    08/13/21 2308 08/14/21 0328 08/14/21 0724 08/14/21 0836   BP: 137/78 107/71 137/71    Pulse: 91 92 73    Resp: 15 16 15 17   Temp: 97.8 °F (36.6 °C) 97.9 °F (36.6 °C) 97.1 °F (36.2 °C)    TempSrc: Temporal Temporal Oral    SpO2: 98% 98% 97%    Weight:       Height:             Intake/Output Summary (Last 24 hours) at 8/14/2021 1113  Last data filed at 8/14/2021 0636  Gross per 24 hour   Intake 1985.42 ml   Output 1125 ml   Net 860.42 ml       PHYSICAL EXAM:  General Appearance  Alert , awake , oriented X1  HEENT - Head is normocephalic, atraumatic. Lungs - Bilateral equal air entry , no wheezes, rales or rhonchi, aeration good  Cardiovascular - Heart sounds are normal.  Regular rhythm, normal rate without murmur, gallop or rub.   Abdomen - Soft, nontender, nondistended, no masses or organomegaly  Neurologic - There are no new focal motor or sensory deficits  Skin - No bruising or bleeding on exposed skin area  Extremities - No cyanosis, clubbing or edema      DIAGNOSTICS:     Laboratory Testing:    Recent Results (from the past 24 hour(s))   POC Glucose Fingerstick    Collection Time: 08/13/21 11:49 AM   Result Value Ref Range    POC Glucose 112 (H) 65 - 105 mg/dL   VANCOMYCIN, TROUGH    Collection Time: 08/13/21 12:16 PM   Result Value Ref Range    Vancomycin Tr 19.4 10.0 - 20.0 ug/mL    Vancomycin Trough Dose amount NOT REPORTED     Vancomycin Trough Date last dose NOT REPORTED     Vancomycin Trough Time last dose NOT REPORTED    POC Glucose Fingerstick    Collection Time: 08/13/21  8:55 PM   Result Value Ref Range    POC Glucose 206 (H) 65 - 105 mg/dL   Basic Metabolic Panel w/ Reflex to MG    Collection Time: 08/14/21  6:42 AM   Result Value Ref Range    Glucose 182 (H) 70 - 99 mg/dL    BUN 12 8 - 23 mg/dL    CREATININE 0.78 0.50 - 0.90 mg/dL    Bun/Cre Ratio NOT REPORTED 9 - 20    Calcium 8.3 (L) 8.6 - 10.4 mg/dL    Sodium 135 135 - 144 mmol/L    Potassium 3.2 (L) 3.7 - 5.3 mmol/L    Chloride 105 98 - 107 mmol/L    CO2 15 (L) 20 - 31 mmol/L    Anion Gap 15 9 - 17 mmol/L    GFR Non-African American >60 >60 mL/min    GFR African American >60 >60 mL/min    GFR Comment          GFR Staging NOT REPORTED    CBC auto differential    Collection Time: 08/14/21  6:42 AM   Result Value Ref Range    WBC 12.2 (H) 3.5 - 11.3 k/uL    RBC 3.93 (L) 3.95 - 5.11 m/uL    Hemoglobin 11.0 (L) 11.9 - 15.1 g/dL    Hematocrit 34.7 (L) 36.3 - 47.1 %    MCV 88.3 82.6 - 102.9 fL    MCH 28.0 25.2 - 33.5 pg    MCHC 31.7 28.4 - 34.8 g/dL    RDW 13.0 11.8 - 14.4 %    Platelets 418 589 - 955 k/uL    MPV 10.4 8.1 - 13.5 fL    NRBC Automated 0.0 0.0 per 100 WBC    Differential Type NOT REPORTED     Seg Neutrophils 81 (H) 36 - 65 %    Lymphocytes 14 (L) 24 - 43 %    Monocytes 4 3 - 12 %    Eosinophils % 0 (L) 1 - 4 %    Basophils 0 0 - 2 %    Immature Granulocytes 1 (H) 0 %    Segs Absolute 9.77 (H) 1.50 - 8.10 k/uL    Absolute Lymph # 1.71 1.10 - 3.70 k/uL    Absolute Mono # 0.54 0.10 - 1.20 k/uL    Absolute Eos # <0.03 0.00 - 0.44 k/uL    Basophils Absolute <0.03 0.00 - 0.20 k/uL    Absolute Immature Granulocyte 0.10 0.00 - 0.30 k/uL    WBC Morphology NOT REPORTED     RBC Morphology NOT REPORTED     Platelet Estimate NOT REPORTED    Troponin    Collection Time: 08/14/21  6:42 AM   Result Value Ref Range    Troponin, High Sensitivity 95 (HH) 0 - 14 ng/L    Troponin T NOT REPORTED <0.03 ng/mL    Troponin Interp NOT REPORTED    Magnesium    Collection Time: 08/14/21  6:42 AM   Result Value Ref Range    Magnesium 1.5 (L) 1.6 - 2.6 mg/dL   POC Glucose Fingerstick    Collection Time: 08/14/21  7:24 AM   Result Value Ref Range    POC Glucose 166 (H) 65 - 105 mg/dL         Current Facility-Administered Medications   Medication Dose Route Frequency Provider Last Rate Last Admin    potassium chloride (KLOR-CON M) extended release tablet 40 mEq  40 mEq Oral PRN Yuan Quintanilla MD        Or    potassium bicarb-citric acid (EFFER-K) effervescent tablet 40 mEq  40 mEq Oral PRN Yuan Quintanilla MD        Or    potassium chloride 10 mEq/100 mL IVPB (Peripheral Line)  10 mEq Intravenous PRN Yuan Quintanilla MD        magnesium oxide (MAG-OX) tablet 400 mg  400 mg Oral BID Yuan Quintanilla MD        magnesium sulfate 1000 mg in dextrose 5% 100 mL IVPB  1,000 mg Intravenous PRN Yuan Quintanilla MD        vancomycin (VANCOCIN) 1000 mg in dextrose 5% 200 mL IVPB  1,000 mg Intravenous Q24H Yuan Quintanilla MD   Stopped at 08/13/21 1959    vancomycin (VANCOCIN) intermittent dosing (placeholder)   Other RX Placeholder Yuan Quintanilla MD        sodium chloride flush 0.9 % injection 10 mL  10 mL Intravenous PRN Jene Lundborg, MD        albuterol sulfate  (90 Base) MCG/ACT inhaler 2 puff  2 puff Inhalation Q6H PRN Sloane Philip MD        amLODIPine (NORVASC) tablet 10 mg  10 mg Oral Daily Sloane Philip MD   10 mg at 08/14/21 1001    butalbital-acetaminophen-caffeine (FIORICET, ESGIC) per tablet 1 tablet  1 tablet Oral Q6H PRN Sloane Philip MD        Vitamin D (CHOLECALCIFEROL) tablet 1,000 Units  1,000 Units Oral Daily Sloane Philip MD   1,000 Units at 08/14/21 0958    donepezil (ARICEPT) tablet 5 mg  5 mg Oral Nightly Sloane Philip MD   5 mg at 08/13/21 2056    fluticasone (FLOVENT HFA) 110 MCG/ACT inhaler 1 puff  1 puff Inhalation BID Sloane Philip MD   1 puff at 08/14/21 0834    gemfibrozil (LOPID) tablet 600 mg  600 mg Oral BID Sloane Philip MD   600 mg at 08/14/21 0959    insulin glargine (LANTUS) injection vial 10 Units  10 Units Subcutaneous Nightly Clifton Duff Thelma Rodríguez MD   10 Units at 08/13/21 2056    raloxifene (EVISTA) tablet 60 mg  60 mg Oral Daily Meghana Miller MD        montelukast (SINGULAIR) tablet 10 mg  10 mg Oral Nightly Meghana Miller MD   10 mg at 08/13/21 2056    sertraline (ZOLOFT) tablet 100 mg  100 mg Oral Daily Meghana Miller MD   100 mg at 08/14/21 0958    sodium chloride flush 0.9 % injection 5-40 mL  5-40 mL Intravenous 2 times per day Meghana Miller MD   10 mL at 08/14/21 0958    sodium chloride flush 0.9 % injection 5-40 mL  5-40 mL Intravenous PRN Meghana Miller MD        0.9 % sodium chloride infusion  25 mL Intravenous PRN Meghana Miller MD        enoxaparin (LOVENOX) injection 40 mg  40 mg Subcutaneous Daily Meghana Miller MD   40 mg at 08/14/21 0959    ondansetron (ZOFRAN-ODT) disintegrating tablet 4 mg  4 mg Oral Q8H PRN Meghana Miller MD        Or    ondansetron (ZOFRAN) injection 4 mg  4 mg Intravenous Q6H PRN Meghana Miller MD        polyethylene glycol (GLYCOLAX) packet 17 g  17 g Oral Daily PRN Meghana Miller MD        acetaminophen (TYLENOL) tablet 650 mg  650 mg Oral Q6H PRN Meghana Miller MD        Or    acetaminophen (TYLENOL) suppository 650 mg  650 mg Rectal Q6H PRN Meghana Miller MD        0.9 % sodium chloride infusion   Intravenous Continuous Meghana Miller MD 75 mL/hr at 08/13/21 2056 New Bag at 08/13/21 2056    famotidine (PEPCID) injection 20 mg  20 mg Intravenous Daily Meghana Miller MD   20 mg at 08/14/21 1002    insulin lispro (HUMALOG) injection vial 0-12 Units  0-12 Units Subcutaneous TID WC Meghana Miller MD   2 Units at 08/14/21 1002    insulin lispro (HUMALOG) injection vial 0-6 Units  0-6 Units Subcutaneous Nightly Meghana Miller MD   2 Units at 08/13/21 2056    glucose (GLUTOSE) 40 % oral gel 15 g  15 g Oral PRN Meghana Miller MD        dextrose 50 % IV solution  12.5 g Intravenous PRN Meghana Miller MD        glucagon (rDNA) injection 1 mg  1 mg Intramuscular PRN Meghana Miller MD        dextrose 5 % solution  100 mL/hr Intravenous PRN Abelardo Cullen MD        dexamethasone (DECADRON) injection 4 mg  4 mg Intravenous Q12H Adrián London MD   4 mg at 08/14/21 8901       ASSESSMENT:     Principal Problem:    Altered mental status  Active Problems:    HTN (hypertension)    DM (diabetes mellitus), type 2, uncontrolled with complications (HCC)    CKD (chronic kidney disease) stage 2, GFR 60-89 ml/min    Elevated troponin    Brain lesion    Septicemia (HCC)    Vasogenic edema (HCC)    Cerebral infarction ([facility] [address]  Resolved Problems:    * No resolved hospital problems. *      PLAN:     1. Altered mental status secondary to MRSA UTI and acute/subacute ischemic infracts  Has baseline dementia but condition worsen than baseline  Continue vancomycin  On donepezil and Decadron 4 mg IV every 12 hours  Neuro following for cerebral infarcts, appreciate recs      2. NSTEMI type II  -Troponin trending down 165>111>95  -EKG: NSR  - ECHO : LVEF 47% (8/13/21)  Likely demand ischemia, does not need heparin     3. Hypertension  Continue home  Meds, amlodipine 10 milligrams daily     4. DM2controlled  -Continue Lantus 10 units  -FRANCISCO        DVT prophylaxis: On Lovenox 40 mg daily  GI prophylaxis: Famotidine 20 mg BID        Consultations:   Consults: IP CONSULT TO INTERNAL MEDICINE  IP CONSULT TO NEUROLOGY  IP CONSULT TO FAMILY MEDICINE  IP CONSULT TO SOCIAL WORK  IP CONSULT TO CARDIOLOGY  PT/OT     Plan will be discussed with the attending, Dr. Hari Brewer MD  Family Medicine Resident  PGY 2  8/14/2021 11:13 AM            Please note that this chart was generated using voice recognition Dragon dictation software.   Although every effort was made to ensure the accuracy of this automated transcription, some errors in transcription may have occurred

## 2021-08-15 LAB
-: NORMAL
ABSOLUTE EOS #: <0.03 K/UL (ref 0–0.44)
ABSOLUTE IMMATURE GRANULOCYTE: 0.15 K/UL (ref 0–0.3)
ABSOLUTE LYMPH #: 1.58 K/UL (ref 1.1–3.7)
ABSOLUTE MONO #: 0.5 K/UL (ref 0.1–1.2)
ANION GAP SERPL CALCULATED.3IONS-SCNC: 12 MMOL/L (ref 9–17)
BASOPHILS # BLD: 0 % (ref 0–2)
BASOPHILS ABSOLUTE: <0.03 K/UL (ref 0–0.2)
BUN BLDV-MCNC: 12 MG/DL (ref 8–23)
BUN/CREAT BLD: ABNORMAL (ref 9–20)
CALCIUM SERPL-MCNC: 8.4 MG/DL (ref 8.6–10.4)
CHLORIDE BLD-SCNC: 104 MMOL/L (ref 98–107)
CO2: 18 MMOL/L (ref 20–31)
CREAT SERPL-MCNC: 0.67 MG/DL (ref 0.5–0.9)
DIFFERENTIAL TYPE: ABNORMAL
EOSINOPHILS RELATIVE PERCENT: 0 % (ref 1–4)
GFR AFRICAN AMERICAN: >60 ML/MIN
GFR NON-AFRICAN AMERICAN: >60 ML/MIN
GFR SERPL CREATININE-BSD FRML MDRD: ABNORMAL ML/MIN/{1.73_M2}
GFR SERPL CREATININE-BSD FRML MDRD: ABNORMAL ML/MIN/{1.73_M2}
GLUCOSE BLD-MCNC: 117 MG/DL (ref 65–105)
GLUCOSE BLD-MCNC: 178 MG/DL (ref 65–105)
GLUCOSE BLD-MCNC: 182 MG/DL (ref 65–105)
GLUCOSE BLD-MCNC: 197 MG/DL (ref 70–99)
GLUCOSE BLD-MCNC: 200 MG/DL (ref 65–105)
GLUCOSE BLD-MCNC: 205 MG/DL (ref 65–105)
HCT VFR BLD CALC: 36 % (ref 36.3–47.1)
HEMOGLOBIN: 11.2 G/DL (ref 11.9–15.1)
IMMATURE GRANULOCYTES: 2 %
LYMPHOCYTES # BLD: 15 % (ref 24–43)
MCH RBC QN AUTO: 27.9 PG (ref 25.2–33.5)
MCHC RBC AUTO-ENTMCNC: 31.1 G/DL (ref 28.4–34.8)
MCV RBC AUTO: 89.6 FL (ref 82.6–102.9)
MONOCYTES # BLD: 5 % (ref 3–12)
NRBC AUTOMATED: 0 PER 100 WBC
PDW BLD-RTO: 13.2 % (ref 11.8–14.4)
PLATELET # BLD: 242 K/UL (ref 138–453)
PLATELET ESTIMATE: ABNORMAL
PMV BLD AUTO: 11 FL (ref 8.1–13.5)
POTASSIUM SERPL-SCNC: 4.2 MMOL/L (ref 3.7–5.3)
RBC # BLD: 4.02 M/UL (ref 3.95–5.11)
RBC # BLD: ABNORMAL 10*6/UL
REASON FOR REJECTION: NORMAL
SEG NEUTROPHILS: 78 % (ref 36–65)
SEGMENTED NEUTROPHILS ABSOLUTE COUNT: 8 K/UL (ref 1.5–8.1)
SODIUM BLD-SCNC: 134 MMOL/L (ref 135–144)
TROPONIN INTERP: ABNORMAL
TROPONIN T: ABNORMAL NG/ML
TROPONIN, HIGH SENSITIVITY: 104 NG/L (ref 0–14)
WBC # BLD: 10.3 K/UL (ref 3.5–11.3)
WBC # BLD: ABNORMAL 10*3/UL
ZZ NTE CLEAN UP: ORDERED TEST: NORMAL
ZZ NTE WITH NAME CLEAN UP: SPECIMEN SOURCE: NORMAL

## 2021-08-15 PROCEDURE — 85025 COMPLETE CBC W/AUTO DIFF WBC: CPT

## 2021-08-15 PROCEDURE — 6370000000 HC RX 637 (ALT 250 FOR IP): Performed by: STUDENT IN AN ORGANIZED HEALTH CARE EDUCATION/TRAINING PROGRAM

## 2021-08-15 PROCEDURE — 2580000003 HC RX 258: Performed by: STUDENT IN AN ORGANIZED HEALTH CARE EDUCATION/TRAINING PROGRAM

## 2021-08-15 PROCEDURE — 6360000002 HC RX W HCPCS: Performed by: STUDENT IN AN ORGANIZED HEALTH CARE EDUCATION/TRAINING PROGRAM

## 2021-08-15 PROCEDURE — 97530 THERAPEUTIC ACTIVITIES: CPT

## 2021-08-15 PROCEDURE — 84484 ASSAY OF TROPONIN QUANT: CPT

## 2021-08-15 PROCEDURE — 36415 COLL VENOUS BLD VENIPUNCTURE: CPT

## 2021-08-15 PROCEDURE — 94761 N-INVAS EAR/PLS OXIMETRY MLT: CPT

## 2021-08-15 PROCEDURE — 94640 AIRWAY INHALATION TREATMENT: CPT

## 2021-08-15 PROCEDURE — 2500000003 HC RX 250 WO HCPCS: Performed by: STUDENT IN AN ORGANIZED HEALTH CARE EDUCATION/TRAINING PROGRAM

## 2021-08-15 PROCEDURE — 97535 SELF CARE MNGMENT TRAINING: CPT

## 2021-08-15 PROCEDURE — 82947 ASSAY GLUCOSE BLOOD QUANT: CPT

## 2021-08-15 PROCEDURE — 99233 SBSQ HOSP IP/OBS HIGH 50: CPT | Performed by: STUDENT IN AN ORGANIZED HEALTH CARE EDUCATION/TRAINING PROGRAM

## 2021-08-15 PROCEDURE — 80048 BASIC METABOLIC PNL TOTAL CA: CPT

## 2021-08-15 PROCEDURE — 2060000000 HC ICU INTERMEDIATE R&B

## 2021-08-15 RX ADMIN — Medication 1000 UNITS: at 09:09

## 2021-08-15 RX ADMIN — DONEPEZIL HYDROCHLORIDE 5 MG: 5 TABLET, FILM COATED ORAL at 21:09

## 2021-08-15 RX ADMIN — GEMFIBROZIL 600 MG: 600 TABLET ORAL at 09:10

## 2021-08-15 RX ADMIN — FAMOTIDINE 20 MG: 10 INJECTION INTRAVENOUS at 09:10

## 2021-08-15 RX ADMIN — DEXAMETHASONE SODIUM PHOSPHATE 4 MG: 4 INJECTION, SOLUTION INTRAMUSCULAR; INTRAVENOUS at 09:09

## 2021-08-15 RX ADMIN — SODIUM CHLORIDE, PRESERVATIVE FREE 10 ML: 5 INJECTION INTRAVENOUS at 09:08

## 2021-08-15 RX ADMIN — INSULIN LISPRO 2 UNITS: 100 INJECTION, SOLUTION INTRAVENOUS; SUBCUTANEOUS at 12:24

## 2021-08-15 RX ADMIN — SERTRALINE 100 MG: 50 TABLET, FILM COATED ORAL at 09:09

## 2021-08-15 RX ADMIN — MAGNESIUM GLUCONATE 500 MG ORAL TABLET 400 MG: 500 TABLET ORAL at 21:09

## 2021-08-15 RX ADMIN — GEMFIBROZIL 600 MG: 600 TABLET ORAL at 21:09

## 2021-08-15 RX ADMIN — MONTELUKAST SODIUM 10 MG: 10 TABLET, FILM COATED ORAL at 21:09

## 2021-08-15 RX ADMIN — VANCOMYCIN HYDROCHLORIDE 1000 MG: 1 INJECTION, SOLUTION INTRAVENOUS at 17:31

## 2021-08-15 RX ADMIN — DEXAMETHASONE SODIUM PHOSPHATE 4 MG: 4 INJECTION, SOLUTION INTRAMUSCULAR; INTRAVENOUS at 23:26

## 2021-08-15 RX ADMIN — MAGNESIUM GLUCONATE 500 MG ORAL TABLET 400 MG: 500 TABLET ORAL at 09:09

## 2021-08-15 RX ADMIN — INSULIN GLARGINE 10 UNITS: 100 INJECTION, SOLUTION SUBCUTANEOUS at 21:09

## 2021-08-15 RX ADMIN — FLUTICASONE PROPIONATE 1 PUFF: 110 AEROSOL, METERED RESPIRATORY (INHALATION) at 21:10

## 2021-08-15 RX ADMIN — AMLODIPINE BESYLATE 10 MG: 10 TABLET ORAL at 09:10

## 2021-08-15 RX ADMIN — ENOXAPARIN SODIUM 40 MG: 40 INJECTION SUBCUTANEOUS at 09:10

## 2021-08-15 RX ADMIN — INSULIN LISPRO 4 UNITS: 100 INJECTION, SOLUTION INTRAVENOUS; SUBCUTANEOUS at 16:15

## 2021-08-15 RX ADMIN — INSULIN LISPRO 1 UNITS: 100 INJECTION, SOLUTION INTRAVENOUS; SUBCUTANEOUS at 20:56

## 2021-08-15 RX ADMIN — FLUTICASONE PROPIONATE 1 PUFF: 110 AEROSOL, METERED RESPIRATORY (INHALATION) at 09:29

## 2021-08-15 RX ADMIN — INSULIN LISPRO 4 UNITS: 100 INJECTION, SOLUTION INTRAVENOUS; SUBCUTANEOUS at 09:11

## 2021-08-15 ASSESSMENT — PAIN SCALES - GENERAL
PAINLEVEL_OUTOF10: 0

## 2021-08-15 NOTE — PROGRESS NOTES
Occupational Therapy  Facility/Department: [address] STEPDOWN  Daily Treatment Note  NAME: Maritza Breaux  : 1955  MRN: [id number]    Date of Service: 8/15/2021    Discharge Recommendations:  Patient would benefit from continued therapy after discharge in order to increase pt strength, balance and independence. Assessment   Performance deficits / Impairments: Decreased functional mobility ; Decreased ADL status; Decreased endurance;Decreased strength;Decreased high-level IADLs;Decreased balance;Decreased safe awareness;Decreased posture;Decreased cognition  Prognosis: Fair  OT Education: OT Role;Transfer Training;ADL Adaptive Strategies  Patient Education: purpose of OT; proper hand and foot placement; balance maintaince; proper posture; importance of activity  Barriers to Learning: pt demo P carry over  REQUIRES OT FOLLOW UP: Yes  Activity Tolerance  Activity Tolerance: Treatment limited secondary to decreased cognition;Patient limited by fatigue  Safety Devices  Safety Devices in place: Yes  Type of devices: Patient at risk for falls; Bed alarm in place;Call light within reach; Left in bed  Restraints  Initially in place: No       Patient Diagnosis(es): The primary encounter diagnosis was Septicemia ([address] Diagnoses of Vasogenic edema ([address] Elevated troponin, Acute cystitis without hematuria, Disorientation, Brain lesion, History of renal cell cancer, Cerebral infarction, unspecified mechanism ([address] and Acute metabolic encephalopathy were also pertinent to this visit.       has a past medical history of CEFERINO (acute kidney injury) ([address] Asthma, Carotid artery plaque, Clostridium difficile diarrhea, Dehydration, Depression, Fall, Fibromyalgia, Hiatal hernia, HTN (hypertension), Hyperlipidemia, Hypokalemia, gastrointestinal losses, Kidney stone, Obesity (BMI 30-39.9), Osteoarthritis, Osteoporosis, Renal cancer ([address] Short of breath on exertion, Type II or unspecified type diabetes mellitus without mention of complication, not stated as uncontrolled, Unspecified sleep apnea, Urge urinary incontinence, Wears glasses, Wears glasses, and Zygomatic fracture, right side, initial encounter for closed fracture ([facility] [address] has a past surgical history that includes knee surgery (Bilateral); bladder suspension (08/09/2002); Nephrostomy (Right); total nephrectomy (Right, 06/20/2013); lumbar laminectomy (10/15/2014); Bladder surgery (06/05/2014); Endometrial ablation; Cholecystectomy (10/10/2003); Breast surgery (Left, 03/03/2008); Breast biopsy (Left, 03/03/2008); pr perq vert agmntj cavity crtj uni/bi cannulj lmbr (N/A, 09/12/2018); Fixation Kyphoplasty (10/2018); Kyphosis surgery (N/A, 05/08/2019); Endoscopy, colon, diagnostic; and Colonoscopy. Restrictions  Restrictions/Precautions  Restrictions/Precautions: Fall Risk  Required Braces or Orthoses?: No  Position Activity Restriction  Other position/activity restrictions: up with assistance; hx of dementia  Subjective   General  Chart Reviewed: Yes  Patient assessed for rehabilitation services?: Yes  Family / Caregiver Present: No  General Comment  Comments: Pt and covering RN agreeable to therapy this day. No facial grimacing or groaning noted during session  Pain Assessment  Pain Assessment: 0-10  Pain Level: 0  Pre Treatment Pain Screening  Comments / Details: Pt supine in bed at start of session R sidy lying. At session end pt L side lying with call light in reach, bed alarm on, BLE elevated, BUE elevated and all needs met. Vital Signs  Patient Currently in Pain: Denies   Orientation  Orientation  Overall Orientation Status: Impaired (pt responds to first name inconsistently answers questions)  Orientation Level: Oriented to person;Disoriented to time;Disoriented to situation;Disoriented to place  Objective    ADL  Grooming: Stand by assistance;Setup;Verbal cueing; Increased time to complete;Maximum assistance (face washing)  Additional Comments: Face washing attempted at start of session pt lethargic req Max A with Tribal assist utilizing BUE, pt not followed approx 15% of commands. Pt increasingly alert towards session end, face washing attempted. SBA to wash face with pt req min verbal cues on initiation and sequencing. Throughout session pt limited per fatigue, decreased cognition and decreased strength     Balance  Sitting Balance: Maximum assistance (Static sitting attempted at EOB pt tolerated approx 20 sec. Unable to sit pt fully erect with strong R lateral lean noted despite Max A from CARDOZA and max verbal/tactile cues.)  Standing Balance  Comment: NIYA sec to P sitting balance  Bed mobility  Rolling to Left: Maximum assistance  Rolling to Right: Maximum assistance  Supine to Sit: Maximum assistance  Sit to Supine: Maximum assistance  Scooting: Maximal assistance  Comment: HOB elevated pt following approx 15-20% of commands. Cognition  Overall Cognitive Status: Exceptions  Arousal/Alertness: Delayed responses to stimuli;Inconsistent responses to stimuli  Following Commands: Follows one step commands with repetition; Follows one step commands with increased time; Inconsistently follows commands; Does not follow commands  Attention Span: Difficulty attending to directions; Difficulty dividing attention  Memory: Decreased recall of recent events;Decreased short term memory  Safety Judgement: Decreased awareness of need for assistance;Decreased awareness of need for safety  Problem Solving: Decreased awareness of errors;Assistance required to identify errors made;Assistance required to generate solutions;Assistance required to implement solutions;Assistance required to correct errors made  Insights: Decreased awareness of deficits  Initiation: Requires cues for all  Sequencing: Requires cues for all     Plan   Plan  Times per week: 3-4x/wk  Current Treatment Recommendations: Strengthening, Endurance Training, Patient/Caregiver Education & Training, Equipment Evaluation, Education, & procurement, Self-Care / ADL, Safety Education & Training, Functional Mobility Training, Balance Training, Positioning, Cognitive/Perceptual Training  AM-PAC Score  AM-PAC Inpatient Daily Activity Raw Score: 14 (08/15/21 1444)  AM-PAC Inpatient ADL T-Scale Score : 33.39 (08/15/21 1444)  ADL Inpatient CMS 0-100% Score: 59.67 (08/15/21 1444)  ADL Inpatient CMS G-Code Modifier : CK (08/15/21 1444)    Goals  Short term goals  Time Frame for Short term goals: Patient will, by discharge  Short term goal 1: demo 3+ min of attention to functional task to improve performance  Short term goal 2: demo UB ADLs at [facility] term goal 3: demo functional transfers at [facility] A x1 to engage in functional tasks  Short term goal 4: demo 8+ min of dynamic sitting balance with intermittent unilateral hand release at [facility] to engage in ADLs  Short term goal 5: demo grooming/self-feeding task at Supervision       Therapy Time   Individual Concurrent Group Co-treatment   Time In 1409         Time Out 1433         Minutes 24         Timed Code Treatment Minutes: [address], CARDOZA/L

## 2021-08-15 NOTE — PROGRESS NOTES
[address] [facility]  Progress Note    Date:   8/15/2021  Patient name:  Lito Cain  Date of admission:  8/11/2021  4:28 PM  MRN:   [id number]  YOB: 1955    SUBJECTIVE/Last 24 hours update:     Patient seen and examined at the bed side  No acute events overnight   VSS  Slightly hyperglycemic likely secondary to Decadron   Resting comfortably in bed  Alert and Oriented x 1 seems to be baseline  Vancomycin Day 5 for MRSA UTI  Awaiting placement to ECF    Notes from nursing staff and Consults had been reviewed, and the overnight progress had been checked with the nursing staff as well. HPI:     The patient is a 62 y. o.  female with history of HTN, DM, CHF, Asthma who presents from ECF with AMS for 1 day duration.  Patient was oriented to place and person.  Patient was being currently treated with antibiotics for UTI.she complains of nonproductive cough.  Denies fever, chills, shortness of breath, chest pain, abdominal pain, dysuria, diarrhea or constipation. no trauma or falls. patient is not currently on anticoagulation.       REVIEW OF SYSTEMS:      CONSTITUTIONAL:  no fevers, no headcahes  EYES: negative for blury vision  HEENT: No headaches, No nasal congestion, no difficulty swallowing  RESPIRATORY:negative for dyspnea, no wheezing, no Cough  CARDIOVASCULAR: negative for chest pain, no palpitations  GASTROINTESTINAL: no nausea, no vomiting, no change in bowel habits, no abdominal pain   GENITOURINARY: negative for dysuria, no hematuria   MUSCULOSKELETAL: no joint pains, no muscle aches, no swelling of joints or extremities  NEUROLOGICAL: No  Weakness or numbness    PAST MEDICAL HISTORY:      has a past medical history of CEFERINO (acute kidney injury) ([facility] [address] Asthma, Carotid artery plaque, Clostridium difficile diarrhea, Dehydration, Depression, Fall, Fibromyalgia, Hiatal hernia, HTN (hypertension), Hyperlipidemia, Hypokalemia, gastrointestinal losses, Kidney stone, Obesity (BMI 30-39.9), Osteoarthritis, Osteoporosis, Renal cancer ([facility] [address] Short of breath on exertion, Type II or unspecified type diabetes mellitus without mention of complication, not stated as uncontrolled, Unspecified sleep apnea, Urge urinary incontinence, Wears glasses, Wears glasses, and Zygomatic fracture, right side, initial encounter for closed fracture ([facility] [address] PAST SURGICAL HISTORY:      has a past surgical history that includes knee surgery (Bilateral); bladder suspension (08/09/2002); Nephrostomy (Right); total nephrectomy (Right, 06/20/2013); lumbar laminectomy (10/15/2014); Bladder surgery (06/05/2014); Endometrial ablation; Cholecystectomy (10/10/2003); Breast surgery (Left, 03/03/2008); Breast biopsy (Left, 03/03/2008); pr perq vert agmntj cavity crtj uni/bi cannulj lmbr (N/A, 09/12/2018); Fixation Kyphoplasty (10/2018); Kyphosis surgery (N/A, 05/08/2019); Endoscopy, colon, diagnostic; and Colonoscopy. SOCIAL HISTORY:      reports that she has never smoked. She has never used smokeless tobacco. She reports that she does not drink alcohol and does not use drugs. FAMILY HISTORY:     family history includes Asthma in her brother; Breast Cancer in her sister; Diabetes in her mother; High Blood Pressure in her father, mother, and paternal grandmother; Pacemaker in her mother; Prostate Cancer in her father and maternal grandfather. HOME MEDICATIONS:      Prior to Admission medications    Medication Sig Start Date End Date Taking?  Authorizing Provider   lactobacillus (CULTURELLE) capsule Take 1 capsule by mouth daily (with breakfast) 5/15/21   Jovon Andrews MD   insulin glargine (LANTUS SOLOSTAR) 100 UNIT/ML injection pen Inject 15 units nightly 5/14/21   Jovon Andrews MD   insulin lispro (HUMALOG) 100 UNIT/ML injection vial Inject 10 Units into the skin 3 times daily (before meals) Glucose: Dose: If <139             No Insulin 140-199 2 Units 200-249 4 Units 250-299 6 Units 300-349 8 Units 350-400 10 Units Above 400       12 Units 5/14/21   Abimael Quintanilla MD   donepezil (ARICEPT) 5 MG tablet Take 1 tablet by mouth nightly 9/1/20   Maggie Hong MD   ammonium lactate (AMLACTIN) 12 % cream Apply 1 Bottle topically as needed for Dry Skin Apply topically as needed. Historical Provider, MD   amLODIPine (NORVASC) 10 MG tablet Take 1 tablet by mouth daily 3/27/20   Harsh Brito MD   metFORMIN (GLUCOPHAGE-XR) 500 MG extended release tablet Take 1 tablet by mouth 2 times daily 3/27/20   Harsh Brito MD   ondansetron [facility]) 4 MG tablet Take 1 tablet by mouth every 8 hours as needed for Nausea or Vomiting 8/1/19   Venkat Gallagher MD   sertraline (ZOLOFT) 100 MG tablet TAKE ONE TABLET BY MOUTH DAILY 7/10/19   Candida Jauregui MD   raloxifene (EVISTA) 60 MG tablet TAKE ONE TABLET BY MOUTH DAILY 7/10/19   Candida Jauregui MD   Cholecalciferol (VITAMIN D3) 1000 units TABS Take 1 tablet by mouth daily 12/26/18   Candida Jauregui MD   aspirin EC 81 MG EC tablet Take 1 tablet by mouth daily 12/11/18   Candida Jauregui MD   lovastatin (MEVACOR) 40 MG tablet TAKE ONE AND ONE-HALF (1  1/2) TABLET BY MOUTH NIGHTLY 12/11/18   Candida Jauregui MD   gemfibrozil (LOPID) 600 MG tablet TAKE ONE TABLET BY MOUTH TWICE A DAY 12/11/18   Candida Jauregui MD   fluticasone (ARNUITY ELLIPTA) 200 MCG/ACT AEPB Inhale 1 Inhaler into the lungs daily    Historical Provider, MD   BiPAP Machine MISC by Does not apply route    Historical Provider, MD   calcium carbonate (TUMS) 500 MG chewable tablet Take 1 tablet by mouth 3 times daily as needed for Heartburn 1/10/17   Kamilla Whipple MD   Handicap Placard MISC by Does not apply route Duration: 1 year 4/28/16   Merlyn Berman MD   acetaminophen (TYLENOL) 500 MG tablet Take 500 mg by mouth every 6 hours as needed. Historical Provider, MD   butalbital-acetaminophen-caffeine (FIORICET, ESGIC) per tablet Take 1 tablet by mouth every 8 hours as needed.     Historical Provider, MD   montelukast (SINGULAIR) 10 MG tablet Take 10 mg by mouth nightly. Historical Provider, MD   albuterol (PROVENTIL HFA;VENTOLIN HFA) 108 (90 BASE) MCG/ACT inhaler Inhale 2 puffs into the lungs every 6 hours as needed. Historical Provider, MD       ALLERGIES:     Lac bovis, Nedocromil, Tramadol, Penicillins, and Tape [adhesive tape]      OBJECTIVE:       Vitals:    08/14/21 1911 08/14/21 2319 08/15/21 0400 08/15/21 0709   BP: 134/81 124/65 124/73 (!) 156/78   Pulse: 95 74 74    Resp: 13 20 16    Temp: 97 °F (36.1 °C) 97.6 °F (36.4 °C) 97.8 °F (36.6 °C) 97.3 °F (36.3 °C)   TempSrc: Temporal Temporal Temporal Oral   SpO2: 100% 100% 100%    Weight:       Height:             Intake/Output Summary (Last 24 hours) at 8/15/2021 1034  Last data filed at 8/15/2021 0228  Gross per 24 hour   Intake 3481.62 ml   Output 500 ml   Net 2981.62 ml       PHYSICAL EXAM:  General Appearance  Alert , awake , not in acute distress. AxO x 1 only to self. HEENT - Head is normocephalic, atraumatic. Lungs - Bilateral equal air entry , no wheezes, rales or rhonchi, aeration good  Cardiovascular - Heart sounds are normal.  Regular rhythm, normal rate without murmur, gallop or rub.   Abdomen - Soft, nontender, nondistended, no masses or organomegaly  Neurologic - There are no new focal motor or sensory deficits  Skin - No bruising or bleeding on exposed skin area  Extremities - No cyanosis, clubbing or edema    DIAGNOSTICS:     Laboratory Testing:    Recent Results (from the past 24 hour(s))   POC Glucose Fingerstick    Collection Time: 08/14/21  1:25 PM   Result Value Ref Range    POC Glucose 131 (H) 65 - 105 mg/dL   POC Glucose Fingerstick    Collection Time: 08/14/21  5:46 PM   Result Value Ref Range    POC Glucose 226 (H) 65 - 105 mg/dL   POC Glucose Fingerstick    Collection Time: 08/14/21  9:08 PM   Result Value Ref Range    POC Glucose 175 (H) 65 - 105 mg/dL   CBC auto differential    Collection Time: 08/15/21 6:01 AM   Result Value Ref Range    WBC 10.3 3.5 - 11.3 k/uL    RBC 4.02 3.95 - 5.11 m/uL    Hemoglobin 11.2 (L) 11.9 - 15.1 g/dL    Hematocrit 36.0 (L) 36.3 - 47.1 %    MCV 89.6 82.6 - 102.9 fL    MCH 27.9 25.2 - 33.5 pg    MCHC 31.1 28.4 - 34.8 g/dL    RDW 13.2 11.8 - 14.4 %    Platelets 965 745 - 773 k/uL    MPV 11.0 8.1 - 13.5 fL    NRBC Automated 0.0 0.0 per 100 WBC    Differential Type NOT REPORTED     Seg Neutrophils 78 (H) 36 - 65 %    Lymphocytes 15 (L) 24 - 43 %    Monocytes 5 3 - 12 %    Eosinophils % 0 (L) 1 - 4 %    Basophils 0 0 - 2 %    Immature Granulocytes 2 (H) 0 %    Segs Absolute 8.00 1.50 - 8.10 k/uL    Absolute Lymph # 1.58 1.10 - 3.70 k/uL    Absolute Mono # 0.50 0.10 - 1.20 k/uL    Absolute Eos # <0.03 0.00 - 0.44 k/uL    Basophils Absolute <0.03 0.00 - 0.20 k/uL    Absolute Immature Granulocyte 0.15 0.00 - 0.30 k/uL    WBC Morphology NOT REPORTED     RBC Morphology NOT REPORTED     Platelet Estimate NOT REPORTED    Troponin    Collection Time: 08/15/21  6:01 AM   Result Value Ref Range    Troponin, High Sensitivity 104 (HH) 0 - 14 ng/L    Troponin T NOT REPORTED <0.03 ng/mL    Troponin Interp NOT REPORTED    SPECIMEN REJECTION    Collection Time: 08/15/21  6:01 AM   Result Value Ref Range    Specimen Source . BLOOD     Ordered Test BMPX     Reason for Rejection Unable to perform testing: Specimen hemolyzed.      - NOT REPORTED    POC Glucose Fingerstick    Collection Time: 08/15/21  7:30 AM   Result Value Ref Range    POC Glucose 200 (H) 65 - 105 mg/dL   Basic Metabolic Panel w/ Reflex to MG    Collection Time: 08/15/21  8:41 AM   Result Value Ref Range    Glucose 197 (H) 70 - 99 mg/dL    BUN 12 8 - 23 mg/dL    CREATININE 0.67 0.50 - 0.90 mg/dL    Bun/Cre Ratio NOT REPORTED 9 - 20    Calcium 8.4 (L) 8.6 - 10.4 mg/dL    Sodium 134 (L) 135 - 144 mmol/L    Potassium 4.2 3.7 - 5.3 mmol/L    Chloride 104 98 - 107 mmol/L    CO2 18 (L) 20 - 31 mmol/L    Anion Gap 12 9 - 17 mmol/L    GFR Non-African American >60 >60 mL/min    GFR African American >60 >60 mL/min    GFR Comment          GFR Staging NOT REPORTED          Imaging/Diagonstics:    CTA HEAD NECK W CONTRAST    Result Date: 8/14/2021  EXAMINATION: CTA OF THE HEAD AND NECK WITH CONTRAST 8/14/2021 12:16 pm TECHNIQUE: CTA of the head and neck was performed with the administration of intravenous contrast. Multiplanar reformatted images are provided for review. MIP images are provided for review. Stenosis of the internal carotid arteries measured using NASCET criteria. Dose modulation, iterative reconstruction, and/or weight based adjustment of the mA/kV was utilized to reduce the radiation dose to as low as reasonably achievable. COMPARISON: MRI on 08/12/2021 HISTORY: ORDERING SYSTEM PROVIDED HISTORY: neuro work up TECHNOLOGIST PROVIDED HISTORY: neuro work up Reason for Exam: neuro work up Acuity: Unknown Type of Exam: Unknown FINDINGS: CTA NECK: AORTIC ARCH/ARCH VESSELS: There is a bovine arch. The innominate artery is patent. Fibrocalcific plaque is noted in the proximal right subclavian artery with areas of minimal narrowing. Fibrocalcific plaque is noted in the proximal left subclavian artery with areas of mild narrowing measuring 25%. CAROTID ARTERIES: The arteries in the neck are tortuous suggestive of underlying hypertension. No flow-limiting stenosis in the common carotid or internal carotid arteries bilaterally. VERTEBRAL ARTERIES: There is mild narrowing near the right vertebral artery origin measuring less than 50%. Moderate narrowing is noted at the left vertebral artery origin measuring approximately 50%. There is no vertebral artery dissection. SOFT TISSUES: Limited images through the lung apices are unremarkable. No superior mediastinal adenopathy. The thyroid gland is unremarkable. The submandibular glands and parotid glands are fatty replaced. The parapharyngeal fat spaces are unremarkable.   There is no pharyngeal or laryngeal mass. BONES: Vertebroplasty cement is noted in the T2 vertebral body. There is marked degenerative disc disease. Dental caries are noted diffusely. There is scattered paranasal sinus disease. There is a right sphenoid mucous retention cyst. CTA HEAD: ANTERIOR CIRCULATION: There are areas of fibrocalcific plaque noted in the right cavernous ICA with areas of mild narrowing. Fibrocalcific plaque is also noted in the left cavernous and supraclinoid ICA without significant stenosis. The anterior cerebral arteries are unremarkable. The middle cerebral arteries are unremarkable. POSTERIOR CIRCULATION: The basilar artery is normal in appearance. There is a near complete fetal origin of the left posterior cerebral artery. OTHER: No dural venous sinus thrombosis on this non-dedicated study. BRAIN: There are no areas of abnormal enhancement in the cerebral or cerebellar parenchyma. 1. Moderate narrowing at the left vertebral artery origin, and mild narrowing at the right vertebral artery origin. 2. No flow-limiting stenosis in the internal carotid arteries. 3. 25% stenosis in the proximal left subclavian artery. 4. Unremarkable CTA of the brain. 5. Dental caries. CT CHEST ABDOMEN PELVIS W CONTRAST    Result Date: 8/13/2021  EXAMINATION: CT OF THE CHEST, ABDOMEN, AND PELVIS WITH CONTRAST 8/13/2021 12:35 pm TECHNIQUE: CT of the chest, abdomen and pelvis was performed with the administration of intravenous contrast. Multiplanar reformatted images are provided for review. Dose modulation, iterative reconstruction, and/or weight based adjustment of the mA/kV was utilized to reduce the radiation dose to as low as reasonably achievable.  COMPARISON: Abdomen and pelvis CT, 07/24/2018 HISTORY: ORDERING SYSTEM PROVIDED HISTORY: H/O R renal CA; r/o mets TECHNOLOGIST PROVIDED HISTORY: IV contrast only H/O R renal CA; r/o mets Reason for Exam: H/O R renal CA; r/o mets FINDINGS: Chest: Mediastinum: Visualized thyroid is unremarkable. No mediastinal or hilar lymphadenopathy is seen. Esophagus is unremarkable. Cardiac chambers unremarkable. Thoracic aorta normal in caliber without evidence of dissection. Limited imaging of the pulmonary arteries is unremarkable. Lungs/pleura: No suspicious lung lesions are identified. No focal infiltrate. No pneumothorax. No pleural effusion. Soft Tissues/Bones: Visualized extra thoracic soft tissues are unremarkable. No acute or suspicious bony abnormality identified. Abdomen/Pelvis: Organs: No acute or suspicious liver lesion. Portal vein is patent. Gallbladder is surgically absent. The spleen is unremarkable. Adrenals and pancreas are unremarkable. The patient status post right nephrectomy. No recurrent soft tissue is seen within the nephrectomy bed. The left kidney is unremarkable in appearance. GI/Bowel: No large bowel abnormalities are identified. The appendix is normal.  Stomach, duodenal sweep, and the remainder of the small bowel are unremarkable in appearance. Pelvis: Uterus and adnexa regions unremarkable. No free pelvic fluid. Urinary bladder unremarkable. Peritoneum/Retroperitoneum: Abdominal aorta is normal in caliber. The superior mesenteric artery is enhancing. No upper abdominal, retroperitoneal, mesenteric, iliac chain, or inguinal lymphadenopathy. Bones/Soft Tissues: Postsurgical changes are noted within the lower lumbar spine. Cement augmentation is seen at L2 and L3. No acute or suspicious bony abnormality identified. No evidence of recurrent or metastatic disease within the chest, abdomen, and pelvis.        Current Facility-Administered Medications   Medication Dose Route Frequency Provider Last Rate Last Admin    potassium chloride (KLOR-CON M) extended release tablet 40 mEq  40 mEq Oral PRN Vadim Box MD        Or    potassium bicarb-citric acid (EFFER-K) effervescent tablet 40 mEq  40 mEq Oral PRN Vadim Box MD   40 mEq at 08/14/21 2116    Or    potassium chloride 10 mEq/100 mL IVPB (Peripheral Line)  10 mEq Intravenous PRN Kaia Alvares MD        magnesium oxide (MAG-OX) tablet 400 mg  400 mg Oral BID Kaia Alvares MD   400 mg at 08/15/21 0909    magnesium sulfate 1000 mg in dextrose 5% 100 mL IVPB  1,000 mg Intravenous PRN Kaia Alvares MD        vancomycin (VANCOCIN) 1000 mg in dextrose 5% 200 mL IVPB  1,000 mg Intravenous Q24H Kaia Alvares MD   Stopped at 08/14/21 1803    vancomycin (VANCOCIN) intermittent dosing (placeholder)   Other RX Placeholder Kaia Alvares MD        sodium chloride flush 0.9 % injection 10 mL  10 mL Intravenous PRN Arnold Coronado MD        albuterol sulfate  (90 Base) MCG/ACT inhaler 2 puff  2 puff Inhalation Q6H PRN Ananya Lagunas MD        amLODIPine (NORVASC) tablet 10 mg  10 mg Oral Daily Ananya Lagunas MD   10 mg at 08/15/21 0910    butalbital-acetaminophen-caffeine (FIORICET, ESGIC) per tablet 1 tablet  1 tablet Oral Q6H PRN Ananya Lagunas MD        Vitamin D (CHOLECALCIFEROL) tablet 1,000 Units  1,000 Units Oral Daily Ananya Lagunas MD   1,000 Units at 08/15/21 0909    donepezil (ARICEPT) tablet 5 mg  5 mg Oral Nightly Ananya Lagunas MD   5 mg at 08/14/21 2108    fluticasone (FLOVENT HFA) 110 MCG/ACT inhaler 1 puff  1 puff Inhalation BID Ananya Lagunas MD   1 puff at 08/15/21 0929    gemfibrozil (LOPID) tablet 600 mg  600 mg Oral BID Ananya Lagunas MD   600 mg at 08/15/21 0910    insulin glargine (LANTUS) injection vial 10 Units  10 Units Subcutaneous Nightly Ananya Lagunas MD   10 Units at 08/14/21 2108    raloxifene (EVISTA) tablet 60 mg  60 mg Oral Daily Ananya Lagunas MD        montelukast (SINGULAIR) tablet 10 mg  10 mg Oral Nightly Ananya Lagunas MD   10 mg at 08/14/21 2107    sertraline (ZOLOFT) tablet 100 mg  100 mg Oral Daily Ananya Lagunas MD   100 mg at 08/15/21 0909    sodium chloride flush 0.9 % injection 5-40 mL  5-40 mL Intravenous 2 times per day Ananya Lagunas MD   10 mL at 08/15/21 0908    sodium chloride flush 0.9 % injection 5-40 mL  5-40 mL Intravenous LENOREN Rebel Nova MD        0.9 % sodium chloride infusion  25 mL Intravenous PRN Rebel Nova MD        enoxaparin (LOVENOX) injection 40 mg  40 mg Subcutaneous Daily Rebel Nova MD   40 mg at 08/15/21 0910    ondansetron (ZOFRAN-ODT) disintegrating tablet 4 mg  4 mg Oral Q8H PRN Rebel Nova MD        Or    ondansetron (ZOFRAN) injection 4 mg  4 mg Intravenous Q6H PRN Rebel Nova MD        polyethylene glycol (GLYCOLAX) packet 17 g  17 g Oral Daily PRN Rebel Nova MD        acetaminophen (TYLENOL) tablet 650 mg  650 mg Oral Q6H PRN Rebel Nova MD        Or    acetaminophen (TYLENOL) suppository 650 mg  650 mg Rectal Q6H PRVALERIANO Nova MD        0.9 % sodium chloride infusion   Intravenous Continuous Rebel Noav MD 75 mL/hr at 08/13/21 2056 New Bag at 08/13/21 2056    famotidine (PEPCID) injection 20 mg  20 mg Intravenous Daily Rebel Nova MD   20 mg at 08/15/21 0910    insulin lispro (HUMALOG) injection vial 0-12 Units  0-12 Units Subcutaneous TID WC Rebel Nova MD   4 Units at 08/15/21 0911    insulin lispro (HUMALOG) injection vial 0-6 Units  0-6 Units Subcutaneous Nightly Rebel Nova MD   1 Units at 08/14/21 2108    glucose (GLUTOSE) 40 % oral gel 15 g  15 g Oral PRN Rebel Nova MD        dextrose 50 % IV solution  12.5 g Intravenous PRN Rebel Nova MD        glucagon (rDNA) injection 1 mg  1 mg Intramuscular PRN Rebel Nova MD        dextrose 5 % solution  100 mL/hr Intravenous PRVALERIANO Nova MD        dexamethasone (DECADRON) injection 4 mg  4 mg Intravenous Q12H Sisi Gaffney MD   4 mg at 08/15/21 0909       ASSESSMENT:     Principal Problem:    Altered mental status  Active Problems:    HTN (hypertension)    DM (diabetes mellitus), type 2, uncontrolled with complications (HCC)    CKD (chronic kidney disease) stage 2, GFR 60-89 ml/min    Elevated troponin    Brain lesion    Septicemia ([facility] [address] Vasogenic edema (HCC)    Cerebral infarction (HCC)    MRSA UTI    NSTEMI (non-ST elevated myocardial infarction) ([facility] [address]  Resolved Problems:    * No resolved hospital problems. *      PLAN:     Altered Mental Status 2/2 MRSA UTI and Acute/Subacute Ischemic Infarcts  - Day 5 of IV Vancomycin  - Donepezil 5 mg nightly  - IV Decadron 4 mg twice daily    Type II NSTEMI  - Likely demand ischemia  - Trops 165 -> 111 -> 95 -> 104  - Will stop trending trops  - Last Echo 08/13/2021: EF of 47%. Hypertension  - Norvasc 10 mg daily    Type 2 Diabetes  - Lantus 10 units nightly  - Medium dose insulin sliding scale  - Hypoglycemia protocol  - Last HbA1c 05/09/2021 is 15.9    Depression  - Zoloft 100 mg daily    Hypertriglyceridemia  - Gemfibrozil 600 mg twice daily      DVT prophylaxis: Lovenox 40 mg SC  GI prophylaxis: Famotidine 20 mg BID    Plan will be discussed with the attending, Dr. Amna Cox MD  Family Medicine Resident  8/15/2021 10:34 AM            Please note that this chart was generated using voice recognition Dragon dictation software.   Although every effort was made to ensure the accuracy of this automated transcription, some errors in transcription may have occurred

## 2021-08-15 NOTE — PLAN OF CARE
Problem: Health Behavior:  Goal: Ability to manage health-related needs will improve  Outcome: Ongoing     Problem: Metabolic:  Goal: Ability to maintain appropriate glucose levels will improve  Outcome: Ongoing     Problem: Nutritional:  Goal: Maintenance of adequate nutrition will improve  Outcome: Ongoing

## 2021-08-15 NOTE — PROGRESS NOTES
[facility]   Progress Note                   Date:   8/15/2021  Patient name: Sofie Reaves  Date of admission:  8/11/2021  4:28 PM  MRN:   [id number]  YOB: 1955  PCP: Jo Ann Boland MD    Reason for Admission: Altered mental status [R41.82]  Septicemia ([address] [A41.9]  Disorientation [R41.0]  Elevated troponin [R77.8]  Acute cystitis without hematuria [N30.00]  Vasogenic edema ([address] [G93.6]    Subjective:       Clinical Changes / Abnormalities: Patient seen and examined at the bed side. No acute CV issues overnight. Denies chest pain or SOB. Remains disoriented. Not able to state year or president or where she is currently at. Reviewed vitals, labs, tele, & previous testing. SR HR 74 on tele.       Medications:   Scheduled Meds:   magnesium oxide  400 mg Oral BID    vancomycin  1,000 mg Intravenous Q24H    vancomycin (VANCOCIN) intermittent dosing (placeholder)   Other RX Placeholder    amLODIPine  10 mg Oral Daily    Vitamin D  1,000 Units Oral Daily    donepezil  5 mg Oral Nightly    fluticasone  1 puff Inhalation BID    gemfibrozil  600 mg Oral BID    insulin glargine  10 Units Subcutaneous Nightly    raloxifene  60 mg Oral Daily    montelukast  10 mg Oral Nightly    sertraline  100 mg Oral Daily    sodium chloride flush  5-40 mL Intravenous 2 times per day    enoxaparin  40 mg Subcutaneous Daily    famotidine (PEPCID) injection  20 mg Intravenous Daily    insulin lispro  0-12 Units Subcutaneous TID     insulin lispro  0-6 Units Subcutaneous Nightly    dexamethasone  4 mg Intravenous Q12H     Continuous Infusions:   sodium chloride      sodium chloride 75 mL/hr at 08/13/21 2056    dextrose       CBC:   Recent Labs     08/13/21  0729 08/14/21  0642 08/15/21  0601   WBC 10.8 12.2* 10.3   HGB 10.8* 11.0* 11.2*    220 242     BMP:    Recent Labs     08/13/21  0729 08/14/21  0642    135   K 3.9 3.2*    105   CO2 15* 15*   BUN 10 12 CREATININE 0.81 0.78   GLUCOSE 154* 182*     Hepatic:   No results for input(s): AST, ALT, ALB, BILITOT, ALKPHOS in the last 72 hours. Troponin:   Recent Labs     08/13/21  0729 08/14/21  0642 08/15/21  0601   TROPHS 111* 95* 104*     BNP: No results for input(s): BNP in the last 72 hours. Lipids: No results for input(s): CHOL, HDL in the last 72 hours. Invalid input(s): LDLCALCU  INR: No results for input(s): INR in the last 72 hours. Objective:   Vitals: BP (!) 156/78   Pulse 74   Temp 97.3 °F (36.3 °C) (Oral)   Resp 16   Ht 5' 2\" (1.575 m)   Wt 147 lb (66.7 kg)   LMP 01/01/2002   SpO2 100%   BMI 26.89 kg/m²   General appearance: alert and cooperative with exam  HEENT: Head: Normocephalic, no lesions, without obvious abnormality. Neck: no JVD, trachea midline, no adenopathy  Lungs: Clear with diminished breath sounds in lower lobes with auscultation  Heart: Regular rate and rhythm, s1/s2 auscultated, no murmurs. SR  Abdomen: soft, non-tender, bowel sounds active  Extremities: trace lower ext. edema  Neurologic: not done      Echo 8/13/2021  Summary  Left ventricle is normal in size. Global left ventricular systolic function appears low normal to mildly  reduced. Calculated EF via Sanabria's method 47 % with mildly abnormal global L.  strain of -12.9 %. Mild left ventricular hypertrophy. Grade I (mild) left ventricular diastolic dysfunction. Trivial aortic insufficiency. Mild mitral regurgitation. Trivial pericardial effusion. Previous ECHO 8/13/2020  Summary  Left ventricle is normal in size. Moderate left ventricular hypertrophy. Global left ventricular systolic function is normal. Estimated LV EF >55 %. Evidence of mild (grade I) diastolic dysfunction. Both atria are normal in size. Negative bubble study, no shunt noted. Normal right ventricular size and function. Mild mitral regurgitation. Assessment / Acute Cardiac Problems:     1.  Elevated troponin type I vs type II  2. HTN  3. T2DM  4. CKD stage 2  5. Urosepsis  6. AMS with CTH showing edema and possible mass/subacute stroke     Patient Active Problem List:     Prolapsed intervertebral disk     Hip arthritis     Diabetic neuropathy (HCC)     Dermatomyositis (HCC)     GERD (gastroesophageal reflux disease)     Moderate persistent asthma     HLD (hyperlipidemia)     HTN (hypertension)     Obesity (BMI 30-39. 9)     History of renal cell cancer     S/p nephrectomy     Diabetic autonomic neuropathy associated with type 2 diabetes mellitus ([facility] [address]     Cervical spondylosis with myelopathy     Cervicalgia     Spinal stenosis of lumbar region with neurogenic claudication     Acquired spondylolisthesis     DM (diabetes mellitus), type 2, uncontrolled with complications (HCC)     Colitis     Dizziness     Depression     Osteoporosis     Lumbar disc herniation     EJ on CPAP     Falls     Ambulatory dysfunction     Hypovitaminosis D     CKD (chronic kidney disease) stage 2, GFR 60-89 ml/min     Age-related osteoporosis with current pathological fracture with routine healing     Uncontrolled type 2 diabetes mellitus with complication, with long-term current use of insulin (HCC)     AMS (altered mental status)     Hyperosmolar hyperglycemic state (HHS) (HCC)     Acute metabolic encephalopathy     Altered mental status     Elevated troponin     Brain lesion     Septicemia (HCC)     Vasogenic edema (HCC)     Cerebral infarction ([facility] [address]      Plan of Treatment:   1. Troponin elevation with downward trend as of this morning. (177, 169, 165, 159, 111). Likely demand ischemia. AMS, Possible mass per head CT. Continue aspirin. Not on heparin d/t bleed risk. Neurology on board  2. 2D echo completed as noted above. Slightly reduced LVEF 47% compared to previous LVEF 55% (2020). 3. Will consider ischemic work up once acute issues resolve as out patient as patient denies Chest pain or SOB. 4. HTN. Stable Continue amlodipine.   5. Continue antibiotics per primary. 6. Recommend Keep K+ > 4.0, and Mg+ > 2.0  7. Rest of care managed per primary service. 8. Stable from CV perspective. Cardiology will sign off.  Please call with questions or concerns.         Electronically signed by SACHIN Vallejo CNP on 8/15/2021 at 8:46 AM  [facility]      [phone]

## 2021-08-16 LAB
ABSOLUTE EOS #: <0.03 K/UL (ref 0–0.44)
ABSOLUTE IMMATURE GRANULOCYTE: 0.21 K/UL (ref 0–0.3)
ABSOLUTE LYMPH #: 2.05 K/UL (ref 1.1–3.7)
ABSOLUTE MONO #: 0.82 K/UL (ref 0.1–1.2)
ANION GAP SERPL CALCULATED.3IONS-SCNC: 10 MMOL/L (ref 9–17)
BASOPHILS # BLD: 0 % (ref 0–2)
BASOPHILS ABSOLUTE: <0.03 K/UL (ref 0–0.2)
BUN BLDV-MCNC: 18 MG/DL (ref 8–23)
BUN/CREAT BLD: ABNORMAL (ref 9–20)
CALCIUM SERPL-MCNC: 8.2 MG/DL (ref 8.6–10.4)
CHLORIDE BLD-SCNC: 102 MMOL/L (ref 98–107)
CO2: 19 MMOL/L (ref 20–31)
CREAT SERPL-MCNC: 0.82 MG/DL (ref 0.5–0.9)
DIFFERENTIAL TYPE: ABNORMAL
EOSINOPHILS RELATIVE PERCENT: 0 % (ref 1–4)
GFR AFRICAN AMERICAN: >60 ML/MIN
GFR NON-AFRICAN AMERICAN: >60 ML/MIN
GFR SERPL CREATININE-BSD FRML MDRD: ABNORMAL ML/MIN/{1.73_M2}
GFR SERPL CREATININE-BSD FRML MDRD: ABNORMAL ML/MIN/{1.73_M2}
GLUCOSE BLD-MCNC: 148 MG/DL (ref 65–105)
GLUCOSE BLD-MCNC: 173 MG/DL (ref 65–105)
GLUCOSE BLD-MCNC: 185 MG/DL (ref 65–105)
GLUCOSE BLD-MCNC: 219 MG/DL (ref 70–99)
GLUCOSE BLD-MCNC: 223 MG/DL (ref 65–105)
HCT VFR BLD CALC: 32.6 % (ref 36.3–47.1)
HEMOGLOBIN: 10.2 G/DL (ref 11.9–15.1)
IMMATURE GRANULOCYTES: 2 %
LYMPHOCYTES # BLD: 17 % (ref 24–43)
MCH RBC QN AUTO: 27.9 PG (ref 25.2–33.5)
MCHC RBC AUTO-ENTMCNC: 31.3 G/DL (ref 28.4–34.8)
MCV RBC AUTO: 89.3 FL (ref 82.6–102.9)
MONOCYTES # BLD: 7 % (ref 3–12)
NRBC AUTOMATED: 0 PER 100 WBC
PDW BLD-RTO: 13.3 % (ref 11.8–14.4)
PLATELET # BLD: 215 K/UL (ref 138–453)
PLATELET ESTIMATE: ABNORMAL
PMV BLD AUTO: 10.5 FL (ref 8.1–13.5)
POTASSIUM SERPL-SCNC: 3.6 MMOL/L (ref 3.7–5.3)
RBC # BLD: 3.65 M/UL (ref 3.95–5.11)
RBC # BLD: ABNORMAL 10*6/UL
SEG NEUTROPHILS: 74 % (ref 36–65)
SEGMENTED NEUTROPHILS ABSOLUTE COUNT: 8.89 K/UL (ref 1.5–8.1)
SODIUM BLD-SCNC: 131 MMOL/L (ref 135–144)
WBC # BLD: 12 K/UL (ref 3.5–11.3)
WBC # BLD: ABNORMAL 10*3/UL

## 2021-08-16 PROCEDURE — 99233 SBSQ HOSP IP/OBS HIGH 50: CPT | Performed by: STUDENT IN AN ORGANIZED HEALTH CARE EDUCATION/TRAINING PROGRAM

## 2021-08-16 PROCEDURE — 94640 AIRWAY INHALATION TREATMENT: CPT

## 2021-08-16 PROCEDURE — 80048 BASIC METABOLIC PNL TOTAL CA: CPT

## 2021-08-16 PROCEDURE — 82947 ASSAY GLUCOSE BLOOD QUANT: CPT

## 2021-08-16 PROCEDURE — 36415 COLL VENOUS BLD VENIPUNCTURE: CPT

## 2021-08-16 PROCEDURE — 2060000000 HC ICU INTERMEDIATE R&B

## 2021-08-16 PROCEDURE — 85025 COMPLETE CBC W/AUTO DIFF WBC: CPT

## 2021-08-16 PROCEDURE — 6360000002 HC RX W HCPCS: Performed by: STUDENT IN AN ORGANIZED HEALTH CARE EDUCATION/TRAINING PROGRAM

## 2021-08-16 PROCEDURE — 6370000000 HC RX 637 (ALT 250 FOR IP): Performed by: STUDENT IN AN ORGANIZED HEALTH CARE EDUCATION/TRAINING PROGRAM

## 2021-08-16 PROCEDURE — 94761 N-INVAS EAR/PLS OXIMETRY MLT: CPT

## 2021-08-16 PROCEDURE — 2500000003 HC RX 250 WO HCPCS: Performed by: STUDENT IN AN ORGANIZED HEALTH CARE EDUCATION/TRAINING PROGRAM

## 2021-08-16 PROCEDURE — 2580000003 HC RX 258: Performed by: STUDENT IN AN ORGANIZED HEALTH CARE EDUCATION/TRAINING PROGRAM

## 2021-08-16 RX ADMIN — AMLODIPINE BESYLATE 10 MG: 10 TABLET ORAL at 09:30

## 2021-08-16 RX ADMIN — ENOXAPARIN SODIUM 40 MG: 40 INJECTION SUBCUTANEOUS at 09:30

## 2021-08-16 RX ADMIN — INSULIN LISPRO 1 UNITS: 100 INJECTION, SOLUTION INTRAVENOUS; SUBCUTANEOUS at 20:24

## 2021-08-16 RX ADMIN — MAGNESIUM GLUCONATE 500 MG ORAL TABLET 400 MG: 500 TABLET ORAL at 09:31

## 2021-08-16 RX ADMIN — INSULIN LISPRO 4 UNITS: 100 INJECTION, SOLUTION INTRAVENOUS; SUBCUTANEOUS at 09:37

## 2021-08-16 RX ADMIN — DEXAMETHASONE SODIUM PHOSPHATE 4 MG: 4 INJECTION, SOLUTION INTRAMUSCULAR; INTRAVENOUS at 22:23

## 2021-08-16 RX ADMIN — SODIUM CHLORIDE, PRESERVATIVE FREE 10 ML: 5 INJECTION INTRAVENOUS at 09:31

## 2021-08-16 RX ADMIN — GEMFIBROZIL 600 MG: 600 TABLET ORAL at 20:24

## 2021-08-16 RX ADMIN — SERTRALINE 100 MG: 50 TABLET, FILM COATED ORAL at 09:30

## 2021-08-16 RX ADMIN — Medication 1000 UNITS: at 09:30

## 2021-08-16 RX ADMIN — INSULIN LISPRO 2 UNITS: 100 INJECTION, SOLUTION INTRAVENOUS; SUBCUTANEOUS at 12:03

## 2021-08-16 RX ADMIN — VANCOMYCIN HYDROCHLORIDE 1000 MG: 1 INJECTION, SOLUTION INTRAVENOUS at 17:11

## 2021-08-16 RX ADMIN — GEMFIBROZIL 600 MG: 600 TABLET ORAL at 09:29

## 2021-08-16 RX ADMIN — FAMOTIDINE 20 MG: 10 INJECTION INTRAVENOUS at 09:30

## 2021-08-16 RX ADMIN — MONTELUKAST SODIUM 10 MG: 10 TABLET, FILM COATED ORAL at 20:24

## 2021-08-16 RX ADMIN — DONEPEZIL HYDROCHLORIDE 5 MG: 5 TABLET, FILM COATED ORAL at 20:24

## 2021-08-16 RX ADMIN — FLUTICASONE PROPIONATE 1 PUFF: 110 AEROSOL, METERED RESPIRATORY (INHALATION) at 20:28

## 2021-08-16 RX ADMIN — INSULIN LISPRO 2 UNITS: 100 INJECTION, SOLUTION INTRAVENOUS; SUBCUTANEOUS at 17:11

## 2021-08-16 RX ADMIN — SODIUM CHLORIDE, PRESERVATIVE FREE 10 ML: 5 INJECTION INTRAVENOUS at 20:24

## 2021-08-16 RX ADMIN — FLUTICASONE PROPIONATE 1 PUFF: 110 AEROSOL, METERED RESPIRATORY (INHALATION) at 07:38

## 2021-08-16 RX ADMIN — DEXAMETHASONE SODIUM PHOSPHATE 4 MG: 4 INJECTION, SOLUTION INTRAMUSCULAR; INTRAVENOUS at 09:30

## 2021-08-16 RX ADMIN — INSULIN GLARGINE 10 UNITS: 100 INJECTION, SOLUTION SUBCUTANEOUS at 22:23

## 2021-08-16 ASSESSMENT — PAIN SCALES - GENERAL
PAINLEVEL_OUTOF10: 0
PAINLEVEL_OUTOF10: 0

## 2021-08-16 NOTE — PROCEDURES
EEG REPORT       Patient: Charlotte Munson Age: 61 y.o. MRN: [id number]    Date: 8/11/2021  Referring Provider: No ref. provider found    History: This routine 30 minute scalp EEG was recorded with video- monitoring for a 61 y.o. [facility] female who presented with encephalopathy. This EEG was performed to evaluate for focal and epileptiform abnormalities.      Juan Peterson   Current Facility-Administered Medications   Medication Dose Route Frequency Provider Last Rate Last Admin    potassium chloride (KLOR-CON M) extended release tablet 40 mEq  40 mEq Oral PRN Terra Pritchard MD        Or    potassium bicarb-citric acid (EFFER-K) effervescent tablet 40 mEq  40 mEq Oral PRN Terra Pritchard MD   40 mEq at 08/14/21 2116    Or    potassium chloride 10 mEq/100 mL IVPB (Peripheral Line)  10 mEq Intravenous PRN Terra Pritchard MD        magnesium sulfate 1000 mg in dextrose 5% 100 mL IVPB  1,000 mg Intravenous PRN Terra Pritchard MD        vancomycin (VANCOCIN) 1000 mg in dextrose 5% 200 mL IVPB  1,000 mg Intravenous Q24H Terra Pritchard MD   Stopped at 08/15/21 1902    vancomycin (VANCOCIN) intermittent dosing (placeholder)   Other RX Placeholder Terra Pritchard MD        sodium chloride flush 0.9 % injection 10 mL  10 mL Intravenous PRN Gauri Miles MD        albuterol sulfate  (90 Base) MCG/ACT inhaler 2 puff  2 puff Inhalation Q6H PRN Shandra Ray MD        amLODIPine (NORVASC) tablet 10 mg  10 mg Oral Daily Shandra Ray MD   10 mg at 08/16/21 0930    butalbital-acetaminophen-caffeine (FIORICET, ESGIC) per tablet 1 tablet  1 tablet Oral Q6H PRN Shandra Ray MD        Vitamin D (CHOLECALCIFEROL) tablet 1,000 Units  1,000 Units Oral Daily Shandra Ray MD   1,000 Units at 08/16/21 0930    donepezil (ARICEPT) tablet 5 mg  5 mg Oral Nightly Shandra Ray MD   5 mg at 08/15/21 2109    fluticasone (FLOVENT HFA) 110 MCG/ACT inhaler 1 puff  1 puff Inhalation BID Shandra Ray MD   1 puff at 08/16/21 8050  gemfibrozil (LOPID) tablet 600 mg  600 mg Oral BID Fadi Carson MD   600 mg at 08/16/21 0929    insulin glargine (LANTUS) injection vial 10 Units  10 Units Subcutaneous Nightly Fadi Carson MD   10 Units at 08/15/21 2109    montelukast (SINGULAIR) tablet 10 mg  10 mg Oral Nightly Fadi Carson MD   10 mg at 08/15/21 2109    sertraline (ZOLOFT) tablet 100 mg  100 mg Oral Daily Fadi Carson MD   100 mg at 08/16/21 0930    sodium chloride flush 0.9 % injection 5-40 mL  5-40 mL Intravenous 2 times per day Fadi Carson MD   10 mL at 08/16/21 0931    sodium chloride flush 0.9 % injection 5-40 mL  5-40 mL Intravenous PRN Fadi Carson MD        0.9 % sodium chloride infusion  25 mL Intravenous PRN Fadi Carson MD        enoxaparin (LOVENOX) injection 40 mg  40 mg Subcutaneous Daily Fadi Carson MD   40 mg at 08/16/21 0930    ondansetron (ZOFRAN-ODT) disintegrating tablet 4 mg  4 mg Oral Q8H PRN Fadi Carson MD        Or    ondansetron (ZOFRAN) injection 4 mg  4 mg Intravenous Q6H PRN Fadi Carson MD        polyethylene glycol (GLYCOLAX) packet 17 g  17 g Oral Daily PRN Fadi Carson MD        acetaminophen (TYLENOL) tablet 650 mg  650 mg Oral Q6H PRN Fadi Carson MD        Or    acetaminophen (TYLENOL) suppository 650 mg  650 mg Rectal Q6H PRN Fadi Carson MD        famotidine (PEPCID) injection 20 mg  20 mg Intravenous Daily Fadi Carson MD   20 mg at 08/16/21 0930    insulin lispro (HUMALOG) injection vial 0-12 Units  0-12 Units Subcutaneous TID WC Fadi Carson MD   2 Units at 08/16/21 1203    insulin lispro (HUMALOG) injection vial 0-6 Units  0-6 Units Subcutaneous Nightly Fadi Carson MD   1 Units at 08/15/21 2056    glucose (GLUTOSE) 40 % oral gel 15 g  15 g Oral PRN Fadi Carson MD        dextrose 50 % IV solution  12.5 g Intravenous PRN Fadi Carson MD        glucagon (rDNA) injection 1 mg  1 mg Intramuscular PRN Fadi Carson MD        dextrose 5 % solution  100 mL/hr Intravenous PRN Dina Gasca MD        dexamethasone (DECADRON) injection 4 mg  4 mg Intravenous Q12H Bob Varela MD   4 mg at 08/16/21 0930       Technical Description: This is a 21 channel digital EEG recording with time-locked video. Electrodes were placed in accordance with the 10-20 International System of Electrode Placement. Single lead EKG monitoring as well as temporal electrodes were included. The patient was not sleep deprived. This recording was obtained during wakefulness. Recording begins 1711 hrs 28 mins duration  EEG Description: The dominant background activity during maximal recorded wakefulness consisted of 4-5 Hz of polymorphic delta and theta activity. There was poor anterior posterior amplitude gradient. Activation procedures were not performed. The EKG channel demonstrated a normal sinus rhythm. Interpretation  This EEG was abnormal due to disorganized and slow background in polymorphic delta and theta frequency. Clinical correlation  This EEG was abnormal. Disorganized and slow background suggested mild to moderate encephalopathy of non specific etiology.      MD Carter Matthews, American Board of Psychiatry and Neurology

## 2021-08-16 NOTE — PROGRESS NOTES
[facility]  Progress Note    Date:   8/16/2021  Patient name:  Alexandre Martinez  Date of admission:  8/11/2021  4:28 PM  MRN:   [id number]  YOB: 1955    SUBJECTIVE/Last 24 hours update:     Patient seen and examined at the bed side  No acute events overnight   VSS  Lethargic and sleepy  Oriented to self only  Awaiting placement to ECF    Notes from nursing staff and Consults had been reviewed, and the overnight progress had been checked with the nursing staff as well. HPI:     The patient is a 62 y. o.  female with history of HTN, DM, CHF, Asthma who presents from ECF with AMS for 1 day duration.  Patient was oriented to place and person.  Patient was being currently treated with antibiotics for UTI.she complains of nonproductive cough.  Denies fever, chills, shortness of breath, chest pain, abdominal pain, dysuria, diarrhea or constipation. no trauma or falls. patient is not currently on anticoagulation.       REVIEW OF SYSTEMS:      CONSTITUTIONAL:  no fevers, no headcahes  EYES: negative for blury vision  HEENT: No headaches, No nasal congestion, no difficulty swallowing  RESPIRATORY:negative for dyspnea, no wheezing, no Cough  CARDIOVASCULAR: negative for chest pain, no palpitations  GASTROINTESTINAL: no nausea, no vomiting, no change in bowel habits, no abdominal pain   GENITOURINARY: negative for dysuria, no hematuria   MUSCULOSKELETAL: no joint pains, no muscle aches, no swelling of joints or extremities  NEUROLOGICAL: No  Weakness or numbness    PAST MEDICAL HISTORY:      has a past medical history of CEFERINO (acute kidney injury) ([address] Asthma, Carotid artery plaque, Clostridium difficile diarrhea, Dehydration, Depression, Fall, Fibromyalgia, Hiatal hernia, HTN (hypertension), Hyperlipidemia, Hypokalemia, gastrointestinal losses, Kidney stone, Obesity (BMI 30-39.9), Osteoarthritis, Osteoporosis, Renal cancer ([address] Short of breath on exertion, Type II or unspecified type diabetes mellitus without mention of complication, not stated as uncontrolled, Unspecified sleep apnea, Urge urinary incontinence, Wears glasses, Wears glasses, and Zygomatic fracture, right side, initial encounter for closed fracture ([facility] [address] PAST SURGICAL HISTORY:      has a past surgical history that includes knee surgery (Bilateral); bladder suspension (08/09/2002); Nephrostomy (Right); total nephrectomy (Right, 06/20/2013); lumbar laminectomy (10/15/2014); Bladder surgery (06/05/2014); Endometrial ablation; Cholecystectomy (10/10/2003); Breast surgery (Left, 03/03/2008); Breast biopsy (Left, 03/03/2008); pr perq vert agmntj cavity crtj uni/bi cannulj lmbr (N/A, 09/12/2018); Fixation Kyphoplasty (10/2018); Kyphosis surgery (N/A, 05/08/2019); Endoscopy, colon, diagnostic; and Colonoscopy. SOCIAL HISTORY:      reports that she has never smoked. She has never used smokeless tobacco. She reports that she does not drink alcohol and does not use drugs. FAMILY HISTORY:     family history includes Asthma in her brother; Breast Cancer in her sister; Diabetes in her mother; High Blood Pressure in her father, mother, and paternal grandmother; Pacemaker in her mother; Prostate Cancer in her father and maternal grandfather. HOME MEDICATIONS:      Prior to Admission medications    Medication Sig Start Date End Date Taking?  Authorizing Provider   lactobacillus (CULTURELLE) capsule Take 1 capsule by mouth daily (with breakfast) 5/15/21   Marie Ralph MD   insulin glargine (LANTUS SOLOSTAR) 100 UNIT/ML injection pen Inject 15 units nightly 5/14/21   Marie Ralph MD   insulin lispro (HUMALOG) 100 UNIT/ML injection vial Inject 10 Units into the skin 3 times daily (before meals) Glucose: Dose: If <139             No Insulin 140-199 2 Units 200-249 4 Units 250-299 6 Units 300-349 8 Units 350-400 10 Units Above 400       12 Units 5/14/21   Marie Ralph MD   donepezil (ARICEPT) 5 MG tablet Take 1 tablet by mouth nightly 9/1/20   Jessica Junior MD   ammonium lactate (AMLACTIN) 12 % cream Apply 1 Bottle topically as needed for Dry Skin Apply topically as needed. Historical Provider, MD   amLODIPine (NORVASC) 10 MG tablet Take 1 tablet by mouth daily 3/27/20   Carmelina Jay MD   metFORMIN (GLUCOPHAGE-XR) 500 MG extended release tablet Take 1 tablet by mouth 2 times daily 3/27/20   Carmelina Jay MD   ondansetron [facility]) 4 MG tablet Take 1 tablet by mouth every 8 hours as needed for Nausea or Vomiting 8/1/19   Mariana Stewart MD   sertraline (ZOLOFT) 100 MG tablet TAKE ONE TABLET BY MOUTH DAILY 7/10/19   Vinayak Sahu MD   raloxifene (EVISTA) 60 MG tablet TAKE ONE TABLET BY MOUTH DAILY 7/10/19   Vinayak Sahu MD   Cholecalciferol (VITAMIN D3) 1000 units TABS Take 1 tablet by mouth daily 12/26/18   Vinayak Sahu MD   aspirin EC 81 MG EC tablet Take 1 tablet by mouth daily 12/11/18   Vinayak Sahu MD   lovastatin (MEVACOR) 40 MG tablet TAKE ONE AND ONE-HALF (1  1/2) TABLET BY MOUTH NIGHTLY 12/11/18   Vinayak Sahu MD   gemfibrozil (LOPID) 600 MG tablet TAKE ONE TABLET BY MOUTH TWICE A DAY 12/11/18   Vinayak Sahu MD   fluticasone (ARNUITY ELLIPTA) 200 MCG/ACT AEPB Inhale 1 Inhaler into the lungs daily    Historical Provider, MD   BiPAP Machine MISC by Does not apply route    Historical Provider, MD   calcium carbonate (TUMS) 500 MG chewable tablet Take 1 tablet by mouth 3 times daily as needed for Heartburn 1/10/17   Alfredito Sanches MD   Handicap Placard MISC by Does not apply route Duration: 1 year 4/28/16   Ritesh Smith MD   acetaminophen (TYLENOL) 500 MG tablet Take 500 mg by mouth every 6 hours as needed. Historical Provider, MD   butalbital-acetaminophen-caffeine (FIORICET, ESGIC) per tablet Take 1 tablet by mouth every 8 hours as needed. Historical Provider, MD   montelukast (SINGULAIR) 10 MG tablet Take 10 mg by mouth nightly.       Historical Provider, MD albuterol (PROVENTIL HFA;VENTOLIN HFA) 108 (90 BASE) MCG/ACT inhaler Inhale 2 puffs into the lungs every 6 hours as needed. Historical Provider, MD       ALLERGIES:     Lac bovis, Nedocromil, Tramadol, Penicillins, and Tape [adhesive tape]      OBJECTIVE:       Vitals:    08/15/21 1944 08/15/21 2110 08/15/21 2328 08/16/21 0353   BP: 130/64  131/68 (!) 116/57   Pulse: 71  71 78   Resp: 20 15 21 21   Temp: 97 °F (36.1 °C)  97.3 °F (36.3 °C) 97.9 °F (36.6 °C)   TempSrc: Temporal  Temporal Temporal   SpO2: 99% 100% 100% 97%   Weight:       Height:             Intake/Output Summary (Last 24 hours) at 8/16/2021 0738  Last data filed at 8/16/2021 0644  Gross per 24 hour   Intake 3596.46 ml   Output 1475 ml   Net 2121.46 ml       PHYSICAL EXAM:  General Appearance  Alert , awake , not in acute distress. Letargic, O x 1 only to self. HEENT - Head is normocephalic, atraumatic. Lungs - Bilateral equal air entry , no wheezes, rales or rhonchi, aeration good  Cardiovascular - Heart sounds are normal.  Regular rhythm, normal rate without murmur, gallop or rub.   Abdomen - Soft, nontender, nondistended, no masses or organomegaly  Neurologic - There are no new focal motor or sensory deficits  Skin - No bruising or bleeding on exposed skin area  Extremities - No cyanosis, clubbing or edema    DIAGNOSTICS:     Laboratory Testing:    Recent Results (from the past 24 hour(s))   Basic Metabolic Panel w/ Reflex to MG    Collection Time: 08/15/21  8:41 AM   Result Value Ref Range    Glucose 197 (H) 70 - 99 mg/dL    BUN 12 8 - 23 mg/dL    CREATININE 0.67 0.50 - 0.90 mg/dL    Bun/Cre Ratio NOT REPORTED 9 - 20    Calcium 8.4 (L) 8.6 - 10.4 mg/dL    Sodium 134 (L) 135 - 144 mmol/L    Potassium 4.2 3.7 - 5.3 mmol/L    Chloride 104 98 - 107 mmol/L    CO2 18 (L) 20 - 31 mmol/L    Anion Gap 12 9 - 17 mmol/L    GFR Non-African American >60 >60 mL/min    GFR African American >60 >60 mL/min    GFR Comment          GFR Staging NOT REPORTED POC Glucose Fingerstick    Collection Time: 08/15/21 11:40 AM   Result Value Ref Range    POC Glucose 182 (H) 65 - 105 mg/dL   POC Glucose Fingerstick    Collection Time: 08/15/21  3:27 PM   Result Value Ref Range    POC Glucose 205 (H) 65 - 105 mg/dL   POC Glucose Fingerstick    Collection Time: 08/15/21  7:47 PM   Result Value Ref Range    POC Glucose 178 (H) 65 - 105 mg/dL   POC Glucose Fingerstick    Collection Time: 08/15/21 11:45 PM   Result Value Ref Range    POC Glucose 117 (H) 65 - 105 mg/dL   POC Glucose Fingerstick    Collection Time: 08/16/21  6:47 AM   Result Value Ref Range    POC Glucose 223 (H) 65 - 105 mg/dL         Imaging/Diagonstics:    CTA HEAD NECK W CONTRAST    Result Date: 8/14/2021  EXAMINATION: CTA OF THE HEAD AND NECK WITH CONTRAST 8/14/2021 12:16 pm TECHNIQUE: CTA of the head and neck was performed with the administration of intravenous contrast. Multiplanar reformatted images are provided for review. MIP images are provided for review. Stenosis of the internal carotid arteries measured using NASCET criteria. Dose modulation, iterative reconstruction, and/or weight based adjustment of the mA/kV was utilized to reduce the radiation dose to as low as reasonably achievable. COMPARISON: MRI on 08/12/2021 HISTORY: ORDERING SYSTEM PROVIDED HISTORY: neuro work up TECHNOLOGIST PROVIDED HISTORY: neuro work up Reason for Exam: neuro work up Acuity: Unknown Type of Exam: Unknown FINDINGS: CTA NECK: AORTIC ARCH/ARCH VESSELS: There is a bovine arch. The innominate artery is patent. Fibrocalcific plaque is noted in the proximal right subclavian artery with areas of minimal narrowing. Fibrocalcific plaque is noted in the proximal left subclavian artery with areas of mild narrowing measuring 25%. CAROTID ARTERIES: The arteries in the neck are tortuous suggestive of underlying hypertension. No flow-limiting stenosis in the common carotid or internal carotid arteries bilaterally.  VERTEBRAL ARTERIES: There is mild narrowing near the right vertebral artery origin measuring less than 50%. Moderate narrowing is noted at the left vertebral artery origin measuring approximately 50%. There is no vertebral artery dissection. SOFT TISSUES: Limited images through the lung apices are unremarkable. No superior mediastinal adenopathy. The thyroid gland is unremarkable. The submandibular glands and parotid glands are fatty replaced. The parapharyngeal fat spaces are unremarkable. There is no pharyngeal or laryngeal mass. BONES: Vertebroplasty cement is noted in the T2 vertebral body. There is marked degenerative disc disease. Dental caries are noted diffusely. There is scattered paranasal sinus disease. There is a right sphenoid mucous retention cyst. CTA HEAD: ANTERIOR CIRCULATION: There are areas of fibrocalcific plaque noted in the right cavernous ICA with areas of mild narrowing. Fibrocalcific plaque is also noted in the left cavernous and supraclinoid ICA without significant stenosis. The anterior cerebral arteries are unremarkable. The middle cerebral arteries are unremarkable. POSTERIOR CIRCULATION: The basilar artery is normal in appearance. There is a near complete fetal origin of the left posterior cerebral artery. OTHER: No dural venous sinus thrombosis on this non-dedicated study. BRAIN: There are no areas of abnormal enhancement in the cerebral or cerebellar parenchyma. 1. Moderate narrowing at the left vertebral artery origin, and mild narrowing at the right vertebral artery origin. 2. No flow-limiting stenosis in the internal carotid arteries. 3. 25% stenosis in the proximal left subclavian artery. 4. Unremarkable CTA of the brain. 5. Dental caries.      CT CHEST ABDOMEN PELVIS W CONTRAST    Result Date: 8/13/2021  EXAMINATION: CT OF THE CHEST, ABDOMEN, AND PELVIS WITH CONTRAST 8/13/2021 12:35 pm TECHNIQUE: CT of the chest, abdomen and pelvis was performed with the administration of intravenous contrast. Multiplanar reformatted images are provided for review. Dose modulation, iterative reconstruction, and/or weight based adjustment of the mA/kV was utilized to reduce the radiation dose to as low as reasonably achievable. COMPARISON: Abdomen and pelvis CT, 07/24/2018 HISTORY: ORDERING SYSTEM PROVIDED HISTORY: H/O R renal CA; r/o mets TECHNOLOGIST PROVIDED HISTORY: IV contrast only H/O R renal CA; r/o mets Reason for Exam: H/O R renal CA; r/o mets FINDINGS: Chest: Mediastinum: Visualized thyroid is unremarkable. No mediastinal or hilar lymphadenopathy is seen. Esophagus is unremarkable. Cardiac chambers unremarkable. Thoracic aorta normal in caliber without evidence of dissection. Limited imaging of the pulmonary arteries is unremarkable. Lungs/pleura: No suspicious lung lesions are identified. No focal infiltrate. No pneumothorax. No pleural effusion. Soft Tissues/Bones: Visualized extra thoracic soft tissues are unremarkable. No acute or suspicious bony abnormality identified. Abdomen/Pelvis: Organs: No acute or suspicious liver lesion. Portal vein is patent. Gallbladder is surgically absent. The spleen is unremarkable. Adrenals and pancreas are unremarkable. The patient status post right nephrectomy. No recurrent soft tissue is seen within the nephrectomy bed. The left kidney is unremarkable in appearance. GI/Bowel: No large bowel abnormalities are identified. The appendix is normal.  Stomach, duodenal sweep, and the remainder of the small bowel are unremarkable in appearance. Pelvis: Uterus and adnexa regions unremarkable. No free pelvic fluid. Urinary bladder unremarkable. Peritoneum/Retroperitoneum: Abdominal aorta is normal in caliber. The superior mesenteric artery is enhancing. No upper abdominal, retroperitoneal, mesenteric, iliac chain, or inguinal lymphadenopathy. Bones/Soft Tissues: Postsurgical changes are noted within the lower lumbar spine.   Cement augmentation is seen at L2 and L3. No acute or suspicious bony abnormality identified. No evidence of recurrent or metastatic disease within the chest, abdomen, and pelvis.        Current Facility-Administered Medications   Medication Dose Route Frequency Provider Last Rate Last Admin    potassium chloride (KLOR-CON M) extended release tablet 40 mEq  40 mEq Oral PRN Juliann Jenkins MD        Or    potassium bicarb-citric acid (EFFER-K) effervescent tablet 40 mEq  40 mEq Oral PRN Juliann Jenkins MD   40 mEq at 08/14/21 2116    Or    potassium chloride 10 mEq/100 mL IVPB (Peripheral Line)  10 mEq Intravenous PRN Juliann Jenkins MD        magnesium oxide (MAG-OX) tablet 400 mg  400 mg Oral BID Juliann Jenkins MD   400 mg at 08/15/21 2109    magnesium sulfate 1000 mg in dextrose 5% 100 mL IVPB  1,000 mg Intravenous PRN Juliann Jenkins MD        vancomycin (VANCOCIN) 1000 mg in dextrose 5% 200 mL IVPB  1,000 mg Intravenous Q24H Juliann Jenkins MD   Stopped at 08/15/21 1902    vancomycin (VANCOCIN) intermittent dosing (placeholder)   Other RX Placeholder Juliann Jenkins MD        sodium chloride flush 0.9 % injection 10 mL  10 mL Intravenous PRN Nacho Gordon MD        albuterol sulfate  (90 Base) MCG/ACT inhaler 2 puff  2 puff Inhalation Q6H PRN Rachel Romero MD        amLODIPine (NORVASC) tablet 10 mg  10 mg Oral Daily Rachel Romero MD   10 mg at 08/15/21 0910    butalbital-acetaminophen-caffeine (FIORICET, ESGIC) per tablet 1 tablet  1 tablet Oral Q6H PRN Rachel Romero MD        Vitamin D (CHOLECALCIFEROL) tablet 1,000 Units  1,000 Units Oral Daily Rachel Romero MD   1,000 Units at 08/15/21 0909    donepezil (ARICEPT) tablet 5 mg  5 mg Oral Nightly Rachel Romero MD   5 mg at 08/15/21 2109    fluticasone (FLOVENT HFA) 110 MCG/ACT inhaler 1 puff  1 puff Inhalation BID Rachel Romero MD   1 puff at 08/15/21 2110    gemfibrozil (LOPID) tablet 600 mg  600 mg Oral BID Rachel Romero MD   600 mg at 08/15/21 4686    insulin glargine (LANTUS) injection vial 10 Units  10 Units Subcutaneous Nightly Ariadna Levy MD   10 Units at 08/15/21 2109    raloxifene (EVISTA) tablet 60 mg  60 mg Oral Daily Ariadna Levy MD        montelukast (SINGULAIR) tablet 10 mg  10 mg Oral Nightly Ariadna Levy MD   10 mg at 08/15/21 2109    sertraline (ZOLOFT) tablet 100 mg  100 mg Oral Daily Ariadna Levy MD   100 mg at 08/15/21 0909    sodium chloride flush 0.9 % injection 5-40 mL  5-40 mL Intravenous 2 times per day Ariadna Levy MD   10 mL at 08/15/21 0908    sodium chloride flush 0.9 % injection 5-40 mL  5-40 mL Intravenous PRN Ariadna Levy MD        0.9 % sodium chloride infusion  25 mL Intravenous PRN Ariadna Levy MD        enoxaparin (LOVENOX) injection 40 mg  40 mg Subcutaneous Daily Ariadna Levy MD   40 mg at 08/15/21 0910    ondansetron (ZOFRAN-ODT) disintegrating tablet 4 mg  4 mg Oral Q8H PRN Ariadna Levy MD        Or    ondansetron (ZOFRAN) injection 4 mg  4 mg Intravenous Q6H PRN Ariadna Levy MD        polyethylene glycol (GLYCOLAX) packet 17 g  17 g Oral Daily PRN Ariadna Levy MD        acetaminophen (TYLENOL) tablet 650 mg  650 mg Oral Q6H PRN Ariadna Levy MD        Or    acetaminophen (TYLENOL) suppository 650 mg  650 mg Rectal Q6H PRN Ariadna Levy MD        0.9 % sodium chloride infusion   Intravenous Continuous Ariadna Levy MD 75 mL/hr at 08/13/21 2056 New Bag at 08/13/21 2056    famotidine (PEPCID) injection 20 mg  20 mg Intravenous Daily Ariadna Levy MD   20 mg at 08/15/21 0910    insulin lispro (HUMALOG) injection vial 0-12 Units  0-12 Units Subcutaneous TID WC Ariadna Levy MD   4 Units at 08/15/21 1615    insulin lispro (HUMALOG) injection vial 0-6 Units  0-6 Units Subcutaneous Nightly Ariadna Levy MD   1 Units at 08/15/21 2056    glucose (GLUTOSE) 40 % oral gel 15 g  15 g Oral PRN Ariadna Levy MD        dextrose 50 % IV solution  12.5 g Intravenous PRN Ariadna Levy MD        glucagon (rDNA) injection 1 mg  1 mg Intramuscular PRN Federico Sanabria MD        dextrose 5 % solution  100 mL/hr Intravenous PRN Federico Sanabria MD        dexamethasone (DECADRON) injection 4 mg  4 mg Intravenous Q12H Char Aguirre MD   4 mg at 08/15/21 7518       ASSESSMENT:     Principal Problem:    Altered mental status  Active Problems:    HTN (hypertension)    DM (diabetes mellitus), type 2, uncontrolled with complications (HCC)    CKD (chronic kidney disease) stage 2, GFR 60-89 ml/min    Elevated troponin    Brain lesion    Septicemia (HCC)    Vasogenic edema (HCC)    Cerebral infarction (HCC)    MRSA UTI    NSTEMI (non-ST elevated myocardial infarction) ([facility] [address]  Resolved Problems:    * No resolved hospital problems. *      PLAN:     Altered Mental Status 2/2 MRSA UTI and Acute/Subacute Ischemic Infarcts  - Day 6 of IV Vancomycin  - Donepezil 5 mg nightly  - IV Decadron 4 mg BID    Type II NSTEMI  - Likely demand ischemia  - Trops 165 -> 111 -> 95 -> 104  - Will stop trending trops  -Continue Aspirin. Not on heparin due to risk of brain bleed  - Last Echo 08/13/2021: EF of 47%. -Cardio on board    Hypertension  - Norvasc 10 mg daily    Type 2 Diabetes  - Lantus 10 units nightly  - Medium dose insulin sliding scale  - Hypoglycemia protocol  - Last HbA1c 05/09/2021 is 15.9    Depression  - Zoloft 100 mg daily    Hypertriglyceridemia  - Gemfibrozil 600 mg twice daily      DVT prophylaxis: Lovenox 40 mg SC  GI prophylaxis: Famotidine 20 mg BID    Plan will be discussed with the attending, Dr. Jerome Mittal MD  Family Medicine Resident  8/16/2021 7:38 AM        Please note that this chart was generated using voice recognition Dragon dictation software.   Although every effort was made to ensure the accuracy of this automated transcription, some errors in transcription may have occurred

## 2021-08-16 NOTE — PROGRESS NOTES
Pharmacy Note      Donte Medrano was ordered raloxifene Justyna Lorenzo). Raloxifene therapy is associated with an increased risk of venous thromboembolism. The  recommends holding raloxifene at least 72 hours prior to and during prolonged immobility (post-surgical and prolonged bed rest) and restarting therapy when the patient is fully ambulatory. Because many hospitalized patients have risk factors for thromboembolism, raloxifene therapy is held at [facility] per P&T policy. Consider risk versus benefits when resuming raloxifene at discharge.     Eduardo Jean, PharmD  PGY2 Ambulatory Care Pharmacy Resident    8/16/2021 1:08 PM

## 2021-08-16 NOTE — PLAN OF CARE
Problem: Skin Integrity:  Goal: Will show no infection signs and symptoms  Description: Will show no infection signs and symptoms  Outcome: Met This Shift  Goal: Absence of new skin breakdown  Description: Absence of new skin breakdown  Outcome: Met This Shift  Goal: Risk for impaired skin integrity will decrease  Description: Risk for impaired skin integrity will decrease  Outcome: Met This Shift     Problem: Falls - Risk of:  Goal: Will remain free from falls  Description: Will remain free from falls  Outcome: Met This Shift  Goal: Absence of physical injury  Description: Absence of physical injury  Outcome: Met This Shift     Problem:  Activity:  Goal: Risk for activity intolerance will decrease  Description: Risk for activity intolerance will decrease  Outcome: Ongoing     Problem: Coping:  Goal: Ability to adjust to condition or change in health will improve  Description: Ability to adjust to condition or change in health will improve  Outcome: Ongoing     Problem: Fluid Volume:  Goal: Ability to maintain a balanced intake and output will improve  Description: Ability to maintain a balanced intake and output will improve  Outcome: Ongoing     Problem: Health Behavior:  Goal: Ability to identify and utilize available resources and services will improve  Description: Ability to identify and utilize available resources and services will improve  Outcome: Ongoing  Goal: Ability to manage health-related needs will improve  Description: Ability to manage health-related needs will improve  Outcome: Ongoing     Problem: Metabolic:  Goal: Ability to maintain appropriate glucose levels will improve  Description: Ability to maintain appropriate glucose levels will improve  Outcome: Met This Shift     Problem: Nutritional:  Goal: Maintenance of adequate nutrition will improve  Description: Maintenance of adequate nutrition will improve  Outcome: Ongoing  Goal: Progress toward achieving an optimal weight will improve  Description: Progress toward achieving an optimal weight will improve  Outcome: Ongoing     Problem: Physical Regulation:  Goal: Complications related to the disease process, condition or treatment will be avoided or minimized  Description: Complications related to the disease process, condition or treatment will be avoided or minimized  Outcome: Met This Shift  Goal: Diagnostic test results will improve  Description: Diagnostic test results will improve  Outcome: Met This Shift     Problem: Tissue Perfusion:  Goal: Adequacy of tissue perfusion will improve  Description: Adequacy of tissue perfusion will improve  Outcome: Met This Shift

## 2021-08-17 VITALS
TEMPERATURE: 98.1 F | SYSTOLIC BLOOD PRESSURE: 133 MMHG | RESPIRATION RATE: 32 BRPM | OXYGEN SATURATION: 100 % | DIASTOLIC BLOOD PRESSURE: 111 MMHG | BODY MASS INDEX: 27.05 KG/M2 | HEIGHT: 62 IN | HEART RATE: 72 BPM | WEIGHT: 147 LBS

## 2021-08-17 LAB
ABSOLUTE EOS #: <0.03 K/UL (ref 0–0.44)
ABSOLUTE IMMATURE GRANULOCYTE: 0.13 K/UL (ref 0–0.3)
ABSOLUTE LYMPH #: 2.07 K/UL (ref 1.1–3.7)
ABSOLUTE MONO #: 0.61 K/UL (ref 0.1–1.2)
ANION GAP SERPL CALCULATED.3IONS-SCNC: 13 MMOL/L (ref 9–17)
BASOPHILS # BLD: 0 % (ref 0–2)
BASOPHILS ABSOLUTE: <0.03 K/UL (ref 0–0.2)
BUN BLDV-MCNC: 17 MG/DL (ref 8–23)
BUN/CREAT BLD: ABNORMAL (ref 9–20)
CALCIUM SERPL-MCNC: 8.7 MG/DL (ref 8.6–10.4)
CHLORIDE BLD-SCNC: 100 MMOL/L (ref 98–107)
CO2: 18 MMOL/L (ref 20–31)
CREAT SERPL-MCNC: 0.74 MG/DL (ref 0.5–0.9)
CULTURE: NORMAL
CULTURE: NORMAL
DIFFERENTIAL TYPE: ABNORMAL
EOSINOPHILS RELATIVE PERCENT: 0 % (ref 1–4)
GFR AFRICAN AMERICAN: >60 ML/MIN
GFR NON-AFRICAN AMERICAN: >60 ML/MIN
GFR SERPL CREATININE-BSD FRML MDRD: ABNORMAL ML/MIN/{1.73_M2}
GFR SERPL CREATININE-BSD FRML MDRD: ABNORMAL ML/MIN/{1.73_M2}
GLUCOSE BLD-MCNC: 118 MG/DL (ref 65–105)
GLUCOSE BLD-MCNC: 152 MG/DL (ref 65–105)
GLUCOSE BLD-MCNC: 157 MG/DL (ref 65–105)
GLUCOSE BLD-MCNC: 187 MG/DL (ref 65–105)
GLUCOSE BLD-MCNC: 216 MG/DL (ref 70–99)
HCT VFR BLD CALC: 33.4 % (ref 36.3–47.1)
HEMOGLOBIN: 10.6 G/DL (ref 11.9–15.1)
IMMATURE GRANULOCYTES: 1 %
LYMPHOCYTES # BLD: 22 % (ref 24–43)
Lab: NORMAL
Lab: NORMAL
MCH RBC QN AUTO: 27.9 PG (ref 25.2–33.5)
MCHC RBC AUTO-ENTMCNC: 31.7 G/DL (ref 28.4–34.8)
MCV RBC AUTO: 87.9 FL (ref 82.6–102.9)
MONOCYTES # BLD: 6 % (ref 3–12)
NRBC AUTOMATED: 0 PER 100 WBC
PDW BLD-RTO: 13.2 % (ref 11.8–14.4)
PLATELET # BLD: 237 K/UL (ref 138–453)
PLATELET ESTIMATE: ABNORMAL
PMV BLD AUTO: 11 FL (ref 8.1–13.5)
POTASSIUM SERPL-SCNC: 3.6 MMOL/L (ref 3.7–5.3)
RBC # BLD: 3.8 M/UL (ref 3.95–5.11)
RBC # BLD: ABNORMAL 10*6/UL
SARS-COV-2, RAPID: NOT DETECTED
SEG NEUTROPHILS: 71 % (ref 36–65)
SEGMENTED NEUTROPHILS ABSOLUTE COUNT: 6.79 K/UL (ref 1.5–8.1)
SODIUM BLD-SCNC: 131 MMOL/L (ref 135–144)
SPECIMEN DESCRIPTION: NORMAL
WBC # BLD: 9.6 K/UL (ref 3.5–11.3)
WBC # BLD: ABNORMAL 10*3/UL

## 2021-08-17 PROCEDURE — 6370000000 HC RX 637 (ALT 250 FOR IP): Performed by: STUDENT IN AN ORGANIZED HEALTH CARE EDUCATION/TRAINING PROGRAM

## 2021-08-17 PROCEDURE — 87635 SARS-COV-2 COVID-19 AMP PRB: CPT

## 2021-08-17 PROCEDURE — 36415 COLL VENOUS BLD VENIPUNCTURE: CPT

## 2021-08-17 PROCEDURE — 6360000002 HC RX W HCPCS: Performed by: STUDENT IN AN ORGANIZED HEALTH CARE EDUCATION/TRAINING PROGRAM

## 2021-08-17 PROCEDURE — 85025 COMPLETE CBC W/AUTO DIFF WBC: CPT

## 2021-08-17 PROCEDURE — 2580000003 HC RX 258: Performed by: STUDENT IN AN ORGANIZED HEALTH CARE EDUCATION/TRAINING PROGRAM

## 2021-08-17 PROCEDURE — 94761 N-INVAS EAR/PLS OXIMETRY MLT: CPT

## 2021-08-17 PROCEDURE — 82947 ASSAY GLUCOSE BLOOD QUANT: CPT

## 2021-08-17 PROCEDURE — 2500000003 HC RX 250 WO HCPCS: Performed by: STUDENT IN AN ORGANIZED HEALTH CARE EDUCATION/TRAINING PROGRAM

## 2021-08-17 PROCEDURE — 99239 HOSP IP/OBS DSCHRG MGMT >30: CPT | Performed by: STUDENT IN AN ORGANIZED HEALTH CARE EDUCATION/TRAINING PROGRAM

## 2021-08-17 PROCEDURE — 94640 AIRWAY INHALATION TREATMENT: CPT

## 2021-08-17 PROCEDURE — 80048 BASIC METABOLIC PNL TOTAL CA: CPT

## 2021-08-17 RX ORDER — ASPIRIN 81 MG/1
81 TABLET, CHEWABLE ORAL DAILY
Status: DISCONTINUED | OUTPATIENT
Start: 2021-08-17 | End: 2021-08-17

## 2021-08-17 RX ADMIN — INSULIN LISPRO 2 UNITS: 100 INJECTION, SOLUTION INTRAVENOUS; SUBCUTANEOUS at 12:36

## 2021-08-17 RX ADMIN — SODIUM CHLORIDE, PRESERVATIVE FREE 10 ML: 5 INJECTION INTRAVENOUS at 19:58

## 2021-08-17 RX ADMIN — GEMFIBROZIL 600 MG: 600 TABLET ORAL at 19:57

## 2021-08-17 RX ADMIN — INSULIN LISPRO 2 UNITS: 100 INJECTION, SOLUTION INTRAVENOUS; SUBCUTANEOUS at 16:48

## 2021-08-17 RX ADMIN — VANCOMYCIN HYDROCHLORIDE 1000 MG: 1 INJECTION, SOLUTION INTRAVENOUS at 16:48

## 2021-08-17 RX ADMIN — FLUTICASONE PROPIONATE 1 PUFF: 110 AEROSOL, METERED RESPIRATORY (INHALATION) at 19:37

## 2021-08-17 RX ADMIN — AMLODIPINE BESYLATE 10 MG: 10 TABLET ORAL at 09:07

## 2021-08-17 RX ADMIN — SERTRALINE 100 MG: 50 TABLET, FILM COATED ORAL at 09:08

## 2021-08-17 RX ADMIN — Medication 1000 UNITS: at 09:08

## 2021-08-17 RX ADMIN — DEXAMETHASONE SODIUM PHOSPHATE 4 MG: 4 INJECTION, SOLUTION INTRAMUSCULAR; INTRAVENOUS at 20:45

## 2021-08-17 RX ADMIN — INSULIN GLARGINE 10 UNITS: 100 INJECTION, SOLUTION SUBCUTANEOUS at 19:57

## 2021-08-17 RX ADMIN — SODIUM CHLORIDE, PRESERVATIVE FREE 10 ML: 5 INJECTION INTRAVENOUS at 09:08

## 2021-08-17 RX ADMIN — ENOXAPARIN SODIUM 40 MG: 40 INJECTION SUBCUTANEOUS at 09:07

## 2021-08-17 RX ADMIN — FLUTICASONE PROPIONATE 1 PUFF: 110 AEROSOL, METERED RESPIRATORY (INHALATION) at 08:06

## 2021-08-17 RX ADMIN — DEXAMETHASONE SODIUM PHOSPHATE 4 MG: 4 INJECTION, SOLUTION INTRAMUSCULAR; INTRAVENOUS at 09:07

## 2021-08-17 RX ADMIN — INSULIN LISPRO 2 UNITS: 100 INJECTION, SOLUTION INTRAVENOUS; SUBCUTANEOUS at 09:08

## 2021-08-17 RX ADMIN — GEMFIBROZIL 600 MG: 600 TABLET ORAL at 09:07

## 2021-08-17 RX ADMIN — MONTELUKAST SODIUM 10 MG: 10 TABLET, FILM COATED ORAL at 19:57

## 2021-08-17 RX ADMIN — FAMOTIDINE 20 MG: 10 INJECTION INTRAVENOUS at 09:07

## 2021-08-17 RX ADMIN — DONEPEZIL HYDROCHLORIDE 5 MG: 5 TABLET, FILM COATED ORAL at 19:57

## 2021-08-17 ASSESSMENT — PAIN SCALES - GENERAL: PAINLEVEL_OUTOF10: 0

## 2021-08-17 NOTE — DISCHARGE INSTR - COC
Continuity of Care Form    Patient Name: Randall Suresh   :  1955  MRN:  [id number]    Admit date:  2021  Discharge date:  ***    Code Status Order: Full Code   Advance Directives:      Admitting Physician:  Kavitha Bruner DO  PCP: Angy Goff MD    Discharging Nurse: [facility] Unit/Room#: 4534/2212-54  Discharging Unit Phone Number: ***    Emergency Contact:   Extended Emergency Contact Information  Primary Emergency Contact: [facility], [address] Phone: [phone]  Work Phone: [phone]  Mobile Phone: [phone]  Relation: Parent   needed? No  Secondary Emergency Contact: [facility], MrAme (unknown)  Address: Spouse lives at patients home. This pt lives with spouses Mother and sleeps on her couch. Relation: Spouse    Past Surgical History:  Past Surgical History:   Procedure Laterality Date    BLADDER SURGERY  2014    Bladder Stimulator Implant. Model 3058 Leads [id number]. MRI conditional-TR coil, 1.5T and head only. Bladder Stim has to be turned off.  Get device from l    BLADDER SUSPENSION  2002    BREAST BIOPSY Left 2008    BREAST SURGERY Left 2008    lumpectomy    CHOLECYSTECTOMY  10/10/2003    COLONOSCOPY      x2    ENDOMETRIAL ABLATION      ENDOSCOPY, COLON, DIAGNOSTIC       egd x2    FIXATION KYPHOPLASTY  10/2018    KNEE SURGERY Bilateral     x 2 each side    KYPHOSIS SURGERY N/A 2019    KYPHOPLASTY L4 performed by Nathalie Slade MD at [address]  10/15/2014    WITH FUSION POSTERIOR L4 L5 WITH SPINAL CORD MONITORING    NEPHROSTOMY Right     GA PERQ VERT AGMNTJ CAVITY CRTJ UNI/BI CANNULJ LMBR N/A 2018    KYPHOPLASTY T2 & L2 performed by Nathalie Slade MD at [address] Right 2013    right due to CA       Immunization History:   Immunization History   Administered Date(s) Administered    Influenza 2012, 10/22/2013    Influenza Virus Vaccine 10/17/2014, 10/26/2015    Influenza, 1600   Dressing/Treatment Zinc paste 21 0800   Wound Assessment Pink/red 21 0800   Drainage Amount None 21 0800   Natalee-wound Assessment Intact 21 0800   Margins Attached edges 08/15/21 1600   Number of days: 5       Wound 21 Neck Anterior redness (Active)   Wound Etiology Other 21 0800   Dressing/Treatment Open to air 21 0800   Wound Assessment Pink/red 21 0800   Drainage Amount None 21 0800   Natalee-wound Assessment Intact 21 0800   Number of days: 5        Elimination:  Continence: Bowel: {YES / ZS:25960}  Bladder: {YES / VO:47810}  Urinary Catheter: {Urinary Catheter:471449299}   Colostomy/Ileostomy/Ileal Conduit: {YES / UM:44219}       Date of Last BM: ***    Intake/Output Summary (Last 24 hours) at 2021 1123  Last data filed at 2021 0533  Gross per 24 hour   Intake 615 ml   Output 1100 ml   Net -485 ml     I/O last 3 completed shifts: In: 615 [P.O.:175;  I.V.:440]  Out: 1100 [Urine:1100]    Safety Concerns:     [address] [facility] Safety Concerns:844197849}    Impairments/Disabilities:      {[facility] Impairments/Disabilities:182258839}    Nutrition Therapy:  Current Nutrition Therapy:   [address] [employer] Diet List:742586363}    Routes of Feeding: {P DME Other Feedings:818905791}  Liquids: {Slp liquid thickness:81307}  Daily Fluid Restriction: {CHP DME Yes amt example:032032582}  Last Modified Barium Swallow with Video (Video Swallowing Test): {Done Not Done OMXA:451130316}    Treatments at the Time of Hospital Discharge:   Respiratory Treatments: ***  Oxygen Therapy:  {Therapy; copd oxygen:37132}  Ventilator:    { CC Vent HAMX:253769373}    Rehab Therapies: {THERAPEUTIC INTERVENTION:1723698994}  Weight Bearing Status/Restrictions: [facility] Weight Bearin}  Other Medical Equipment (for information only, NOT a DME order):  {EQUIPMENT:069824261}  Other Treatments: ***    Patient's personal belongings (please select all that are sent with patient):  {ROD DME Belongings:079277432}    RN SIGNATURE:  {Esignature:603609416}    CASE MANAGEMENT/SOCIAL WORK SECTION    Inpatient Status Date: 8/11/21    Readmission Risk Assessment Score:  Readmission Risk              Risk of Unplanned Readmission:  22           Discharging to Facility/ Agency   Name:   [address], [address] [facility] [address]         Phone: [phone]       Fax: [phone]          Address:  Phone:  Fax:    Dialysis Facility (if applicable)   Name:  Address:  Dialysis Schedule:  Phone:  Fax:    / signature: Electronically signed by Roc Vasquez RN on 8/17/21 at 11:23 AM EDT    PHYSICIAN SECTION    Prognosis: Good    Condition at Discharge: Stable    Rehab Potential (if transferring to Rehab): Good    Recommended Labs or Other Treatments After Discharge: MRI in 4-6 weeks    Physician Certification: I certify the above information and transfer of Marily Goncalves  is necessary for the continuing treatment of the diagnosis listed and that she requires [place] for greater 30 days.      Update Admission H&P: No change in H&P    PHYSICIAN SIGNATURE:  Electronically signed by Merlinda Pickett, MD on 8/17/21 at 11:46 AM EDT

## 2021-08-17 NOTE — PROGRESS NOTES
[address] [facility]  Progress Note    Date:   8/17/2021  Patient name:  Maritza Breaux  Date of admission:  8/11/2021  4:28 PM  MRN:   [id number]  YOB: 1955    SUBJECTIVE/Last 24 hours update:     Patient seen and examined at the bed side  No acute events overnight   VSS  Oriented to self only  Follows simple commands \"squeeze my fingers\"  Discharge to ECF today    Notes from nursing staff and Consults had been reviewed, and the overnight progress had been checked with the nursing staff as well. HPI:     The patient is a 62 y. o.  female with history of HTN, DM, CHF, Asthma who presents from [facility] with AMS for 1 day duration.  Patient was oriented to place and person.  Patient was being currently treated with antibiotics for UTI.she complains of nonproductive cough.  Denies fever, chills, shortness of breath, chest pain, abdominal pain, dysuria, diarrhea or constipation. no trauma or falls. patient is not currently on anticoagulation.       REVIEW OF SYSTEMS:      CONSTITUTIONAL:  no fevers, no headcahes  EYES: negative for blury vision  HEENT: No headaches, No nasal congestion, no difficulty swallowing  RESPIRATORY:negative for dyspnea, no wheezing, no Cough  CARDIOVASCULAR: negative for chest pain, no palpitations  GASTROINTESTINAL: no nausea, no vomiting, no change in bowel habits, no abdominal pain   GENITOURINARY: negative for dysuria, no hematuria   MUSCULOSKELETAL: no joint pains, no muscle aches, no swelling of joints or extremities  NEUROLOGICAL: No  Weakness or numbness    PAST MEDICAL HISTORY:      has a past medical history of CEFERINO (acute kidney injury) ([facility] [address] Asthma, Carotid artery plaque, Clostridium difficile diarrhea, Dehydration, Depression, Fall, Fibromyalgia, Hiatal hernia, HTN (hypertension), Hyperlipidemia, Hypokalemia, gastrointestinal losses, Kidney stone, Obesity (BMI 30-39.9), Osteoarthritis, Osteoporosis, Renal cancer [facility]), Short of breath on exertion, Type II or unspecified type diabetes mellitus without mention of complication, not stated as uncontrolled, Unspecified sleep apnea, Urge urinary incontinence, Wears glasses, Wears glasses, and Zygomatic fracture, right side, initial encounter for closed fracture ([facility] [address] PAST SURGICAL HISTORY:      has a past surgical history that includes knee surgery (Bilateral); bladder suspension (08/09/2002); Nephrostomy (Right); total nephrectomy (Right, 06/20/2013); lumbar laminectomy (10/15/2014); Bladder surgery (06/05/2014); Endometrial ablation; Cholecystectomy (10/10/2003); Breast surgery (Left, 03/03/2008); Breast biopsy (Left, 03/03/2008); pr perq vert agmntj cavity crtj uni/bi cannulj lmbr (N/A, 09/12/2018); Fixation Kyphoplasty (10/2018); Kyphosis surgery (N/A, 05/08/2019); Endoscopy, colon, diagnostic; and Colonoscopy. SOCIAL HISTORY:      reports that she has never smoked. She has never used smokeless tobacco. She reports that she does not drink alcohol and does not use drugs. FAMILY HISTORY:     family history includes Asthma in her brother; Breast Cancer in her sister; Diabetes in her mother; High Blood Pressure in her father, mother, and paternal grandmother; Pacemaker in her mother; Prostate Cancer in her father and maternal grandfather. HOME MEDICATIONS:      Prior to Admission medications    Medication Sig Start Date End Date Taking?  Authorizing Provider   lactobacillus (CULTURELLE) capsule Take 1 capsule by mouth daily (with breakfast) 5/15/21   Ewelina Benjamin MD   insulin glargine (LANTUS SOLOSTAR) 100 UNIT/ML injection pen Inject 15 units nightly 5/14/21   Ewelina Benjamin MD   insulin lispro (HUMALOG) 100 UNIT/ML injection vial Inject 10 Units into the skin 3 times daily (before meals) Glucose: Dose: If <139             No Insulin 140-199 2 Units 200-249 4 Units 250-299 6 Units 300-349 8 Units 350-400 10 Units Above 400       12 Units 5/14/21   Mi Fred Zavaleta MD   donepezil (ARICEPT) 5 MG tablet Take 1 tablet by mouth nightly 9/1/20   Asim Lambert MD   ammonium lactate (AMLACTIN) 12 % cream Apply 1 Bottle topically as needed for Dry Skin Apply topically as needed. Historical Provider, MD   amLODIPine (NORVASC) 10 MG tablet Take 1 tablet by mouth daily 3/27/20   Liliana Doyle MD   metFORMIN (GLUCOPHAGE-XR) 500 MG extended release tablet Take 1 tablet by mouth 2 times daily 3/27/20   Liliana Doyle MD   ondansetron [facility]) 4 MG tablet Take 1 tablet by mouth every 8 hours as needed for Nausea or Vomiting 8/1/19   Get Gonzalez MD   sertraline (ZOLOFT) 100 MG tablet TAKE ONE TABLET BY MOUTH DAILY 7/10/19   Giovani Olivo MD   raloxifene (EVISTA) 60 MG tablet TAKE ONE TABLET BY MOUTH DAILY 7/10/19   Giovani Olivo MD   Cholecalciferol (VITAMIN D3) 1000 units TABS Take 1 tablet by mouth daily 12/26/18   Giovani Olivo MD   aspirin EC 81 MG EC tablet Take 1 tablet by mouth daily 12/11/18   Giovani Olivo MD   lovastatin (MEVACOR) 40 MG tablet TAKE ONE AND ONE-HALF (1  1/2) TABLET BY MOUTH NIGHTLY 12/11/18   Giovani Olivo MD   gemfibrozil (LOPID) 600 MG tablet TAKE ONE TABLET BY MOUTH TWICE A DAY 12/11/18   Giovani Olivo MD   fluticasone (ARNUITY ELLIPTA) 200 MCG/ACT AEPB Inhale 1 Inhaler into the lungs daily    Historical Provider, MD   BiPAP Machine MISC by Does not apply route    Historical Provider, MD   calcium carbonate (TUMS) 500 MG chewable tablet Take 1 tablet by mouth 3 times daily as needed for Heartburn 1/10/17   Dinesh Pink MD   Handicap Jerri MISC by Does not apply route Duration: 1 year 4/28/16   Villa Pascual MD   acetaminophen (TYLENOL) 500 MG tablet Take 500 mg by mouth every 6 hours as needed. Historical Provider, MD   butalbital-acetaminophen-caffeine (FIORICET, ESGIC) per tablet Take 1 tablet by mouth every 8 hours as needed. Historical Provider, MD   montelukast (SINGULAIR) 10 MG tablet Take 10 mg by mouth nightly.       Historical Provider, MD   albuterol (PROVENTIL HFA;VENTOLIN HFA) 108 (90 BASE) MCG/ACT inhaler Inhale 2 puffs into the lungs every 6 hours as needed. Historical Provider, MD       ALLERGIES:     Lac bovis, Nedocromil, Tramadol, Penicillins, and Tape [adhesive tape]      OBJECTIVE:       Vitals:    08/16/21 1512 08/16/21 1917 08/16/21 2321 08/17/21 0325   BP: 129/66 139/76 (!) 148/67 (!) 160/84   Pulse: 70 74 65 66   Resp: 16 20 18 19   Temp: 97.9 °F (36.6 °C) 97.9 °F (36.6 °C) 97.9 °F (36.6 °C) 98 °F (36.7 °C)   TempSrc: Temporal Axillary Oral Axillary   SpO2: 99% 100% 97% 98%   Weight:       Height:             Intake/Output Summary (Last 24 hours) at 8/17/2021 0710  Last data filed at 8/17/2021 0533  Gross per 24 hour   Intake 615 ml   Output 1100 ml   Net -485 ml       PHYSICAL EXAM:  General Appearance  Alert , awake , not in acute distress. Letargic, O x 1 only to self. HEENT - Head is normocephalic, atraumatic. Lungs - Bilateral equal air entry , no wheezes, rales or rhonchi, aeration good  Cardiovascular - Heart sounds are normal.  Regular rhythm, normal rate without murmur, gallop or rub.   Abdomen - Soft, nontender, nondistended, no masses or organomegaly  Neurologic - There are no new focal motor or sensory deficits  Skin - No bruising or bleeding on exposed skin area  Extremities - No cyanosis, clubbing or edema    DIAGNOSTICS:     Laboratory Testing:    Recent Results (from the past 24 hour(s))   Basic Metabolic Panel w/ Reflex to MG    Collection Time: 08/16/21  7:21 AM   Result Value Ref Range    Glucose 219 (H) 70 - 99 mg/dL    BUN 18 8 - 23 mg/dL    CREATININE 0.82 0.50 - 0.90 mg/dL    Bun/Cre Ratio NOT REPORTED 9 - 20    Calcium 8.2 (L) 8.6 - 10.4 mg/dL    Sodium 131 (L) 135 - 144 mmol/L    Potassium 3.6 (L) 3.7 - 5.3 mmol/L    Chloride 102 98 - 107 mmol/L    CO2 19 (L) 20 - 31 mmol/L    Anion Gap 10 9 - 17 mmol/L    GFR Non-African American >60 >60 mL/min    GFR African American >60 >60 mL/min    GFR Comment          GFR Staging NOT REPORTED    CBC auto differential    Collection Time: 08/16/21  7:21 AM   Result Value Ref Range    WBC 12.0 (H) 3.5 - 11.3 k/uL    RBC 3.65 (L) 3.95 - 5.11 m/uL    Hemoglobin 10.2 (L) 11.9 - 15.1 g/dL    Hematocrit 32.6 (L) 36.3 - 47.1 %    MCV 89.3 82.6 - 102.9 fL    MCH 27.9 25.2 - 33.5 pg    MCHC 31.3 28.4 - 34.8 g/dL    RDW 13.3 11.8 - 14.4 %    Platelets 812 768 - 388 k/uL    MPV 10.5 8.1 - 13.5 fL    NRBC Automated 0.0 0.0 per 100 WBC    Differential Type NOT REPORTED     Seg Neutrophils 74 (H) 36 - 65 %    Lymphocytes 17 (L) 24 - 43 %    Monocytes 7 3 - 12 %    Eosinophils % 0 (L) 1 - 4 %    Basophils 0 0 - 2 %    Immature Granulocytes 2 (H) 0 %    Segs Absolute 8.89 (H) 1.50 - 8.10 k/uL    Absolute Lymph # 2.05 1.10 - 3.70 k/uL    Absolute Mono # 0.82 0.10 - 1.20 k/uL    Absolute Eos # <0.03 0.00 - 0.44 k/uL    Basophils Absolute <0.03 0.00 - 0.20 k/uL    Absolute Immature Granulocyte 0.21 0.00 - 0.30 k/uL    WBC Morphology NOT REPORTED     RBC Morphology NOT REPORTED     Platelet Estimate NOT REPORTED    POC Glucose Fingerstick    Collection Time: 08/16/21 11:59 AM   Result Value Ref Range    POC Glucose 173 (H) 65 - 105 mg/dL   POC Glucose Fingerstick    Collection Time: 08/16/21  3:13 PM   Result Value Ref Range    POC Glucose 148 (H) 65 - 105 mg/dL   POC Glucose Fingerstick    Collection Time: 08/16/21  7:21 PM   Result Value Ref Range    POC Glucose 185 (H) 65 - 105 mg/dL   Basic Metabolic Panel w/ Reflex to MG    Collection Time: 08/17/21  5:47 AM   Result Value Ref Range    Glucose 216 (H) 70 - 99 mg/dL    BUN 17 8 - 23 mg/dL    CREATININE 0.74 0.50 - 0.90 mg/dL    Bun/Cre Ratio NOT REPORTED 9 - 20    Calcium 8.7 8.6 - 10.4 mg/dL    Sodium 131 (L) 135 - 144 mmol/L    Potassium 3.6 (L) 3.7 - 5.3 mmol/L    Chloride 100 98 - 107 mmol/L    CO2 18 (L) 20 - 31 mmol/L    Anion Gap 13 9 - 17 mmol/L    GFR Non-African American >60 >60 mL/min    GFR African American >60 >60 mL/min    GFR Comment          GFR Staging NOT REPORTED    CBC auto differential    Collection Time: 08/17/21  5:47 AM   Result Value Ref Range    WBC 9.6 3.5 - 11.3 k/uL    RBC 3.80 (L) 3.95 - 5.11 m/uL    Hemoglobin 10.6 (L) 11.9 - 15.1 g/dL    Hematocrit 33.4 (L) 36.3 - 47.1 %    MCV 87.9 82.6 - 102.9 fL    MCH 27.9 25.2 - 33.5 pg    MCHC 31.7 28.4 - 34.8 g/dL    RDW 13.2 11.8 - 14.4 %    Platelets 633 349 - 350 k/uL    MPV 11.0 8.1 - 13.5 fL    NRBC Automated 0.0 0.0 per 100 WBC    Differential Type NOT REPORTED     Seg Neutrophils 71 (H) 36 - 65 %    Lymphocytes 22 (L) 24 - 43 %    Monocytes 6 3 - 12 %    Eosinophils % 0 (L) 1 - 4 %    Basophils 0 0 - 2 %    Immature Granulocytes 1 (H) 0 %    Segs Absolute 6.79 1.50 - 8.10 k/uL    Absolute Lymph # 2.07 1.10 - 3.70 k/uL    Absolute Mono # 0.61 0.10 - 1.20 k/uL    Absolute Eos # <0.03 0.00 - 0.44 k/uL    Basophils Absolute <0.03 0.00 - 0.20 k/uL    Absolute Immature Granulocyte 0.13 0.00 - 0.30 k/uL    WBC Morphology NOT REPORTED     RBC Morphology NOT REPORTED     Platelet Estimate NOT REPORTED    POC Glucose Fingerstick    Collection Time: 08/17/21  6:35 AM   Result Value Ref Range    POC Glucose 187 (H) 65 - 105 mg/dL         Imaging/Diagonstics:    CTA HEAD NECK W CONTRAST    Result Date: 8/14/2021  EXAMINATION: CTA OF THE HEAD AND NECK WITH CONTRAST 8/14/2021 12:16 pm TECHNIQUE: CTA of the head and neck was performed with the administration of intravenous contrast. Multiplanar reformatted images are provided for review. MIP images are provided for review. Stenosis of the internal carotid arteries measured using NASCET criteria. Dose modulation, iterative reconstruction, and/or weight based adjustment of the mA/kV was utilized to reduce the radiation dose to as low as reasonably achievable.  COMPARISON: MRI on 08/12/2021 HISTORY: ORDERING SYSTEM PROVIDED HISTORY: neuro work up TECHNOLOGIST PROVIDED HISTORY: neuro work up Reason for Exam: neuro work up Acuity: Unknown Type of Exam: Unknown FINDINGS: CTA NECK: AORTIC ARCH/ARCH VESSELS: There is a bovine arch. The innominate artery is patent. Fibrocalcific plaque is noted in the proximal right subclavian artery with areas of minimal narrowing. Fibrocalcific plaque is noted in the proximal left subclavian artery with areas of mild narrowing measuring 25%. CAROTID ARTERIES: The arteries in the neck are tortuous suggestive of underlying hypertension. No flow-limiting stenosis in the common carotid or internal carotid arteries bilaterally. VERTEBRAL ARTERIES: There is mild narrowing near the right vertebral artery origin measuring less than 50%. Moderate narrowing is noted at the left vertebral artery origin measuring approximately 50%. There is no vertebral artery dissection. SOFT TISSUES: Limited images through the lung apices are unremarkable. No superior mediastinal adenopathy. The thyroid gland is unremarkable. The submandibular glands and parotid glands are fatty replaced. The parapharyngeal fat spaces are unremarkable. There is no pharyngeal or laryngeal mass. BONES: Vertebroplasty cement is noted in the T2 vertebral body. There is marked degenerative disc disease. Dental caries are noted diffusely. There is scattered paranasal sinus disease. There is a right sphenoid mucous retention cyst. CTA HEAD: ANTERIOR CIRCULATION: There are areas of fibrocalcific plaque noted in the right cavernous ICA with areas of mild narrowing. Fibrocalcific plaque is also noted in the left cavernous and supraclinoid ICA without significant stenosis. The anterior cerebral arteries are unremarkable. The middle cerebral arteries are unremarkable. POSTERIOR CIRCULATION: The basilar artery is normal in appearance. There is a near complete fetal origin of the left posterior cerebral artery. OTHER: No dural venous sinus thrombosis on this non-dedicated study.  BRAIN: There are no areas of abnormal enhancement in the cerebral or cerebellar parenchyma. 1. Moderate narrowing at the left vertebral artery origin, and mild narrowing at the right vertebral artery origin. 2. No flow-limiting stenosis in the internal carotid arteries. 3. 25% stenosis in the proximal left subclavian artery. 4. Unremarkable CTA of the brain. 5. Dental caries. CT CHEST ABDOMEN PELVIS W CONTRAST    Result Date: 8/13/2021  EXAMINATION: CT OF THE CHEST, ABDOMEN, AND PELVIS WITH CONTRAST 8/13/2021 12:35 pm TECHNIQUE: CT of the chest, abdomen and pelvis was performed with the administration of intravenous contrast. Multiplanar reformatted images are provided for review. Dose modulation, iterative reconstruction, and/or weight based adjustment of the mA/kV was utilized to reduce the radiation dose to as low as reasonably achievable. COMPARISON: Abdomen and pelvis CT, 07/24/2018 HISTORY: ORDERING SYSTEM PROVIDED HISTORY: H/O R renal CA; r/o mets TECHNOLOGIST PROVIDED HISTORY: IV contrast only H/O R renal CA; r/o mets Reason for Exam: H/O R renal CA; r/o mets FINDINGS: Chest: Mediastinum: Visualized thyroid is unremarkable. No mediastinal or hilar lymphadenopathy is seen. Esophagus is unremarkable. Cardiac chambers unremarkable. Thoracic aorta normal in caliber without evidence of dissection. Limited imaging of the pulmonary arteries is unremarkable. Lungs/pleura: No suspicious lung lesions are identified. No focal infiltrate. No pneumothorax. No pleural effusion. Soft Tissues/Bones: Visualized extra thoracic soft tissues are unremarkable. No acute or suspicious bony abnormality identified. Abdomen/Pelvis: Organs: No acute or suspicious liver lesion. Portal vein is patent. Gallbladder is surgically absent. The spleen is unremarkable. Adrenals and pancreas are unremarkable. The patient status post right nephrectomy. No recurrent soft tissue is seen within the nephrectomy bed. The left kidney is unremarkable in appearance.  GI/Bowel: No large bowel abnormalities are identified. The appendix is normal.  Stomach, duodenal sweep, and the remainder of the small bowel are unremarkable in appearance. Pelvis: Uterus and adnexa regions unremarkable. No free pelvic fluid. Urinary bladder unremarkable. Peritoneum/Retroperitoneum: Abdominal aorta is normal in caliber. The superior mesenteric artery is enhancing. No upper abdominal, retroperitoneal, mesenteric, iliac chain, or inguinal lymphadenopathy. Bones/Soft Tissues: Postsurgical changes are noted within the lower lumbar spine. Cement augmentation is seen at L2 and L3. No acute or suspicious bony abnormality identified. No evidence of recurrent or metastatic disease within the chest, abdomen, and pelvis.        Current Facility-Administered Medications   Medication Dose Route Frequency Provider Last Rate Last Admin    potassium chloride (KLOR-CON M) extended release tablet 40 mEq  40 mEq Oral PRN Vadim Box MD        Or    potassium bicarb-citric acid (EFFER-K) effervescent tablet 40 mEq  40 mEq Oral PRN Vadim Box MD   40 mEq at 08/14/21 2116    Or    potassium chloride 10 mEq/100 mL IVPB (Peripheral Line)  10 mEq Intravenous PRN Vadim Box MD        magnesium sulfate 1000 mg in dextrose 5% 100 mL IVPB  1,000 mg Intravenous PRN Vadim Box MD        vancomycin (VANCOCIN) 1000 mg in dextrose 5% 200 mL IVPB  1,000 mg Intravenous Q24H Vadim Box MD   Stopped at 08/16/21 1811    vancomycin (VANCOCIN) intermittent dosing (placeholder)   Other RX Placeholder Vadim Box MD        sodium chloride flush 0.9 % injection 10 mL  10 mL Intravenous PRN Boris Dawkins MD        albuterol sulfate  (90 Base) MCG/ACT inhaler 2 puff  2 puff Inhalation Q6H PRN Bret Claros MD        amLODIPine (NORVASC) tablet 10 mg  10 mg Oral Daily Bret Claros MD   10 mg at 08/16/21 0930    butalbital-acetaminophen-caffeine (FIORICET, ESGIC) per tablet 1 tablet  1 tablet Oral Q6H PRN Bret Claros MD       [facility] Vitamin D (CHOLECALCIFEROL) tablet 1,000 Units  1,000 Units Oral Daily Ariel Cardenas MD   1,000 Units at 08/16/21 0930    donepezil (ARICEPT) tablet 5 mg  5 mg Oral Nightly Ariel Cardenas MD   5 mg at 08/16/21 2024    fluticasone (FLOVENT HFA) 110 MCG/ACT inhaler 1 puff  1 puff Inhalation BID Ariel Cardenas MD   1 puff at 08/16/21 2028    gemfibrozil (LOPID) tablet 600 mg  600 mg Oral BID Ariel Cardenas MD   600 mg at 08/16/21 2024    insulin glargine (LANTUS) injection vial 10 Units  10 Units Subcutaneous Nightly Ariel Cardenas MD   10 Units at 08/16/21 2223    montelukast (SINGULAIR) tablet 10 mg  10 mg Oral Nightly Ariel Cardenas MD   10 mg at 08/16/21 2024    sertraline (ZOLOFT) tablet 100 mg  100 mg Oral Daily Ariel Cardenas MD   100 mg at 08/16/21 0930    sodium chloride flush 0.9 % injection 5-40 mL  5-40 mL Intravenous 2 times per day Ariel Cardenas MD   10 mL at 08/16/21 2024    sodium chloride flush 0.9 % injection 5-40 mL  5-40 mL Intravenous PRN Ariel Cardenas MD        0.9 % sodium chloride infusion  25 mL Intravenous PRN Ariel Cardenas MD        enoxaparin (LOVENOX) injection 40 mg  40 mg Subcutaneous Daily Ariel Cardenas MD   40 mg at 08/16/21 0930    ondansetron (ZOFRAN-ODT) disintegrating tablet 4 mg  4 mg Oral Q8H PRN Ariel Cardenas MD        Or    ondansetron (ZOFRAN) injection 4 mg  4 mg Intravenous Q6H PRN Ariel Cardenas MD        polyethylene glycol (GLYCOLAX) packet 17 g  17 g Oral Daily PRN Ariel Cardenas MD        acetaminophen (TYLENOL) tablet 650 mg  650 mg Oral Q6H PRN Ariel Cardenas MD        Or    acetaminophen (TYLENOL) suppository 650 mg  650 mg Rectal Q6H PRN Ariel Cardenas MD        famotidine (PEPCID) injection 20 mg  20 mg Intravenous Daily Ariel Cardenas MD   20 mg at 08/16/21 0930    insulin lispro (HUMALOG) injection vial 0-12 Units  0-12 Units Subcutaneous TID  Ariel Cardenas MD   2 Units at 08/16/21 1711    insulin lispro (HUMALOG) injection vial 0-6 Units  0-6 Units Subcutaneous Nightly Jennifer Szymanski MD   1 Units at 08/16/21 2024    glucose (GLUTOSE) 40 % oral gel 15 g  15 g Oral PRN Jennifer Szymanski MD        dextrose 50 % IV solution  12.5 g Intravenous PRN Jennifer Szymanski MD        glucagon (rDNA) injection 1 mg  1 mg Intramuscular PRN Jennifer Szymanski MD        dextrose 5 % solution  100 mL/hr Intravenous PRN Jennifer Szymanski MD        dexamethasone (DECADRON) injection 4 mg  4 mg Intravenous Q12H Gustabo Vega MD   4 mg at 08/16/21 2223       ASSESSMENT:     Principal Problem:    Altered mental status  Active Problems:    HTN (hypertension)    DM (diabetes mellitus), type 2, uncontrolled with complications (HCC)    CKD (chronic kidney disease) stage 2, GFR 60-89 ml/min    Elevated troponin    Brain lesion    Septicemia (HCC)    Vasogenic edema (HCC)    Cerebral infarction (HCC)    MRSA UTI    NSTEMI (non-ST elevated myocardial infarction) ([facility] [address]  Resolved Problems:    * No resolved hospital problems. *      PLAN:     Altered Mental Status 2/2 MRSA UTI and Acute/Subacute Ischemic Infarcts  -Abnormal EEG suggests mild to moderate encephalopathy  - Day 7 of IV Vancomycin  - Donepezil 5 mg nightly  - IV Decadron 4 mg BID    Type II NSTEMI  - Likely demand ischemia  - Trops 165 -> 111 -> 95 -> 104  - Will stop trending trops  -Continue Aspirin. Not on heparin due to risk of brain bleed  - Last Echo 08/13/2021: EF of 47%.    -Cardio on board    Hypertension  - Norvasc 10 mg daily    Type 2 Diabetes  - Lantus 10 units nightly  - Medium dose insulin sliding scale  - Hypoglycemia protocol  - Last HbA1c 05/09/2021 is 15.9    Depression  - Zoloft 100 mg daily    Hypertriglyceridemia  - Gemfibrozil 600 mg twice daily      DVT prophylaxis: Lovenox 40 mg SC  GI prophylaxis: Famotidine 20 mg BID    Plan will be discussed with the attending, Dr. Fransisco Sawyer MD  Family Medicine Resident  8/17/2021 7:10 AM        Please note that this chart was generated using voice recognition Dragon dictation software.   Although every effort was made to ensure the accuracy of this automated transcription, some errors in transcription may have occurred

## 2021-08-17 NOTE — PLAN OF CARE
health-related needs will improve  8/17/2021 1511 by Thien Ramirez RN  Outcome: Ongoing     Problem: Metabolic:  Goal: Ability to maintain appropriate glucose levels will improve  Description: Ability to maintain appropriate glucose levels will improve  8/17/2021 1511 by Thien Ramirez RN  Outcome: Met This Shift     Problem: Nutritional:  Goal: Maintenance of adequate nutrition will improve  Description: Maintenance of adequate nutrition will improve  8/17/2021 1511 by Thien Ramirez RN  Outcome: Ongoing     Problem: Nutritional:  Goal: Progress toward achieving an optimal weight will improve  Description: Progress toward achieving an optimal weight will improve  8/17/2021 1511 by Thien Ramirez RN  Outcome: Ongoing

## 2021-08-17 NOTE — CARE COORDINATION
Discharge order in place; SARA TINEO for [facility] completion. Called Sofiya Combs of [facility] but she did not answer; left VM and call back number. [phone] Received call back from Sofiya Combs stating they can accept patient back and do not need anything faxed over since they have access to [employer]. She states patient will need rapid covid. Report number is [phone]. SARA TINEO for rapid. [address] [facility] documents to [address] [facility] 275 Confirmed  time with [facility] is at 1900; confirmed plan with RN and notified Sofiya Combs at [facility] of  time and covid result being negative via VM. Also left callback number. [phone] Informed by RN that patient needed a Holter Monitor; [facility] not in answering phone. RN is working to get Holter Monitor on patient. Called [facility] to suspend transport until 2100. Provided [facility] number and [facility] number to RN with instructions of who to call if patient is unable to get Holter Monitor and discharge has to be cancelled. Provided my cell phone number as well if she had further questions.

## 2021-08-17 NOTE — DISCHARGE SUMMARY
08/17/2021     08/17/2021    CHOL 181 05/26/2020    TRIG 230 (H) 05/26/2020    HDL 46 05/26/2020    LDLDIRECT 155 (H) 10/13/2015    ALT 12 08/11/2021    AST 16 08/11/2021     (L) 08/17/2021    K 3.6 (L) 08/17/2021     08/17/2021    CREATININE 0.74 08/17/2021    BUN 17 08/17/2021    CO2 18 (L) 08/17/2021    TSH 1.32 05/08/2021    INR 0.9 05/08/2021    GLUF 110 (H) 05/24/2019    LABA1C 15.9 (H) 05/09/2021    LABMICR 1,118 (H) 05/26/2020         Disposition:   ECF    Instructions to Patient: MRI brain repeat in 4 weeks. Follow up with neurology in 4-6 weeks. F/U with caridology for further workup. Follow up with Michelle Nielsen MD in  2 week    No follow-up provider specified. Discharge Medications:     Maureen Calrk   Home Medication Instructions GKD:[id number]    Printed on:08/17/21 5562   Medication Information                      acetaminophen (TYLENOL) 500 MG tablet  Take 500 mg by mouth every 6 hours as needed. albuterol (PROVENTIL HFA;VENTOLIN HFA) 108 (90 BASE) MCG/ACT inhaler  Inhale 2 puffs into the lungs every 6 hours as needed. amLODIPine (NORVASC) 10 MG tablet  Take 1 tablet by mouth daily             ammonium lactate (AMLACTIN) 12 % cream  Apply 1 Bottle topically as needed for Dry Skin Apply topically as needed. aspirin EC 81 MG EC tablet  Take 1 tablet by mouth daily             BiPAP Machine MISC  by Does not apply route             butalbital-acetaminophen-caffeine (FIORICET, ESGIC) per tablet  Take 1 tablet by mouth every 8 hours as needed.              calcium carbonate (TUMS) 500 MG chewable tablet  Take 1 tablet by mouth 3 times daily as needed for Heartburn             Cholecalciferol (VITAMIN D3) 1000 units TABS  Take 1 tablet by mouth daily             donepezil (ARICEPT) 5 MG tablet  Take 1 tablet by mouth nightly             fluticasone (ARNUITY ELLIPTA) 200 MCG/ACT AEPB  Inhale 1 Inhaler into the lungs daily gemfibrozil (LOPID) 600 MG tablet  TAKE ONE TABLET BY MOUTH TWICE A DAY             Handicap Placard MISC  by Does not apply route Duration: 1 year             insulin glargine (LANTUS SOLOSTAR) 100 UNIT/ML injection pen  Inject 15 units nightly             insulin lispro (HUMALOG) 100 UNIT/ML injection vial  Inject 10 Units into the skin 3 times daily (before meals) Glucose: Dose: If <139             No Insulin 140-199 2 Units 200-249 4 Units 250-299 6 Units 300-349 8 Units 350-400 10 Units Above 400       12 Units             lactobacillus (CULTURELLE) capsule  Take 1 capsule by mouth daily (with breakfast)             lovastatin (MEVACOR) 40 MG tablet  TAKE ONE AND ONE-HALF (1  1/2) TABLET BY MOUTH NIGHTLY             metFORMIN (GLUCOPHAGE-XR) 500 MG extended release tablet  Take 1 tablet by mouth 2 times daily             montelukast (SINGULAIR) 10 MG tablet  Take 10 mg by mouth nightly. ondansetron (ZOFRAN) 4 MG tablet  Take 1 tablet by mouth every 8 hours as needed for Nausea or Vomiting             raloxifene (EVISTA) 60 MG tablet  TAKE ONE TABLET BY MOUTH DAILY             sertraline (ZOLOFT) 100 MG tablet  TAKE ONE TABLET BY MOUTH DAILY                 Send Copies to:  Luz Giang MD, Carisa Peña      Note that over 30 minutes was spent in preparing discharge papers, discussing discharge with patient and family, medication review, etc.    Signed:  Angie Armas MD  Family medicine Resident  8/17/2021 8:33 AM

## 2021-08-17 NOTE — PLAN OF CARE
Problem: Skin Integrity:  Goal: Will show no infection signs and symptoms  Description: Will show no infection signs and symptoms  8/17/2021 0333 by Willem Tang RN  Outcome: Met This Shift  8/16/2021 1653 by Clark Bhardwaj RN  Outcome: Met This Shift  Goal: Absence of new skin breakdown  Description: Absence of new skin breakdown  8/17/2021 0333 by Willem Tang RN  Outcome: Met This Shift  8/16/2021 1653 by Clark Bhardwaj RN  Outcome: Met This Shift  Goal: Risk for impaired skin integrity will decrease  Description: Risk for impaired skin integrity will decrease  8/17/2021 0333 by Willem Tang RN  Outcome: Met This Shift  8/16/2021 1653 by Clark Bhardwaj RN  Outcome: Met This Shift     Problem: Falls - Risk of:  Goal: Will remain free from falls  Description: Will remain free from falls  8/17/2021 0333 by Willem Tang RN  Outcome: Met This Shift  8/16/2021 1653 by Clark Bhardwaj RN  Outcome: Met This Shift  Goal: Absence of physical injury  Description: Absence of physical injury  8/17/2021 0333 by Willem Tang RN  Outcome: Met This Shift  8/16/2021 1653 by Clark Bhardwaj RN  Outcome: Met This Shift     Problem:  Activity:  Goal: Risk for activity intolerance will decrease  Description: Risk for activity intolerance will decrease  8/17/2021 0333 by Willem Tang RN  Outcome: Ongoing  8/16/2021 1653 by Clark Bhardwaj RN  Outcome: Ongoing     Problem: Coping:  Goal: Ability to adjust to condition or change in health will improve  Description: Ability to adjust to condition or change in health will improve  8/17/2021 0333 by Willem Tang RN  Outcome: Ongoing  8/16/2021 1653 by Clark Bhardwaj RN  Outcome: Ongoing     Problem: Health Behavior:  Goal: Ability to identify and utilize available resources and services will improve  Description: Ability to identify and utilize available resources and services will improve  8/17/2021 0333 by Willem Tang RN  Outcome: Ongoing  8/16/2021 1653 by Tana Singleton RN  Outcome: Ongoing  Goal: Ability to manage health-related needs will improve  Description: Ability to manage health-related needs will improve  8/17/2021 0333 by Elvia Card RN  Outcome: Ongoing  8/16/2021 1653 by Tana Singleton RN  Outcome: Ongoing     Problem: Nutritional:  Goal: Maintenance of adequate nutrition will improve  Description: Maintenance of adequate nutrition will improve  8/17/2021 0333 by Elvia Card RN  Outcome: Ongoing  8/16/2021 1653 by Tana Singleton RN  Outcome: Ongoing  Goal: Progress toward achieving an optimal weight will improve  Description: Progress toward achieving an optimal weight will improve  8/17/2021 0333 by Elvia Card RN  Outcome: Ongoing  8/16/2021 1653 by Tana Singleton RN  Outcome: Ongoing     Problem: Physical Regulation:  Goal: Complications related to the disease process, condition or treatment will be avoided or minimized  Description: Complications related to the disease process, condition or treatment will be avoided or minimized  8/17/2021 0333 by Elvia Card RN  Outcome: Ongoing  8/16/2021 1653 by Tana Singleton RN  Outcome: Met This Shift  Goal: Diagnostic test results will improve  Description: Diagnostic test results will improve  8/17/2021 0333 by Elvia Card RN  Outcome: Ongoing  8/16/2021 1653 by Tana Singleton RN  Outcome: Met This Shift

## 2021-08-18 ENCOUNTER — HOSPITAL ENCOUNTER (EMERGENCY)
Age: 66
Discharge: NURSING, REHAB OR LONG-TERM CARE | End: 2021-08-18
Attending: EMERGENCY MEDICINE
Payer: MEDICAID

## 2021-08-18 VITALS
SYSTOLIC BLOOD PRESSURE: 156 MMHG | TEMPERATURE: 98 F | BODY MASS INDEX: 26.89 KG/M2 | HEART RATE: 72 BPM | DIASTOLIC BLOOD PRESSURE: 77 MMHG | RESPIRATION RATE: 21 BRPM | OXYGEN SATURATION: 99 % | WEIGHT: 147 LBS

## 2021-08-18 DIAGNOSIS — Z00.00 WELL ADULT HEALTH CHECK: Primary | ICD-10-CM

## 2021-08-18 PROCEDURE — 99283 EMERGENCY DEPT VISIT LOW MDM: CPT

## 2021-08-18 NOTE — PROGRESS NOTES
Report given to nursing facility. All questions answered. Night meds administered. Pt successfully picked up by transport and discharged.

## 2021-08-18 NOTE — ED NOTES
Bed: 23  Expected date:   Expected time:   Means of arrival:   Comments:  Vivek Holguin RN  08/18/21 5604

## 2021-08-18 NOTE — ED NOTES
Call made to [facility]. Dr. Parris Peace spoke with staff and it was expressed that patient was d/c too soon from hospital d/t needing MRI and additional studies d/t elevated troponins. Pt arrived not wearing Holter monitor as stated she was supposed to.      Wilian Wise RN  08/18/21 0350

## 2021-08-18 NOTE — ED NOTES
Pt to ED from [facility]. Pt was discharged from here yesterday for Urosepsis with follow up. Pt was sent to [facility] for long term care. Pt was sent here d/t being \"unstable\". NP was concerned for patient being discharged too soon d/t lack of MRI and elevated trops. Pt is at baseline at this time. Pt placed on full cardiac monitor. Dr. Michele Yeung at bedside.      Carmela Centeno RN  08/18/21 3863

## 2021-08-18 NOTE — ED PROVIDER NOTES
[facility]  ED  Emergency Department        Pt Name: Perla Tavera  MRN: [id number]  [place] 1955  Date of evaluation: 8/18/21    CHIEF COMPLAINT       Chief Complaint   Patient presents with    Altered Mental Status       HISTORY OF PRESENT ILLNESS  (Location/Symptom, Timing/Onset, Context/Setting, Quality, Duration, ModifyingFactors, Severity.)      Perla Tavera is a 61 y.o. female who presents with  Patient being sent back to the ER after she was just discharged yesterday, [facility], but sent to the ER and dx with new stroke, UTI, and Type II NSTEMI, patient was seen by NS, neurology, and cardiology, patient then discharged last night and today sent back to the ER for   Spoke with Lorenza Watts, who reports she received the patient in singout this morning and was told by night nurse that patient was confused. And that patient also had an elevated troponin. She then called the CNP on call who thn said to send her to the ER. PAST MEDICAL / SURGICAL / SOCIAL / FAMILY HISTORY      has a past medical history of CEFERINO (acute kidney injury) ([address] Asthma, Carotid artery plaque, Clostridium difficile diarrhea, Dehydration, Depression, Fall, Fibromyalgia, Hiatal hernia, HTN (hypertension), Hyperlipidemia, Hypokalemia, gastrointestinal losses, Kidney stone, Obesity (BMI 30-39.9), Osteoarthritis, Osteoporosis, Renal cancer ([address] Short of breath on exertion, Type II or unspecified type diabetes mellitus without mention of complication, not stated as uncontrolled, Unspecified sleep apnea, Urge urinary incontinence, Wears glasses, Wears glasses, and Zygomatic fracture, right side, initial encounter for closed fracture ([address] has a past surgical history that includes knee surgery (Bilateral); bladder suspension (08/09/2002); Nephrostomy (Right); total nephrectomy (Right, 06/20/2013); lumbar laminectomy (10/15/2014);  Bladder surgery (06/05/2014); Endometrial ablation; Cholecystectomy (10/10/2003); Breast surgery (Left, 03/03/2008); Breast biopsy (Left, 03/03/2008); pr perq vert agmntj cavity crtj uni/bi cannulj lmbr (N/A, 09/12/2018); Fixation Kyphoplasty (10/2018); Kyphosis surgery (N/A, 05/08/2019); Endoscopy, colon, diagnostic; and Colonoscopy. Social History     Socioeconomic History    Marital status:      Spouse name: Not on file    Number of children: Not on file    Years of education: 15    Highest education level: Not on file   Occupational History    Occupation: disability     Employer: N/A   Tobacco Use    Smoking status: Never Smoker    Smokeless tobacco: Never Used   Vaping Use    Vaping Use: Never used   Substance and Sexual Activity    Alcohol use: No    Drug use: No    Sexual activity: Not on file   Other Topics Concern    Not on file   Social History Narrative    Not on file     Social Determinants of Health     Financial Resource Strain:     Difficulty of Paying Living Expenses:    Food Insecurity:     Worried About [address] in the Last Year:     [address] in the Last Year:    Transportation Needs:     Lack of Transportation (Medical):      Lack of Transportation (Non-Medical):    Physical Activity:     Days of Exercise per Week:     Minutes of Exercise per Session:    Stress:     Feeling of Stress :    Social Connections:     Frequency of Communication with Friends and Family:     Frequency of Social Gatherings with Friends and Family:     Attends Jain Services:     Active Member of Clubs or Organizations:     Attends Club or Organization Meetings:     Marital Status:    Intimate Partner Violence:     Fear of Current or Ex-Partner:     Emotionally Abused:     Physically Abused:     Sexually Abused:        Family History   Problem Relation Age of Onset    Diabetes Mother     High Blood Pressure Mother     Pacemaker Mother     High Blood Pressure Father     Prostate Cancer Father     Breast Cancer Sister     Asthma Brother     Prostate Cancer Maternal Grandfather     High Blood Pressure Paternal Grandmother        Allergies:  Lac bovis, Nedocromil, Tramadol, Penicillins, and Tape [adhesive tape]    Home Medications:  Prior to Admission medications    Medication Sig Start Date End Date Taking? Authorizing Provider   lactobacillus (CULTURELLE) capsule Take 1 capsule by mouth daily (with breakfast) 5/15/21   Ariel Barahona MD   insulin glargine (LANTUS SOLOSTAR) 100 UNIT/ML injection pen Inject 15 units nightly 5/14/21   Ariel Barahona MD   insulin lispro (HUMALOG) 100 UNIT/ML injection vial Inject 10 Units into the skin 3 times daily (before meals) Glucose: Dose: If <139             No Insulin 140-199 2 Units 200-249 4 Units 250-299 6 Units 300-349 8 Units 350-400 10 Units Above 400       12 Units 5/14/21   Ariel Barahona MD   donepezil (ARICEPT) 5 MG tablet Take 1 tablet by mouth nightly 9/1/20   Corina Maya MD   ammonium lactate (AMLACTIN) 12 % cream Apply 1 Bottle topically as needed for Dry Skin Apply topically as needed.     Historical Provider, MD   amLODIPine (NORVASC) 10 MG tablet Take 1 tablet by mouth daily 3/27/20   Marlyn Martin MD   metFORMIN (GLUCOPHAGE-XR) 500 MG extended release tablet Take 1 tablet by mouth 2 times daily 3/27/20   Marlyn Martin MD   ondansetron [facility]) 4 MG tablet Take 1 tablet by mouth every 8 hours as needed for Nausea or Vomiting 8/1/19   Marvin Zhao MD   sertraline (ZOLOFT) 100 MG tablet TAKE ONE TABLET BY MOUTH DAILY 7/10/19   Callie Adamson MD   raloxifene (EVISTA) 60 MG tablet TAKE ONE TABLET BY MOUTH DAILY 7/10/19   Callie Adamson MD   Cholecalciferol (VITAMIN D3) 1000 units TABS Take 1 tablet by mouth daily 12/26/18   Callie Adamson MD   aspirin EC 81 MG EC tablet Take 1 tablet by mouth daily 12/11/18   Callie Adamson MD   lovastatin (MEVACOR) 40 MG tablet TAKE ONE AND ONE-HALF (1  1/2) TABLET BY MOUTH NIGHTLY 12/11/18   Callie Adamson MD   gemfibrozil (LOPID) 600 MG tablet TAKE ONE TABLET BY MOUTH TWICE A DAY 12/11/18   Eddie Shaw MD   fluticasone (ARNUITY ELLIPTA) 200 MCG/ACT AEPB Inhale 1 Inhaler into the lungs daily    Historical Provider, MD   BiPAP Machine MISC by Does not apply route    Historical Provider, MD   calcium carbonate (TUMS) 500 MG chewable tablet Take 1 tablet by mouth 3 times daily as needed for Heartburn 1/10/17   Patricia Roche MD   Handicap Placard MISC by Does not apply route Duration: 1 year 4/28/16   Vivian Galvez MD   acetaminophen (TYLENOL) 500 MG tablet Take 500 mg by mouth every 6 hours as needed. Historical Provider, MD   butalbital-acetaminophen-caffeine (FIORICET, ESGIC) per tablet Take 1 tablet by mouth every 8 hours as needed. Historical Provider, MD   montelukast (SINGULAIR) 10 MG tablet Take 10 mg by mouth nightly. Historical Provider, MD   albuterol (PROVENTIL HFA;VENTOLIN HFA) 108 (90 BASE) MCG/ACT inhaler Inhale 2 puffs into the lungs every 6 hours as needed. Historical Provider, MD       REVIEW OF SYSTEMS    (2-9 systems for level 4, 10 or more for level 5)      Review of Systems   Unable to perform ROS: Mental status change       PHYSICAL EXAM   (up to 7 for level 4, 8 or more for level 5)     INITIAL VITALS:   /71   Pulse 65   Temp 98 °F (36.7 °C)   Resp 19   Wt 147 lb (66.7 kg)   LMP 01/01/2002   SpO2 99%   BMI 26.89 kg/m²     Physical Exam  Constitutional:       Comments: Patient chronically ill appeared, oriented to self and place. Patient non ambulatory at baseline. Answers simple questions, flat affect. HENT:      Head: Normocephalic and atraumatic. Eyes:      Extraocular Movements: Extraocular movements intact. Pupils: Pupils are equal, round, and reactive to light. Cardiovascular:      Rate and Rhythm: Normal rate. Heart sounds: Normal heart sounds. No murmur heard. No friction rub. No gallop.     Pulmonary:      Effort: Pulmonary effort is normal. No respiratory distress. Breath sounds: Normal breath sounds. No stridor. No wheezing, rhonchi or rales. Chest:      Chest wall: No tenderness. Abdominal:      General: There is no distension. Palpations: Abdomen is soft. There is no mass. Tenderness: There is no abdominal tenderness. There is no guarding. Neurological:      Mental Status: Mental status is at baseline. Sensory: No sensory deficit. Motor: Weakness (generalized) present. DIFFERENTIAL  DIAGNOSIS     Patient sent to ER, appears to be at her baseline after recent UTI and stroke. Facility was worried about an elevated trop that occurred while she was in the hospital and was evaluated for. Will speak with facility to see what concern was as it appears patient is at her new baseline, and symptoms are unchanged, she is not tachycardic or febrile, and from chart review her exam is unchanged. Will also speak with her recent admitting team to confirm her baseline status    PLAN (LABS / IMAGING / EKG):  Orders Placed This Encounter   Procedures    Inpatient consult to [facility]    Inpatient consult to [facility]       MEDICATIONS ORDERED:  No orders of the defined types were placed in this encounter. DIAGNOSTIC RESULTS / EMERGENCY DEPARTMENT COURSE / MDM     LABS:  No results found for this visit on 08/18/21. IMPRESSION: well check    RADIOLOGY:          EMERGENCY DEPARTMENT COURSE:  Spoke with Nurse manager Shell Loomis at [facility], and discussed why patient was sent back to the ER. She reports that floor nurse was concerned due to elevated troponin, and that this was discussed with ISABEL nayak who then sent the patient back to the ER.    1:40 PM EDT  Called and left message for CNP to call back to ER to discucss concern, but still awaiting call back.   Spoke with FP attending and will assess patient to see if at baseline, and if unchanged then plan to discharge the patient back to her care facility     2:16 PM EDT  Medicine resident came down to evaluate the patient. States that she is at her baseline from when she was inpatient. And there is no changes. We will plan on discharge back to facility  Spoke with Sergio VALERIANO Boyce  manager, and discussed plan to send her back to facility where her bed is still available. FINAL IMPRESSION      1. Well adult health check          DISPOSITION / PLAN     DISPOSITION Decision To Discharge 08/18/2021 02:17:13 PM      PATIENT REFERRED TO:  No follow-up provider specified.     DISCHARGE MEDICATIONS:  New Prescriptions    No medications on file       Bran Maddox DO  2:18 PM    Attending Emergency Physician  [address] [facility]    (Please note that portions of this note were completed with a voice recognition program.  Hussein Gutierrez made to edit the dictations but occasionally words are mis-transcribed.)              Leola Cooks, DO  08/19/21 8542

## 2021-08-18 NOTE — CARE COORDINATION
Transition planning   Notified IBRAHIMA PP that pt would be picked up from here at 630 to return to facility . She relates no need to call report .

## 2021-08-18 NOTE — PLAN OF CARE
Problem: Skin Integrity:  Goal: Will show no infection signs and symptoms  Description: Will show no infection signs and symptoms  8/17/2021 2053 by Do Braswell RN  Outcome: Completed  8/17/2021 1511 by Clark Bhardwaj RN  Outcome: Met This Shift  Goal: Absence of new skin breakdown  Description: Absence of new skin breakdown  8/17/2021 2053 by Do Braswell RN  Outcome: Completed  8/17/2021 1511 by Clark Bhardwaj RN  Outcome: Met This Shift  Goal: Risk for impaired skin integrity will decrease  Description: Risk for impaired skin integrity will decrease  8/17/2021 2053 by Do Braswell RN  Outcome: Completed  8/17/2021 1511 by Clark Bhardwaj RN  Outcome: Met This Shift     Problem: Falls - Risk of:  Goal: Will remain free from falls  Description: Will remain free from falls  8/17/2021 2053 by Do Braswell RN  Outcome: Completed  8/17/2021 1511 by Clark Bhardwaj RN  Outcome: Met This Shift  Goal: Absence of physical injury  Description: Absence of physical injury  8/17/2021 2053 by Do Braswell RN  Outcome: Completed  8/17/2021 1511 by Clark Bhardwaj RN  Outcome: Met This Shift     Problem:  Activity:  Goal: Risk for activity intolerance will decrease  Description: Risk for activity intolerance will decrease  8/17/2021 2053 by Do Braswell RN  Outcome: Completed  8/17/2021 1511 by Clark Bhardwaj RN  Outcome: Ongoing     Problem: Coping:  Goal: Ability to adjust to condition or change in health will improve  Description: Ability to adjust to condition or change in health will improve  8/17/2021 2053 by Do Braswell RN  Outcome: Completed  8/17/2021 1511 by Clark Bhardwaj RN  Outcome: Met This Shift     Problem: Fluid Volume:  Goal: Ability to maintain a balanced intake and output will improve  Description: Ability to maintain a balanced intake and output will improve  8/17/2021 2053 by Do Braswell RN  Outcome: Completed  8/17/2021 1511 by Clark Bhardwaj RN  Outcome: Ongoing     Problem: Health Behavior:  Goal: Ability to identify and utilize available resources and services will improve  Description: Ability to identify and utilize available resources and services will improve  8/17/2021 2053 by Glenroy Iverson RN  Outcome: Completed  8/17/2021 1511 by Viola Villanueva RN  Outcome: Ongoing  Goal: Ability to manage health-related needs will improve  Description: Ability to manage health-related needs will improve  8/17/2021 2053 by Glenroy Iverson RN  Outcome: Completed  8/17/2021 1511 by Viola Villanueva RN  Outcome: Ongoing     Problem: Metabolic:  Goal: Ability to maintain appropriate glucose levels will improve  Description: Ability to maintain appropriate glucose levels will improve  8/17/2021 2053 by Glenroy Iverson RN  Outcome: Completed  8/17/2021 1511 by Viola Villanueva RN  Outcome: Met This Shift     Problem: Nutritional:  Goal: Maintenance of adequate nutrition will improve  Description: Maintenance of adequate nutrition will improve  8/17/2021 2053 by Glenroy Iverson RN  Outcome: Completed  8/17/2021 1511 by Viola Villanueva RN  Outcome: Ongoing  Goal: Progress toward achieving an optimal weight will improve  Description: Progress toward achieving an optimal weight will improve  8/17/2021 2053 by Glenroy Iverson RN  Outcome: Completed  8/17/2021 1511 by Viola Villanueva RN  Outcome: Ongoing     Problem: Physical Regulation:  Goal: Complications related to the disease process, condition or treatment will be avoided or minimized  Description: Complications related to the disease process, condition or treatment will be avoided or minimized  Outcome: Completed  Goal: Diagnostic test results will improve  Description: Diagnostic test results will improve  Outcome: Completed     Problem: Tissue Perfusion:  Goal: Adequacy of tissue perfusion will improve  Description: Adequacy of tissue perfusion will improve  Outcome: Completed

## 2021-08-18 NOTE — PROGRESS NOTES
Physician Progress Note      PATIENT:               Janeen De León  CSN #:                  [id number]  :                       1955  ADMIT DATE:       2021 4:28 PM  [facility] [place], operated by [facility] DATE:  RESPONDING  PROVIDER #:        Josie Blakely MD          QUERY TEXT:    Pt admitted with UTI. Pt noted to have urinary catheter. If possible, please   document in the progress notes and discharge summary if you are evaluating   and/or treating any of the following: The medical record reflects the following:  Risk Factors: Dee placed  @ 0700,  Clinical Indicators: Urine Culture METHICILLIN RESISTANT STAPHYLOCOCCUS AUREUS   >[id number] CFU/ML  Treatment: IV Vanco, Monitoring    Thank you, Please call if questions  Carly Herrera RN [facility]  [phone]  Options provided:  -- UTI due to  indwelling urinary catheter  -- UTI not due to indwelling urinary catheter  -- Other - I will add my own diagnosis  -- Disagree - Not applicable / Not valid  -- Disagree - Clinically unable to determine / Unknown  -- Refer to Clinical Documentation Reviewer    PROVIDER RESPONSE TEXT:    UTI is not due to the indwelling urinary catheter.     Query created by: Tamir Rhodes on 2021 2:04 PM      Electronically signed by:  Josie Blakely MD 2021 10:25 PM

## 2021-08-23 NOTE — PROGRESS NOTES
Physician Progress Note      PATIENT:               Tiffany Ellis  CSN #:                  [id number]  :                       1955  ADMIT DATE:       2021 4:28 PM  [address] DATE:        2021 8:40 AM  RESPONDING  PROVIDER #:        Mattie Severe MD          QUERY TEXT:    Patient admitted with  AMS Likely 2/2 urosepsis did not meet  SIRS criteria   POA. Noted documentation of Septicemia in Discharge summary POA  on . In   order to support the diagnosis of Sepsis, please include additional clinical   indicators in your documentation. Or please document if the diagnosis of   Sepsis has been ruled out after further study. The medical record reflects the following:  Risk Factors: MRSA UTI ,DM  Clinical Indicators: Resp:12>22,Lactic Acid, 2.0,  Leuckocyte EsteraseLARGE,    Urine WBC's TOO NUMEROUS TO COUNT,  WBC 12.2,  urine culture, Patient was   found to have MRSA urosepsis. Treatment: IV Vanco, - IV Decadron 4 mg BID, monitoring    Thank you, Please call if questions  Carly Villar RN [facility]  [phone]  Options provided:  -- Sepsis is  present as evidenced by, Please document evidence. -- Sepsis was ruled out  -- Other - I will add my own diagnosis  -- Disagree - Not applicable / Not valid  -- Disagree - Clinically unable to determine / Unknown  -- Refer to Clinical Documentation Reviewer    PROVIDER RESPONSE TEXT:    Sepsis was ruled out after study.     Query created by: Alexander Holter on 2021 10:40 AM      Electronically signed by:  Mattie Severe MD 2021 10:55 AM

## 2021-09-10 PROBLEM — R79.89 ELEVATED TROPONIN: Status: RESOLVED | Noted: 2021-08-11 | Resolved: 2021-09-10

## 2021-09-10 PROBLEM — R77.8 ELEVATED TROPONIN: Status: RESOLVED | Noted: 2021-08-11 | Resolved: 2021-09-10

## 2023-10-24 NOTE — PROGRESS NOTES
Physical Therapy Visit    Visit Type: Daily Treatment Note  Visit: 2  Referring Provider: Vicki Avila MD  Medical Diagnosis (from order): Diagnosis Information    Diagnosis  M54.40 (ICD-10-CM) - Low back pain with sciatica, sciatica laterality unspecified, unspecified back pain laterality, unspecified chronicity         SUBJECTIVE                                                                                                               Most pain is on the left side, some on the right.  \"It feels like sciatic pain.\"  Everyday I am feeling better with my breathing over the last week    Pt with recent inpatient hospitalization due to COPD exacerbation.  Seen by Dr. Avila yesterday and cleared for PT.      Pain / Symptoms  - Pain rating (out of 10): Current: 5       OBJECTIVE                                                                                                                                       Treatment    Un-attended Electrical Stimulation (37153/): Interferential Current  - Purpose: pain relief  - Location: low back, upper gluteal  - Position: sidelying  - Pulse Rate:  Hz  - Intensity: patient tolerance  - In conjunction with: moist hot pack  - Temperature: 160° F  - Duration: 15 minutes    Results: decreased pain  Reaction: no adverse reaction to treatment      Therapeutic Exercise  Hooklying stretches(each leg)  -single knee to chest*  -90/90 hamstring stretch*  -modified figure 4*  -piriformis with knee to opposing shoulder*    (*) HEP additions    Reviewed decompression sequence  -head press   -shoulder press  -arm lengthener  -leg press  -leg lengthener   Patient demonstrates understanding    Skilled input: verbal instruction/cues    Writer verbally educated and received verbal consent for hand placement, positioning of patient, and techniques to be performed today from patient for clothing adjustments for techniques, therapist position for techniques, hand placement and  palpation for techniques and modality application as described above and how they are pertinent to the patient's plan of care.  Home Exercise Program  Access Code: [email or web address]  URL: [email or web address] CenterScreenSc[facility] NetworksC[facility] Hill[facility] HospitalQuality [email or web address]/  Date: 10/24/2023  Prepared by: Jose Alfredo KOROMA    Exercises  - Supine Piriformis Stretch with Foot on Ground  - 1-2 x daily - 7 x weekly - 1 sets - 2 reps - 20 hold  - Supine Figure 4 Piriformis Stretch  - 1-2 x daily - 7 x weekly - 1 sets - 2 reps - 20 hold  - Hooklying Single Knee to Chest  - 1-2 x daily - 7 x weekly - 1 sets - 2 reps - 20 hold  - Supine Hamstring Stretch  - 1-2 x daily - 7 x weekly - 1 sets - 2 reps - 20 hold        ASSESSMENT                                                                                                            Good demonstration of HEP additions.  She also had a good understanding of the decompressionsequence, which she states she does regularly.  \"I was instructed on this in other rounds of therapy.  Decreased symptoms post treatment.  Pain/symptoms after session (out of 10): 4  Education:   - Results of above outlined education: Verbalizes understanding and Needs reinforcement    PLAN                                                                                                                           Suggestions for next session as indicated: LE stretches, lumbar stretches   Educate on hip hinge  Check feedback of IFC with heat       Therapy procedure time and total treatment time can be found documented on the Time Entry flowsheet     Worry: Not on file     Inability: Not on file    Transportation needs     Medical: Not on file     Non-medical: Not on file   Tobacco Use    Smoking status: Never Smoker    Smokeless tobacco: Never Used   Substance and Sexual Activity    Alcohol use: No    Drug use: No    Sexual activity: Not on file   Lifestyle    Physical activity     Days per week: Not on file     Minutes per session: Not on file    Stress: Not on file   Relationships    Social connections     Talks on phone: Not on file     Gets together: Not on file     Attends Holiness service: Not on file     Active member of club or organization: Not on file     Attends meetings of clubs or organizations: Not on file     Relationship status: Not on file    Intimate partner violence     Fear of current or ex partner: Not on file     Emotionally abused: Not on file     Physically abused: Not on file     Forced sexual activity: Not on file   Other Topics Concern    Not on file   Social History Narrative    Not on file        Current Outpatient Medications   Medication Sig Dispense Refill    ammonium lactate (AMLACTIN) 12 % cream Apply 1 Bottle topically as needed for Dry Skin Apply topically as needed.       Magnesium Oxide 400 MG CAPS Take 400 mg by mouth daily 30 capsule 3    insulin glargine (LANTUS SOLOSTAR) 100 UNIT/ML injection pen Inject 45units nightly and 40units in the morning 5 pen 3    insulin lispro (HUMALOG) 100 UNIT/ML injection vial Inject 10 Units into the skin 3 times daily (before meals) 10 units with Breakfast  + moderate correction scale  Lunch only moderate correction scale  6 units with dinner + moderate correction scale   Moderate correction scale : 200 - 250  = 3 units, 251- 300 = 6 units, 301 - 350 = 10 units, and over 350 = 16 units  Max dose per day 64 units  Per Dr Shelley Melchor - [facility] - 4/8/2020      sertraline (ZOLOFT) 100 MG tablet TAKE ONE TABLET BY MOUTH DAILY 30 tablet 0    raloxifene (EVISTA) 60 MG tablet Penicillins Hives and Rash    Tape Mukherjee Benes Tape] Rash     OK to use paper tape per patient        REVIEW OF SYSTEMS:     Review of Systems   Constitutional: Positive for fatigue. Negative for chills, diaphoresis, fever and unexpected weight change. HENT: Negative for congestion, ear discharge, ear pain, hearing loss, sinus pain and sore throat. Eyes: Negative for photophobia, pain, discharge, redness and visual disturbance. Respiratory: Negative for cough and shortness of breath. Cardiovascular: Negative for chest pain, palpitations and leg swelling. Gastrointestinal: Negative for abdominal pain, constipation, diarrhea, nausea and vomiting. Endocrine: Negative for polydipsia and polyuria. Genitourinary: Negative for difficulty urinating and hematuria. Musculoskeletal: Positive for arthralgias, back pain and gait problem. Negative for neck pain. Skin: Negative for pallor and rash. Neurological: Positive for dizziness, weakness and headaches. Negative for tremors, seizures, syncope, facial asymmetry, speech difficulty, light-headedness and numbness. Hematological: Does not bruise/bleed easily. Psychiatric/Behavioral: Positive for confusion, decreased concentration, dysphoric mood and sleep disturbance. Negative for agitation, behavioral problems and hallucinations. VITALS  /67 (Site: Left Upper Arm, Position: Sitting, Cuff Size: Medium Adult)   Pulse 78   Temp 97.3 °F (36.3 °C) (Temporal)   Ht 5' 1\" (1.549 m)   Wt 175 lb (79.4 kg)   LMP 01/01/2002   SpO2 94%   BMI 33.07 kg/m²      PHYSICAL EXAMINATION:     Constitutional: Well developed, well nourished and in no acute distress. Head:  normocephalic, atraumatic. Neck: supple, no carotid bruits, thyroid not palpable  Respiratory: Clear to auscultation bilaterally with no use of accessory muscles during respiration. Cardiovascular: normal rate, regular rhythm, no murmur, gallop, rub.   Abdomen: Soft, nontender, evaluation of cognitive impairment and chronic migraine headaches. · Cognitive impairment- Unclear etiology, would like to evaluate further with MRI Brain and Neuropsychological testing.  MMSE in the clinic: 20 /30   Chronic headaches   Chronic migraine headaches   Hypertension   Hyperlipidemia   Fibromyalgia   Asthma   Renal cancer-right   Diabetes   Kidney stones   Osteoarthritis      Vitamin B12 724 on 5/2/2020  TSH 1.86 on 5/2/2020    PLAN:    MRI Brain to rule out any structural or vascular etiology contributing to patient's cognitive impairment, episodes of confusion and headaches.  EEG to rule out any epileptiform discharges   Will check VDRL, CBC CMP   Neuropsychological evaluation for cognitive impairment.  Magnesium oxide 400 mg daily for headache prophylaxis   Tylenol 500 mg as needed for moderate to severe headaches. No more than 14 days in a month to avoid medication overuse headache   Syncopal episodes-Holter monitor and 2D echo   May consider tilt table test if needed as per above evaluations   Will consider starting Donepezil if needed in next visit as per above test results.  Follow up in the clinic in 6 weeks   Instructed patient to call the clinic if symptoms worsen or develop any new symptoms. I have spent 60 minutes face to face with the patient more than 50% of this time was spent counseling on the following healthy behaviors: medical compliance, smoking cessation, blood pressure control and coordinating care.     Electronically signed by Jose Luis Cook MD on 6/23/2020 at 4:40 PM

## 2025-06-17 NOTE — PATIENT INSTRUCTIONS
Spoke to patient to give results : per Dr Palacio A1C now controlled at 6.7. Rest of labs stable. Continue same meds.   Patient understood   People with confirmed COVID-19 who were hospitalized and determined to be medically stable to go home    Your healthcare provider and public health staff will evaluate whether you can be cared for at home. If it is determined that you do not need to be hospitalized and can be isolated at home, you will be monitored by staff from your local or state health department. You should follow the prevention steps below until a healthcare provider or local or state health department says you can return to your normal activities. Stay home except to get medical care  People who are mildly ill with COVID-19 are able to isolate at home during their illness. You should restrict activities outside your home, except for getting medical care. Do not go to work, school, or public areas. Avoid using public transportation, ride-sharing, or taxis. Separate yourself from other people and animals in your home  People: As much as possible, you should stay in a specific room and away from other people in your home. Also, you should use a separate bathroom, if available. Animals: You should restrict contact with pets and other animals while you are sick with COVID-19, just like you would around other people. Although there have not been reports of pets or other animals becoming sick with COVID-19, it is still recommended that people sick with COVID-19 limit contact with animals until more information is known about the virus. When possible, have another member of your household care for your animals while you are sick. If you are sick with COVID-19, avoid contact with your pet, including petting, snuggling, being kissed or licked, and sharing food. If you must care for your pet or be around animals while you are sick, wash your hands before and after you interact with pets and wear a facemask.   Call ahead before visiting your doctor  If you have a medical appointment, call the healthcare provider and tell them that you have or may have

## (undated) DEVICE — DRAPE IRRIG FLD WRM W44XL44IN W/ AORN STD PRTBL INTRATEMP

## (undated) DEVICE — BANDAGE ADH W0.75XL3IN NAT PLAS CURAD

## (undated) DEVICE — NEEDLE SPNL 18GA L3.5IN W/ QNCKE SHARPER BVL DURA CLICK

## (undated) DEVICE — DRAPE,REIN 53X77,STERILE: Brand: MEDLINE

## (undated) DEVICE — BONE TAMP KIT KPX203PB FF X2 20/3 1 STP: Brand: KYPHOPAK™ TRAY

## (undated) DEVICE — BONE TAMP KIT KEX152EB FF E2 15/2 OI: Brand: KYPHON EXPRESS II KYPHOPAK TRAY

## (undated) DEVICE — SYRINGE A08E KIS INFLATION HP: Brand: KYPHON®  INFLATION SYRINGE

## (undated) DEVICE — TOWEL,OR,DSP,ST,BLUE,STD,6/PK,12PK/CS: Brand: MEDLINE

## (undated) DEVICE — JACKSON / MODULAR SPINAL TABLE STYLE POSITIONING KIT - STANDARD: Brand: SOULE MEDICAL

## (undated) DEVICE — SYRINGE, LUER LOCK, 10ML: Brand: MEDLINE

## (undated) DEVICE — 3M™ WARMING BLANKET, LOWER BODY, 10 PER CASE, 42568: Brand: BAIR HUGGER™

## (undated) DEVICE — DRAPE,EXTREMITY,89X128,STERILE: Brand: MEDLINE

## (undated) DEVICE — COUNTER NDL 10 COUNT HLD 20 FOAM BLK SGL MAG

## (undated) DEVICE — BONE TAMP KIT KEX152NB AF E2 15/2: Brand: KYPHON EXPRESS II KYPHOPAK TRAY

## (undated) DEVICE — COVER,TABLE,60X90,STERILE: Brand: MEDLINE

## (undated) DEVICE — SPONGE LAP W18XL18IN WHT COT 4 PLY FLD STRUNG RADPQ DISP ST

## (undated) DEVICE — CURETTE A13A SIZE 2 T-TIP: Brand: KYPHON® EXPRESS ™ CURETTE

## (undated) DEVICE — COVER,MAYO STAND,STERILE: Brand: MEDLINE

## (undated) DEVICE — SOLUTION IV IRRIG WATER 1000ML POUR BRL 2F7114

## (undated) DEVICE — SUTURE VCRL + SZ 2-0 L27IN ABSRB UD CP-1 1/2 CIR REV CUT VCP266H

## (undated) DEVICE — TUBING, SUCTION, 9/32" X 20', STRAIGHT: Brand: MEDLINE INDUSTRIES, INC.

## (undated) DEVICE — GLOVE SURG SZ 8 L12IN FNGR THK79MIL GRN LTX FREE

## (undated) DEVICE — BANDAGE COMPR W6INXL5YD SELF ADH COHESIVE CO FLX

## (undated) DEVICE — MARKER,SKIN,WI/RULER AND LABELS: Brand: MEDLINE

## (undated) DEVICE — GOWN,AURORA,NONREINFORCED,LARGE: Brand: MEDLINE

## (undated) DEVICE — 3M™ IOBAN™ 2 ANTIMICROBIAL INCISE DRAPE 6650EZ: Brand: IOBAN™ 2

## (undated) DEVICE — CHLORAPREP 26ML ORANGE

## (undated) DEVICE — DRAPE C ARM W104XL188CM FLD MOB XR

## (undated) DEVICE — STOCKINETTE,IMPERVIOUS,12X48,STERILE: Brand: MEDLINE

## (undated) DEVICE — COVER,C-ARM,41X74: Brand: MEDLINE

## (undated) DEVICE — GLOVE SURG SZ 8 L12IN FNGR THK13MIL BRN LTX SYN POLYMER W

## (undated) DEVICE — PAD,NON-ADHERENT,3X8,STERILE,LF,1/PK: Brand: MEDLINE

## (undated) DEVICE — 3M™ WARMING BLANKET, UPPER BODY, 10 PER CASE, 42268: Brand: BAIR HUGGER™

## (undated) DEVICE — SHEET, T, LAPAROTOMY, STERILE: Brand: MEDLINE

## (undated) DEVICE — SOLUTION IV IRRIG POUR BRL 0.9% SODIUM CHL 2F7124

## (undated) DEVICE — INTENDED FOR TISSUE SEPARATION, AND OTHER PROCEDURES THAT REQUIRE A SHARP SURGICAL BLADE TO PUNCTURE OR CUT.: Brand: BARD-PARKER ® CARBON RIB-BACK BLADES

## (undated) DEVICE — MIXER A07A CEMENT

## (undated) DEVICE — Device

## (undated) DEVICE — DRAPE,U/ SHT,SPLIT,PLAS,STERIL: Brand: MEDLINE

## (undated) DEVICE — GLOVE SURG SZ 8 L12IN FNGR THK11.8MIL KHAKI LTX BEAD CUF LT